# Patient Record
Sex: FEMALE | Race: WHITE | Employment: OTHER | URBAN - METROPOLITAN AREA
[De-identification: names, ages, dates, MRNs, and addresses within clinical notes are randomized per-mention and may not be internally consistent; named-entity substitution may affect disease eponyms.]

---

## 2017-01-12 PROBLEM — Z01.810 PRE-OPERATIVE CARDIOVASCULAR EXAMINATION: Status: ACTIVE | Noted: 2017-01-12

## 2017-01-12 PROBLEM — R06.02 SHORTNESS OF BREATH: Status: ACTIVE | Noted: 2017-01-12

## 2017-01-27 ENCOUNTER — HOSPITAL ENCOUNTER (OUTPATIENT)
Dept: MAMMOGRAPHY | Age: 77
Discharge: HOME | End: 2017-01-27
Attending: INTERNAL MEDICINE
Payer: MEDICARE

## 2017-01-27 DIAGNOSIS — Z12.31 VISIT FOR SCREENING MAMMOGRAM: ICD-10-CM

## 2017-01-27 PROCEDURE — 77067 SCR MAMMO BI INCL CAD: CPT

## 2017-04-04 ENCOUNTER — HOSPITAL ENCOUNTER (OUTPATIENT)
Dept: PHYSICAL THERAPY | Age: 77
Discharge: HOME | End: 2017-04-04
Payer: MEDICARE

## 2017-04-04 PROCEDURE — G8978 MOBILITY CURRENT STATUS: HCPCS

## 2017-04-04 PROCEDURE — 97162 PT EVAL MOD COMPLEX 30 MIN: CPT

## 2017-04-04 PROCEDURE — 97110 THERAPEUTIC EXERCISES: CPT

## 2017-04-04 PROCEDURE — G8979 MOBILITY GOAL STATUS: HCPCS

## 2017-04-04 NOTE — PROGRESS NOTES
Ambulatory/Rehab Services H2 Model Falls Risk Assessment    Risk Factor Pts. ·   Confusion/Disorientation/Impulsivity  []    4 ·   Symptomatic Depression  []   2 ·   Altered Elimination  []   1 ·   Dizziness/Vertigo  []   1 ·   Gender (Male)  []   1 ·   Any administered antiepileptics (anticonvulsants):  []   2 ·   Any administered benzodiazepines:  []   1 ·   Visual Impairment (specify):  []   1 ·   Portable Oxygen Use  []   1 ·   Orthostatic ? BP  []   1 ·   History of Recent Falls (within 3 mos.)  []   5     Ability to Rise from Chair (choose one) Pts. ·   Ability to rise in a single movement  [x]   0 ·   Pushes up, successful in one attempt  []   1 ·   Multiple attempts, but successful  []   3 ·   Unable to rise without assistance  []   4   Total: (5 or greater = High Risk) 0     Falls Prevention Plan:   []                Physical Limitations to Exercise (specify):   []                Mobility Assistance Device (type):   []                Exercise/Equipment Adaptation (specify):    ©2010 [facility] [address] [place] [address] Patent #[id number].  Federal Law prohibits the replication, distribution or use without written permission from [facility]

## 2017-04-04 NOTE — PROGRESS NOTES
Keiko Eugene  : [id number] [place] @ [address] [place] [address].  [phone]   [phone]        OUTPATIENT PHYSICAL THERAPY:Initial Assessment 2017      ICD-10: Treatment Diagnosis: Effusion, right hip (M25.451)Strain of muscle, fascia and tendon of the posterior muscle group at thigh level, left thigh, sequela (N22.963Y)  Precautions/Allergies:   Adhesive tape and Codeine  Fall Risk Score: 0 (? 5 = High Risk)  MD Orders: Eval and treat MEDICAL/REFERRING DIAGNOSIS:  Right hip pain [M25.551]   DATE OF ONSET: 2017  REFERRING PHYSICIAN: Paige Rob MD  RETURN PHYSICIAN APPOINTMENT: 5/15/2017     INITIAL ASSESSMENT:  Ms. Sobeida Shell presents with right hamstring muscle strain and spinal scoliosis. Nerve tension in the right leg was ruled out with negative slump and passive straight leg raise. She also demonstrates poor core and gluteal strength. She has secondary impairment of poor balance, increasing her fall risk. She will benefit from aquatic based therapy to improve strength and muscle flexibility. Recommend transition to land based therapy when she demonstrates improved functional strength and balance. She was educated on HEP to perform between sessions. PROBLEM LIST (Impacting functional limitations):  1. Decreased Strength  2. Decreased Transfer Abilities  3. Decreased Ambulation Ability/Technique  4. Decreased Balance  5. Increased Pain  6. Decreased Flexibility/Joint Mobility INTERVENTIONS PLANNED:  1. Balance Exercise  2. Electrical Stimulation  3. Gait Training  4. Heat  5. Home Exercise Program (HEP)  6. Manual Therapy  7. Neuromuscular Re-education/Strengthening  8. Range of Motion (ROM)  9. Therapeutic Activites  10. Therapeutic Exercise/Strengthening  11. Aquatics   TREATMENT PLAN:  Effective Dates: 2017 TO 2017.   Frequency/Duration: 2 times a week for 12 weeks  GOALS: (Goals have been discussed and agreed upon with patient.)  Short-Term Functional Goals: Time Frame: 2 weeks  1. Patient will be independent with HEP without pain. 2. Patient will report ability to turn while sleeping without pain. 3. Patient will reports ability to walk 1/2 mile without limp or increased pain. Discharge Goals: Time Frame: 12 weeks  1. Patient will have LEFS improved to 50/80 allowing improve community participation. 2. Patient will have mmt of bilateral LE improve to 5-/5 allowing her to ascend/descend flight of stairs without increased pain. 3. Patient will have BBS improved to > 44 decreasing her fall risk. Rehabilitation Potential For Stated Goals: Good  Regarding [facility] [employer] therapy, I certify that the treatment plan above will be carried out by a therapist or under their direction. Thank you for this referral,  Veronika Harrell PT       Referring Physician Signature: Jimmy Camarillo MD              Date                      HISTORY:   History of Present Injury/Illness (Reason for Referral):  63 y/o F with c/o LBP R hip pain lasting the last 2 months. She had chiropractor treatment that helped. Her pain is 7/10 low back and hip constant and is better than it was 2 months ago. . She had L THIERNO 1 year ago without PT. She reports poor balance but no falls. She has scoliosis that has gotten worse lately. Her pain limits her ability to walk > 1/2 mile and turn over in bed. Past Medical History/Comorbidities:   Ms. 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 She also has no past medical history of Adverse effect of anesthesia; Aneurysm ([address] Arrhythmia; Asthma; Autoimmune disease ([address] Cancer ([address] Chronic kidney disease; Chronic obstructive pulmonary disease ([address] Chronic pain; Coagulation disorder ([address] Diabetes ([address] Difficult intubation; Endocarditis; GERD (gastroesophageal reflux disease); Heart failure ([address] Ill-defined condition; Liver disease; Malignant hyperthermia due to anesthesia; Morbid obesity ([address] Pseudocholinesterase deficiency; Rheumatic fever; Seizures ([address] Sleep apnea; Stroke [facility]); or Thromboembolus ([address] Ms. Ann Marie Cook  has a past surgical history that includes appendectomy (1987); hysterectomy (1988); hip replacement (Left, 2007); bunionectomy (Bilateral, 2009); hammer toe repair (2010); lumbar laminectomy (2009); orthopaedic (2010); hip replacement (Left, 2009); knee replacement (Right, 2007); shoulder arthroscopy (2013); carpal tunnel release (Right, 10/31/13); and breast biopsy (Left, 1980). Social History/Living Environment:     no stairs, lives with spouse  Prior Level of Function/Work/Activity:  Independent  Dominant Side:         RIGHT  Current Medications:    Current Outpatient Prescriptions:     LORazepam (ATIVAN) 0.5 mg tablet, Take 0.5 mg by mouth., Disp: , Rfl:     estradiol (ESTRACE) 1 mg tablet, Take 1 mg by mouth., Disp: , Rfl:     simvastatin (ZOCOR) 10 mg tablet, Take 10 mg by mouth nightly., Disp: , Rfl:     PARoxetine (PAXIL) 20 mg tablet, Take 20 mg by mouth daily. Take / use AM day of surgery  per anesthesia protocols. , Disp: , Rfl:     cetirizine (ALLER-TALON) 10 mg tablet, Take 10 mg by mouth daily. Take / use AM day of surgery  per anesthesia protocols. , Disp: , Rfl:     fluticasone (FLONASE) 50 mcg/actuation nasal spray, 2 Sprays by Both Nostrils route daily.  Take morning of surgery per anesthesia guidelines. , Disp: , Rfl:    Date Last Reviewed:  4/4/2017   # of Personal Factors/Comorbidities that affect the Plan of Care: 3+: HIGH COMPLEXITY   EXAMINATION:   Observation/Orthostatic Postural Assessment:          Scoliosis concavity T/S to right with severe right sided shoulder shift. Palpation:          WNL  ROM:     Hip and Lumbar AROM WNL without pain  Right hamstring severe muscle flexibility resitrction                  Strength:     Date:  4/4/2017 Date:   Date:     LE MMT Left Right Left Right Left Right   Hip Flex (L1/L2) 3+ 3+       Hip Abd (glut med) 3 3       Hip Ext 3+ 3+       Quad    ( L3/4) 4+ 4+       Hamstring         Anterior Tib (L4/L5)         Gastroc (S1/2)         EHL (L5)                             Special Tests: n/a  Neurological Screen:        Sensation: light touch intact B LE  Functional Mobility:         Gait/Ambulation:  Right trendelenberg, flat foot contact B  Balance:          BBS 37        TUG 25 sec        5x STS 23 sec        SLB: unable B   Body Structures Involved:  1. Joints  2. Muscles Body Functions Affected:  1. Neuromusculoskeletal  2. Movement Related Activities and Participation Affected:  1. Mobility  2. Community, Social and [place]   # of elements that affect the Plan of Care: 3: MODERATE COMPLEXITY   CLINICAL PRESENTATION:   Presentation: Evolving clinical presentation with changing clinical characteristics: MODERATE COMPLEXITY   CLINICAL DECISION MAKING:   Outcome Measure: Tool Used: Lower Extremity Functional Scale (LEFS)  Score:  Initial: 39/80 Most Recent: X/80 (Date: -- )   Interpretation of Score: 20 questions each scored on a 5 point scale with 0 representing \"extreme difficulty or unable to perform\" and 4 representing \"no difficulty\". The lower the score, the greater the functional disability. 80/80 represents no disability. Minimal detectable change is 9 points.   Score 80 79-63 62-48 47-32 31-16 15-1 0 Modifier CH CI CJ CK CL CM CN     ? Mobility - Walking and Moving Around:     - CURRENT STATUS: CK - 40%-59% impaired, limited or restricted    - GOAL STATUS: CI - 1%-19% impaired, limited or restricted    - D/C STATUS:  ---------------To be determined---------------    Medical Necessity:   · Patient is expected to demonstrate progress in strength, range of motion, balance, coordination and functional technique to decrease assistance required with functional mobility. .  Reason for Services/Other Comments:  · Patient continues to require skilled intervention due to progressing toward goals. .   Use of outcome tool(s) and clinical judgement create a POC that gives a: Questionable prediction of patient's progress: MODERATE COMPLEXITY   TREATMENT:   (In addition to Assessment/Re-Assessment sessions the following treatments were rendered)  MODALITIES: (see grid for  minutes): *  Electrical Stimulation Therapy (IFC) was provided with intensity adjusted throughout treatment to patient tolerance. to reduce pain. *  Cold Pack Therapy in order to provide analgesia and reduce inflammation and edema. *  Hot Pack Therapy in order to relieve muscle spasm. Therapeutic Exercise: (see flow sheet below for minutes) ( ):  Exercises per grid below to improve mobility, strength, balance, coordination and dynamic movement of buttock - right, hip - right and thigh - right to improve functional bending and gait, stairs, transfers. Required moderate visual, verbal, manual and tactile cues to promote proper body alignment, promote proper body posture, promote proper body mechanics and promote proper body breathing techniques. Progressed resistance, range, repetitions and complexity of movement as indicated. Aquatic Therapy (see flow sheet below for minutes):  Aquatic treatment performed per flow grid for Decreased muscle strength, Decreased static/dynamic balance and reactive control, Decreased activity endurance, Ease of movement and Low impact and reduced weight bearing activity. Cues provided for body mechanics. Assistance by therapist provided for safety. Patient has difficulty with body mechanics. Date: 4/4/2017       Modalities:                                Manual Therapy:                                Aquatic Exercise:        Gait F/B/S/M        Heel/Toe walk        4-way        PF/DF        Hamstring Curls        Hip Flexion with knee ext. (rockette)        Squats          SLR march        Lunges        Hip circles        Hip horizontal abduction/adduction        Core:          Core:        Deep well bike        Deep well jumping gilda        Deep well scissors        Deep well:  traction                Hamstring Stretch        Step Lunge        Piriformis stretch        PF Stretch        Balance: Single leg Stance        Balance: Tandem                          Therapeutic Exercise: 15 mins       Active HS stretch 30x       Fig 4 stretch 3x30\"       Knee press single leg 10\"x5       PPT 10\"x5       SL clams 3x10               F=forward  B=Backward  S=sidesteps  M=marches    H=hold    Manual: (see flow sheet above for minutes) to reduce pain  Modalities: (see flow sheet above for minutes) to reduce pain    HEP: Handout provided 4/4/2017  Treatment/Session Assessment:  She tolerated rx without c/o of pain. She had moderate difficulty with body mechanics. · Pain/ Symptoms: Initial:   7/10 Post Session:  5/10 ·   Compliance with Program/Exercises: Will assess as treatment progresses. · Recommendations/Intent for next treatment session: \"Next visit will focus on advancements to more challenging activities\".   Total Treatment Duration:  PT Patient Time In/Time Out  Time In: 1340  Time Out: [address], PT

## 2017-04-05 NOTE — PROGRESS NOTES
Jossie Esquivel  : 1940 [id number] @ [place] [address].  [phone]   [phone]        OUTPATIENT PHYSICAL THERAPY:Initial Assessment 2017      ICD-10: Treatment Diagnosis: Effusion, right hip (M25.451)Strain of muscle, fascia and tendon of the posterior muscle group at thigh level, left thigh, sequela (N40.859G)  Precautions/Allergies:   Adhesive tape and Codeine  Fall Risk Score: 0 (? 5 = High Risk)  MD Orders: Eval and treat MEDICAL/REFERRING DIAGNOSIS:  Right hip pain [M25.551]   DATE OF ONSET: 2017  REFERRING PHYSICIAN: Mateo Almonte MD  RETURN PHYSICIAN APPOINTMENT: 5/15/2017     INITIAL ASSESSMENT:  Ms. Robbie Lee presents with right hamstring muscle strain and spinal scoliosis. Nerve tension in the right leg was ruled out with negative slump and passive straight leg raise. She also demonstrates poor core and gluteal strength. She has secondary impairment of poor balance, increasing her fall risk. She will benefit from aquatic based therapy to improve strength and muscle flexibility. Recommend transition to land based therapy when she demonstrates improved functional strength and balance. She was educated on HEP to perform between sessions. PROBLEM LIST (Impacting functional limitations):  1. Decreased Strength  2. Decreased Transfer Abilities  3. Decreased Ambulation Ability/Technique  4. Decreased Balance  5. Increased Pain  6. Decreased Flexibility/Joint Mobility INTERVENTIONS PLANNED:  1. Balance Exercise  2. Electrical Stimulation  3. Gait Training  4. Heat  5. Home Exercise Program (HEP)  6. Manual Therapy  7. Neuromuscular Re-education/Strengthening  8. Range of Motion (ROM)  9. Therapeutic Activites  10. Therapeutic Exercise/Strengthening  11. Aquatics   TREATMENT PLAN:  Effective Dates: 2017 TO 2017.   Frequency/Duration: 2 times a week for 12 weeks  GOALS: (Goals have been discussed and agreed upon with patient.)  Short-Term Functional Goals: Time Frame: 2 weeks  1. Patient will be independent with HEP without pain. 2. Patient will report ability to turn while sleeping without pain. 3. Patient will reports ability to walk 1/2 mile without limp or increased pain. Discharge Goals: Time Frame: 12 weeks  1. Patient will have LEFS improved to 50/80 allowing improve community participation. 2. Patient will have mmt of bilateral LE improve to 5-/5 allowing her to ascend/descend flight of stairs without increased pain. 3. Patient will have BBS improved to > 44 decreasing her fall risk. Rehabilitation Potential For Stated Goals: Good                HISTORY:   History of Present Injury/Illness (Reason for Referral):  63 y/o F with c/o LBP R hip pain lasting the last 2 months. She had chiropractor treatment that helped. Her pain is 7/10 low back and hip constant and is better than it was 2 months ago. . She had L THIERNO 1 year ago without PT. She reports poor balance but no falls. She has scoliosis that has gotten worse lately. Her pain limits her ability to walk > 1/2 mile and turn over in bed. Past Medical History/Comorbidities:   Ms. Nano Culver  has a past medical history of Arthritis; Malagon's esophagus; Chest discomfort; Coronary atherosclerosis of native coronary vessel; Degeneration of lumbar or lumbosacral intervertebral disc; Depression; Gastric ulcer, unspecified as acute or chronic, without mention of hemorrhage or perforation (1989); Hyperlipidemia; Hypothyroidism; Infectious disease; Nausea & vomiting; Palpitations; Scoliosis (and kyphoscoliosis), idiopathic; Spinal stenosis, lumbar (12/7/2009); Status post right hip replacement (2/26/2016); Thoracic or lumbosacral neuritis or radiculitis, unspecified; Tricuspid regurgitation; and Tricuspid valve disorder. She also has no past medical history of Adverse effect of anesthesia; Aneurysm ([address] Arrhythmia; Asthma;  Autoimmune disease ([address] [address] Chronic kidney disease; Chronic obstructive pulmonary disease ([facility] [address] Chronic pain; Coagulation disorder ([facility] [address] Diabetes ([facility] [address] Difficult intubation; Endocarditis; GERD (gastroesophageal reflux disease); Heart failure ([facility] [address] Ill-defined condition; Liver disease; Malignant hyperthermia due to anesthesia; Morbid obesity ([facility] [address] Pseudocholinesterase deficiency; Rheumatic fever; Seizures ([facility] [address] Sleep apnea; Stroke [facility]); or Thromboembolus ([facility] [address] Ms. Shalonda Olivas  has a past surgical history that includes appendectomy (1987); hysterectomy (1988); hip replacement (Left, 2007); bunionectomy (Bilateral, 2009); hammer toe repair (2010); lumbar laminectomy (2009); orthopaedic (2010); hip replacement (Left, 2009); knee replacement (Right, 2007); shoulder arthroscopy (2013); carpal tunnel release (Right, 10/31/13); and breast biopsy (Left, 1980). Social History/Living Environment:     no stairs, lives with spouse  Prior Level of Function/Work/Activity:  Independent  Dominant Side:         RIGHT  Current Medications:    Current Outpatient Prescriptions:     LORazepam (ATIVAN) 0.5 mg tablet, Take 0.5 mg by mouth., Disp: , Rfl:     estradiol (ESTRACE) 1 mg tablet, Take 1 mg by mouth., Disp: , Rfl:     simvastatin (ZOCOR) 10 mg tablet, Take 10 mg by mouth nightly., Disp: , Rfl:     PARoxetine (PAXIL) 20 mg tablet, Take 20 mg by mouth daily. Take / use AM day of surgery  per anesthesia protocols. , Disp: , Rfl:     cetirizine (ALLER-TALON) 10 mg tablet, Take 10 mg by mouth daily. Take / use AM day of surgery  per anesthesia protocols. , Disp: , Rfl:     fluticasone (FLONASE) 50 mcg/actuation nasal spray, 2 Sprays by Both Nostrils route daily. Take morning of surgery per anesthesia guidelines. , Disp: , Rfl:    Date Last Reviewed:  4/6/2017   EXAMINATION:   Observation/Orthostatic Postural Assessment:          Scoliosis concavity T/S to right with severe right sided shoulder shift.    Palpation:          WNL  ROM:     Hip and Lumbar AROM WNL without pain  Right hamstring severe muscle flexibility resitrction                  Strength:     Date:  4/4/2017 Date:   Date:     LE MMT Left Right Left Right Left Right   Hip Flex (L1/L2) 3+ 3+       Hip Abd (glut med) 3 3       Hip Ext 3+ 3+       Quad    ( L3/4) 4+ 4+       Hamstring         Anterior Tib (L4/L5)         Gastroc (S1/2)         EHL (L5)                             Special Tests: n/a  Neurological Screen:        Sensation: light touch intact B LE  Functional Mobility:         Gait/Ambulation:  Right trendelenberg, flat foot contact B  Balance:          BBS 37        TUG 25 sec        5x STS 23 sec        SLB: unable B   Body Structures Involved:  1. Joints  2. Muscles Body Functions Affected:  1. Neuromusculoskeletal  2. Movement Related Activities and Participation Affected:  1. Mobility  2. Community, Social and Civic Life   CLINICAL PRESENTATION:   CLINICAL DECISION MAKING:   Outcome Measure: Tool Used: Lower Extremity Functional Scale (LEFS)  Score:  Initial: 39/80 Most Recent: X/80 (Date: -- )   Interpretation of Score: 20 questions each scored on a 5 point scale with 0 representing \"extreme difficulty or unable to perform\" and 4 representing \"no difficulty\". The lower the score, the greater the functional disability. 80/80 represents no disability. Minimal detectable change is 9 points. Score 80 79-63 62-48 47-32 31-16 15-1 0   Modifier CH CI CJ CK CL CM CN     ? Mobility - Walking and Moving Around:     - CURRENT STATUS: CK - 40%-59% impaired, limited or restricted    - GOAL STATUS: CI - 1%-19% impaired, limited or restricted    - D/C STATUS:  ---------------To be determined---------------    Medical Necessity:   · Patient is expected to demonstrate progress in strength, range of motion, balance, coordination and functional technique to decrease assistance required with functional mobility.  .  Reason for Services/Other Comments:  · Patient continues to require skilled intervention due to progressing toward goals. .   TREATMENT:   (In addition to Assessment/Re-Assessment sessions the following treatments were rendered)  MODALITIES: (see grid for  minutes): *  Electrical Stimulation Therapy (IFC) was provided with intensity adjusted throughout treatment to patient tolerance. to reduce pain. *  Cold Pack Therapy in order to provide analgesia and reduce inflammation and edema. *  Hot Pack Therapy in order to relieve muscle spasm. Therapeutic Exercise: (see flow sheet below for minutes) ( ):  Exercises per grid below to improve mobility, strength, balance, coordination and dynamic movement of buttock - right, hip - right and thigh - right to improve functional bending and gait, stairs, transfers. Required moderate visual, verbal, manual and tactile cues to promote proper body alignment, promote proper body posture, promote proper body mechanics and promote proper body breathing techniques. Progressed resistance, range, repetitions and complexity of movement as indicated. Aquatic Therapy (see flow sheet below for minutes): Aquatic treatment performed per flow grid for Decreased muscle strength, Decreased static/dynamic balance and reactive control, Decreased activity endurance, Ease of movement and Low impact and reduced weight bearing activity. Cues provided for body mechanics. Assistance by therapist provided for safety. Patient has difficulty with body mechanics. Date: 4/4/2017  (visit 1) 4/6/2017  (visit 2)      Modalities:                                Manual Therapy:                                Aquatic Exercise:  35 mins 1.5#      Gait F/B/S/M  3 laps      Heel/Toe walk  30x       Squats  30x      4-way        PF/DF        Hamstring Curls        Hip Flexion with knee ext.   (rockette)        Squats          SLR march        Lunges        Hip circles        Hip horizontal abduction/adduction        Core:    Paddles open 30x PDs      Core:        Deep well bike  5 min      Deep well jumping gilda        Deep well scissors        Deep well:  traction                Hamstring Stretch        Step Lunge        Piriformis stretch        PF Stretch        Balance: Single leg Stance        Balance: Tandem                          Therapeutic Exercise: 15 mins       Active HS stretch 30x       Fig 4 stretch 3x30\"       Knee press single leg 10\"x5       PPT 10\"x5       SL clams 3x10               F=forward  B=Backward  S=sidesteps  M=marches    H=hold    Manual: (see flow sheet above for minutes) to reduce pain  Modalities: (see flow sheet above for minutes) to reduce pain    HEP: Handout provided 4/4/2017   Treatment/Session Assessment:  She reported some soreness after first session that went away after a day. She was able to perform HEP 3 times yesterday without pain. She tolerated exercise in pool without pain. · Pain/ Symptoms: Initial:   6/10 Post Session:  5/10 ·   Compliance with Program/Exercises: Will assess as treatment progresses. · Recommendations/Intent for next treatment session: \"Next visit will focus on advancements to more challenging activities\".   Total Treatment Duration:  PT Patient Time In/Time Out  Time In: 4856  Time Out: [address] [facility],

## 2017-04-06 ENCOUNTER — HOSPITAL ENCOUNTER (OUTPATIENT)
Dept: PHYSICAL THERAPY | Age: 77
Discharge: HOME | End: 2017-04-06
Payer: MEDICARE

## 2017-04-06 PROCEDURE — 97113 AQUATIC THERAPY/EXERCISES: CPT

## 2017-04-21 ENCOUNTER — HOSPITAL ENCOUNTER (OUTPATIENT)
Dept: PHYSICAL THERAPY | Age: 77
Discharge: HOME | End: 2017-04-21
Payer: MEDICARE

## 2017-04-21 PROCEDURE — 97113 AQUATIC THERAPY/EXERCISES: CPT

## 2017-04-21 NOTE — PROGRESS NOTES
Chyrl Lm  : 1940 [address] @ [address] [place] [address].  [phone]   [phone]        OUTPATIENT PHYSICAL THERAPY:Daily Note 2017      ICD-10: Treatment Diagnosis: Effusion, right hip (M25.451)Strain of muscle, fascia and tendon of the posterior muscle group at thigh level, left thigh, sequela (V80.354T)  Precautions/Allergies:   Adhesive tape and Codeine  Fall Risk Score: 0 (? 5 = High Risk)  MD Orders: Eval and treat MEDICAL/REFERRING DIAGNOSIS:  Right hip pain [M25.551]   DATE OF ONSET: 2017  REFERRING PHYSICIAN: Jimmy Camarillo MD  RETURN PHYSICIAN APPOINTMENT: 5/15/2017     INITIAL ASSESSMENT:  Ms. Bong Joseph presents with right hamstring muscle strain and spinal scoliosis. Nerve tension in the right leg was ruled out with negative slump and passive straight leg raise. She also demonstrates poor core and gluteal strength. She has secondary impairment of poor balance, increasing her fall risk. She will benefit from aquatic based therapy to improve strength and muscle flexibility. Recommend transition to land based therapy when she demonstrates improved functional strength and balance. She was educated on HEP to perform between sessions. PROBLEM LIST (Impacting functional limitations):  1. Decreased Strength  2. Decreased Transfer Abilities  3. Decreased Ambulation Ability/Technique  4. Decreased Balance  5. Increased Pain  6. Decreased Flexibility/Joint Mobility INTERVENTIONS PLANNED:  1. Balance Exercise  2. Electrical Stimulation  3. Gait Training  4. Heat  5. Home Exercise Program (HEP)  6. Manual Therapy  7. Neuromuscular Re-education/Strengthening  8. Range of Motion (ROM)  9. Therapeutic Activites  10. Therapeutic Exercise/Strengthening  11. Aquatics   TREATMENT PLAN:  Effective Dates: 2017 TO 2017.   Frequency/Duration: 2 times a week for 12 weeks  GOALS: (Goals have been discussed and agreed upon with patient.)  Short-Term Functional Goals: Time Frame: 2 weeks  1. Patient will be independent with HEP without pain. 2. Patient will report ability to turn while sleeping without pain. 3. Patient will reports ability to walk 1/2 mile without limp or increased pain. Discharge Goals: Time Frame: 12 weeks  1. Patient will have LEFS improved to 50/80 allowing improve community participation. 2. Patient will have mmt of bilateral LE improve to 5-/5 allowing her to ascend/descend flight of stairs without increased pain. 3. Patient will have BBS improved to > 44 decreasing her fall risk. Rehabilitation Potential For Stated Goals: Good                HISTORY:   History of Present Injury/Illness (Reason for Referral):  65 y/o F with c/o LBP R hip pain lasting the last 2 months. She had chiropractor treatment that helped. Her pain is 7/10 low back and hip constant and is better than it was 2 months ago. . She had L THEIRNO 1 year ago without PT. She reports poor balance but no falls. She has scoliosis that has gotten worse lately. Her pain limits her ability to walk > 1/2 mile and turn over in bed. Past Medical History/Comorbidities:   Ms. Miladys Fletcher  has a past medical history of Arthritis; Malagon's esophagus; Chest discomfort; Coronary atherosclerosis of native coronary vessel; Degeneration of lumbar or lumbosacral intervertebral disc; Depression; Gastric ulcer, unspecified as acute or chronic, without mention of hemorrhage or perforation (1989); Hyperlipidemia; Hypothyroidism; Infectious disease; Nausea & vomiting; Palpitations; Scoliosis (and kyphoscoliosis), idiopathic; Spinal stenosis, lumbar (12/7/2009); Status post right hip replacement (2/26/2016); Thoracic or lumbosacral neuritis or radiculitis, unspecified; Tricuspid regurgitation; and Tricuspid valve disorder. She also has no past medical history of Adverse effect of anesthesia; Aneurysm ([address] Arrhythmia; Asthma; Autoimmune disease ([address]  Cancer ([facility] [address] Chronic kidney disease; Chronic obstructive pulmonary disease ([address] Chronic pain; Coagulation disorder ([address] Diabetes ([address] Difficult intubation; Endocarditis; GERD (gastroesophageal reflux disease); Heart failure ([address] Ill-defined condition; Liver disease; Malignant hyperthermia due to anesthesia; Morbid obesity ([facility] [address] Pseudocholinesterase deficiency; Rheumatic fever; Seizures ([facility] [address] Sleep apnea; Stroke [facility]); or Thromboembolus ([facility] [address] Ms. Natasha Dawn  has a past surgical history that includes appendectomy (1987); hysterectomy (1988); hip replacement (Left, 2007); bunionectomy (Bilateral, 2009); hammer toe repair (2010); lumbar laminectomy (2009); orthopaedic (2010); hip replacement (Left, 2009); knee replacement (Right, 2007); shoulder arthroscopy (2013); carpal tunnel release (Right, 10/31/13); and breast biopsy (Left, 1980). Social History/Living Environment:     no stairs, lives with spouse  Prior Level of Function/Work/Activity:  Independent  Dominant Side:         RIGHT  Current Medications:    Current Outpatient Prescriptions:     LORazepam (ATIVAN) 0.5 mg tablet, Take 0.5 mg by mouth., Disp: , Rfl:     estradiol (ESTRACE) 1 mg tablet, Take 1 mg by mouth., Disp: , Rfl:     simvastatin (ZOCOR) 10 mg tablet, Take 10 mg by mouth nightly., Disp: , Rfl:     PARoxetine (PAXIL) 20 mg tablet, Take 20 mg by mouth daily. Take / use AM day of surgery  per anesthesia protocols. , Disp: , Rfl:     cetirizine (ALLER-TALON) 10 mg tablet, Take 10 mg by mouth daily. Take / use AM day of surgery  per anesthesia protocols. , Disp: , Rfl:     fluticasone (FLONASE) 50 mcg/actuation nasal spray, 2 Sprays by Both Nostrils route daily. Take morning of surgery per anesthesia guidelines. , Disp: , Rfl:    Date Last Reviewed:  4/21/2017   EXAMINATION:   Observation/Orthostatic Postural Assessment:          Scoliosis concavity T/S to right with severe right sided shoulder shift.    Palpation:          WNL  ROM:     Hip and Lumbar AROM WNL without pain  Right hamstring severe muscle flexibility resitrction                  Strength:     Date:  4/4/2017 Date:   Date:     LE MMT Left Right Left Right Left Right   Hip Flex (L1/L2) 3+ 3+       Hip Abd (glut med) 3 3       Hip Ext 3+ 3+       Quad    ( L3/4) 4+ 4+       Hamstring         Anterior Tib (L4/L5)         Gastroc (S1/2)         EHL (L5)                             Special Tests: n/a  Neurological Screen:        Sensation: light touch intact B LE  Functional Mobility:         Gait/Ambulation:  Right trendelenberg, flat foot contact B  Balance:          BBS 37        TUG 25 sec        5x STS 23 sec        SLB: unable B   Body Structures Involved:  1. Joints  2. Muscles Body Functions Affected:  1. Neuromusculoskeletal  2. Movement Related Activities and Participation Affected:  1. Mobility  2. Community, Social and Civic Life   CLINICAL PRESENTATION:   CLINICAL DECISION MAKING:   Outcome Measure: Tool Used: Lower Extremity Functional Scale (LEFS)  Score:  Initial: 39/80 Most Recent: X/80 (Date: -- )   Interpretation of Score: 20 questions each scored on a 5 point scale with 0 representing \"extreme difficulty or unable to perform\" and 4 representing \"no difficulty\". The lower the score, the greater the functional disability. 80/80 represents no disability. Minimal detectable change is 9 points. Score 80 79-63 62-48 47-32 31-16 15-1 0   Modifier CH CI CJ CK CL CM CN     ? Mobility - Walking and Moving Around:     - CURRENT STATUS: CK - 40%-59% impaired, limited or restricted    - GOAL STATUS: CI - 1%-19% impaired, limited or restricted    - D/C STATUS:  ---------------To be determined---------------    Medical Necessity:   · Patient is expected to demonstrate progress in strength, range of motion, balance, coordination and functional technique to decrease assistance required with functional mobility.  .  Reason for Services/Other Comments:  · Patient continues to require skilled intervention due to progressing toward goals. .   TREATMENT:   (In addition to Assessment/Re-Assessment sessions the following treatments were rendered)  MODALITIES: (see grid for  minutes): *  Electrical Stimulation Therapy (IFC) was provided with intensity adjusted throughout treatment to patient tolerance. to reduce pain. *  Cold Pack Therapy in order to provide analgesia and reduce inflammation and edema. *  Hot Pack Therapy in order to relieve muscle spasm. Therapeutic Exercise: (see flow sheet below for minutes) ( ):  Exercises per grid below to improve mobility, strength, balance, coordination and dynamic movement of buttock - right, hip - right and thigh - right to improve functional bending and gait, stairs, transfers. Required moderate visual, verbal, manual and tactile cues to promote proper body alignment, promote proper body posture, promote proper body mechanics and promote proper body breathing techniques. Progressed resistance, range, repetitions and complexity of movement as indicated. Aquatic Therapy (see flow sheet below for minutes): Aquatic treatment performed per flow grid for Decreased muscle strength, Decreased static/dynamic balance and reactive control, Decreased activity endurance, Ease of movement and Low impact and reduced weight bearing activity. Cues provided for body mechanics. Assistance by therapist provided for safety. Patient has difficulty with body mechanics. Date: 4/4/2017  (visit 1) 4/6/2017  (visit 2) 4/21/17     Modalities:                                Manual Therapy:                                Aquatic Exercise:  35 mins 1.5# 1.5# 55 min     Gait F/B/S/M  3 laps x4L     Heel/Toe walk  30x       Squats  30x x30     4-way   x10 BLE F/B only     PF/DF        Hamstring Curls   x2L (limited flexion)     Hip Flexion with knee ext.   (rockette)                  SLR march   x4L     Lunges        Hip circles        Hip horizontal abduction/adduction        Core:    Paddles open 30x PDs      Core:        Deep well bike  5 min 4 min     Deep well jumping gilda   2 min     Deep well scissors   2 min     Deep well:  traction   5 min             Hamstring Stretch        Step Lunge        Piriformis stretch        PF Stretch        Balance: Single leg Stance        Balance: Tandem                          Therapeutic Exercise: 15 mins       Active HS stretch 30x       Fig 4 stretch 3x30\"       Knee press single leg 10\"x5       PPT 10\"x5       SL clams 3x10               F=forward  B=Backward  S=sidesteps  M=marches    H=hold    Manual: (see flow sheet above for minutes)   Modalities: (see flow sheet above for minutes)     HEP: Handout provided 4/4/2017   Treatment/Session Assessment: Pt returns after not showing last two visits stating she did not know she had the appts. Continued aquatics with 1.5# and tolerated well. Had cramping in LLE with HS curl which subsided by limiting flexion motion. Plan to continue POC and progress as tolerated     · Pain/ Symptoms: Initial:   0/10 Pt reports no pain but states she has been nauseated since waking this morning, denies SOB or upset stomach. Post Session:  5/10 ·   Compliance with Program/Exercises: Will assess as treatment progresses. · Recommendations/Intent for next treatment session: \"Next visit will focus on advancements to more challenging activities\".   Total Treatment Duration:   PT Patient Time In/Time Out  Time In: 1100  Time Out: Brendan Stanford PTA

## 2017-04-26 ENCOUNTER — HOSPITAL ENCOUNTER (OUTPATIENT)
Dept: PHYSICAL THERAPY | Age: 77
Discharge: HOME | End: 2017-04-26
Payer: MEDICARE

## 2017-04-26 PROCEDURE — 97113 AQUATIC THERAPY/EXERCISES: CPT

## 2017-04-26 NOTE — PROGRESS NOTES
Samantha Patino  : 1940 [address] [facility],[address] @ [address]  [address] [place] [address].  [phone]   [phone]        OUTPATIENT PHYSICAL THERAPY:Daily Note 2017      ICD-10: Treatment Diagnosis: Effusion, right hip (M25.451)Strain of muscle, fascia and tendon of the posterior muscle group at thigh level, left thigh, sequela (M07.323L)  Precautions/Allergies:   Adhesive tape and Codeine  Fall Risk Score: 0 (? 5 = High Risk)  MD Orders: Eval and treat MEDICAL/REFERRING DIAGNOSIS:  Right hip pain [M25.551]   DATE OF ONSET: 2017  REFERRING PHYSICIAN: Aretha Osorio MD  RETURN PHYSICIAN APPOINTMENT: 5/15/2017     INITIAL ASSESSMENT:  Ms. Graciela Nguyen presents with right hamstring muscle strain and spinal scoliosis. Nerve tension in the right leg was ruled out with negative slump and passive straight leg raise. She also demonstrates poor core and gluteal strength. She has secondary impairment of poor balance, increasing her fall risk. She will benefit from aquatic based therapy to improve strength and muscle flexibility. Recommend transition to land based therapy when she demonstrates improved functional strength and balance. She was educated on HEP to perform between sessions. PROBLEM LIST (Impacting functional limitations):  1. Decreased Strength  2. Decreased Transfer Abilities  3. Decreased Ambulation Ability/Technique  4. Decreased Balance  5. Increased Pain  6. Decreased Flexibility/Joint Mobility INTERVENTIONS PLANNED:  1. Balance Exercise  2. Electrical Stimulation  3. Gait Training  4. Heat  5. Home Exercise Program (HEP)  6. Manual Therapy  7. Neuromuscular Re-education/Strengthening  8. Range of Motion (ROM)  9. Therapeutic Activites  10. Therapeutic Exercise/Strengthening  11. Aquatics   TREATMENT PLAN:  Effective Dates: 2017 TO 2017.   Frequency/Duration: 2 times a week for 12 weeks  GOALS: (Goals have been discussed and agreed upon with patient.)  Short-Term Functional Goals: Time Frame: 2 weeks  1. Patient will be independent with HEP without pain. 2. Patient will report ability to turn while sleeping without pain. 3. Patient will reports ability to walk 1/2 mile without limp or increased pain. Discharge Goals: Time Frame: 12 weeks  1. Patient will have LEFS improved to 50/80 allowing improve community participation. 2. Patient will have mmt of bilateral LE improve to 5-/5 allowing her to ascend/descend flight of stairs without increased pain. 3. Patient will have BBS improved to > 44 decreasing her fall risk. Rehabilitation Potential For Stated Goals: Good                HISTORY:   History of Present Injury/Illness (Reason for Referral):  65 y/o F with c/o LBP R hip pain lasting the last 2 months. She had chiropractor treatment that helped. Her pain is 7/10 low back and hip constant and is better than it was 2 months ago. . She had L THIERNO 1 year ago without PT. She reports poor balance but no falls. She has scoliosis that has gotten worse lately. Her pain limits her ability to walk > 1/2 mile and turn over in bed. Past Medical History/Comorbidities:   Ms. Ousmane Roa  has a past medical history of Arthritis; Malagon's esophagus; Chest discomfort; Coronary atherosclerosis of native coronary vessel; Degeneration of lumbar or lumbosacral intervertebral disc; Depression; Gastric ulcer, unspecified as acute or chronic, without mention of hemorrhage or perforation (1989); Hyperlipidemia; Hypothyroidism; Infectious disease; Nausea & vomiting; Palpitations; Scoliosis (and kyphoscoliosis), idiopathic; Spinal stenosis, lumbar (12/7/2009); Status post right hip replacement (2/26/2016); Thoracic or lumbosacral neuritis or radiculitis, unspecified; Tricuspid regurgitation; and Tricuspid valve disorder. She also has no past medical history of Adverse effect of anesthesia; Aneurysm ([address] Arrhythmia; Asthma; Autoimmune disease ([address]  Cancer ([facility] [address] Chronic kidney disease; Chronic obstructive pulmonary disease ([address] Chronic pain; Coagulation disorder ([facility] [address] Diabetes ([address] Difficult intubation; Endocarditis; GERD (gastroesophageal reflux disease); Heart failure ([address] Ill-defined condition; Liver disease; Malignant hyperthermia due to anesthesia; Morbid obesity ([facility] [address] Pseudocholinesterase deficiency; Rheumatic fever; Seizures ([facility] [address] Sleep apnea; Stroke [facility]); or Thromboembolus ([facility] [address] Ms. Graciela Nguyen  has a past surgical history that includes appendectomy (1987); hysterectomy (1988); hip replacement (Left, 2007); bunionectomy (Bilateral, 2009); hammer toe repair (2010); lumbar laminectomy (2009); orthopaedic (2010); hip replacement (Left, 2009); knee replacement (Right, 2007); shoulder arthroscopy (2013); carpal tunnel release (Right, 10/31/13); and breast biopsy (Left, 1980). Social History/Living Environment:     no stairs, lives with spouse  Prior Level of Function/Work/Activity:  Independent  Dominant Side:         RIGHT  Current Medications:    Current Outpatient Prescriptions:     LORazepam (ATIVAN) 0.5 mg tablet, Take 0.5 mg by mouth., Disp: , Rfl:     estradiol (ESTRACE) 1 mg tablet, Take 1 mg by mouth., Disp: , Rfl:     simvastatin (ZOCOR) 10 mg tablet, Take 10 mg by mouth nightly., Disp: , Rfl:     PARoxetine (PAXIL) 20 mg tablet, Take 20 mg by mouth daily. Take / use AM day of surgery  per anesthesia protocols. , Disp: , Rfl:     cetirizine (ALLER-TALON) 10 mg tablet, Take 10 mg by mouth daily. Take / use AM day of surgery  per anesthesia protocols. , Disp: , Rfl:     fluticasone (FLONASE) 50 mcg/actuation nasal spray, 2 Sprays by Both Nostrils route daily. Take morning of surgery per anesthesia guidelines. , Disp: , Rfl:    Date Last Reviewed:  4/26/2017   EXAMINATION:   Observation/Orthostatic Postural Assessment:          Scoliosis concavity T/S to right with severe right sided shoulder shift.    Palpation:          WNL  ROM:     Hip and Lumbar AROM WNL without pain  Right hamstring severe muscle flexibility resitrction                  Strength:     Date:  4/4/2017 Date:   Date:     LE MMT Left Right Left Right Left Right   Hip Flex (L1/L2) 3+ 3+       Hip Abd (glut med) 3 3       Hip Ext 3+ 3+       Quad    ( L3/4) 4+ 4+       Hamstring         Anterior Tib (L4/L5)         Gastroc (S1/2)         EHL (L5)                             Special Tests: n/a  Neurological Screen:        Sensation: light touch intact B LE  Functional Mobility:         Gait/Ambulation:  Right trendelenberg, flat foot contact B  Balance:          BBS 37        TUG 25 sec        5x STS 23 sec        SLB: unable B   Body Structures Involved:  1. Joints  2. Muscles Body Functions Affected:  1. Neuromusculoskeletal  2. Movement Related Activities and Participation Affected:  1. Mobility  2. Community, Social and Civic Life   CLINICAL PRESENTATION:   CLINICAL DECISION MAKING:   Outcome Measure: Tool Used: Lower Extremity Functional Scale (LEFS)  Score:  Initial: 39/80 Most Recent: X/80 (Date: -- )   Interpretation of Score: 20 questions each scored on a 5 point scale with 0 representing \"extreme difficulty or unable to perform\" and 4 representing \"no difficulty\". The lower the score, the greater the functional disability. 80/80 represents no disability. Minimal detectable change is 9 points. Score 80 79-63 62-48 47-32 31-16 15-1 0   Modifier CH CI CJ CK CL CM CN     ? Mobility - Walking and Moving Around:     - CURRENT STATUS: CK - 40%-59% impaired, limited or restricted    - GOAL STATUS: CI - 1%-19% impaired, limited or restricted    - D/C STATUS:  ---------------To be determined---------------    Medical Necessity:   · Patient is expected to demonstrate progress in strength, range of motion, balance, coordination and functional technique to decrease assistance required with functional mobility.  .  Reason for Services/Other Comments:  · Patient continues to require skilled intervention due to progressing toward goals. .   TREATMENT:   (In addition to Assessment/Re-Assessment sessions the following treatments were rendered)  MODALITIES: (see grid for  minutes): *  Electrical Stimulation Therapy (IFC) was provided with intensity adjusted throughout treatment to patient tolerance. to reduce pain. *  Cold Pack Therapy in order to provide analgesia and reduce inflammation and edema. *  Hot Pack Therapy in order to relieve muscle spasm. Therapeutic Exercise: (see flow sheet below for minutes) ( ):  Exercises per grid below to improve mobility, strength, balance, coordination and dynamic movement of buttock - right, hip - right and thigh - right to improve functional bending and gait, stairs, transfers. Required moderate visual, verbal, manual and tactile cues to promote proper body alignment, promote proper body posture, promote proper body mechanics and promote proper body breathing techniques. Progressed resistance, range, repetitions and complexity of movement as indicated. Aquatic Therapy (see flow sheet below for minutes): Aquatic treatment performed per flow grid for Decreased muscle strength, Decreased static/dynamic balance and reactive control, Decreased activity endurance, Ease of movement and Low impact and reduced weight bearing activity. Cues provided for body mechanics. Assistance by therapist provided for safety. Patient has difficulty with body mechanics. Date: 4/4/2017  (visit 1) 4/6/2017  (visit 2) 4/21/17 04/26/17    Modalities:                                Manual Therapy:                                Aquatic Exercise:  35 mins 1.5# 1.5# 55 min  60  Mins  2.5#    Gait F/B/S/M  3 laps x4L x4L ea    Heel/Toe walk  30x       Squats  30x x30 x30 at front rail    4-way   x10 BLE F/B only x20B     PF/DF    x30    Hamstring Curls   x2L (limited flexion)     Hip Flexion with knee ext.   (rockette)    x15 reps              SLR march   x4L x2L    Lunges        Hip circles        Hip horizontal abduction/adduction        Core:paddles UE \"rows\"    Paddles open 30x PDs  x20    Core: paddles UE horz abd/add    x20    Core: dumbbells abd/add    x20    Core: dumbbells flex/extn        Deep well bike  5 min 4 min 5 mins    Deep well jumping gilda   2 min 1 min    Deep well scissors   2 min 1 min    Deep well:  traction   5 min 5 mins            Hamstring Stretch        Step Lunge        Piriformis stretch        PF Stretch        Balance: Single leg Stance        Balance: Tandem                          Therapeutic Exercise: 15 mins       Active HS stretch 30x       Fig 4 stretch 3x30\"       Knee press single leg 10\"x5       PPT 10\"x5       SL clams 3x10               F=forward  B=Backward  S=sidesteps  M=marches    H=hold    Manual: (see flow sheet above for minutes)   Modalities: (see flow sheet above for minutes)     HEP: Handout provided 4/4/2017     Treatment/Session Assessment: Pt says \"I have felt the best that I have in a long time\", after previous aquatic sessions. With today's treatment pt continued aquatics as per flow sheet above, to decrease back pain and improve functional strength, mobility and balance. Verbal and visual cuing for proper execution and body mechanics. Aquatics completed without exacerbation of pain. Plan to continue POC and progress as tolerated     · Pain/ Symptoms: Initial:   1-2/10   Pt says overall feels good, report no significant back pain at time of treatment today. Yoni Bending Post Session: 1-25/10 ·   Compliance with Program/Exercises: Will assess as treatment progresses. · Recommendations/Intent for next treatment session: \"Next visit will focus on advancements to more challenging activities\".   Total Treatment Duration:   PT Patient Time In/Time Out  Time In: 1100  Time Out: [address], PTA

## 2017-04-28 ENCOUNTER — HOSPITAL ENCOUNTER (OUTPATIENT)
Dept: PHYSICAL THERAPY | Age: 77
Discharge: HOME | End: 2017-04-28
Payer: MEDICARE

## 2017-05-01 ENCOUNTER — HOSPITAL ENCOUNTER (OUTPATIENT)
Dept: PHYSICAL THERAPY | Age: 77
Discharge: HOME | End: 2017-05-01
Payer: MEDICARE

## 2017-05-01 PROCEDURE — 97113 AQUATIC THERAPY/EXERCISES: CPT

## 2017-05-01 NOTE — PROGRESS NOTES
Frank Baires  : 1940 [address] [facility] [address] @ [address]  [address] [place] [address]  [phone]   [phone]        OUTPATIENT PHYSICAL THERAPY:Daily Note 2017      ICD-10: Treatment Diagnosis: Effusion, right hip (M25.451)Strain of muscle, fascia and tendon of the posterior muscle group at thigh level, left thigh, sequela (J22.922M)  Precautions/Allergies:   Adhesive tape and Codeine  Fall Risk Score: 0 (? 5 = High Risk)  MD Orders: Eval and treat MEDICAL/REFERRING DIAGNOSIS:  Right hip pain [M25.551]   DATE OF ONSET: 2017  REFERRING PHYSICIAN: Boni Olvera MD  RETURN PHYSICIAN APPOINTMENT: 5/15/2017     INITIAL ASSESSMENT:  Ms. Hannah Clifton presents with right hamstring muscle strain and spinal scoliosis. Nerve tension in the right leg was ruled out with negative slump and passive straight leg raise. She also demonstrates poor core and gluteal strength. She has secondary impairment of poor balance, increasing her fall risk. She will benefit from aquatic based therapy to improve strength and muscle flexibility. Recommend transition to land based therapy when she demonstrates improved functional strength and balance. She was educated on HEP to perform between sessions. PROBLEM LIST (Impacting functional limitations):  1. Decreased Strength  2. Decreased Transfer Abilities  3. Decreased Ambulation Ability/Technique  4. Decreased Balance  5. Increased Pain  6. Decreased Flexibility/Joint Mobility INTERVENTIONS PLANNED:  1. Balance Exercise  2. Electrical Stimulation  3. Gait Training  4. Heat  5. Home Exercise Program (HEP)  6. Manual Therapy  7. Neuromuscular Re-education/Strengthening  8. Range of Motion (ROM)  9. Therapeutic Activites  10. Therapeutic Exercise/Strengthening  11. Aquatics   TREATMENT PLAN:  Effective Dates: 2017 TO 2017.   Frequency/Duration: 2 times a week for 12 weeks  GOALS: (Goals have been discussed and agreed upon with patient.)  Short-Term Functional Goals: Time Frame: 2 weeks  1. Patient will be independent with HEP without pain. 2. Patient will report ability to turn while sleeping without pain. 3. Patient will reports ability to walk 1/2 mile without limp or increased pain. Discharge Goals: Time Frame: 12 weeks  1. Patient will have LEFS improved to 50/80 allowing improve community participation. 2. Patient will have mmt of bilateral LE improve to 5-/5 allowing her to ascend/descend flight of stairs without increased pain. 3. Patient will have BBS improved to > 44 decreasing her fall risk. Rehabilitation Potential For Stated Goals: Good                HISTORY:   History of Present Injury/Illness (Reason for Referral):  63 y/o F with c/o LBP R hip pain lasting the last 2 months. She had chiropractor treatment that helped. Her pain is 7/10 low back and hip constant and is better than it was 2 months ago. . She had L THIERNO 1 year ago without PT. She reports poor balance but no falls. She has scoliosis that has gotten worse lately. Her pain limits her ability to walk > 1/2 mile and turn over in bed. Past Medical History/Comorbidities:   Ms. Cruz Adjutant  has a past medical history of Arthritis; Malagon's esophagus; Chest discomfort; Coronary atherosclerosis of native coronary vessel; Degeneration of lumbar or lumbosacral intervertebral disc; Depression; Gastric ulcer, unspecified as acute or chronic, without mention of hemorrhage or perforation (1989); Hyperlipidemia; Hypothyroidism; Infectious disease; Nausea & vomiting; Palpitations; Scoliosis (and kyphoscoliosis), idiopathic; Spinal stenosis, lumbar (12/7/2009); Status post right hip replacement (2/26/2016); Thoracic or lumbosacral neuritis or radiculitis, unspecified; Tricuspid regurgitation; and Tricuspid valve disorder. She also has no past medical history of Adverse effect of anesthesia; Aneurysm ([address] Arrhythmia; Asthma; Autoimmune disease ([address]  Cancer ([facility] [address] Chronic kidney disease; Chronic obstructive pulmonary disease ([address] Chronic pain; Coagulation disorder ([address] Diabetes ([address] Difficult intubation; Endocarditis; GERD (gastroesophageal reflux disease); Heart failure ([address] Ill-defined condition; Liver disease; Malignant hyperthermia due to anesthesia; Morbid obesity ([facility] [address] Pseudocholinesterase deficiency; Rheumatic fever; Seizures ([facility] [address] Sleep apnea; Stroke [facility]); or Thromboembolus ([facility] [address] Ms. Marcie Lyons  has a past surgical history that includes appendectomy (1987); hysterectomy (1988); hip replacement (Left, 2007); bunionectomy (Bilateral, 2009); hammer toe repair (2010); lumbar laminectomy (2009); orthopaedic (2010); hip replacement (Left, 2009); knee replacement (Right, 2007); shoulder arthroscopy (2013); carpal tunnel release (Right, 10/31/13); and breast biopsy (Left, 1980). Social History/Living Environment:     no stairs, lives with spouse  Prior Level of Function/Work/Activity:  Independent  Dominant Side:         RIGHT  Current Medications:    Current Outpatient Prescriptions:     LORazepam (ATIVAN) 0.5 mg tablet, Take 0.5 mg by mouth., Disp: , Rfl:     estradiol (ESTRACE) 1 mg tablet, Take 1 mg by mouth., Disp: , Rfl:     simvastatin (ZOCOR) 10 mg tablet, Take 10 mg by mouth nightly., Disp: , Rfl:     PARoxetine (PAXIL) 20 mg tablet, Take 20 mg by mouth daily. Take / use AM day of surgery  per anesthesia protocols. , Disp: , Rfl:     cetirizine (ALLER-TALON) 10 mg tablet, Take 10 mg by mouth daily. Take / use AM day of surgery  per anesthesia protocols. , Disp: , Rfl:     fluticasone (FLONASE) 50 mcg/actuation nasal spray, 2 Sprays by Both Nostrils route daily. Take morning of surgery per anesthesia guidelines. , Disp: , Rfl:    Date Last Reviewed:  5/1/2017   EXAMINATION:   Observation/Orthostatic Postural Assessment:          Scoliosis concavity T/S to right with severe right sided shoulder shift.    Palpation:          WNL  ROM:     Hip and Lumbar AROM WNL without pain  Right hamstring severe muscle flexibility restriction                  Strength:     Date:  4/4/2017 Date:   Date:     LE MMT Left Right Left Right Left Right   Hip Flex (L1/L2) 3+ 3+       Hip Abd (glut med) 3 3       Hip Ext 3+ 3+       Quad    ( L3/4) 4+ 4+       Hamstring         Anterior Tib (L4/L5)         Gastroc (S1/2)         EHL (L5)                             Special Tests: n/a  Neurological Screen:        Sensation: light touch intact B LE  Functional Mobility:         Gait/Ambulation:  Right trendelenberg, flat foot contact B  Balance:          BBS 37        TUG 25 sec        5x STS 23 sec        SLB: unable B   Body Structures Involved:  1. Joints  2. Muscles Body Functions Affected:  1. Neuromusculoskeletal  2. Movement Related Activities and Participation Affected:  1. Mobility  2. Community, Social and Civic Life   CLINICAL PRESENTATION:   CLINICAL DECISION MAKING:   Outcome Measure: Tool Used: Lower Extremity Functional Scale (LEFS)  Score:  Initial: 39/80 Most Recent: X/80 (Date: -- )   Interpretation of Score: 20 questions each scored on a 5 point scale with 0 representing \"extreme difficulty or unable to perform\" and 4 representing \"no difficulty\". The lower the score, the greater the functional disability. 80/80 represents no disability. Minimal detectable change is 9 points. Score 80 79-63 62-48 47-32 31-16 15-1 0   Modifier CH CI CJ CK CL CM CN     ? Mobility - Walking and Moving Around:     - CURRENT STATUS: CK - 40%-59% impaired, limited or restricted    - GOAL STATUS: CI - 1%-19% impaired, limited or restricted    - D/C STATUS:  ---------------To be determined---------------    Medical Necessity:   · Patient is expected to demonstrate progress in strength, range of motion, balance, coordination and functional technique to decrease assistance required with functional mobility.  .  Reason for Services/Other Comments:  · Patient continues to require skilled intervention due to progressing toward goals. .   TREATMENT:   (In addition to Assessment/Re-Assessment sessions the following treatments were rendered)  MODALITIES: (see grid for  minutes): *  Electrical Stimulation Therapy (IFC) was provided with intensity adjusted throughout treatment to patient tolerance. to reduce pain. *  Cold Pack Therapy in order to provide analgesia and reduce inflammation and edema. *  Hot Pack Therapy in order to relieve muscle spasm. Therapeutic Exercise: (see flow sheet below for minutes) ( ):  Exercises per grid below to improve mobility, strength, balance, coordination and dynamic movement of buttock - right, hip - right and thigh - right to improve functional bending and gait, stairs, transfers. Required moderate visual, verbal, manual and tactile cues to promote proper body alignment, promote proper body posture, promote proper body mechanics and promote proper body breathing techniques. Progressed resistance, range, repetitions and complexity of movement as indicated. Aquatic Therapy (see flow sheet below for minutes): Aquatic treatment performed per flow grid for Decreased muscle strength, Decreased static/dynamic balance and reactive control, Decreased activity endurance, Ease of movement and Low impact and reduced weight bearing activity. Cues provided for body mechanics. Assistance by therapist provided for safety. Patient has difficulty with body mechanics.      Date: 4/4/2017  (visit 1) 4/6/2017  (visit 2) 4/21/17 04/26/17 05/01/17   Modalities:                                Manual Therapy:                                Aquatic Exercise:  35 mins 1.5# 1.5# 55 min  60  Mins  2.5# 2.5# 60 min   Gait F/B/S/M  3 laps x4L x4L ea X4L each   Heel/Toe walk  30x       Squats  30x x30 x30 at front rail x30   4-way   x10 BLE F/B only x20B  X20 BLE   PF/DF    x30 X20 with handrail for support   Hamstring Curls   x2L (limited flexion)  x2L Hip Flexion with knee ext. (rockette)    x15 reps X15 BLE with hip ext             SLR march   x4L x2L X4L    Lunges        Hip circles        Hip horizontal abduction/adduction        Core:paddles UE \"rows\"    Paddles open 30x PDs  x20 X20 closed paddles   Core: paddles UE horz abd/add    x20 X20 closed paddles   Core: dumbbells abd/add    x20 X20 closed paddles   Core: dumbbells flex/extn        Deep well bike  5 min 4 min 5 mins 3 min   Deep well jumping gilda   2 min 1 min 2 min   Deep well scissors   2 min 1 min 2 min   Deep well:  traction   5 min 5 mins 5 min           Hamstring Stretch        Step Lunge        Piriformis stretch        PF Stretch        Balance: Single leg Stance        Balance: Tandem     With closed paddles alternating BLE x10                     Therapeutic Exercise: 15 mins       Active HS stretch 30x       Fig 4 stretch 3x30\"       Knee press single leg 10\"x5       PPT 10\"x5       SL clams 3x10               F=forward  B=Backward  S=sidesteps  M=marches    H=hold    Manual: (see flow sheet above for minutes)   Modalities: (see flow sheet above for minutes)     HEP: Handout provided 4/4/2017     Treatment/Session Assessment: Pt continued aquatics as per flow sheet above, to decrease back pain and improve functional strength, mobility and balance. Focused on core stabilization with introduction of tandem stance with resistance. Aquatics completed with improved pain. Plan to continue POC and progress as tolerated     · Pain/ Symptoms: Initial:   1-2/10   Pt reports overall feeling better. Gelaurena Christina Post Session: 1/10 ·   Compliance with Program/Exercises: Will assess as treatment progresses. · Recommendations/Intent for next treatment session: \"Next visit will focus on advancements to more challenging activities\".   Total Treatment Duration:   PT Patient Time In/Time Out  Time In: 1130  Time Out: [address], PT

## 2017-05-08 ENCOUNTER — HOSPITAL ENCOUNTER (OUTPATIENT)
Dept: PHYSICAL THERAPY | Age: 77
Discharge: HOME | End: 2017-05-08
Payer: MEDICARE

## 2017-05-08 PROCEDURE — 97113 AQUATIC THERAPY/EXERCISES: CPT

## 2017-05-08 NOTE — PROGRESS NOTES
Karina Scott  : 1940 [id number] [facility],[address] @ [address]  [address] [place] [address].  [phone]   [phone]        OUTPATIENT PHYSICAL THERAPY:Daily Note 2017      ICD-10: Treatment Diagnosis: Effusion, right hip (M25.451)Strain of muscle, fascia and tendon of the posterior muscle group at thigh level, left thigh, sequela (E34.511C)  Precautions/Allergies:   Adhesive tape and Codeine  Fall Risk Score: 0 (? 5 = High Risk)  MD Orders: Eval and treat MEDICAL/REFERRING DIAGNOSIS:  Right hip pain [M25.551]   DATE OF ONSET: 2017  REFERRING PHYSICIAN: Samantha Matos MD  RETURN PHYSICIAN APPOINTMENT: 5/15/2017     INITIAL ASSESSMENT:  Ms. Miladys Fletcher presents with right hamstring muscle strain and spinal scoliosis. Nerve tension in the right leg was ruled out with negative slump and passive straight leg raise. She also demonstrates poor core and gluteal strength. She has secondary impairment of poor balance, increasing her fall risk. She will benefit from aquatic based therapy to improve strength and muscle flexibility. Recommend transition to land based therapy when she demonstrates improved functional strength and balance. She was educated on HEP to perform between sessions. PROBLEM LIST (Impacting functional limitations):  1. Decreased Strength  2. Decreased Transfer Abilities  3. Decreased Ambulation Ability/Technique  4. Decreased Balance  5. Increased Pain  6. Decreased Flexibility/Joint Mobility INTERVENTIONS PLANNED:  1. Balance Exercise  2. Electrical Stimulation  3. Gait Training  4. Heat  5. Home Exercise Program (HEP)  6. Manual Therapy  7. Neuromuscular Re-education/Strengthening  8. Range of Motion (ROM)  9. Therapeutic Activites  10. Therapeutic Exercise/Strengthening  11. Aquatics   TREATMENT PLAN:  Effective Dates: 2017 TO 2017.   Frequency/Duration: 2 times a week for 12 weeks  GOALS: (Goals have been discussed and agreed upon with patient.)  Short-Term Functional Goals: Time Frame: 2 weeks  1. Patient will be independent with HEP without pain. 2. Patient will report ability to turn while sleeping without pain. - MET (5/8/17)  3. Patient will reports ability to walk 1/2 mile without limp or increased pain. Discharge Goals: Time Frame: 12 weeks  1. Patient will have LEFS improved to 50/80 allowing improve community participation. 2. Patient will have mmt of bilateral LE improve to 5-/5 allowing her to ascend/descend flight of stairs without increased pain. 3. Patient will have BBS improved to > 44 decreasing her fall risk. Rehabilitation Potential For Stated Goals: Good                HISTORY:   History of Present Injury/Illness (Reason for Referral):  63 y/o F with c/o LBP R hip pain lasting the last 2 months. She had chiropractor treatment that helped. Her pain is 7/10 low back and hip constant and is better than it was 2 months ago. . She had L THIERNO 1 year ago without PT. She reports poor balance but no falls. She has scoliosis that has gotten worse lately. Her pain limits her ability to walk > 1/2 mile and turn over in bed. Past Medical History/Comorbidities:   Ms. Kalina Joseph  has a past medical history of Arthritis; Malagon's esophagus; Chest discomfort; Coronary atherosclerosis of native coronary vessel; Degeneration of lumbar or lumbosacral intervertebral disc; Depression; Gastric ulcer, unspecified as acute or chronic, without mention of hemorrhage or perforation (1989); Hyperlipidemia; Hypothyroidism; Infectious disease; Nausea & vomiting; Palpitations; Scoliosis (and kyphoscoliosis), idiopathic; Spinal stenosis, lumbar (12/7/2009); Status post right hip replacement (2/26/2016); Thoracic or lumbosacral neuritis or radiculitis, unspecified; Tricuspid regurgitation; and Tricuspid valve disorder. She also has no past medical history of Adverse effect of anesthesia; Aneurysm ([address] Arrhythmia; Asthma;  Autoimmune disease ([facility] [address] Cancer ([facility] [address] Chronic kidney disease; Chronic obstructive pulmonary disease ([address] Chronic pain; Coagulation disorder ([address] Diabetes ([facility] [address] Difficult intubation; Endocarditis; GERD (gastroesophageal reflux disease); Heart failure ([facility] [address] Ill-defined condition; Liver disease; Malignant hyperthermia due to anesthesia; Morbid obesity ([facility] [address] Pseudocholinesterase deficiency; Rheumatic fever; Seizures ([facility] [address] Sleep apnea; Stroke [facility]); or Thromboembolus ([facility] [address] Ms. Umang Centeno  has a past surgical history that includes appendectomy (1987); hysterectomy (1988); hip replacement (Left, 2007); bunionectomy (Bilateral, 2009); hammer toe repair (2010); lumbar laminectomy (2009); orthopaedic (2010); hip replacement (Left, 2009); knee replacement (Right, 2007); shoulder arthroscopy (2013); carpal tunnel release (Right, 10/31/13); and breast biopsy (Left, 1980). Social History/Living Environment:     no stairs, lives with spouse  Prior Level of Function/Work/Activity:  Independent  Dominant Side:         RIGHT  Current Medications:    Current Outpatient Prescriptions:     LORazepam (ATIVAN) 0.5 mg tablet, Take 0.5 mg by mouth., Disp: , Rfl:     estradiol (ESTRACE) 1 mg tablet, Take 1 mg by mouth., Disp: , Rfl:     simvastatin (ZOCOR) 10 mg tablet, Take 10 mg by mouth nightly., Disp: , Rfl:     PARoxetine (PAXIL) 20 mg tablet, Take 20 mg by mouth daily. Take / use AM day of surgery  per anesthesia protocols. , Disp: , Rfl:     cetirizine (ALLER-TALON) 10 mg tablet, Take 10 mg by mouth daily. Take / use AM day of surgery  per anesthesia protocols. , Disp: , Rfl:     fluticasone (FLONASE) 50 mcg/actuation nasal spray, 2 Sprays by Both Nostrils route daily. Take morning of surgery per anesthesia guidelines. , Disp: , Rfl:    Date Last Reviewed:  5/8/2017   EXAMINATION:   Observation/Orthostatic Postural Assessment:          Scoliosis concavity T/S to right with severe right sided shoulder shift.    Palpation:          WNL  ROM: Hip and Lumbar AROM WNL without pain  Right hamstring severe muscle flexibility restriction                  Strength:     Date:  4/4/2017 Date:   Date:     LE MMT Left Right Left Right Left Right   Hip Flex (L1/L2) 3+ 3+       Hip Abd (glut med) 3 3       Hip Ext 3+ 3+       Quad    ( L3/4) 4+ 4+       Hamstring         Anterior Tib (L4/L5)         Gastroc (S1/2)         EHL (L5)                             Special Tests: n/a  Neurological Screen:        Sensation: light touch intact B LE  Functional Mobility:         Gait/Ambulation:  Right trendelenberg, flat foot contact B  Balance:          BBS 37        TUG 25 sec        5x STS 23 sec        SLB: unable B   Body Structures Involved:  1. Joints  2. Muscles Body Functions Affected:  1. Neuromusculoskeletal  2. Movement Related Activities and Participation Affected:  1. Mobility  2. Community, Social and Civic Life   CLINICAL PRESENTATION:   CLINICAL DECISION MAKING:   Outcome Measure: Tool Used: Lower Extremity Functional Scale (LEFS)  Score:  Initial: 39/80 Most Recent: X/80 (Date: -- )   Interpretation of Score: 20 questions each scored on a 5 point scale with 0 representing \"extreme difficulty or unable to perform\" and 4 representing \"no difficulty\". The lower the score, the greater the functional disability. 80/80 represents no disability. Minimal detectable change is 9 points. Score 80 79-63 62-48 47-32 31-16 15-1 0   Modifier CH CI CJ CK CL CM CN     ? Mobility - Walking and Moving Around:     - CURRENT STATUS: CK - 40%-59% impaired, limited or restricted    - GOAL STATUS: CI - 1%-19% impaired, limited or restricted    - D/C STATUS:  ---------------To be determined---------------    Medical Necessity:   · Patient is expected to demonstrate progress in strength, range of motion, balance, coordination and functional technique to decrease assistance required with functional mobility.  .  Reason for Services/Other Comments:  · Patient continues to require skilled intervention due to progressing toward goals. .   TREATMENT:   (In addition to Assessment/Re-Assessment sessions the following treatments were rendered)  MODALITIES: (see grid for  minutes): *  Electrical Stimulation Therapy (IFC) was provided with intensity adjusted throughout treatment to patient tolerance. to reduce pain. *  Cold Pack Therapy in order to provide analgesia and reduce inflammation and edema. *  Hot Pack Therapy in order to relieve muscle spasm. Therapeutic Exercise: (see flow sheet below for minutes) ( ):  Exercises per grid below to improve mobility, strength, balance, coordination and dynamic movement of buttock - right, hip - right and thigh - right to improve functional bending and gait, stairs, transfers. Required moderate visual, verbal, manual and tactile cues to promote proper body alignment, promote proper body posture, promote proper body mechanics and promote proper body breathing techniques. Progressed resistance, range, repetitions and complexity of movement as indicated. Aquatic Therapy (see flow sheet below for minutes): Aquatic treatment performed per flow grid for Decreased muscle strength, Decreased static/dynamic balance and reactive control, Decreased activity endurance, Ease of movement and Low impact and reduced weight bearing activity. Cues provided for body mechanics. Assistance by therapist provided for safety. Patient has difficulty with body mechanics.      Date: 4/4/2017  (visit 1) 4/6/2017  (visit 2) 4/21/17 04/26/17 05/01/17 5/8/17    Modalities:                                        Manual Therapy:                                        Aquatic Exercise:  35 mins 1.5# 1.5# 55 min  60  Mins  2.5# 2.5# 60 min  60 mins  2.5#    Gait F/B/S/M  3 laps x4L x4L ea X4L each x4L ea    Heel/Toe walk  30x         Squats  30x x30 x30 at front rail x30 x30    4-way   x10 BLE F/B only x20B  X20 BLE x20 B    PF/DF    x30 X20 with handrail for support X30    Hamstring Curls   x2L (limited flexion)  x2L X2L    Hip Flexion with knee ext. (rockette)    x15 reps X15 BLE with hip ext x20 B                SLR march   x4L x2L X4L  x2L    Lunges          Hip circles      Fig 4    Hip horizontal abduction/adduction          Core:paddles UE \"rows\"    Paddles open 30x PDs  x20 X20 closed paddles x30    Core: paddles UE horz abd/add    x20 X20 closed paddles x20    Core: dumbbells abd/add    x20 X20 closed paddles X30    Core: dumbbells flex/extn      X20    Deep well bike  5 min 4 min 5 mins 3 min 5 mins    Deep well jumping gilda   2 min 1 min 2 min 1 min    Deep well scissors   2 min 1 min 2 min 1 min    Deep well:  traction   5 min 5 mins 5 min 5 min              Hamstring Stretch          Step Lunge          Piriformis stretch          PF Stretch          Balance: Single leg Stance          Balance: Tandem     With closed paddles alternating BLE x10                           Therapeutic Exercise: 15 mins         Active HS stretch 30x         Fig 4 stretch 3x30\"         Knee press single leg 10\"x5         PPT 10\"x5         SL clams 3x10                   F=forward  B=Backward  S=sidesteps  M=marches    H=hold    Manual: (see flow sheet above for minutes)   Modalities: (see flow sheet above for minutes)     HEP: Handout provided 4/4/2017     Treatment/Session Assessment: Pt continued aquatics as per flow sheet above, to decrease back pain and improve functional strength, mobility and balance. Continue to perform core stabilization aquatics with cuing for proper body mechanics. Pt demonstrates moderate difficulty with resisted core stabilization exercises. She demonstrates decreased balance while walking in water, needing to use hand rail to maintain course. Aquatics completed without exacerbation of back.   Plan to continue POC and progress as tolerated     · Pain/ Symptoms: Initial:   1-2/10   Pt is pleased with progress at this time, says the aquatic has been \"wonderful\". Pt says notices it getting easier to rise for sitting in a low chair, and in/out of car. Pt says no longer hurts to depress brake pedal while driving. .    Benji Dudley Session: 1/10 ·   Compliance with Program/Exercises: Will assess as treatment progresses. · Recommendations/Intent for next treatment session: \"Next visit will focus on advancements to more challenging activities\".   ·   Total Treatment Duration:   PT Patient Time In/Time Out  Time In: 1100  Time Out: Fany 90, PTA

## 2017-05-10 ENCOUNTER — HOSPITAL ENCOUNTER (OUTPATIENT)
Dept: PHYSICAL THERAPY | Age: 77
Discharge: HOME | End: 2017-05-10
Payer: MEDICARE

## 2017-05-10 PROCEDURE — 97113 AQUATIC THERAPY/EXERCISES: CPT

## 2017-05-10 NOTE — PROGRESS NOTES
Rayshawn Bolden  : 1940 [address] @ [address] [place] [address].  [phone]   [phone]        OUTPATIENT PHYSICAL THERAPY:Daily Note 5/10/2017      ICD-10: Treatment Diagnosis: Effusion, right hip (M25.451)Strain of muscle, fascia and tendon of the posterior muscle group at thigh level, left thigh, sequela (W78.684T)  Precautions/Allergies:   Adhesive tape and Codeine  Fall Risk Score: 0 (? 5 = High Risk)  MD Orders: Eval and treat MEDICAL/REFERRING DIAGNOSIS:  Right hip pain [M25.551]   DATE OF ONSET: 2017  REFERRING PHYSICIAN: Gwen Espinal MD  RETURN PHYSICIAN APPOINTMENT: 5/15/2017     INITIAL ASSESSMENT:  Ms. Tanika Stafford presents with right hamstring muscle strain and spinal scoliosis. Nerve tension in the right leg was ruled out with negative slump and passive straight leg raise. She also demonstrates poor core and gluteal strength. She has secondary impairment of poor balance, increasing her fall risk. She will benefit from aquatic based therapy to improve strength and muscle flexibility. Recommend transition to land based therapy when she demonstrates improved functional strength and balance. She was educated on HEP to perform between sessions. PROBLEM LIST (Impacting functional limitations):  1. Decreased Strength  2. Decreased Transfer Abilities  3. Decreased Ambulation Ability/Technique  4. Decreased Balance  5. Increased Pain  6. Decreased Flexibility/Joint Mobility INTERVENTIONS PLANNED:  1. Balance Exercise  2. Electrical Stimulation  3. Gait Training  4. Heat  5. Home Exercise Program (HEP)  6. Manual Therapy  7. Neuromuscular Re-education/Strengthening  8. Range of Motion (ROM)  9. Therapeutic Activites  10. Therapeutic Exercise/Strengthening  11. Aquatics   TREATMENT PLAN:  Effective Dates: 2017 TO 2017.   Frequency/Duration: 2 times a week for 12 weeks  GOALS: (Goals have been discussed and agreed upon with patient.)  Short-Term Functional Goals: Time Frame: 2 weeks  1. Patient will be independent with HEP without pain. 2. Patient will report ability to turn while sleeping without pain. - MET (5/8/17)  3. Patient will reports ability to walk 1/2 mile without limp or increased pain. Discharge Goals: Time Frame: 12 weeks  1. Patient will have LEFS improved to 50/80 allowing improve community participation. 2. Patient will have mmt of bilateral LE improve to 5-/5 allowing her to ascend/descend flight of stairs without increased pain. 3. Patient will have BBS improved to > 44 decreasing her fall risk. Rehabilitation Potential For Stated Goals: Good                HISTORY:   History of Present Injury/Illness (Reason for Referral):  65 y/o F with c/o LBP R hip pain lasting the last 2 months. She had chiropractor treatment that helped. Her pain is 7/10 low back and hip constant and is better than it was 2 months ago. . She had L THIERNO 1 year ago without PT. She reports poor balance but no falls. She has scoliosis that has gotten worse lately. Her pain limits her ability to walk > 1/2 mile and turn over in bed. Past Medical History/Comorbidities:   Ms. Robbie Lee  has a past medical history of Arthritis; Malagon's esophagus; Chest discomfort; Coronary atherosclerosis of native coronary vessel; Degeneration of lumbar or lumbosacral intervertebral disc; Depression; Gastric ulcer, unspecified as acute or chronic, without mention of hemorrhage or perforation (1989); Hyperlipidemia; Hypothyroidism; Infectious disease; Nausea & vomiting; Palpitations; Scoliosis (and kyphoscoliosis), idiopathic; Spinal stenosis, lumbar (12/7/2009); Status post right hip replacement (2/26/2016); Thoracic or lumbosacral neuritis or radiculitis, unspecified; Tricuspid regurgitation; and Tricuspid valve disorder. She also has no past medical history of Adverse effect of anesthesia; Aneurysm ([address] Arrhythmia; Asthma;  Autoimmune disease ([facility] [address] Cancer ([facility] [address] Chronic kidney disease; Chronic obstructive pulmonary disease ([address] Chronic pain; Coagulation disorder ([address] Diabetes ([address] Difficult intubation; Endocarditis; GERD (gastroesophageal reflux disease); Heart failure ([address] Ill-defined condition; Liver disease; Malignant hyperthermia due to anesthesia; Morbid obesity ([facility] [address] Pseudocholinesterase deficiency; Rheumatic fever; Seizures ([facility] [address] Sleep apnea; Stroke [facility]); or Thromboembolus ([facility] [address] Ms. Tamiko Jenkins  has a past surgical history that includes appendectomy (1987); hysterectomy (1988); hip replacement (Left, 2007); bunionectomy (Bilateral, 2009); hammer toe repair (2010); lumbar laminectomy (2009); orthopaedic (2010); hip replacement (Left, 2009); knee replacement (Right, 2007); shoulder arthroscopy (2013); carpal tunnel release (Right, 10/31/13); and breast biopsy (Left, 1980). Social History/Living Environment:     no stairs, lives with spouse  Prior Level of Function/Work/Activity:  Independent  Dominant Side:         RIGHT  Current Medications:    Current Outpatient Prescriptions:     LORazepam (ATIVAN) 0.5 mg tablet, Take 0.5 mg by mouth., Disp: , Rfl:     estradiol (ESTRACE) 1 mg tablet, Take 1 mg by mouth., Disp: , Rfl:     simvastatin (ZOCOR) 10 mg tablet, Take 10 mg by mouth nightly., Disp: , Rfl:     PARoxetine (PAXIL) 20 mg tablet, Take 20 mg by mouth daily. Take / use AM day of surgery  per anesthesia protocols. , Disp: , Rfl:     cetirizine (ALLER-TALON) 10 mg tablet, Take 10 mg by mouth daily. Take / use AM day of surgery  per anesthesia protocols. , Disp: , Rfl:     fluticasone (FLONASE) 50 mcg/actuation nasal spray, 2 Sprays by Both Nostrils route daily. Take morning of surgery per anesthesia guidelines. , Disp: , Rfl:    Date Last Reviewed:  5/10/2017   EXAMINATION:   Observation/Orthostatic Postural Assessment:          Scoliosis concavity T/S to right with severe right sided shoulder shift.    Palpation:          WNL  ROM: Hip and Lumbar AROM WNL without pain  Right hamstring severe muscle flexibility restriction                  Strength:     Date:  4/4/2017 Date:   Date:     LE MMT Left Right Left Right Left Right   Hip Flex (L1/L2) 3+ 3+       Hip Abd (glut med) 3 3       Hip Ext 3+ 3+       Quad    ( L3/4) 4+ 4+       Hamstring         Anterior Tib (L4/L5)         Gastroc (S1/2)         EHL (L5)                             Special Tests: n/a  Neurological Screen:        Sensation: light touch intact B LE  Functional Mobility:         Gait/Ambulation:  Right trendelenberg, flat foot contact B  Balance:          BBS 37        TUG 25 sec        5x STS 23 sec        SLB: unable B   Body Structures Involved:  1. Joints  2. Muscles Body Functions Affected:  1. Neuromusculoskeletal  2. Movement Related Activities and Participation Affected:  1. Mobility  2. Community, Social and Civic Life   CLINICAL PRESENTATION:   CLINICAL DECISION MAKING:   Outcome Measure: Tool Used: Lower Extremity Functional Scale (LEFS)  Score:  Initial: 39/80 Most Recent: X/80 (Date: -- )   Interpretation of Score: 20 questions each scored on a 5 point scale with 0 representing \"extreme difficulty or unable to perform\" and 4 representing \"no difficulty\". The lower the score, the greater the functional disability. 80/80 represents no disability. Minimal detectable change is 9 points. Score 80 79-63 62-48 47-32 31-16 15-1 0   Modifier CH CI CJ CK CL CM CN     ? Mobility - Walking and Moving Around:     - CURRENT STATUS: CK - 40%-59% impaired, limited or restricted    - GOAL STATUS: CI - 1%-19% impaired, limited or restricted    - D/C STATUS:  ---------------To be determined---------------    Medical Necessity:   · Patient is expected to demonstrate progress in strength, range of motion, balance, coordination and functional technique to decrease assistance required with functional mobility.  .  Reason for Services/Other Comments:  · Patient continues to require skilled intervention due to progressing toward goals. .   TREATMENT:   (In addition to Assessment/Re-Assessment sessions the following treatments were rendered)  MODALITIES: (see grid for  minutes): *  Electrical Stimulation Therapy (IFC) was provided with intensity adjusted throughout treatment to patient tolerance. to reduce pain. *  Cold Pack Therapy in order to provide analgesia and reduce inflammation and edema. *  Hot Pack Therapy in order to relieve muscle spasm. Therapeutic Exercise: (see flow sheet below for minutes) ( ):  Exercises per grid below to improve mobility, strength, balance, coordination and dynamic movement of buttock - right, hip - right and thigh - right to improve functional bending and gait, stairs, transfers. Required moderate visual, verbal, manual and tactile cues to promote proper body alignment, promote proper body posture, promote proper body mechanics and promote proper body breathing techniques. Progressed resistance, range, repetitions and complexity of movement as indicated. Aquatic Therapy (see flow sheet below for minutes): Aquatic treatment performed per flow grid for Decreased muscle strength, Decreased static/dynamic balance and reactive control, Decreased activity endurance, Ease of movement and Low impact and reduced weight bearing activity. Cues provided for body mechanics. Assistance by therapist provided for safety. Patient has difficulty with body mechanics.      Date: 4/4/2017  (visit 1) 4/6/2017  (visit 2) 4/21/17 04/26/17 05/01/17 5/8/17 5/10/17   Modalities:                                        Manual Therapy:                                        Aquatic Exercise:  35 mins 1.5# 1.5# 55 min  60  Mins  2.5# 2.5# 60 min  60 mins  2.5# 2.5# 60 min   Gait F/B/S/M  3 laps x4L x4L ea X4L each x4L ea x4L   Heel/Toe walk  30x         Squats  30x x30 x30 at front rail x30 x30    4-way   x10 BLE F/B only x20B  X20 BLE x20 B PF/DF    x30 X20 with handrail for support X30    Hamstring Curls   x2L (limited flexion)  x2L X2L x2L   Hip Flexion with knee ext. (rockette)    x15 reps X15 BLE with hip ext x20 B                SLR march   x4L x2L X4L  x2L x4L   Lunges       x15 BLE   Hip circles      Fig 4    Hip horizontal abduction/adduction          Core:paddles UE \"rows\"    Paddles open 30x PDs  x20 X20 closed paddles x30 x15 helmets   Core: paddles UE horz abd/add    x20 X20 closed paddles x20 x15 helmets   Core: dumbbells abd/add    x20 X20 closed paddles X30    Core: dumbbells flex/extn      X20 x15 helmets   Deep well bike  5 min 4 min 5 mins 3 min 5 mins 4 min   Deep well jumping gilda   2 min 1 min 2 min 1 min 2 min   Deep well scissors   2 min 1 min 2 min 1 min 2 min    Deep well:  traction   5 min 5 mins 5 min 5 min 5 min             Hamstring Stretch          Step Lunge          Piriformis stretch          PF Stretch          Balance: Single leg Stance          Balance: Tandem     With closed paddles alternating BLE x10                           Therapeutic Exercise: 15 mins         Active HS stretch 30x         Fig 4 stretch 3x30\"         Knee press single leg 10\"x5         PPT 10\"x5         SL clams 3x10                   F=forward  B=Backward  S=sidesteps  M=marches    H=hold    Manual: (see flow sheet above for minutes)   Modalities: (see flow sheet above for minutes)     HEP: Handout provided 4/4/2017     Treatment/Session Assessment: Continued aquatics with 2.5#. Demonstrates decreased balance/stability requiring railing and also decreased endurance. Pt asked for massage therapist recommendation for her muscle tightness/tension in neck region, therapist provided name/number for massage therapist Luis Santana requests we recommend to. Plan to work in [employer] 4 more visits and then d/c to [employer] as pt plans to join pool at [facility] for primary form of exercise.      · Pain/ Symptoms: Initial:   0/10 Pt reports muscle soreness after last session but denies any pain today. .    Cesar Maddox Session: 0/10 ·   Compliance with Program/Exercises: Will assess as treatment progresses. · Recommendations/Intent for next treatment session: \"Next visit will focus on advancements to more challenging activities\".   ·   Total Treatment Duration:   PT Patient Time In/Time Out  Time In: 1055  Time Out: Brendan Stanford PTA

## 2017-05-15 ENCOUNTER — HOSPITAL ENCOUNTER (OUTPATIENT)
Dept: PHYSICAL THERAPY | Age: 77
Discharge: HOME | End: 2017-05-15
Payer: MEDICARE

## 2017-05-15 PROCEDURE — G8979 MOBILITY GOAL STATUS: HCPCS

## 2017-05-15 PROCEDURE — 97113 AQUATIC THERAPY/EXERCISES: CPT

## 2017-05-15 PROCEDURE — G8978 MOBILITY CURRENT STATUS: HCPCS

## 2017-05-15 NOTE — PROGRESS NOTES
Laisha Del Rio  : 1940 [address] [facility] @ [address]  [address] [place] [address], [address] [place] [address]  [phone]   Critical access [phone]        OUTPATIENT PHYSICAL THERAPY:Daily Note and Progress Report 5/15/2017      ICD-10: Treatment Diagnosis: Effusion, right hip (M25.451)Strain of muscle, fascia and tendon of the posterior muscle group at thigh level, left thigh, sequela (U52.704C)  Precautions/Allergies:   Adhesive tape and Codeine  Fall Risk Score: 0 (? 5 = High Risk)  MD Orders: Eval and treat MEDICAL/REFERRING DIAGNOSIS:  Right hip pain [M25.551]   DATE OF ONSET: 2017  REFERRING PHYSICIAN: Hill Kyle MD  RETURN PHYSICIAN APPOINTMENT: 5/15/2017     INITIAL ASSESSMENT:  Ms. Shalonda Olivas presents with right hamstring muscle strain and spinal scoliosis. Nerve tension in the right leg was ruled out with negative slump and passive straight leg raise. She also demonstrates poor core and gluteal strength. She has secondary impairment of poor balance, increasing her fall risk. She will benefit from aquatic based therapy to improve strength and muscle flexibility. Recommend transition to land based therapy when she demonstrates improved functional strength and balance. She was educated on HEP to perform between sessions. PROBLEM LIST (Impacting functional limitations):  1. Decreased Strength  2. Decreased Transfer Abilities  3. Decreased Ambulation Ability/Technique  4. Decreased Balance  5. Increased Pain  6. Decreased Flexibility/Joint Mobility INTERVENTIONS PLANNED:  1. Balance Exercise  2. Electrical Stimulation  3. Gait Training  4. Heat  5. Home Exercise Program (HEP)  6. Manual Therapy  7. Neuromuscular Re-education/Strengthening  8. Range of Motion (ROM)  9. Therapeutic Activites  10. Therapeutic Exercise/Strengthening  11. Aquatics   TREATMENT PLAN:  Effective Dates: 2017 TO 2017.   Frequency/Duration: 2 times a week for 12 weeks  GOALS: (Goals have been discussed and agreed upon with patient.)  Short-Term Functional Goals: Time Frame: 2 weeks  1. Patient will be independent with HEP without pain. (MET)  2. Patient will report ability to turn while sleeping without pain. - MET (5/8/17)  3. Patient will reports ability to walk 1/2 mile without limp or increased pain. (MET)  Discharge Goals: Time Frame: 12 weeks  1. Patient will have LEFS improved to 50/80 allowing improve community participation. (MET)  2. Patient will have mmt of bilateral LE improve to 5-/5 allowing her to ascend/descend flight of stairs without increased pain. (MET)  3. Patient will have BBS improved to > 44 decreasing her fall risk. (IN PROGRESS)  Rehabilitation Potential For Stated Goals: Good                HISTORY:   History of Present Injury/Illness (Reason for Referral):  63 y/o F with c/o LBP R hip pain lasting the last 2 months. She had chiropractor treatment that helped. Her pain is 7/10 low back and hip constant and is better than it was 2 months ago. . She had L THIERNO 1 year ago without PT. She reports poor balance but no falls. She has scoliosis that has gotten worse lately. Her pain limits her ability to walk > 1/2 mile and turn over in bed. Past Medical History/Comorbidities:   Ms. Adilene Norton  has a past medical history of Arthritis; Malagon's esophagus; Chest discomfort; Coronary atherosclerosis of native coronary vessel; Degeneration of lumbar or lumbosacral intervertebral disc; Depression; Gastric ulcer, unspecified as acute or chronic, without mention of hemorrhage or perforation (1989); Hyperlipidemia; Hypothyroidism; Infectious disease; Nausea & vomiting; Palpitations; Scoliosis (and kyphoscoliosis), idiopathic; Spinal stenosis, lumbar (12/7/2009); Status post right hip replacement (2/26/2016); Thoracic or lumbosacral neuritis or radiculitis, unspecified; Tricuspid regurgitation; and Tricuspid valve disorder.  She also has no past medical history of Adverse effect of anesthesia; Aneurysm ([address] Arrhythmia; Asthma; Autoimmune disease ([address] Cancer ([facility] [address] Chronic kidney disease; Chronic obstructive pulmonary disease ([address] Chronic pain; Coagulation disorder ([address] Diabetes ([address] Difficult intubation; Endocarditis; GERD (gastroesophageal reflux disease); Heart failure ([address] Ill-defined condition; Liver disease; Malignant hyperthermia due to anesthesia; Morbid obesity ([address] Pseudocholinesterase deficiency; Rheumatic fever; Seizures ([address] Sleep apnea; Stroke [facility]); or Thromboembolus ([facility] [address] Ms. Robbie Lee  has a past surgical history that includes appendectomy (1987); hysterectomy (1988); hip replacement (Left, 2007); bunionectomy (Bilateral, 2009); hammer toe repair (2010); lumbar laminectomy (2009); orthopaedic (2010); hip replacement (Left, 2009); knee replacement (Right, 2007); shoulder arthroscopy (2013); carpal tunnel release (Right, 10/31/13); and breast biopsy (Left, 1980). Social History/Living Environment:     no stairs, lives with spouse  Prior Level of Function/Work/Activity:  Independent  Dominant Side:         RIGHT  Current Medications:    Current Outpatient Prescriptions:     LORazepam (ATIVAN) 0.5 mg tablet, Take 0.5 mg by mouth., Disp: , Rfl:     estradiol (ESTRACE) 1 mg tablet, Take 1 mg by mouth., Disp: , Rfl:     simvastatin (ZOCOR) 10 mg tablet, Take 10 mg by mouth nightly., Disp: , Rfl:     PARoxetine (PAXIL) 20 mg tablet, Take 20 mg by mouth daily. Take / use AM day of surgery  per anesthesia protocols. , Disp: , Rfl:     cetirizine (ALLER-TALON) 10 mg tablet, Take 10 mg by mouth daily. Take / use AM day of surgery  per anesthesia protocols. , Disp: , Rfl:     fluticasone (FLONASE) 50 mcg/actuation nasal spray, 2 Sprays by Both Nostrils route daily. Take morning of surgery per anesthesia guidelines. , Disp: , Rfl:    Date Last Reviewed:  5/15/2017   EXAMINATION:   Observation/Orthostatic Postural Assessment:          Scoliosis concavity T/S to right with severe right sided shoulder shift. Palpation:          WNL  ROM:     Hip and Lumbar AROM WNL without pain  Right hamstring severe muscle flexibility restriction                  Strength:     Date:  4/4/2017 Date:  5/15/17 Date:     LE MMT Left Right Left Right Left Right   Hip Flex (L1/L2) 3+ 3+ 5-/5 5-/5     Hip Abd (glut med) 3 3       Hip Ext 3+ 3+       Quad    ( L3/4) 4+ 4+ 5/5 5/5     Hamstring   5/5 5/5     Anterior Tib (L4/L5)         Gastroc (S1/2)         EHL (L5)                             Special Tests: n/a  Neurological Screen:        Sensation: light touch intact B LE  Functional Mobility:         Gait/Ambulation:  Right trendelenberg, flat foot contact B  Balance:          BBS 37        TUG 25 sec        5x STS 23 sec        SLB: unable B   Body Structures Involved:  1. Joints  2. Muscles Body Functions Affected:  1. Neuromusculoskeletal  2. Movement Related Activities and Participation Affected:  1. Mobility  2. Community, Social and Civic Life   CLINICAL PRESENTATION:   CLINICAL DECISION MAKING:   Outcome Measure: Tool Used: Lower Extremity Functional Scale (LEFS)  Score:  Initial: 39/80 Most Recent: 59/80 (Date: 5/15/17 -- )   Interpretation of Score: 20 questions each scored on a 5 point scale with 0 representing \"extreme difficulty or unable to perform\" and 4 representing \"no difficulty\". The lower the score, the greater the functional disability. 80/80 represents no disability. Minimal detectable change is 9 points. Score 80 79-63 62-48 47-32 31-16 15-1 0   Modifier CH CI CJ CK CL CM CN     ?  Mobility - Walking and Moving Around:     - CURRENT STATUS: CK - 40%-59% impaired, limited or restricted    - GOAL STATUS: CJ - 20%-39% impaired, limited or restricted    - D/C STATUS:  ---------------To be determined---------------    Medical Necessity:   · Patient is expected to demonstrate progress in strength, range of motion, balance, coordination and functional technique to decrease assistance required with functional mobility. .  Reason for Services/Other Comments:  · Patient continues to require skilled intervention due to progressing toward goals. .   TREATMENT:   (In addition to Assessment/Re-Assessment sessions the following treatments were rendered)  MODALITIES: (see grid for  minutes): *  Electrical Stimulation Therapy (IFC) was provided with intensity adjusted throughout treatment to patient tolerance. to reduce pain. *  Cold Pack Therapy in order to provide analgesia and reduce inflammation and edema. *  Hot Pack Therapy in order to relieve muscle spasm. Therapeutic Exercise: (see flow sheet below for minutes) ( ):  Exercises per grid below to improve mobility, strength, balance, coordination and dynamic movement of buttock - right, hip - right and thigh - right to improve functional bending and gait, stairs, transfers. Required moderate visual, verbal, manual and tactile cues to promote proper body alignment, promote proper body posture, promote proper body mechanics and promote proper body breathing techniques. Progressed resistance, range, repetitions and complexity of movement as indicated. Aquatic Therapy (see flow sheet below for minutes): Aquatic treatment performed per flow grid for Decreased muscle strength, Decreased static/dynamic balance and reactive control, Decreased activity endurance, Ease of movement and Low impact and reduced weight bearing activity. Cues provided for body mechanics. Assistance by therapist provided for safety. Patient has difficulty with body mechanics.      Date: 4/4/2017  (visit 1) 4/6/2017  (visit 2) 4/21/17 04/26/17 05/01/17 5/8/17 5/10/17 5/15/17   Modalities:                                            Manual Therapy:                                            Aquatic Exercise:  35 mins 1.5# 1.5# 55 min  60  Mins  2.5# 2.5# 60 min  60 mins  2.5# 2.5# 60 min 2.5# 55 min   Gait F/B/S/M  3 laps x4L x4L ea X4L each x4L ea x4L x4L   Heel/Toe walk  30x          Squats  30x x30 x30 at front rail x30 x30     4-way   x10 BLE F/B only x20B  X20 BLE x20 B     PF/DF    x30 X20 with handrail for support X30     Hamstring Curls   x2L (limited flexion)  x2L X2L x2L x2L   Hip Flexion with knee ext. (rockette)    x15 reps X15 BLE with hip ext x20 B                  SLR march   x4L x2L X4L  x2L x4L x4L   Lunges       x15 BLE x15 BLE   Hip circles      Fig 4     Hip horizontal abduction/adduction           Core:paddles UE \"rows\"    Paddles open 30x PDs  x20 X20 closed paddles x30 x15 helmets x15 helmets   Core: paddles UE horz abd/add    x20 X20 closed paddles x20 x15 helmets x15 helmets   Core: dumbbells abd/add    x20 X20 closed paddles X30     Core: dumbbells flex/extn      X20 x15 helmets x15 helmets   Deep well bike  5 min 4 min 5 mins 3 min 5 mins 4 min 4 min   Deep well jumping gilda   2 min 1 min 2 min 1 min 2 min 2 min   Deep well scissors   2 min 1 min 2 min 1 min 2 min  2 min   Deep well:  traction   5 min 5 mins 5 min 5 min 5 min 5 min              Hamstring Stretch           Step Lunge           Piriformis stretch           PF Stretch           Balance: Single leg Stance           Balance: Tandem     With closed paddles alternating BLE x10                              Therapeutic Exercise: 15 mins          Active HS stretch 30x          Fig 4 stretch 3x30\"          Knee press single leg 10\"x5          PPT 10\"x5          SL clams 3x10                     F=forward  B=Backward  S=sidesteps  M=marches    H=hold    Manual: (see flow sheet above for minutes)   Modalities: (see flow sheet above for minutes)     HEP: Handout provided 4/4/2017     Treatment/Session Assessment: Continued aquatics and tolerated well. Assessments taken for progress note today with pt making good progress towards goals. Strength significantly improved. Pt still has difficulty with longer distance walking/standing and decreased balance.  Plan to add more core strength/stability as well as higher level balance next session. Progress note written with STG met and planning to progress with LTG for increased I with mobility in community setting. · Pain/ Symptoms: Initial:   0/10 Pt reports pain this morning upon waking that eased out after she moved around. States she was on her feet a lot yesterday with a busy day. .    Nelson Blake Session: 0/10 ·   Compliance with Program/Exercises: Will assess as treatment progresses. · Recommendations/Intent for next treatment session: \"Next visit will focus on advancements to more challenging activities\".   Plan to continue in aquatics  ·   Total Treatment Duration:   PT Patient Time In/Time Out  Time In: 1100  Time Out: Brendan Stanford PTA

## 2017-05-19 ENCOUNTER — HOSPITAL ENCOUNTER (OUTPATIENT)
Dept: PHYSICAL THERAPY | Age: 77
Discharge: HOME | End: 2017-05-19
Payer: MEDICARE

## 2017-05-25 ENCOUNTER — HOSPITAL ENCOUNTER (OUTPATIENT)
Dept: PHYSICAL THERAPY | Age: 77
Discharge: HOME | End: 2017-05-25
Payer: MEDICARE

## 2017-05-25 PROCEDURE — 97113 AQUATIC THERAPY/EXERCISES: CPT | Performed by: PHYSICAL THERAPIST

## 2017-05-25 NOTE — PROGRESS NOTES
Gabby Morales  : 1940 [address] @ [address]  [address] [place] [address].  [phone]   [phone]        OUTPATIENT PHYSICAL THERAPY:Daily Note 2017      ICD-10: Treatment Diagnosis: Effusion, right hip (M25.451)Strain of muscle, fascia and tendon of the posterior muscle group at thigh level, left thigh, sequela (F66.365B)  Precautions/Allergies:   Adhesive tape and Codeine  Fall Risk Score: 0 (? 5 = High Risk)  MD Orders: Eval and treat MEDICAL/REFERRING DIAGNOSIS:  Right hip pain [M25.551]   DATE OF ONSET: 2017  REFERRING PHYSICIAN: Laverne Daly MD  RETURN PHYSICIAN APPOINTMENT: 5/15/2017     INITIAL ASSESSMENT:  Ms. Claire Ro presents with right hamstring muscle strain and spinal scoliosis. Nerve tension in the right leg was ruled out with negative slump and passive straight leg raise. She also demonstrates poor core and gluteal strength. She has secondary impairment of poor balance, increasing her fall risk. She will benefit from aquatic based therapy to improve strength and muscle flexibility. Recommend transition to land based therapy when she demonstrates improved functional strength and balance. She was educated on HEP to perform between sessions. PROBLEM LIST (Impacting functional limitations):  1. Decreased Strength  2. Decreased Transfer Abilities  3. Decreased Ambulation Ability/Technique  4. Decreased Balance  5. Increased Pain  6. Decreased Flexibility/Joint Mobility INTERVENTIONS PLANNED:  1. Balance Exercise  2. Electrical Stimulation  3. Gait Training  4. Heat  5. Home Exercise Program (HEP)  6. Manual Therapy  7. Neuromuscular Re-education/Strengthening  8. Range of Motion (ROM)  9. Therapeutic Activites  10. Therapeutic Exercise/Strengthening  11. Aquatics   TREATMENT PLAN:  Effective Dates: 2017 TO 2017.   Frequency/Duration: 2 times a week for 12 weeks  GOALS: (Goals have been discussed and agreed upon with patient.)  Short-Term Functional Goals: Time Frame: 2 weeks  1. Patient will be independent with HEP without pain. (MET)  2. Patient will report ability to turn while sleeping without pain. - MET (5/8/17)  3. Patient will reports ability to walk 1/2 mile without limp or increased pain. (MET)  Discharge Goals: Time Frame: 12 weeks  1. Patient will have LEFS improved to 50/80 allowing improve community participation. (MET)  2. Patient will have mmt of bilateral LE improve to 5-/5 allowing her to ascend/descend flight of stairs without increased pain. (MET)  3. Patient will have BBS improved to > 44 decreasing her fall risk. (IN PROGRESS)  Rehabilitation Potential For Stated Goals: Good                HISTORY:   History of Present Injury/Illness (Reason for Referral):  65 y/o F with c/o LBP R hip pain lasting the last 2 months. She had chiropractor treatment that helped. Her pain is 7/10 low back and hip constant and is better than it was 2 months ago. . She had L THIERNO 1 year ago without PT. She reports poor balance but no falls. She has scoliosis that has gotten worse lately. Her pain limits her ability to walk > 1/2 mile and turn over in bed. Past Medical History/Comorbidities:   Ms. Tamiko Jenkins  has a past medical history of Arthritis; Malagon's esophagus; Chest discomfort; Coronary atherosclerosis of native coronary vessel; Degeneration of lumbar or lumbosacral intervertebral disc; Depression; Gastric ulcer, unspecified as acute or chronic, without mention of hemorrhage or perforation (1989); Hyperlipidemia; Hypothyroidism; Infectious disease; Nausea & vomiting; Palpitations; Scoliosis (and kyphoscoliosis), idiopathic; Spinal stenosis, lumbar (12/7/2009); Status post right hip replacement (2/26/2016); Thoracic or lumbosacral neuritis or radiculitis, unspecified; Tricuspid regurgitation; and Tricuspid valve disorder. She also has no past medical history of Adverse effect of anesthesia;  Aneurysm ([facility] [address] Arrhythmia; Asthma; Autoimmune disease ([facility] [address] Cancer ([address] Chronic kidney disease; Chronic obstructive pulmonary disease ([address] Chronic pain; Coagulation disorder ([address] Diabetes ([address] Difficult intubation; Endocarditis; GERD (gastroesophageal reflux disease); Heart failure ([address] Ill-defined condition; Liver disease; Malignant hyperthermia due to anesthesia; Morbid obesity ([address] Pseudocholinesterase deficiency; Rheumatic fever; Seizures ([address] Sleep apnea; Stroke [facility]); or Thromboembolus ([facility] [address] Ms. Abdon Gonzáles  has a past surgical history that includes appendectomy (1987); hysterectomy (1988); hip replacement (Left, 2007); bunionectomy (Bilateral, 2009); hammer toe repair (2010); lumbar laminectomy (2009); orthopaedic (2010); hip replacement (Left, 2009); knee replacement (Right, 2007); shoulder arthroscopy (2013); carpal tunnel release (Right, 10/31/13); and breast biopsy (Left, 1980). Social History/Living Environment:     no stairs, lives with spouse  Prior Level of Function/Work/Activity:  Independent  Dominant Side:         RIGHT  Current Medications:    Current Outpatient Prescriptions:     LORazepam (ATIVAN) 0.5 mg tablet, Take 0.5 mg by mouth., Disp: , Rfl:     estradiol (ESTRACE) 1 mg tablet, Take 1 mg by mouth., Disp: , Rfl:     simvastatin (ZOCOR) 10 mg tablet, Take 10 mg by mouth nightly., Disp: , Rfl:     PARoxetine (PAXIL) 20 mg tablet, Take 20 mg by mouth daily. Take / use AM day of surgery  per anesthesia protocols. , Disp: , Rfl:     cetirizine (ALLER-TALON) 10 mg tablet, Take 10 mg by mouth daily. Take / use AM day of surgery  per anesthesia protocols. , Disp: , Rfl:     fluticasone (FLONASE) 50 mcg/actuation nasal spray, 2 Sprays by Both Nostrils route daily. Take morning of surgery per anesthesia guidelines. , Disp: , Rfl:    Date Last Reviewed:  5/25/2017   EXAMINATION:   Observation/Orthostatic Postural Assessment:          Scoliosis concavity T/S to right with severe right sided shoulder shift.   Palpation:          WNL  ROM:     Hip and Lumbar AROM WNL without pain  Right hamstring severe muscle flexibility restriction                  Strength:     Date:  4/4/2017 Date:  5/15/17 Date:     LE MMT Left Right Left Right Left Right   Hip Flex (L1/L2) 3+ 3+ 5-/5 5-/5     Hip Abd (glut med) 3 3       Hip Ext 3+ 3+       Quad    ( L3/4) 4+ 4+ 5/5 5/5     Hamstring   5/5 5/5     Anterior Tib (L4/L5)         Gastroc (S1/2)         EHL (L5)                             Special Tests: n/a  Neurological Screen:        Sensation: light touch intact B LE  Functional Mobility:         Gait/Ambulation:  Right trendelenberg, flat foot contact B  Balance:          BBS 37        TUG 25 sec        5x STS 23 sec        SLB: unable B   Body Structures Involved:  1. Joints  2. Muscles Body Functions Affected:  1. Neuromusculoskeletal  2. Movement Related Activities and Participation Affected:  1. Mobility  2. Community, Social and Civic Life   CLINICAL PRESENTATION:   CLINICAL DECISION MAKING:   Outcome Measure: Tool Used: Lower Extremity Functional Scale (LEFS)  Score:  Initial: 39/80 Most Recent: 59/80 (Date: 5/15/17 -- )   Interpretation of Score: 20 questions each scored on a 5 point scale with 0 representing \"extreme difficulty or unable to perform\" and 4 representing \"no difficulty\". The lower the score, the greater the functional disability. 80/80 represents no disability. Minimal detectable change is 9 points. Score 80 79-63 62-48 47-32 31-16 15-1 0   Modifier CH CI CJ CK CL CM CN     ?  Mobility - Walking and Moving Around:     - CURRENT STATUS: CK - 40%-59% impaired, limited or restricted    - GOAL STATUS: CJ - 20%-39% impaired, limited or restricted    - D/C STATUS:  ---------------To be determined---------------    Medical Necessity:   · Patient is expected to demonstrate progress in strength, range of motion, balance, coordination and functional technique to decrease assistance required with functional mobility. .  Reason for Services/Other Comments:  · Patient continues to require skilled intervention due to progressing toward goals. .   TREATMENT:   (In addition to Assessment/Re-Assessment sessions the following treatments were rendered)  MODALITIES: (see grid for  minutes): *  Electrical Stimulation Therapy (IFC) was provided with intensity adjusted throughout treatment to patient tolerance. to reduce pain. *  Cold Pack Therapy in order to provide analgesia and reduce inflammation and edema. *  Hot Pack Therapy in order to relieve muscle spasm. Therapeutic Exercise: (see flow sheet below for minutes) ( ):  Exercises per grid below to improve mobility, strength, balance, coordination and dynamic movement of buttock - right, hip - right and thigh - right to improve functional bending and gait, stairs, transfers. Required moderate visual, verbal, manual and tactile cues to promote proper body alignment, promote proper body posture, promote proper body mechanics and promote proper body breathing techniques. Progressed resistance, range, repetitions and complexity of movement as indicated. Aquatic Therapy (see flow sheet below for minutes): Aquatic treatment performed per flow grid for Decreased muscle strength, Decreased static/dynamic balance and reactive control, Decreased activity endurance, Ease of movement and Low impact and reduced weight bearing activity. Cues provided for body mechanics. Assistance by therapist provided for safety. Patient has difficulty with body mechanics.      Date: 4/4/2017  (visit 1) 4/6/2017  (visit 2) 4/21/17 04/26/17 05/01/17 5/8/17 5/10/17 5/15/17 5/25/17   Modalities:                                                Manual Therapy:                                                Aquatic Exercise:  35 mins 1.5# 1.5# 55 min  60  Mins  2.5# 2.5# 60 min  60 mins  2.5# 2.5# 60 min 2.5# 55 min 3.75#/55 minutes   Gait F/B/S/M  3 laps x4L x4L ea X4L each x4L ea x4L x4L x4L   Heel/Toe walk  30x           Squats  30x x30 x30 at front rail x30 x30      4-way   x10 BLE F/B only x20B  X20 BLE x20 B      PF/DF    x30 X20 with handrail for support X30      Hamstring Curls   x2L (limited flexion)  x2L X2L x2L x2L x2L   Hip Flexion with knee ext.   (rockette)    x15 reps X15 BLE with hip ext x20 B                    SLR march   x4L x2L X4L  x2L x4L x4L x4L   Lunges       x15 BLE x15 BLE x20 BLE   Hip circles      Fig 4      Hip horizontal abduction/adduction            Core:paddles UE \"rows\"    Paddles open 30x PDs  x20 X20 closed paddles x30 x15 helmets x15 helmets Dumbbells x15   Core: paddles UE horz abd/add    x20 X20 closed paddles x20 x15 helmets x15 helmets    Core: dumbbells abd/add    x20 X20 closed paddles X30   Dumb bells 2x10   Core: dumbbells flex/extn      X20 x15 helmets x15 helmets Helmets 2x15   Deep well bike  5 min 4 min 5 mins 3 min 5 mins 4 min 4 min 4 min   Deep well jumping gilda   2 min 1 min 2 min 1 min 2 min 2 min 2 min   Deep well scissors   2 min 1 min 2 min 1 min 2 min  2 min 2 min   Deep well:  traction   5 min 5 mins 5 min 5 min 5 min 5 min 5 min   Squat chop             Hamstring Stretch            Step Lunge            Piriformis stretch            PF Stretch            Balance: Single leg Stance         2 H 20   Balance: Tandem     With closed paddles alternating BLE x10    Tandem walk  x2 L   Squat chop         UE flex/ext with dumb bells while in squat  2x10                 Therapeutic Exercise: 15 mins           Active HS stretch 30x           Fig 4 stretch 3x30\"           Knee press single leg 10\"x5           PPT 10\"x5           SL clams 3x10                       F=forward  B=Backward  S=sidesteps  M=marches    H=hold    Manual: (see flow sheet above for minutes)   Modalities: (see flow sheet above for minutes)     HEP: Handout provided 4/4/2017     Treatment/Session Assessment: Progressed aquatics with increased resistance and increased challenge with balance. Instructed to use handrail less with gait activities. No c/o during activities but note decreased dynamic balance. Needs continued balance training. · Pain/ Symptoms: Initial:   0/10 Pt reports some mild pain yesterday with the weather change, but no c/o today. \"I do need to work on my balance. \"    . Post Session: 0/10 ·   Compliance with Program/Exercises: compliant  · Recommendations/Intent for next treatment session: \"Next visit will focus on advancements to more challenging activities\".   Plan to continue in aquatics    Total Treatment Duration:   PT Patient Time In/Time Out  Time In: 0930  Time Out: 1025     Magnolia Kramer, PT

## 2017-05-31 ENCOUNTER — HOSPITAL ENCOUNTER (OUTPATIENT)
Dept: PHYSICAL THERAPY | Age: 77
Discharge: HOME | End: 2017-05-31
Payer: MEDICARE

## 2017-05-31 PROCEDURE — 97113 AQUATIC THERAPY/EXERCISES: CPT

## 2017-05-31 NOTE — PROGRESS NOTES
Laisha Del Rio  : 1940 [id number] [facility],[address] @ [address]  [address] [place] [address]  [phone]   [phone]        OUTPATIENT PHYSICAL THERAPY:Daily Note 2017      ICD-10: Treatment Diagnosis: Effusion, right hip (M25.451)Strain of muscle, fascia and tendon of the posterior muscle group at thigh level, left thigh, sequela (Z19.828B)  Precautions/Allergies:   Adhesive tape and Codeine  Fall Risk Score: 0 (? 5 = High Risk)  MD Orders: Eval and treat MEDICAL/REFERRING DIAGNOSIS:  Right hip pain [M25.551]   DATE OF ONSET: 2017  REFERRING PHYSICIAN: Hill Kyle MD  RETURN PHYSICIAN APPOINTMENT: 5/15/2017     INITIAL ASSESSMENT:  Ms. Shalonda Olivas presents with right hamstring muscle strain and spinal scoliosis. Nerve tension in the right leg was ruled out with negative slump and passive straight leg raise. She also demonstrates poor core and gluteal strength. She has secondary impairment of poor balance, increasing her fall risk. She will benefit from aquatic based therapy to improve strength and muscle flexibility. Recommend transition to land based therapy when she demonstrates improved functional strength and balance. She was educated on HEP to perform between sessions. PROBLEM LIST (Impacting functional limitations):  1. Decreased Strength  2. Decreased Transfer Abilities  3. Decreased Ambulation Ability/Technique  4. Decreased Balance  5. Increased Pain  6. Decreased Flexibility/Joint Mobility INTERVENTIONS PLANNED:  1. Balance Exercise  2. Electrical Stimulation  3. Gait Training  4. Heat  5. Home Exercise Program (HEP)  6. Manual Therapy  7. Neuromuscular Re-education/Strengthening  8. Range of Motion (ROM)  9. Therapeutic Activites  10. Therapeutic Exercise/Strengthening  11. Aquatics   TREATMENT PLAN:  Effective Dates: 2017 TO 2017.   Frequency/Duration: 2 times a week for 12 weeks  GOALS: (Goals have been discussed and agreed upon with patient.)  Short-Term Functional Goals: Time Frame: 2 weeks  1. Patient will be independent with HEP without pain. (MET)  2. Patient will report ability to turn while sleeping without pain. - MET (5/8/17)  3. Patient will reports ability to walk 1/2 mile without limp or increased pain. (MET)  Discharge Goals: Time Frame: 12 weeks  1. Patient will have LEFS improved to 50/80 allowing improve community participation. (MET)  2. Patient will have mmt of bilateral LE improve to 5-/5 allowing her to ascend/descend flight of stairs without increased pain. (MET)  3. Patient will have BBS improved to > 44 decreasing her fall risk. (IN PROGRESS)  Rehabilitation Potential For Stated Goals: Good                HISTORY:   History of Present Injury/Illness (Reason for Referral):  65 y/o F with c/o LBP R hip pain lasting the last 2 months. She had chiropractor treatment that helped. Her pain is 7/10 low back and hip constant and is better than it was 2 months ago. . She had L THIERNO 1 year ago without PT. She reports poor balance but no falls. She has scoliosis that has gotten worse lately. Her pain limits her ability to walk > 1/2 mile and turn over in bed. Past Medical History/Comorbidities:   Ms. Ysabel Mckeon  has a past medical history of Arthritis; Malagon's esophagus; Chest discomfort; Coronary atherosclerosis of native coronary vessel; Degeneration of lumbar or lumbosacral intervertebral disc; Depression; Gastric ulcer, unspecified as acute or chronic, without mention of hemorrhage or perforation (1989); Hyperlipidemia; Hypothyroidism; Infectious disease; Nausea & vomiting; Palpitations; Scoliosis (and kyphoscoliosis), idiopathic; Spinal stenosis, lumbar (12/7/2009); Status post right hip replacement (2/26/2016); Thoracic or lumbosacral neuritis or radiculitis, unspecified; Tricuspid regurgitation; and Tricuspid valve disorder. She also has no past medical history of Adverse effect of anesthesia;  Aneurysm ([address] Arrhythmia; Asthma; Autoimmune disease ([facility] [address] Cancer ([address] Chronic kidney disease; Chronic obstructive pulmonary disease ([address] Chronic pain; Coagulation disorder ([address] Diabetes ([address] Difficult intubation; Endocarditis; GERD (gastroesophageal reflux disease); Heart failure ([address] Ill-defined condition; Liver disease; Malignant hyperthermia due to anesthesia; Morbid obesity ([address] Pseudocholinesterase deficiency; Rheumatic fever; Seizures ([address] Sleep apnea; Stroke [facility]); or Thromboembolus ([facility] [address] Ms. Ysabel Mckeon  has a past surgical history that includes appendectomy (1987); hysterectomy (1988); hip replacement (Left, 2007); bunionectomy (Bilateral, 2009); hammer toe repair (2010); lumbar laminectomy (2009); orthopaedic (2010); hip replacement (Left, 2009); knee replacement (Right, 2007); shoulder arthroscopy (2013); carpal tunnel release (Right, 10/31/13); and breast biopsy (Left, 1980). Social History/Living Environment:     no stairs, lives with spouse  Prior Level of Function/Work/Activity:  Independent  Dominant Side:         RIGHT  Current Medications:    Current Outpatient Prescriptions:     LORazepam (ATIVAN) 0.5 mg tablet, Take 0.5 mg by mouth., Disp: , Rfl:     estradiol (ESTRACE) 1 mg tablet, Take 1 mg by mouth., Disp: , Rfl:     simvastatin (ZOCOR) 10 mg tablet, Take 10 mg by mouth nightly., Disp: , Rfl:     PARoxetine (PAXIL) 20 mg tablet, Take 20 mg by mouth daily. Take / use AM day of surgery  per anesthesia protocols. , Disp: , Rfl:     cetirizine (ALLER-TALON) 10 mg tablet, Take 10 mg by mouth daily. Take / use AM day of surgery  per anesthesia protocols. , Disp: , Rfl:     fluticasone (FLONASE) 50 mcg/actuation nasal spray, 2 Sprays by Both Nostrils route daily. Take morning of surgery per anesthesia guidelines. , Disp: , Rfl:    Date Last Reviewed:  5/31/2017   EXAMINATION:   Observation/Orthostatic Postural Assessment:          Scoliosis concavity T/S to right with severe right sided shoulder shift.   Palpation:          WNL  ROM:     Hip and Lumbar AROM WNL without pain  Right hamstring severe muscle flexibility restriction            Strength:     Date:  4/4/2017 Date:  5/15/17 Date:     LE MMT Left Right Left Right Left Right   Hip Flex (L1/L2) 3+ 3+ 5-/5 5-/5     Hip Abd (glut med) 3 3       Hip Ext 3+ 3+       Quad    ( L3/4) 4+ 4+ 5/5 5/5     Hamstring   5/5 5/5     Anterior Tib (L4/L5)         Gastroc (S1/2)         EHL (L5)                             Special Tests: n/a  Neurological Screen:        Sensation: light touch intact B LE  Functional Mobility:         Gait/Ambulation:  Right trendelenberg, flat foot contact B  Balance:          BBS 37        TUG 25 sec        5x STS 23 sec        SLB: unable B   Body Structures Involved:  1. Joints  2. Muscles Body Functions Affected:  1. Neuromusculoskeletal  2. Movement Related Activities and Participation Affected:  1. Mobility  2. Community, Social and Civic Life   CLINICAL PRESENTATION:   CLINICAL DECISION MAKING:   Outcome Measure: Tool Used: Lower Extremity Functional Scale (LEFS)  Score:  Initial: 39/80 Most Recent: 59/80 (Date: 5/15/17 -- )   Interpretation of Score: 20 questions each scored on a 5 point scale with 0 representing \"extreme difficulty or unable to perform\" and 4 representing \"no difficulty\". The lower the score, the greater the functional disability. 80/80 represents no disability. Minimal detectable change is 9 points. Score 80 79-63 62-48 47-32 31-16 15-1 0   Modifier CH CI CJ CK CL CM CN     ?  Mobility - Walking and Moving Around:     - CURRENT STATUS: CK - 40%-59% impaired, limited or restricted    - GOAL STATUS: CJ - 20%-39% impaired, limited or restricted    - D/C STATUS:  ---------------To be determined---------------    Medical Necessity:   · Patient is expected to demonstrate progress in strength, range of motion, balance, coordination and functional technique to decrease assistance required with functional mobility. .  Reason for Services/Other Comments:  · Patient continues to require skilled intervention due to progressing toward goals. .   TREATMENT:   (In addition to Assessment/Re-Assessment sessions the following treatments were rendered)  MODALITIES: (see grid for  minutes): *  Electrical Stimulation Therapy (IFC) was provided with intensity adjusted throughout treatment to patient tolerance. to reduce pain. *  Cold Pack Therapy in order to provide analgesia and reduce inflammation and edema. *  Hot Pack Therapy in order to relieve muscle spasm. Therapeutic Exercise: (see flow sheet below for minutes) ( ):  Exercises per grid below to improve mobility, strength, balance, coordination and dynamic movement of buttock - right, hip - right and thigh - right to improve functional bending and gait, stairs, transfers. Required moderate visual, verbal, manual and tactile cues to promote proper body alignment, promote proper body posture, promote proper body mechanics and promote proper body breathing techniques. Progressed resistance, range, repetitions and complexity of movement as indicated. Aquatic Therapy (see flow sheet below for minutes): Aquatic treatment performed per flow grid for Decreased muscle strength, Decreased static/dynamic balance and reactive control, Decreased activity endurance, Ease of movement and Low impact and reduced weight bearing activity. Cues provided for body mechanics. Assistance by therapist provided for safety. Patient has difficulty with body mechanics.      Date: 05/01/17 5/8/17 5/10/17 5/15/17 5/25/17 5/31/17    Modalities:                                        Manual Therapy:                                        Aquatic Exercise: 2.5# 60 min  60 mins  2.5# 2.5# 60 min 2.5# 55 min 3.75#/55 minutes  60 mins  3.75#    Gait F/B/S/M X4L each x4L ea x4L x4L x4L x4L ea    Heel/Toe walk          Squats x30 x30    x20 at front rail    4-way X20 BLE x20 B    x20 B    PF/DF X20 with handrail for support X30    x30    Hamstring Curls x2L X2L x2L x2L x2L x2L    Hip Flexion with knee ext. (rockette) X15 BLE with hip ext x20 B                    SLR march X4L  x2L x4L x4L x4L x2L    Lunges   x15 BLE x15 BLE x20 BLE     Hip circles  Fig 4        Hip horizontal abduction/adduction          Core:paddles UE \"rows\"   X20 closed paddles x30 x15 helmets x15 helmets Dumbbells x15 x20    Core: paddles UE horz abd/add X20 closed paddles x20 x15 helmets x15 helmets  x20    Core: dumbbells abd/add X20 closed paddles X30   Dumb bells 2x10 x20    Core: dumbbells flex/extn  X20 x15 helmets x15 helmets Helmets 2x15 x20    Deep well bike 3 min 5 mins 4 min 4 min 4 min 5 min    Deep well jumping gilda 2 min 1 min 2 min 2 min 2 min 2 min    Deep well scissors 2 min 1 min 2 min  2 min 2 min 2 min    Deep well:  traction 5 min 5 min 5 min 5 min 5 min 10 min    Squat chop           Hamstring Stretch          Step Lunge          Piriformis stretch          PF Stretch          Balance: Single leg Stance     2 H 20     Balance: Tandem With closed paddles alternating BLE x10    Tandem walk  x2 L x2L ea forw/back  hold    Squat chop     UE flex/ext with dumb bells while in squat  2x10                 Therapeutic Exercise: Active HS stretch          Fig 4 stretch          Knee press single leg          PPT          SL clams                    F=forward  B=Backward  S=sidesteps  M=marches    H=hold    Manual: (see flow sheet above for minutes)   Modalities: (see flow sheet above for minutes)     HEP: Handout provided 4/4/2017     Treatment/Session Assessment: Progressed aquatics with increased resistance to increase challenge with balance and stability. Verbal and visual cuing to assure proper body mechanics, including abdominal bracing. Pt utilized HHA at rail with gait activities. No c/o during activities but notes decreased dynamic balance. Needs continued balance training. · Pain/ Symptoms: Initial:   1-2/10   Pt reports mild B hip soreness. Pt primary complaint of hip and LBP with static standing and walking. Pt also notes R >L shoulder pain, pt says \"it needs to be replaced\". Pt continues to demonstrate altered gait mechanics due to spinal scoliosis and unlevel hips. .  Post Session: 0/10 ·     · Compliance with Program/Exercises: compliant  · Recommendations/Intent for next treatment session: \"Next visit will focus on advancements to more challenging activities\". Plan to continue in , progressing activities as tolerated by pt.     Total Treatment Duration: pt called to say she was running late (appt at 9:30)  PT Patient Time In/Time Out  Time In: 8485  Time Out: 410 [facility] [place]

## 2017-06-06 ENCOUNTER — HOSPITAL ENCOUNTER (OUTPATIENT)
Dept: PHYSICAL THERAPY | Age: 77
Discharge: HOME | End: 2017-06-06
Payer: MEDICARE

## 2017-06-06 PROCEDURE — 97113 AQUATIC THERAPY/EXERCISES: CPT

## 2017-06-06 NOTE — PROGRESS NOTES
Griffin Cuellar  : 1940 [id number] [facility],[address] @ [address]  [facility] [address]  [phone]   [phone]        OUTPATIENT PHYSICAL THERAPY:Daily Note 2017      ICD-10: Treatment Diagnosis: Effusion, right hip (M25.451)Strain of muscle, fascia and tendon of the posterior muscle group at thigh level, left thigh, sequela (F51.952I)  Precautions/Allergies:   Adhesive tape and Codeine  Fall Risk Score: 0 (? 5 = High Risk)  MD Orders: Eval and treat MEDICAL/REFERRING DIAGNOSIS:  Right hip pain [M25.551]   DATE OF ONSET: 2017  REFERRING PHYSICIAN: Jonn Huffman MD  RETURN PHYSICIAN APPOINTMENT: 5/15/2017     INITIAL ASSESSMENT:  Ms. Papo Jackson presents with right hamstring muscle strain and spinal scoliosis. Nerve tension in the right leg was ruled out with negative slump and passive straight leg raise. She also demonstrates poor core and gluteal strength. She has secondary impairment of poor balance, increasing her fall risk. She will benefit from aquatic based therapy to improve strength and muscle flexibility. Recommend transition to land based therapy when she demonstrates improved functional strength and balance. She was educated on HEP to perform between sessions. PROBLEM LIST (Impacting functional limitations):  1. Decreased Strength  2. Decreased Transfer Abilities  3. Decreased Ambulation Ability/Technique  4. Decreased Balance  5. Increased Pain  6. Decreased Flexibility/Joint Mobility INTERVENTIONS PLANNED:  1. Balance Exercise  2. Electrical Stimulation  3. Gait Training  4. Heat  5. Home Exercise Program (HEP)  6. Manual Therapy  7. Neuromuscular Re-education/Strengthening  8. Range of Motion (ROM)  9. Therapeutic Activites  10. Therapeutic Exercise/Strengthening  11. Aquatics   TREATMENT PLAN:  Effective Dates: 2017 TO 2017.   Frequency/Duration: 2 times a week for 12 weeks  GOALS: (Goals have been discussed and agreed upon with patient.)  Short-Term Functional Goals: Time Frame: 2 weeks  1. Patient will be independent with HEP without pain. (MET)  2. Patient will report ability to turn while sleeping without pain. - MET (5/8/17)  3. Patient will reports ability to walk 1/2 mile without limp or increased pain. (MET)  Discharge Goals: Time Frame: 12 weeks  1. Patient will have LEFS improved to 50/80 allowing improve community participation. (MET)  2. Patient will have mmt of bilateral LE improve to 5-/5 allowing her to ascend/descend flight of stairs without increased pain. (MET)  3. Patient will have BBS improved to > 44 decreasing her fall risk. (IN PROGRESS)  Rehabilitation Potential For Stated Goals: Good                HISTORY:   History of Present Injury/Illness (Reason for Referral):  63 y/o F with c/o LBP R hip pain lasting the last 2 months. She had chiropractor treatment that helped. Her pain is 7/10 low back and hip constant and is better than it was 2 months ago. . She had L THIERNO 1 year ago without PT. She reports poor balance but no falls. She has scoliosis that has gotten worse lately. Her pain limits her ability to walk > 1/2 mile and turn over in bed. Past Medical History/Comorbidities:   Ms. Abbey Olivas  has a past medical history of Arthritis; Malagon's esophagus; Chest discomfort; Coronary atherosclerosis of native coronary vessel; Degeneration of lumbar or lumbosacral intervertebral disc; Depression; Gastric ulcer, unspecified as acute or chronic, without mention of hemorrhage or perforation (1989); Hyperlipidemia; Hypothyroidism; Infectious disease; Nausea & vomiting; Palpitations; Scoliosis (and kyphoscoliosis), idiopathic; Spinal stenosis, lumbar (12/7/2009); Status post right hip replacement (2/26/2016); Thoracic or lumbosacral neuritis or radiculitis, unspecified; Tricuspid regurgitation; and Tricuspid valve disorder. She also has no past medical history of Adverse effect of anesthesia;  Aneurysm ([address] Arrhythmia; Asthma; Autoimmune disease ([facility] [address] Cancer ([address] Chronic kidney disease; Chronic obstructive pulmonary disease ([address] Chronic pain; Coagulation disorder ([address] Diabetes ([address] Difficult intubation; Endocarditis; GERD (gastroesophageal reflux disease); Heart failure ([address] Ill-defined condition; Liver disease; Malignant hyperthermia due to anesthesia; Morbid obesity ([address] Pseudocholinesterase deficiency; Rheumatic fever; Seizures ([address] Sleep apnea; Stroke [facility]); or Thromboembolus ([facility] [address] Ms. Tamiko Jenkins  has a past surgical history that includes appendectomy (1987); hysterectomy (1988); hip replacement (Left, 2007); bunionectomy (Bilateral, 2009); hammer toe repair (2010); lumbar laminectomy (2009); orthopaedic (2010); hip replacement (Left, 2009); knee replacement (Right, 2007); shoulder arthroscopy (2013); carpal tunnel release (Right, 10/31/13); and breast biopsy (Left, 1980). Social History/Living Environment:     no stairs, lives with spouse  Prior Level of Function/Work/Activity:  Independent  Dominant Side:         RIGHT  Current Medications:    Current Outpatient Prescriptions:     LORazepam (ATIVAN) 0.5 mg tablet, Take 0.5 mg by mouth., Disp: , Rfl:     estradiol (ESTRACE) 1 mg tablet, Take 1 mg by mouth., Disp: , Rfl:     simvastatin (ZOCOR) 10 mg tablet, Take 10 mg by mouth nightly., Disp: , Rfl:     PARoxetine (PAXIL) 20 mg tablet, Take 20 mg by mouth daily. Take / use AM day of surgery  per anesthesia protocols. , Disp: , Rfl:     cetirizine (ALLER-TALON) 10 mg tablet, Take 10 mg by mouth daily. Take / use AM day of surgery  per anesthesia protocols. , Disp: , Rfl:     fluticasone (FLONASE) 50 mcg/actuation nasal spray, 2 Sprays by Both Nostrils route daily. Take morning of surgery per anesthesia guidelines. , Disp: , Rfl:    Date Last Reviewed:  6/6/2017   EXAMINATION:   Observation/Orthostatic Postural Assessment:          Scoliosis concavity T/S to right with severe right sided shoulder shift.   Palpation:          WNL  ROM:     Hip and Lumbar AROM WNL without pain  Right hamstring severe muscle flexibility restriction            Strength:     Date:  4/4/2017 Date:  5/15/17 Date:     LE MMT Left Right Left Right Left Right   Hip Flex (L1/L2) 3+ 3+ 5-/5 5-/5     Hip Abd (glut med) 3 3       Hip Ext 3+ 3+       Quad    ( L3/4) 4+ 4+ 5/5 5/5     Hamstring   5/5 5/5     Anterior Tib (L4/L5)         Gastroc (S1/2)         EHL (L5)                             Special Tests: n/a  Neurological Screen:        Sensation: light touch intact B LE  Functional Mobility:         Gait/Ambulation:  Right trendelenberg, flat foot contact B  Balance:          BBS 37        TUG 25 sec        5x STS 23 sec        SLB: unable B   Body Structures Involved:  1. Joints  2. Muscles Body Functions Affected:  1. Neuromusculoskeletal  2. Movement Related Activities and Participation Affected:  1. Mobility  2. Community, Social and Civic Life   CLINICAL PRESENTATION:   CLINICAL DECISION MAKING:   Outcome Measure: Tool Used: Lower Extremity Functional Scale (LEFS)  Score:  Initial: 39/80 Most Recent: 59/80 (Date: 5/15/17 -- )   Interpretation of Score: 20 questions each scored on a 5 point scale with 0 representing \"extreme difficulty or unable to perform\" and 4 representing \"no difficulty\". The lower the score, the greater the functional disability. 80/80 represents no disability. Minimal detectable change is 9 points. Score 80 79-63 62-48 47-32 31-16 15-1 0   Modifier CH CI CJ CK CL CM CN     ?  Mobility - Walking and Moving Around:     - CURRENT STATUS: CK - 40%-59% impaired, limited or restricted    - GOAL STATUS: CJ - 20%-39% impaired, limited or restricted    - D/C STATUS:  ---------------To be determined---------------    Medical Necessity:   · Patient is expected to demonstrate progress in strength, range of motion, balance, coordination and functional technique to decrease assistance required with functional mobility. .  Reason for Services/Other Comments:  · Patient continues to require skilled intervention due to progressing toward goals. .   TREATMENT:   (In addition to Assessment/Re-Assessment sessions the following treatments were rendered)  MODALITIES: (see grid for  minutes): *  Electrical Stimulation Therapy (IFC) was provided with intensity adjusted throughout treatment to patient tolerance. to reduce pain. *  Cold Pack Therapy in order to provide analgesia and reduce inflammation and edema. *  Hot Pack Therapy in order to relieve muscle spasm. Therapeutic Exercise: (see flow sheet below for minutes) ( ):  Exercises per grid below to improve mobility, strength, balance, coordination and dynamic movement of buttock - right, hip - right and thigh - right to improve functional bending and gait, stairs, transfers. Required moderate visual, verbal, manual and tactile cues to promote proper body alignment, promote proper body posture, promote proper body mechanics and promote proper body breathing techniques. Progressed resistance, range, repetitions and complexity of movement as indicated. Aquatic Therapy (see flow sheet below for minutes): Aquatic treatment performed per flow grid for Decreased muscle strength, Decreased static/dynamic balance and reactive control, Decreased activity endurance, Ease of movement and Low impact and reduced weight bearing activity. Cues provided for body mechanics. Assistance by therapist provided for safety. Patient has difficulty with body mechanics.      Date: 05/01/17 5/8/17 5/10/17 5/15/17 5/25/17 5/31/17 6/6/17   Modalities:                                        Manual Therapy:                                        Aquatic Exercise: 2.5# 60 min  60 mins  2.5# 2.5# 60 min 2.5# 55 min 3.75#/55 minutes  60 mins  3.75#  60 mins  3.75#   Gait F/B/S/M X4L each x4L ea x4L x4L x4L x4L ea x4Lea   Heel/Toe walk          Squats x30 x30    x20 at front rail x20 at front rail   4-way X20 BLE x20 B    x20 B x20B   PF/DF X20 with handrail for support X30    x30 x30   Hamstring Curls x2L X2L x2L x2L x2L x2L x4L   Hip Flexion with knee ext. (rockette) X15 BLE with hip ext x20 B                    SLR march X4L  x2L x4L x4L x4L x2L x4L   Lunges   x15 BLE x15 BLE x20 BLE     Hip circles  Fig 4        Hip horizontal abduction/adduction          Core:paddles UE \"rows\"   X20 closed paddles x30 x15 helmets x15 helmets Dumbbells x15 x20 x20   Core: paddles UE horz abd/add X20 closed paddles x20 x15 helmets x15 helmets  x20 x20   Core: dumbbells abd/add X20 closed paddles X30   Dumb bells 2x10 x20 x20   Core: dumbbells flex/extn  X20 x15 helmets x15 helmets Helmets 2x15 x20 x20   Deep well bike 3 min 5 mins 4 min 4 min 4 min 5 min 5 min   Deep well jumping gilda 2 min 1 min 2 min 2 min 2 min 2 min 2 min   Deep well scissors 2 min 1 min 2 min  2 min 2 min 2 min 2 min   Deep well:  traction 5 min 5 min 5 min 5 min 5 min 10 min 10 min   Squat chop           Hamstring Stretch          Step Lunge          Piriformis stretch          PF Stretch          Balance: Single leg Stance     2 H 20     Balance: Tandem With closed paddles alternating BLE x10    Tandem walk  x2 L x2L ea forw/back  hold    Squat chop     UE flex/ext with dumb bells while in squat  2x10                 Therapeutic Exercise: Active HS stretch          Fig 4 stretch          Knee press single leg          PPT          SL clams                    F=forward  B=Backward  S=sidesteps  M=marches    H=hold    Manual: (see flow sheet above for minutes)   Modalities: (see flow sheet above for minutes)     HEP: Handout provided 4/4/2017     Treatment/Session Assessment:  Patient arrives at clinic today complaining of ill-feeling with upset stomach, and nausea. Though complaining, pt says feels good enough to continue with therapy/aquatics.     Pt completed aquatics per flow sheet without exacerbation of hip or back pain. Continue to challenge pt with core stability and strength, and use of good body mechanics in an effort to reduce back pain. Minimal verbal and visual cuing to assure proper execution, and abdominal bracing. Pt utilized HHA at rail with gait activities. No c/o of increased pain during activities but notes decreased dynamic balance. Pt would benefit from more challenging balance training. · Pain/ Symptoms: Initial:   1-2/10   Pt complains of mild B hip soreness. Pt primary complaint of hip and LBP with static standing and walking, she has moderate difficulty walk community distances. Pt continues to demonstrate altered gait mechanics due to spinal scoliosis and unlevel hips. Overall pt feels aquatics, with off-loading spine while performing exercise has been beneficial, \"I am able to do the exercise without a lot of pain\". .  Post Session: 0/10 ·     · Compliance with Program/Exercises: compliant  · Recommendations/Intent for next treatment session: \"Next visit will focus on advancements to more challenging activities\". Plan to continue in , progressing activities as tolerated by pt.     Total Treatment Duration:   PT Patient Time In/Time Out  Time In: 0845  Time Out: [address], PTA

## 2017-06-13 ENCOUNTER — HOSPITAL ENCOUNTER (OUTPATIENT)
Dept: PHYSICAL THERAPY | Age: 77
Discharge: HOME | End: 2017-06-13
Payer: MEDICARE

## 2017-06-16 ENCOUNTER — HOSPITAL ENCOUNTER (OUTPATIENT)
Dept: PHYSICAL THERAPY | Age: 77
Discharge: HOME | End: 2017-06-16
Payer: MEDICARE

## 2017-06-16 PROCEDURE — 97113 AQUATIC THERAPY/EXERCISES: CPT

## 2017-06-16 NOTE — PROGRESS NOTES
Rene Jones  : 1940 [address] [facility],[address]  [address] [place] [address].  [phone]   [phone]        OUTPATIENT PHYSICAL THERAPY:Daily Note 2017      ICD-10: Treatment Diagnosis: Effusion, right hip (M25.451)Strain of muscle, fascia and tendon of the posterior muscle group at thigh level, left thigh, sequela (E05.952A)  Precautions/Allergies:   Adhesive tape and Codeine  Fall Risk Score: 0 (? 5 = High Risk)  MD Orders: Eval and treat MEDICAL/REFERRING DIAGNOSIS:  Right hip pain [M25.551]   DATE OF ONSET: 2017  REFERRING PHYSICIAN: Paramjit Falk MD  RETURN PHYSICIAN APPOINTMENT: 5/15/2017     INITIAL ASSESSMENT:  Ms. Ousmane Roa presents with right hamstring muscle strain and spinal scoliosis. Nerve tension in the right leg was ruled out with negative slump and passive straight leg raise. She also demonstrates poor core and gluteal strength. She has secondary impairment of poor balance, increasing her fall risk. She will benefit from aquatic based therapy to improve strength and muscle flexibility. Recommend transition to land based therapy when she demonstrates improved functional strength and balance. She was educated on HEP to perform between sessions. PROBLEM LIST (Impacting functional limitations):  1. Decreased Strength  2. Decreased Transfer Abilities  3. Decreased Ambulation Ability/Technique  4. Decreased Balance  5. Increased Pain  6. Decreased Flexibility/Joint Mobility INTERVENTIONS PLANNED:  1. Balance Exercise  2. Electrical Stimulation  3. Gait Training  4. Heat  5. Home Exercise Program (HEP)  6. Manual Therapy  7. Neuromuscular Re-education/Strengthening  8. Range of Motion (ROM)  9. Therapeutic Activites  10. Therapeutic Exercise/Strengthening  11. Aquatics   TREATMENT PLAN:  Effective Dates: 2017 TO 2017.   Frequency/Duration: 2 times a week for 12 weeks  GOALS: (Goals have been discussed and agreed upon with patient.)  Short-Term Functional Goals: Time Frame: 2 weeks  1. Patient will be independent with HEP without pain. (MET)  2. Patient will report ability to turn while sleeping without pain. - MET (5/8/17)  3. Patient will reports ability to walk 1/2 mile without limp or increased pain. (MET)  Discharge Goals: Time Frame: 12 weeks  1. Patient will have LEFS improved to 50/80 allowing improve community participation. (MET)  2. Patient will have mmt of bilateral LE improve to 5-/5 allowing her to ascend/descend flight of stairs without increased pain. (MET)  3. Patient will have BBS improved to > 44 decreasing her fall risk. (IN PROGRESS)  Rehabilitation Potential For Stated Goals: Good                HISTORY:   History of Present Injury/Illness (Reason for Referral):  65 y/o F with c/o LBP R hip pain lasting the last 2 months. She had chiropractor treatment that helped. Her pain is 7/10 low back and hip constant and is better than it was 2 months ago. . She had L THIERNO 1 year ago without PT. She reports poor balance but no falls. She has scoliosis that has gotten worse lately. Her pain limits her ability to walk > 1/2 mile and turn over in bed. Past Medical History/Comorbidities:   Ms. Abbey Olivas  has a past medical history of Arthritis; Malagon's esophagus; Chest discomfort; Coronary atherosclerosis of native coronary vessel; Degeneration of lumbar or lumbosacral intervertebral disc; Depression; Gastric ulcer, unspecified as acute or chronic, without mention of hemorrhage or perforation (1989); Hyperlipidemia; Hypothyroidism; Infectious disease; Nausea & vomiting; Palpitations; Scoliosis (and kyphoscoliosis), idiopathic; Spinal stenosis, lumbar (12/7/2009); Status post right hip replacement (2/26/2016); Thoracic or lumbosacral neuritis or radiculitis, unspecified; Tricuspid regurgitation; and Tricuspid valve disorder. She also has no past medical history of Adverse effect of anesthesia;  Aneurysm ([address] Arrhythmia; Asthma; Autoimmune disease ([facility] [address] Cancer ([address] Chronic kidney disease; Chronic obstructive pulmonary disease ([address] Chronic pain; Coagulation disorder ([address] Diabetes ([address] Difficult intubation; Endocarditis; GERD (gastroesophageal reflux disease); Heart failure ([address] Ill-defined condition; Liver disease; Malignant hyperthermia due to anesthesia; Morbid obesity ([facility] [place] [address] Pseudocholinesterase deficiency; Rheumatic fever; Seizures ([facility] [address] Sleep apnea; Stroke [facility]); or Thromboembolus ([facility] [address] Ms. Frantz Larsen  has a past surgical history that includes appendectomy (1987); hysterectomy (1988); hip replacement (Left, 2007); bunionectomy (Bilateral, 2009); hammer toe repair (2010); lumbar laminectomy (2009); orthopaedic (2010); hip replacement (Left, 2009); knee replacement (Right, 2007); shoulder arthroscopy (2013); carpal tunnel release (Right, 10/31/13); and breast biopsy (Left, 1980). Social History/Living Environment:     no stairs, lives with spouse  Prior Level of Function/Work/Activity:  Independent  Dominant Side:         RIGHT  Current Medications:    Current Outpatient Prescriptions:     LORazepam (ATIVAN) 0.5 mg tablet, Take 0.5 mg by mouth., Disp: , Rfl:     estradiol (ESTRACE) 1 mg tablet, Take 1 mg by mouth., Disp: , Rfl:     simvastatin (ZOCOR) 10 mg tablet, Take 10 mg by mouth nightly., Disp: , Rfl:     PARoxetine (PAXIL) 20 mg tablet, Take 20 mg by mouth daily. Take / use AM day of surgery  per anesthesia protocols. , Disp: , Rfl:     cetirizine (ALLER-TALON) 10 mg tablet, Take 10 mg by mouth daily. Take / use AM day of surgery  per anesthesia protocols. , Disp: , Rfl:     fluticasone (FLONASE) 50 mcg/actuation nasal spray, 2 Sprays by Both Nostrils route daily. Take morning of surgery per anesthesia guidelines. , Disp: , Rfl:    Date Last Reviewed:  6/16/2017   EXAMINATION:   Observation/Orthostatic Postural Assessment:          Scoliosis concavity T/S to right with severe right sided shoulder shift.   Palpation:          WNL  ROM:     Hip and Lumbar AROM WNL without pain  Right hamstring severe muscle flexibility restriction                  Strength:     Date:  4/4/2017 Date:  5/15/17 Date:     LE MMT Left Right Left Right Left Right   Hip Flex (L1/L2) 3+ 3+ 5-/5 5-/5     Hip Abd (glut med) 3 3       Hip Ext 3+ 3+       Quad    ( L3/4) 4+ 4+ 5/5 5/5     Hamstring   5/5 5/5     Anterior Tib (L4/L5)         Gastroc (S1/2)         EHL (L5)                             Special Tests: n/a  Neurological Screen:        Sensation: light touch intact B LE  Functional Mobility:         Gait/Ambulation:  Right trendelenberg, flat foot contact B  Balance:          BBS 37        TUG 25 sec        5x STS 23 sec        SLB: unable B   Body Structures Involved:  1. Joints  2. Muscles Body Functions Affected:  1. Neuromusculoskeletal  2. Movement Related Activities and Participation Affected:  1. Mobility  2. Community, Social and Civic Life   CLINICAL PRESENTATION:   CLINICAL DECISION MAKING:   Outcome Measure: Tool Used: Lower Extremity Functional Scale (LEFS)  Score:  Initial: 39/80 Most Recent: 59/80 (Date: 5/15/17 -- )   Interpretation of Score: 20 questions each scored on a 5 point scale with 0 representing \"extreme difficulty or unable to perform\" and 4 representing \"no difficulty\". The lower the score, the greater the functional disability. 80/80 represents no disability. Minimal detectable change is 9 points. Score 80 79-63 62-48 47-32 31-16 15-1 0   Modifier CH CI CJ CK CL CM CN     ?  Mobility - Walking and Moving Around:     - CURRENT STATUS: CK - 40%-59% impaired, limited or restricted    - GOAL STATUS: CJ - 20%-39% impaired, limited or restricted    - D/C STATUS:  ---------------To be determined---------------    Medical Necessity:   · Patient is expected to demonstrate progress in strength, range of motion, balance, coordination and functional technique to decrease assistance required with functional mobility. .  Reason for Services/Other Comments:  · Patient continues to require skilled intervention due to progressing toward goals. .   TREATMENT:   (In addition to Assessment/Re-Assessment sessions the following treatments were rendered)  MODALITIES: (see grid for  minutes): *  Electrical Stimulation Therapy (IFC) was provided with intensity adjusted throughout treatment to patient tolerance. to reduce pain. *  Cold Pack Therapy in order to provide analgesia and reduce inflammation and edema. *  Hot Pack Therapy in order to relieve muscle spasm. Therapeutic Exercise: (see flow sheet below for minutes) ( ):  Exercises per grid below to improve mobility, strength, balance, coordination and dynamic movement of buttock - right, hip - right and thigh - right to improve functional bending and gait, stairs, transfers. Required moderate visual, verbal, manual and tactile cues to promote proper body alignment, promote proper body posture, promote proper body mechanics and promote proper body breathing techniques. Progressed resistance, range, repetitions and complexity of movement as indicated. Aquatic Therapy (see flow sheet below for minutes): Aquatic treatment performed per flow grid for Decreased muscle strength, Decreased static/dynamic balance and reactive control, Decreased activity endurance, Ease of movement and Low impact and reduced weight bearing activity. Cues provided for body mechanics. Assistance by therapist provided for safety. Patient has difficulty with body mechanics.      Date: 4/4/2017  (visit 1) 4/6/2017  (visit 2) 4/21/17 04/26/17 05/01/17 5/8/17 5/10/17 5/15/17 6/16/17    Modalities:                                                    Manual Therapy:                                                    Aquatic Exercise:  35 mins 1.5# 1.5# 55 min  60  Mins  2.5# 2.5# 60 min  60 mins  2.5# 2.5# 60 min 2.5# 55 min 2.5 # 40 min    Gait F/B/S/M  3 laps x4L x4L ea X4L each x4L ea x4L x4L X2L    Heel/Toe walk  30x            Squats  30x x30 x30 at front rail x30 x30       4-way   x10 BLE F/B only x20B  X20 BLE x20 B   X20    PF/DF    x30 X20 with handrail for support X30       Hamstring Curls   x2L (limited flexion)  x2L X2L x2L x2L X20    Hip Flexion with knee ext. (rockette)    x15 reps X15 BLE with hip ext x20 B                      SLR march   x4L x2L X4L  x2L x4L x4L X2L    Lunges       x15 BLE x15 BLE     Hip circles      Fig 4       Hip horizontal abduction/adduction             Core:paddles UE \"rows\"    Paddles open 30x PDs  x20 X20 closed paddles x30 x15 helmets x15 helmets X15  helmets    Core: paddles UE horz abd/add    x20 X20 closed paddles x20 x15 helmets x15 helmets X15  helmets    Core: dumbbells abd/add    x20 X20 closed paddles X30       Core: dumbbells flex/extn      X20 x15 helmets x15 helmets Too painful on R shoulder    Deep well bike  5 min 4 min 5 mins 3 min 5 mins 4 min 4 min 3 min    Deep well jumping gilda   2 min 1 min 2 min 1 min 2 min 2 min 2 min    Deep well scissors   2 min 1 min 2 min 1 min 2 min  2 min 2 min    Deep well:  traction   5 min 5 mins 5 min 5 min 5 min 5 min 5 min                 Hamstring Stretch             Step Lunge             Piriformis stretch             PF Stretch             Balance: Single leg Stance             Balance: Tandem     With closed paddles alternating BLE x10                                    Therapeutic Exercise: 15 mins            Active HS stretch 30x            Fig 4 stretch 3x30\"            Knee press single leg 10\"x5            PPT 10\"x5            SL clams 3x10                         F=forward  B=Backward  S=sidesteps  M=marches    H=hold    Manual: (see flow sheet above for minutes)   Modalities: (see flow sheet above for minutes)     HEP: Handout provided 4/4/2017     Treatment/Session Assessment: Pt arrived to PT 20 mins late for her appointment.  Patient performed above list of aquatic exercises demonstrating decreased xiang in lap exercises. Pt needed verbal and visual cues for correct technique and body mechanics. Pt reports she has R shoulder pain but was able to perform core exercises listed above with hydro bells without increased pain. Pt tolerated PT session well without exacerbation of R hip pain. Plan to continue aquatics next visit. · Pain/ Symptoms:  Initial:   0/10 Pt reports no pain today just weakness. Vernida Chard Post Session: 0/10 ·   Compliance with Program/Exercises: Will assess as treatment progresses. · Recommendations/Intent for next treatment session: \"Next visit will focus on advancements to more challenging activities\".   Plan to continue in aquatics  ·   Total Treatment Duration:   PT Patient Time In/Time Out  Time In: 1035  Time Out: [address] [place]

## 2017-06-20 ENCOUNTER — HOSPITAL ENCOUNTER (OUTPATIENT)
Dept: PHYSICAL THERAPY | Age: 77
Discharge: HOME | End: 2017-06-20
Payer: MEDICARE

## 2017-06-20 PROCEDURE — 97113 AQUATIC THERAPY/EXERCISES: CPT

## 2017-06-20 NOTE — PROGRESS NOTES
Rene Jones  : 1940 [address] [facility],[address] @ [address]  [address] [place] [address].  [phone]   [phone]        OUTPATIENT PHYSICAL THERAPY:Daily Note 2017      ICD-10: Treatment Diagnosis: Effusion, right hip (M25.451)Strain of muscle, fascia and tendon of the posterior muscle group at thigh level, left thigh, sequela (P35.990Y)  Precautions/Allergies:   Adhesive tape and Codeine  Fall Risk Score: 0 (? 5 = High Risk)  MD Orders: Eval and treat MEDICAL/REFERRING DIAGNOSIS:  Right hip pain [M25.551]   DATE OF ONSET: 2017  REFERRING PHYSICIAN: Paramjit Falk MD  RETURN PHYSICIAN APPOINTMENT: 5/15/2017     INITIAL ASSESSMENT:  Ms. Ousmane Roa presents with right hamstring muscle strain and spinal scoliosis. Nerve tension in the right leg was ruled out with negative slump and passive straight leg raise. She also demonstrates poor core and gluteal strength. She has secondary impairment of poor balance, increasing her fall risk. She will benefit from aquatic based therapy to improve strength and muscle flexibility. Recommend transition to land based therapy when she demonstrates improved functional strength and balance. She was educated on HEP to perform between sessions. PROBLEM LIST (Impacting functional limitations):  1. Decreased Strength  2. Decreased Transfer Abilities  3. Decreased Ambulation Ability/Technique  4. Decreased Balance  5. Increased Pain  6. Decreased Flexibility/Joint Mobility INTERVENTIONS PLANNED:  1. Balance Exercise  2. Electrical Stimulation  3. Gait Training  4. Heat  5. Home Exercise Program (HEP)  6. Manual Therapy  7. Neuromuscular Re-education/Strengthening  8. Range of Motion (ROM)  9. Therapeutic Activites  10. Therapeutic Exercise/Strengthening  11. Aquatics   TREATMENT PLAN:  Effective Dates: 2017 TO 2017.   Frequency/Duration: 2 times a week for 12 weeks  GOALS: (Goals have been discussed and agreed upon with patient.)  Short-Term Functional Goals: Time Frame: 2 weeks  1. Patient will be independent with HEP without pain. (MET)  2. Patient will report ability to turn while sleeping without pain. - MET (5/8/17)  3. Patient will reports ability to walk 1/2 mile without limp or increased pain. (MET)  Discharge Goals: Time Frame: 12 weeks  1. Patient will have LEFS improved to 50/80 allowing improve community participation. (MET)  2. Patient will have mmt of bilateral LE improve to 5-/5 allowing her to ascend/descend flight of stairs without increased pain. (MET)  3. Patient will have BBS improved to > 44 decreasing her fall risk. (IN PROGRESS)  Rehabilitation Potential For Stated Goals: Good                HISTORY:   History of Present Injury/Illness (Reason for Referral):  65 y/o F with c/o LBP R hip pain lasting the last 2 months. She had chiropractor treatment that helped. Her pain is 7/10 low back and hip constant and is better than it was 2 months ago. . She had L THIERNO 1 year ago without PT. She reports poor balance but no falls. She has scoliosis that has gotten worse lately. Her pain limits her ability to walk > 1/2 mile and turn over in bed. Past Medical History/Comorbidities:   Ms. Ousmane Roa  has a past medical history of Arthritis; Malagon's esophagus; Chest discomfort; Coronary atherosclerosis of native coronary vessel; Degeneration of lumbar or lumbosacral intervertebral disc; Depression; Gastric ulcer, unspecified as acute or chronic, without mention of hemorrhage or perforation (1989); Hyperlipidemia; Hypothyroidism; Infectious disease; Nausea & vomiting; Palpitations; Scoliosis (and kyphoscoliosis), idiopathic; Spinal stenosis, lumbar (12/7/2009); Status post right hip replacement (2/26/2016); Thoracic or lumbosacral neuritis or radiculitis, unspecified; Tricuspid regurgitation; and Tricuspid valve disorder. She also has no past medical history of Adverse effect of anesthesia;  Aneurysm ([address] Arrhythmia; Asthma; Autoimmune disease ([facility] [address] Cancer ([address] Chronic kidney disease; Chronic obstructive pulmonary disease ([address] Chronic pain; Coagulation disorder ([address] Diabetes ([address] Difficult intubation; Endocarditis; GERD (gastroesophageal reflux disease); Heart failure ([address] Ill-defined condition; Liver disease; Malignant hyperthermia due to anesthesia; Morbid obesity ([address] Pseudocholinesterase deficiency; Rheumatic fever; Seizures ([address] Sleep apnea; Stroke [facility]); or Thromboembolus ([facility] [address] Ms. Cassia Moody  has a past surgical history that includes appendectomy (1987); hysterectomy (1988); hip replacement (Left, 2007); bunionectomy (Bilateral, 2009); hammer toe repair (2010); lumbar laminectomy (2009); orthopaedic (2010); hip replacement (Left, 2009); knee replacement (Right, 2007); shoulder arthroscopy (2013); carpal tunnel release (Right, 10/31/13); and breast biopsy (Left, 1980). Social History/Living Environment:     no stairs, lives with spouse  Prior Level of Function/Work/Activity:  Independent  Dominant Side:         RIGHT  Current Medications:    Current Outpatient Prescriptions:     LORazepam (ATIVAN) 0.5 mg tablet, Take 0.5 mg by mouth., Disp: , Rfl:     estradiol (ESTRACE) 1 mg tablet, Take 1 mg by mouth., Disp: , Rfl:     simvastatin (ZOCOR) 10 mg tablet, Take 10 mg by mouth nightly., Disp: , Rfl:     PARoxetine (PAXIL) 20 mg tablet, Take 20 mg by mouth daily. Take / use AM day of surgery  per anesthesia protocols. , Disp: , Rfl:     cetirizine (ALLER-TALON) 10 mg tablet, Take 10 mg by mouth daily. Take / use AM day of surgery  per anesthesia protocols. , Disp: , Rfl:     fluticasone (FLONASE) 50 mcg/actuation nasal spray, 2 Sprays by Both Nostrils route daily. Take morning of surgery per anesthesia guidelines. , Disp: , Rfl:    Date Last Reviewed:  6/20/2017   EXAMINATION:   Observation/Orthostatic Postural Assessment:          Scoliosis concavity T/S to right with severe right sided shoulder shift.   Palpation:          WNL  ROM:     Hip and Lumbar AROM WNL without pain  Right hamstring severe muscle flexibility restriction                  Strength:     Date:  4/4/2017 Date:  5/15/17 Date:     LE MMT Left Right Left Right Left Right   Hip Flex (L1/L2) 3+ 3+ 5-/5 5-/5     Hip Abd (glut med) 3 3       Hip Ext 3+ 3+       Quad    ( L3/4) 4+ 4+ 5/5 5/5     Hamstring   5/5 5/5     Anterior Tib (L4/L5)         Gastroc (S1/2)         EHL (L5)                             Special Tests: n/a  Neurological Screen:        Sensation: light touch intact B LE  Functional Mobility:         Gait/Ambulation:  Right trendelenberg, flat foot contact B  Balance:          BBS 37        TUG 25 sec        5x STS 23 sec        SLB: unable B   Body Structures Involved:  1. Joints  2. Muscles Body Functions Affected:  1. Neuromusculoskeletal  2. Movement Related Activities and Participation Affected:  1. Mobility  2. Community, Social and Civic Life   CLINICAL PRESENTATION:   CLINICAL DECISION MAKING:   Outcome Measure: Tool Used: Lower Extremity Functional Scale (LEFS)  Score:  Initial: 39/80 Most Recent: 59/80 (Date: 5/15/17 -- )   Interpretation of Score: 20 questions each scored on a 5 point scale with 0 representing \"extreme difficulty or unable to perform\" and 4 representing \"no difficulty\". The lower the score, the greater the functional disability. 80/80 represents no disability. Minimal detectable change is 9 points. Score 80 79-63 62-48 47-32 31-16 15-1 0   Modifier CH CI CJ CK CL CM CN     ?  Mobility - Walking and Moving Around:     - CURRENT STATUS: CK - 40%-59% impaired, limited or restricted    - GOAL STATUS: CJ - 20%-39% impaired, limited or restricted    - D/C STATUS:  ---------------To be determined---------------    Medical Necessity:   · Patient is expected to demonstrate progress in strength, range of motion, balance, coordination and functional technique to decrease assistance required with functional mobility. .  Reason for Services/Other Comments:  · Patient continues to require skilled intervention due to progressing toward goals. .   TREATMENT:   (In addition to Assessment/Re-Assessment sessions the following treatments were rendered)  MODALITIES: (see grid for  minutes): *  Electrical Stimulation Therapy (IFC) was provided with intensity adjusted throughout treatment to patient tolerance. to reduce pain. *  Cold Pack Therapy in order to provide analgesia and reduce inflammation and edema. *  Hot Pack Therapy in order to relieve muscle spasm. Therapeutic Exercise: (see flow sheet below for minutes) ( ):  Exercises per grid below to improve mobility, strength, balance, coordination and dynamic movement of buttock - right, hip - right and thigh - right to improve functional bending and gait, stairs, transfers. Required moderate visual, verbal, manual and tactile cues to promote proper body alignment, promote proper body posture, promote proper body mechanics and promote proper body breathing techniques. Progressed resistance, range, repetitions and complexity of movement as indicated. Aquatic Therapy (see flow sheet below for minutes): Aquatic treatment performed per flow grid for Decreased muscle strength, Decreased static/dynamic balance and reactive control, Decreased activity endurance, Ease of movement and Low impact and reduced weight bearing activity. Cues provided for body mechanics. Assistance by therapist provided for safety. Patient has difficulty with body mechanics.      Date: 04/26/17 05/01/17 5/8/17 5/10/17 5/15/17 6/16/17 6/20/17   Modalities:                                        Manual Therapy:                                        Aquatic Exercise:  60  Mins  2.5# 2.5# 60 min  60 mins  2.5# 2.5# 60 min 2.5# 55 min 2.5 # 40 min 3.75# 60 min   Gait F/B/S/M x4L ea X4L each x4L ea x4L x4L X2L X4L   Heel/Toe walk       X2L   Squats x30 at front rail x30 x30    X20   4-way x20B  X20 BLE x20 B   X20 X20 B   PF/DF x30 X20 with handrail for support X30    X15 RLE  X15 LLE   Hamstring Curls  x2L X2L x2L x2L X20    Hip Flexion with knee ext. (rockette) x15 reps X15 BLE with hip ext x20 B    X20 B               SLR march x2L X4L  x2L x4L x4L X2L X4L   Lunges    x15 BLE x15 BLE     Hip circles   Fig 4       Hip horizontal abduction/adduction          Core:paddles UE \"rows\"   x20 X20 closed paddles x30 x15 helmets x15 helmets X15  helmets    Core: paddles UE horz abd/add x20 X20 closed paddles x20 x15 helmets x15 helmets X15  helmets    Core: dumbbells abd/add x20 X20 closed paddles X30       Core: dumbbells flex/extn   X20 x15 helmets x15 helmets Too painful on R shoulder    Deep well bike 5 mins 3 min 5 mins 4 min 4 min 3 min 5 min   Deep well jumping gilda 1 min 2 min 1 min 2 min 2 min 2 min 2 min   Deep well scissors 1 min 2 min 1 min 2 min  2 min 2 min 2 min   Deep well:  traction 5 mins 5 min 5 min 5 min 5 min 5 min 8 min             Hamstring Stretch          Step Lunge          Piriformis stretch          PF Stretch          Balance: Single leg Stance          Balance: Tandem  With closed paddles alternating BLE x10     2H30 B                         Therapeutic Exercise: Active HS stretch          Fig 4 stretch          Knee press single leg          PPT          SL clams                    F=forward  B=Backward  S=sidesteps  M=marches    H=hold    Manual: (see flow sheet above for minutes)   Modalities: (see flow sheet above for minutes)     HEP: Handout provided 4/4/2017     Treatment/Session Assessment: Patient performed above list of aquatic exercises to increase BLE and core strength and flexibility. Increased ankle weight to 3.75#. Pt demonstrated decreased xiang in lap exercises due to pain in R hip and decreased balance during RLE single support utilizing hand rail to maintain balance.  Pt needed minimal verbal and visual cues for correct technique and body mechanics. Pt reports she has severe R shoulder pain and is going to make an appointment with her MD for surgery in the future, d/c exercises using hydro bells. Added balance training exercises (see flow sheet) to challenge patient stability. Pt tolerated PT session well without exacerbation of R hip pain. Plan to continue aquatics next visit. Pt plans on having surgery in July. Plan to d/c patient from PT on 6/30/17. · Pain/ Symptoms:  6/10 B hip Post Session: 3/10  Pt reports B hip feels \"better\" ·   Compliance with Program/Exercises: Will assess as treatment progresses. · Recommendations/Intent for next treatment session: \"Next visit will focus on advancements to more challenging activities\".   Plan to continue in aquatics    Total Treatment Duration:   PT Patient Time In/Time Out  Time In: 1500  Time Out: Walker [address]or [id number]

## 2017-06-23 ENCOUNTER — HOSPITAL ENCOUNTER (OUTPATIENT)
Dept: PHYSICAL THERAPY | Age: 77
Discharge: HOME | End: 2017-06-23
Payer: MEDICARE

## 2017-06-23 PROCEDURE — 97113 AQUATIC THERAPY/EXERCISES: CPT

## 2017-06-23 NOTE — PROGRESS NOTES
Gilberto Dillard  : 1940 [address] @ [address] [address].  [phone]   [phone]        OUTPATIENT PHYSICAL THERAPY:Daily Note 2017      ICD-10: Treatment Diagnosis: Effusion, right hip (M25.451)Strain of muscle, fascia and tendon of the posterior muscle group at thigh level, left thigh, sequela (L70.710U)  Precautions/Allergies:   Adhesive tape and Codeine  Fall Risk Score: 0 (? 5 = High Risk)  MD Orders: Eval and treat MEDICAL/REFERRING DIAGNOSIS:  Right hip pain [M25.551]   DATE OF ONSET: 2017  REFERRING PHYSICIAN: Tenzin Chan MD  RETURN PHYSICIAN APPOINTMENT: 5/15/2017     INITIAL ASSESSMENT:  Ms. Tamiko Jenkins presents with right hamstring muscle strain and spinal scoliosis. Nerve tension in the right leg was ruled out with negative slump and passive straight leg raise. She also demonstrates poor core and gluteal strength. She has secondary impairment of poor balance, increasing her fall risk. She will benefit from aquatic based therapy to improve strength and muscle flexibility. Recommend transition to land based therapy when she demonstrates improved functional strength and balance. She was educated on HEP to perform between sessions. PROBLEM LIST (Impacting functional limitations):  1. Decreased Strength  2. Decreased Transfer Abilities  3. Decreased Ambulation Ability/Technique  4. Decreased Balance  5. Increased Pain  6. Decreased Flexibility/Joint Mobility INTERVENTIONS PLANNED:  1. Balance Exercise  2. Electrical Stimulation  3. Gait Training  4. Heat  5. Home Exercise Program (HEP)  6. Manual Therapy  7. Neuromuscular Re-education/Strengthening  8. Range of Motion (ROM)  9. Therapeutic Activites  10. Therapeutic Exercise/Strengthening  11. Aquatics   TREATMENT PLAN:  Effective Dates: 2017 TO 2017.   Frequency/Duration: 2 times a week for 12 weeks  GOALS: (Goals have been discussed and agreed upon with patient.)  Short-Term Functional Goals: Time Frame: 2 weeks  1. Patient will be independent with HEP without pain. (MET)  2. Patient will report ability to turn while sleeping without pain. - MET (5/8/17)  3. Patient will reports ability to walk 1/2 mile without limp or increased pain. (MET)  Discharge Goals: Time Frame: 12 weeks  1. Patient will have LEFS improved to 50/80 allowing improve community participation. (MET)  2. Patient will have mmt of bilateral LE improve to 5-/5 allowing her to ascend/descend flight of stairs without increased pain. (MET)  3. Patient will have BBS improved to > 44 decreasing her fall risk. (IN PROGRESS)  Rehabilitation Potential For Stated Goals: Good                HISTORY:   History of Present Injury/Illness (Reason for Referral):  63 y/o F with c/o LBP R hip pain lasting the last 2 months. She had chiropractor treatment that helped. Her pain is 7/10 low back and hip constant and is better than it was 2 months ago. . She had L THIERNO 1 year ago without PT. She reports poor balance but no falls. She has scoliosis that has gotten worse lately. Her pain limits her ability to walk > 1/2 mile and turn over in bed. Past Medical History/Comorbidities:   Ms. Ousmane Roa  has a past medical history of Arthritis; Malagon's esophagus; Chest discomfort; Coronary atherosclerosis of native coronary vessel; Degeneration of lumbar or lumbosacral intervertebral disc; Depression; Gastric ulcer, unspecified as acute or chronic, without mention of hemorrhage or perforation (1989); Hyperlipidemia; Hypothyroidism; Infectious disease; Nausea & vomiting; Palpitations; Scoliosis (and kyphoscoliosis), idiopathic; Spinal stenosis, lumbar (12/7/2009); Status post right hip replacement (2/26/2016); Thoracic or lumbosacral neuritis or radiculitis, unspecified; Tricuspid regurgitation; and Tricuspid valve disorder. She also has no past medical history of Adverse effect of anesthesia;  Aneurysm ([address] Arrhythmia; Asthma; Autoimmune disease ([facility] [address] Cancer ([address] Chronic kidney disease; Chronic obstructive pulmonary disease ([address] Chronic pain; Coagulation disorder ([address] Diabetes ([address] Difficult intubation; Endocarditis; GERD (gastroesophageal reflux disease); Heart failure ([address] Ill-defined condition; Liver disease; Malignant hyperthermia due to anesthesia; Morbid obesity ([address] Pseudocholinesterase deficiency; Rheumatic fever; Seizures ([address] Sleep apnea; Stroke [facility]); or Thromboembolus ([facility] [address] Ms. Rodriguez Steen  has a past surgical history that includes appendectomy (1987); hysterectomy (1988); hip replacement (Left, 2007); bunionectomy (Bilateral, 2009); hammer toe repair (2010); lumbar laminectomy (2009); orthopaedic (2010); hip replacement (Left, 2009); knee replacement (Right, 2007); shoulder arthroscopy (2013); carpal tunnel release (Right, 10/31/13); and breast biopsy (Left, 1980). Social History/Living Environment:     no stairs, lives with spouse  Prior Level of Function/Work/Activity:  Independent  Dominant Side:         RIGHT  Current Medications:    Current Outpatient Prescriptions:     LORazepam (ATIVAN) 0.5 mg tablet, Take 0.5 mg by mouth., Disp: , Rfl:     estradiol (ESTRACE) 1 mg tablet, Take 1 mg by mouth., Disp: , Rfl:     simvastatin (ZOCOR) 10 mg tablet, Take 10 mg by mouth nightly., Disp: , Rfl:     PARoxetine (PAXIL) 20 mg tablet, Take 20 mg by mouth daily. Take / use AM day of surgery  per anesthesia protocols. , Disp: , Rfl:     cetirizine (ALLER-TALON) 10 mg tablet, Take 10 mg by mouth daily. Take / use AM day of surgery  per anesthesia protocols. , Disp: , Rfl:     fluticasone (FLONASE) 50 mcg/actuation nasal spray, 2 Sprays by Both Nostrils route daily. Take morning of surgery per anesthesia guidelines. , Disp: , Rfl:    Date Last Reviewed:  6/23/2017   EXAMINATION:   Observation/Orthostatic Postural Assessment:          Scoliosis concavity T/S to right with severe right sided shoulder shift.   Palpation:          WNL  ROM:     Hip and Lumbar AROM WNL without pain  Right hamstring severe muscle flexibility restriction                  Strength:     Date:  4/4/2017 Date:  5/15/17 Date:     LE MMT Left Right Left Right Left Right   Hip Flex (L1/L2) 3+ 3+ 5-/5 5-/5     Hip Abd (glut med) 3 3       Hip Ext 3+ 3+       Quad    ( L3/4) 4+ 4+ 5/5 5/5     Hamstring   5/5 5/5     Anterior Tib (L4/L5)         Gastroc (S1/2)         EHL (L5)                             Special Tests: n/a  Neurological Screen:        Sensation: light touch intact B LE  Functional Mobility:         Gait/Ambulation:  Right trendelenberg, flat foot contact B  Balance:          BBS 37        TUG 25 sec        5x STS 23 sec        SLB: unable B   Body Structures Involved:  1. Joints  2. Muscles Body Functions Affected:  1. Neuromusculoskeletal  2. Movement Related Activities and Participation Affected:  1. Mobility  2. Community, Social and Civic Life   CLINICAL PRESENTATION:   CLINICAL DECISION MAKING:   Outcome Measure: Tool Used: Lower Extremity Functional Scale (LEFS)  Score:  Initial: 39/80 Most Recent: 59/80 (Date: 5/15/17 -- )   Interpretation of Score: 20 questions each scored on a 5 point scale with 0 representing \"extreme difficulty or unable to perform\" and 4 representing \"no difficulty\". The lower the score, the greater the functional disability. 80/80 represents no disability. Minimal detectable change is 9 points. Score 80 79-63 62-48 47-32 31-16 15-1 0   Modifier CH CI CJ CK CL CM CN     ?  Mobility - Walking and Moving Around:     - CURRENT STATUS: CK - 40%-59% impaired, limited or restricted    - GOAL STATUS: CJ - 20%-39% impaired, limited or restricted    - D/C STATUS:  ---------------To be determined---------------    Medical Necessity:   · Patient is expected to demonstrate progress in strength, range of motion, balance, coordination and functional technique to decrease assistance required with functional mobility. .  Reason for Services/Other Comments:  · Patient continues to require skilled intervention due to progressing toward goals. .   TREATMENT:   (In addition to Assessment/Re-Assessment sessions the following treatments were rendered)  MODALITIES: (see grid for  minutes): *  Electrical Stimulation Therapy (IFC) was provided with intensity adjusted throughout treatment to patient tolerance. to reduce pain. *  Cold Pack Therapy in order to provide analgesia and reduce inflammation and edema. *  Hot Pack Therapy in order to relieve muscle spasm. Therapeutic Exercise: (see flow sheet below for minutes) ( ):  Exercises per grid below to improve mobility, strength, balance, coordination and dynamic movement of buttock - right, hip - right and thigh - right to improve functional bending and gait, stairs, transfers. Required moderate visual, verbal, manual and tactile cues to promote proper body alignment, promote proper body posture, promote proper body mechanics and promote proper body breathing techniques. Progressed resistance, range, repetitions and complexity of movement as indicated. Aquatic Therapy (see flow sheet below for minutes): Aquatic treatment performed per flow grid for Decreased muscle strength, Decreased static/dynamic balance and reactive control, Decreased activity endurance, Ease of movement and Low impact and reduced weight bearing activity. Cues provided for body mechanics. Assistance by therapist provided for safety. Patient has difficulty with body mechanics.      Date: 04/26/17 05/01/17 5/8/17 5/10/17 5/15/17 6/16/17 6/20/17 6/2317   Modalities:                                            Manual Therapy:                                            Aquatic Exercise:  60  Mins  2.5# 2.5# 60 min  60 mins  2.5# 2.5# 60 min 2.5# 55 min 2.5 # 40 min 3.75# 60 min 3.75# 55 min   Gait F/B/S/M x4L ea X4L each x4L ea x4L x4L X2L X4L X4L   Heel/Toe walk X2L X4L   Squats x30 at front rail x30 x30    X20 X20   4-way x20B  X20 BLE x20 B   X20 X20 B X20 B   PF/DF x30 X20 with handrail for support X30    X15 RLE  X15 LLE X20  X15 LLE  X15 RLE   Hamstring Curls  x2L X2L x2L x2L X20     Hip Flexion with knee ext. (rockette) x15 reps X15 BLE with hip ext x20 B    X20 B                 SLR march x2L X4L  x2L x4L x4L X2L X4L X4L   Lunges    x15 BLE x15 BLE      Hip circles   Fig 4        Hip horizontal abduction/adduction           Core:paddles UE \"rows\"   x20 X20 closed paddles x30 x15 helmets x15 helmets X15  helmets     Core: paddles UE horz abd/add x20 X20 closed paddles x20 x15 helmets x15 helmets X15  helmets     Core: dumbbells abd/add x20 X20 closed paddles X30        Core: dumbbells flex/extn   X20 x15 helmets x15 helmets Too painful on R shoulder     Deep well bike 5 mins 3 min 5 mins 4 min 4 min 3 min 5 min 4 min   Deep well jumping gilda 1 min 2 min 1 min 2 min 2 min 2 min 2 min 1 min   Deep well scissors 1 min 2 min 1 min 2 min  2 min 2 min 2 min 1 min   Deep well:  traction 5 mins 5 min 5 min 5 min 5 min 5 min 8 min 8 min              Hamstring Stretch        2H30   Step Lunge           Piriformis stretch           PF Stretch           Balance: Single leg Stance        H30 B 4ft   Balance: Tandem  With closed paddles alternating BLE x10     2H30 B                            Therapeutic Exercise: Active HS stretch           Fig 4 stretch           Knee press single leg           PPT           SL clams                      F=forward  B=Backward  S=sidesteps  M=marches    H=hold    Manual: (see flow sheet above for minutes)   Modalities: (see flow sheet above for minutes)     HEP: Handout provided 4/4/2017     Treatment/Session Assessment: Patient performed above list of aquatic exercises to increase BLE and core strength and flexibility. Pt performed lap exercises in approximately 80% bouyancy with minimal UE assist at handrail during SLR, pt demonstrated slow xiang and decreased single support due to decreased balance/propriception. Pt demonstrates good recall for aquatic exercises needing minimal verbal and visual cues for correct technique and body mechanics. Pt tolerated PT session well without exacerbation of R hip pain. Plan to continue aquatics next visit and give patient HEP for aquatic setting so she can continue to manage pain and increase LE and core strength/flexibility following d/c. Pt plans on having surgery in July on R shoulder. Plan to d/c patient from PT on 6/30/17. · Pain/ Symptoms:  4/10 B hip    Pt reports pain is better today. Post Session: 3/10  Pt reports B hip feels \"better\" ·   Compliance with Program/Exercises: Will assess as treatment progresses. · Recommendations/Intent for next treatment session: \"Next visit will focus on advancements to more challenging activities\". Plan to continue in aquatics next visit, giving patient aquatic HEP to maintain LE/core strength following d/c on 6/30/17.     Total Treatment Duration:   PT Patient Time In/Time Out  Time In: 1020  Time Out: [address] [facility]

## 2017-06-27 ENCOUNTER — HOSPITAL ENCOUNTER (OUTPATIENT)
Dept: PHYSICAL THERAPY | Age: 77
Discharge: HOME | End: 2017-06-27
Payer: MEDICARE

## 2017-06-27 PROCEDURE — 97113 AQUATIC THERAPY/EXERCISES: CPT

## 2017-06-27 NOTE — PROGRESS NOTES
Jacey Tan  : 1940 [address] @ [address], [address] [place] [address]  [phone]   [phone]        OUTPATIENT PHYSICAL THERAPY:Daily Note 2017      ICD-10: Treatment Diagnosis: Effusion, right hip (M25.451)Strain of muscle, fascia and tendon of the posterior muscle group at thigh level, left thigh, sequela (A52.548L)  Precautions/Allergies:   Adhesive tape and Codeine  Fall Risk Score: 0 (? 5 = High Risk)  MD Orders: Eval and treat MEDICAL/REFERRING DIAGNOSIS:  Right hip pain [M25.551]   DATE OF ONSET: 2017  REFERRING PHYSICIAN: Robyn Weiss MD  RETURN PHYSICIAN APPOINTMENT: 5/15/2017     INITIAL ASSESSMENT:  Ms. Komal Oneal presents with right hamstring muscle strain and spinal scoliosis. Nerve tension in the right leg was ruled out with negative slump and passive straight leg raise. She also demonstrates poor core and gluteal strength. She has secondary impairment of poor balance, increasing her fall risk. She will benefit from aquatic based therapy to improve strength and muscle flexibility. Recommend transition to land based therapy when she demonstrates improved functional strength and balance. She was educated on HEP to perform between sessions. PROBLEM LIST (Impacting functional limitations):  1. Decreased Strength  2. Decreased Transfer Abilities  3. Decreased Ambulation Ability/Technique  4. Decreased Balance  5. Increased Pain  6. Decreased Flexibility/Joint Mobility INTERVENTIONS PLANNED:  1. Balance Exercise  2. Electrical Stimulation  3. Gait Training  4. Heat  5. Home Exercise Program (HEP)  6. Manual Therapy  7. Neuromuscular Re-education/Strengthening  8. Range of Motion (ROM)  9. Therapeutic Activites  10. Therapeutic Exercise/Strengthening  11. Aquatics   TREATMENT PLAN:  Effective Dates: 2017 TO 2017.   Frequency/Duration: 2 times a week for 12 weeks  GOALS: (Goals have been discussed and agreed upon with patient.)  Short-Term Functional Goals: Time Frame: 2 weeks  1. Patient will be independent with HEP without pain. (MET)  2. Patient will report ability to turn while sleeping without pain. - MET (5/8/17)  3. Patient will reports ability to walk 1/2 mile without limp or increased pain. (MET)  Discharge Goals: Time Frame: 12 weeks  1. Patient will have LEFS improved to 50/80 allowing improve community participation. (MET)  2. Patient will have mmt of bilateral LE improve to 5-/5 allowing her to ascend/descend flight of stairs without increased pain. (MET)  3. Patient will have BBS improved to > 44 decreasing her fall risk. (IN PROGRESS)  Rehabilitation Potential For Stated Goals: Good                HISTORY:   History of Present Injury/Illness (Reason for Referral):  65 y/o F with c/o LBP R hip pain lasting the last 2 months. She had chiropractor treatment that helped. Her pain is 7/10 low back and hip constant and is better than it was 2 months ago. . She had L THIERNO 1 year ago without PT. She reports poor balance but no falls. She has scoliosis that has gotten worse lately. Her pain limits her ability to walk > 1/2 mile and turn over in bed. Past Medical History/Comorbidities:   Ms. Edel Olivera  has a past medical history of Arthritis; Malgaon's esophagus; Chest discomfort; Coronary atherosclerosis of native coronary vessel; Degeneration of lumbar or lumbosacral intervertebral disc; Depression; Gastric ulcer, unspecified as acute or chronic, without mention of hemorrhage or perforation (1989); Hyperlipidemia; Hypothyroidism; Infectious disease; Nausea & vomiting; Palpitations; Scoliosis (and kyphoscoliosis), idiopathic; Spinal stenosis, lumbar (12/7/2009); Status post right hip replacement (2/26/2016); Thoracic or lumbosacral neuritis or radiculitis, unspecified; Tricuspid regurgitation; and Tricuspid valve disorder. She also has no past medical history of Adverse effect of anesthesia;  Aneurysm ([address] Arrhythmia; Asthma; Autoimmune disease ([facility] [address] Cancer ([address] Chronic kidney disease; Chronic obstructive pulmonary disease ([address] Chronic pain; Coagulation disorder ([address] Diabetes ([address] Difficult intubation; Endocarditis; GERD (gastroesophageal reflux disease); Heart failure ([address] Ill-defined condition; Liver disease; Malignant hyperthermia due to anesthesia; Morbid obesity ([address] Pseudocholinesterase deficiency; Rheumatic fever; Seizures ([address] Sleep apnea; Stroke [facility]); or Thromboembolus ([facility] [address] Ms. Vielka Todd  has a past surgical history that includes appendectomy (1987); hysterectomy (1988); hip replacement (Left, 2007); bunionectomy (Bilateral, 2009); hammer toe repair (2010); lumbar laminectomy (2009); orthopaedic (2010); hip replacement (Left, 2009); knee replacement (Right, 2007); shoulder arthroscopy (2013); carpal tunnel release (Right, 10/31/13); and breast biopsy (Left, 1980). Social History/Living Environment:     no stairs, lives with spouse  Prior Level of Function/Work/Activity:  Independent  Dominant Side:         RIGHT  Current Medications:    Current Outpatient Prescriptions:     LORazepam (ATIVAN) 0.5 mg tablet, Take 0.5 mg by mouth., Disp: , Rfl:     estradiol (ESTRACE) 1 mg tablet, Take 1 mg by mouth., Disp: , Rfl:     simvastatin (ZOCOR) 10 mg tablet, Take 10 mg by mouth nightly., Disp: , Rfl:     PARoxetine (PAXIL) 20 mg tablet, Take 20 mg by mouth daily. Take / use AM day of surgery  per anesthesia protocols. , Disp: , Rfl:     cetirizine (ALLER-TALON) 10 mg tablet, Take 10 mg by mouth daily. Take / use AM day of surgery  per anesthesia protocols. , Disp: , Rfl:     fluticasone (FLONASE) 50 mcg/actuation nasal spray, 2 Sprays by Both Nostrils route daily. Take morning of surgery per anesthesia guidelines. , Disp: , Rfl:    Date Last Reviewed:  6/27/2017   EXAMINATION:   Observation/Orthostatic Postural Assessment:          Scoliosis concavity T/S to right with severe right sided shoulder shift.   Palpation:          WNL  ROM:     Hip and Lumbar AROM WNL without pain  Right hamstring severe muscle flexibility restriction                  Strength:     Date:  4/4/2017 Date:  5/15/17 Date:     LE MMT Left Right Left Right Left Right   Hip Flex (L1/L2) 3+ 3+ 5-/5 5-/5     Hip Abd (glut med) 3 3       Hip Ext 3+ 3+       Quad    ( L3/4) 4+ 4+ 5/5 5/5     Hamstring   5/5 5/5     Anterior Tib (L4/L5)         Gastroc (S1/2)         EHL (L5)                             Special Tests: n/a  Neurological Screen:        Sensation: light touch intact B LE  Functional Mobility:         Gait/Ambulation:  Right trendelenberg, flat foot contact B  Balance:          BBS 37        TUG 25 sec        5x STS 23 sec        SLB: unable B   Body Structures Involved:  1. Joints  2. Muscles Body Functions Affected:  1. Neuromusculoskeletal  2. Movement Related Activities and Participation Affected:  1. Mobility  2. Community, Social and Civic Life   CLINICAL PRESENTATION:   CLINICAL DECISION MAKING:   Outcome Measure: Tool Used: Lower Extremity Functional Scale (LEFS)  Score:  Initial: 39/80 Most Recent: 59/80 (Date: 5/15/17 -- )   Interpretation of Score: 20 questions each scored on a 5 point scale with 0 representing \"extreme difficulty or unable to perform\" and 4 representing \"no difficulty\". The lower the score, the greater the functional disability. 80/80 represents no disability. Minimal detectable change is 9 points. Score 80 79-63 62-48 47-32 31-16 15-1 0   Modifier CH CI CJ CK CL CM CN     ?  Mobility - Walking and Moving Around:     - CURRENT STATUS: CK - 40%-59% impaired, limited or restricted    - GOAL STATUS: CJ - 20%-39% impaired, limited or restricted    - D/C STATUS:  ---------------To be determined---------------    Medical Necessity:   · Patient is expected to demonstrate progress in strength, range of motion, balance, coordination and functional technique to decrease assistance required with functional mobility. .  Reason for Services/Other Comments:  · Patient continues to require skilled intervention due to progressing toward goals. .   TREATMENT:   (In addition to Assessment/Re-Assessment sessions the following treatments were rendered)  MODALITIES: (see grid for  minutes): *  Electrical Stimulation Therapy (IFC) was provided with intensity adjusted throughout treatment to patient tolerance. to reduce pain. *  Cold Pack Therapy in order to provide analgesia and reduce inflammation and edema. *  Hot Pack Therapy in order to relieve muscle spasm. Therapeutic Exercise: (see flow sheet below for minutes) ( ):  Exercises per grid below to improve mobility, strength, balance, coordination and dynamic movement of buttock - right, hip - right and thigh - right to improve functional bending and gait, stairs, transfers. Required moderate visual, verbal, manual and tactile cues to promote proper body alignment, promote proper body posture, promote proper body mechanics and promote proper body breathing techniques. Progressed resistance, range, repetitions and complexity of movement as indicated. Aquatic Therapy (see flow sheet below for minutes): Aquatic treatment performed per flow grid for Decreased muscle strength, Decreased static/dynamic balance and reactive control, Decreased activity endurance, Ease of movement and Low impact and reduced weight bearing activity. Cues provided for body mechanics. Assistance by therapist provided for safety. Patient has difficulty with body mechanics.      Date: 04/26/17 05/01/17 5/8/17 5/10/17 5/15/17 6/16/17 6/20/17 6/2317 6/27/17   Modalities:                                                Manual Therapy:                                                Aquatic Exercise:  60  Mins  2.5# 2.5# 60 min  60 mins  2.5# 2.5# 60 min 2.5# 55 min 2.5 # 40 min 3.75# 60 min 3.75# 55 min 3.75# 60 min   Gait F/B/S/M x4L ea X4L each x4L ea x4L x4L X2L X4L X4L [place].Cheese   Heel/Toe walk       X2L X4L X2L   Squats x30 at front rail x30 x30    X20 X20 X20 B   4-way x20B  X20 BLE x20 B   X20 X20 B X20 B X20 B   PF/DF x30 X20 with handrail for support X30    X15 RLE  X15 LLE X20  X15 LLE  X15 RLE    Hamstring Curls  x2L X2L x2L x2L X20   X2L   Hip Flexion with knee ext. (rockette) x15 reps X15 BLE with hip ext x20 B    X20 B  X4L                 SLR march x2L X4L  x2L x4L x4L X2L X4L X4L X4L   Lunges    x15 BLE x15 BLE    X15 BLE   Hip circles   Fig 4      X10 cw/ccw   Hip horizontal abduction/adduction         X20 B   Core:paddles UE \"rows\"   x20 X20 closed paddles x30 x15 helmets x15 helmets X15  helmets      Core: paddles UE horz abd/add x20 X20 closed paddles x20 x15 helmets x15 helmets X15  helmets      Core: dumbbells abd/add x20 X20 closed paddles X30         Core: dumbbells flex/extn   X20 x15 helmets x15 helmets Too painful on R shoulder      Deep well bike 5 mins 3 min 5 mins 4 min 4 min 3 min 5 min 4 min 5 min   Deep well jumping gilda 1 min 2 min 1 min 2 min 2 min 2 min 2 min 1 min 2 min   Deep well scissors 1 min 2 min 1 min 2 min  2 min 2 min 2 min 1 min 2 min   Deep well:  traction 5 mins 5 min 5 min 5 min 5 min 5 min 8 min 8 min 8 min               Hamstring Stretch        2H30    Step Lunge            Piriformis stretch            PF Stretch            Balance: Single leg Stance        H30 B 4ft    Balance: Tandem  With closed paddles alternating BLE x10     2H30 B                               Therapeutic Exercise:             Active HS stretch            Fig 4 stretch            Knee press single leg            PPT            SL clams                        F=forward  B=Backward  S=sidesteps  M=marches    H=hold    Manual: (see flow sheet above for minutes)   Modalities: (see flow sheet above for minutes)     HEP: Handout provided 4/4/2017     Treatment/Session Assessment: Patient performed above list of aquatic exercises to increase BLE and core strength and flexibility. Pt was given HEP for aquatic exercises to maintain strength/flexibility following d/c next visit. Pt was given handout with verbal and visual instructions for correct technique, pt performed HEP demonstrating good understanding of exercises. Pt needed minimal verbal and visual cues for correct technique and body mechanics. Pt needed UE assist during single support to maintain balance, demonstrating decreased xiang. Pt tolerated PT session well without exacerbation of R hip pain. Plan to d/c patient from PT next session; pt planning R shoulder surgery in July. · Pain/ Symptoms:  2-3/10 R hip Post Session: 1-2/10 R hip ·   Compliance with Program/Exercises: Will assess as treatment progresses. · Recommendations/Intent for next treatment session: \"Next visit will focus on advancements to more challenging activities\". Plan to d/c patient from PT next visit.     Total Treatment Duration:   PT Patient Time In/Time Out  Time In: 0930  Time Out: [address]

## 2017-06-30 ENCOUNTER — HOSPITAL ENCOUNTER (OUTPATIENT)
Dept: PHYSICAL THERAPY | Age: 77
Discharge: HOME | End: 2017-06-30
Payer: MEDICARE

## 2017-07-21 NOTE — PROGRESS NOTES
Ankit Vora  : [id number] [facility],[address] @ [place] [address].  [phone]   [phone]        OUTPATIENT PHYSICAL THERAPY:Daily Note and Discontinuation Summary 2017      ICD-10: Treatment Diagnosis: Effusion, right hip (M25.451)Strain of muscle, fascia and tendon of the posterior muscle group at thigh level, left thigh, sequela (R65.867C)  Precautions/Allergies:   Adhesive tape and Codeine  Fall Risk Score: 0 (? 5 = High Risk)  MD Orders: Eval and treat MEDICAL/REFERRING DIAGNOSIS:  Right hip pain [M25.551]   DATE OF ONSET: 2017  REFERRING PHYSICIAN: Partha Avalos MD  RETURN PHYSICIAN APPOINTMENT: 5/15/2017     INITIAL ASSESSMENT:  Ms. Olan Landau presents with right hamstring muscle strain and spinal scoliosis. Nerve tension in the right leg was ruled out with negative slump and passive straight leg raise. She also demonstrates poor core and gluteal strength. She has secondary impairment of poor balance, increasing her fall risk. She will benefit from aquatic based therapy to improve strength and muscle flexibility. Recommend transition to land based therapy when she demonstrates improved functional strength and balance. She was educated on HEP to perform between sessions. PROBLEM LIST (Impacting functional limitations):  1. Decreased Strength  2. Decreased Transfer Abilities  3. Decreased Ambulation Ability/Technique  4. Decreased Balance  5. Increased Pain  6. Decreased Flexibility/Joint Mobility INTERVENTIONS PLANNED:  1. Balance Exercise  2. Electrical Stimulation  3. Gait Training  4. Heat  5. Home Exercise Program (HEP)  6. Manual Therapy  7. Neuromuscular Re-education/Strengthening  8. Range of Motion (ROM)  9. Therapeutic Activites  10. Therapeutic Exercise/Strengthening  11. Aquatics   TREATMENT PLAN:  Effective Dates: 2017 TO 2017.   Frequency/Duration: 2 times a week for 12 weeks  GOALS: (Goals have been discussed and agreed upon with patient.)  Short-Term Functional Goals: Time Frame: 2 weeks  1. Patient will be independent with HEP without pain. (MET)  2. Patient will report ability to turn while sleeping without pain. - MET (5/8/17)  3. Patient will reports ability to walk 1/2 mile without limp or increased pain. (MET)  Discharge Goals: Time Frame: 12 weeks  1. Patient will have LEFS improved to 50/80 allowing improve community participation. (MET)  2. Patient will have mmt of bilateral LE improve to 5-/5 allowing her to ascend/descend flight of stairs without increased pain. (MET)  3. Patient will have BBS improved to > 44 decreasing her fall risk. (IN PROGRESS)  Rehabilitation Potential For Stated Goals: Good                HISTORY:   History of Present Injury/Illness (Reason for Referral):  65 y/o F with c/o LBP R hip pain lasting the last 2 months. She had chiropractor treatment that helped. Her pain is 7/10 low back and hip constant and is better than it was 2 months ago. . She had L THIERNO 1 year ago without PT. She reports poor balance but no falls. She has scoliosis that has gotten worse lately. Her pain limits her ability to walk > 1/2 mile and turn over in bed. Past Medical History/Comorbidities:   Ms. Ozzy Baker  has a past medical history of Arthritis; Malagon's esophagus; Chest discomfort; Coronary atherosclerosis of native coronary vessel; Degeneration of lumbar or lumbosacral intervertebral disc; Depression; Gastric ulcer, unspecified as acute or chronic, without mention of hemorrhage or perforation (1989); Hyperlipidemia; Hypothyroidism; Infectious disease; Nausea & vomiting; Palpitations; Scoliosis (and kyphoscoliosis), idiopathic; Spinal stenosis, lumbar (12/7/2009); Status post right hip replacement (2/26/2016); Thoracic or lumbosacral neuritis or radiculitis, unspecified; Tricuspid regurgitation; and Tricuspid valve disorder.  She also has no past medical history of Adverse effect of anesthesia; Aneurysm ([address] Arrhythmia; Asthma; Autoimmune disease ([address] Cancer ([address] Chronic kidney disease; Chronic obstructive pulmonary disease ([address] Chronic pain; Coagulation disorder ([address] Diabetes ([address] Difficult intubation; Endocarditis; GERD (gastroesophageal reflux disease); Heart failure ([address] Ill-defined condition; Liver disease; Malignant hyperthermia due to anesthesia; Morbid obesity ([address] Pseudocholinesterase deficiency; Rheumatic fever; Seizures ([address] Sleep apnea; Stroke [facility]); or Thromboembolus ([address] Ms. Reggie Lovell  has a past surgical history that includes appendectomy (1987); hysterectomy (1988); hip replacement (Left, 2007); bunionectomy (Bilateral, 2009); hammer toe repair (2010); lumbar laminectomy (2009); orthopaedic (2010); hip replacement (Left, 2009); knee replacement (Right, 2007); shoulder arthroscopy (2013); carpal tunnel release (Right, 10/31/13); and breast biopsy (Left, 1980). Social History/Living Environment:     no stairs, lives with spouse  Prior Level of Function/Work/Activity:  Independent  Dominant Side:         RIGHT  Current Medications:    Current Outpatient Prescriptions:     LORazepam (ATIVAN) 0.5 mg tablet, Take 0.5 mg by mouth., Disp: , Rfl:     estradiol (ESTRACE) 1 mg tablet, Take 1 mg by mouth., Disp: , Rfl:     simvastatin (ZOCOR) 10 mg tablet, Take 10 mg by mouth nightly., Disp: , Rfl:     PARoxetine (PAXIL) 20 mg tablet, Take 20 mg by mouth daily. Take / use AM day of surgery  per anesthesia protocols. , Disp: , Rfl:     cetirizine (ALLER-TALON) 10 mg tablet, Take 10 mg by mouth daily. Take / use AM day of surgery  per anesthesia protocols. , Disp: , Rfl:     fluticasone (FLONASE) 50 mcg/actuation nasal spray, 2 Sprays by Both Nostrils route daily. Take morning of surgery per anesthesia guidelines. , Disp: , Rfl:    Date Last Reviewed:  6/27/2017   EXAMINATION:   Observation/Orthostatic Postural Assessment:          Scoliosis concavity T/S to right with severe right sided shoulder shift. Palpation:          WNL  ROM:     Hip and Lumbar AROM WNL without pain  Right hamstring severe muscle flexibility restriction                  Strength:     Date:  4/4/2017 Date:  5/15/17 Date:     LE MMT Left Right Left Right Left Right   Hip Flex (L1/L2) 3+ 3+ 5-/5 5-/5     Hip Abd (glut med) 3 3       Hip Ext 3+ 3+       Quad    ( L3/4) 4+ 4+ 5/5 5/5     Hamstring   5/5 5/5     Anterior Tib (L4/L5)         Gastroc (S1/2)         EHL (L5)                             Special Tests: n/a  Neurological Screen:        Sensation: light touch intact B LE  Functional Mobility:         Gait/Ambulation:  Right trendelenberg, flat foot contact B  Balance:          BBS 37        TUG 25 sec        5x STS 23 sec        SLB: unable B   Body Structures Involved:  1. Joints  2. Muscles Body Functions Affected:  1. Neuromusculoskeletal  2. Movement Related Activities and Participation Affected:  1. Mobility  2. Community, Social and Civic Life   CLINICAL PRESENTATION:   CLINICAL DECISION MAKING:   Outcome Measure: Tool Used: Lower Extremity Functional Scale (LEFS)  Score:  Initial: 39/80 Most Recent: 59/80 (Date: 5/15/17 -- )   Interpretation of Score: 20 questions each scored on a 5 point scale with 0 representing \"extreme difficulty or unable to perform\" and 4 representing \"no difficulty\". The lower the score, the greater the functional disability. 80/80 represents no disability. Minimal detectable change is 9 points. Score 80 79-63 62-48 47-32 31-16 15-1 0   Modifier CH CI CJ CK CL CM CN     ?  Mobility - Walking and Moving Around:     - CURRENT STATUS: CK - 40%-59% impaired, limited or restricted    - GOAL STATUS: CJ - 20%-39% impaired, limited or restricted    - D/C STATUS:  ---------------To be determined---------------    Medical Necessity:   · Patient is expected to demonstrate progress in strength, range of motion, balance, coordination and functional technique to decrease assistance required with functional mobility. .  Reason for Services/Other Comments:  · Patient continues to require skilled intervention due to progressing toward goals. .   TREATMENT:   (In addition to Assessment/Re-Assessment sessions the following treatments were rendered)  MODALITIES: (see grid for  minutes): *  Electrical Stimulation Therapy (IFC) was provided with intensity adjusted throughout treatment to patient tolerance. to reduce pain. *  Cold Pack Therapy in order to provide analgesia and reduce inflammation and edema. *  Hot Pack Therapy in order to relieve muscle spasm. Therapeutic Exercise: (see flow sheet below for minutes) ( ):  Exercises per grid below to improve mobility, strength, balance, coordination and dynamic movement of buttock - right, hip - right and thigh - right to improve functional bending and gait, stairs, transfers. Required moderate visual, verbal, manual and tactile cues to promote proper body alignment, promote proper body posture, promote proper body mechanics and promote proper body breathing techniques. Progressed resistance, range, repetitions and complexity of movement as indicated. Aquatic Therapy (see flow sheet below for minutes): Aquatic treatment performed per flow grid for Decreased muscle strength, Decreased static/dynamic balance and reactive control, Decreased activity endurance, Ease of movement and Low impact and reduced weight bearing activity. Cues provided for body mechanics. Assistance by therapist provided for safety. Patient has difficulty with body mechanics.      Date: 04/26/17 05/01/17 5/8/17 5/10/17 5/15/17 6/16/17 6/20/17 6/2317 6/27/17   Modalities:                                                Manual Therapy:                                                Aquatic Exercise:  60  Mins  2.5# 2.5# 60 min  60 mins  2.5# 2.5# 60 min 2.5# 55 min 2.5 # 40 min 3.75# 60 min 3.75# 55 min 3.75# 60 min   Gait F/B/S/M x4L ea X4L each x4L ea x4L x4L X2L X4L X4L X4L   Heel/Toe walk       X2L X4L X2L   Squats x30 at front rail x30 x30    X20 X20 X20 B   4-way x20B  X20 BLE x20 B   X20 X20 B X20 B X20 B   PF/DF x30 X20 with handrail for support X30    X15 RLE  X15 LLE X20  X15 LLE  X15 RLE    Hamstring Curls  x2L X2L x2L x2L X20   X2L   Hip Flexion with knee ext. (rockette) x15 reps X15 BLE with hip ext x20 B    X20 B  X4L                 SLR march x2L X4L  x2L x4L x4L X2L X4L X4L X4L   Lunges    x15 BLE x15 BLE    X15 BLE   Hip circles   Fig 4      X10 cw/ccw   Hip horizontal abduction/adduction         X20 B   Core:paddles UE \"rows\"   x20 X20 closed paddles x30 x15 helmets x15 helmets X15  helmets      Core: paddles UE horz abd/add x20 X20 closed paddles x20 x15 helmets x15 helmets X15  helmets      Core: dumbbells abd/add x20 X20 closed paddles X30         Core: dumbbells flex/extn   X20 x15 helmets x15 helmets Too painful on R shoulder      Deep well bike 5 mins 3 min 5 mins 4 min 4 min 3 min 5 min 4 min 5 min   Deep well jumping gilda 1 min 2 min 1 min 2 min 2 min 2 min 2 min 1 min 2 min   Deep well scissors 1 min 2 min 1 min 2 min  2 min 2 min 2 min 1 min 2 min   Deep well:  traction 5 mins 5 min 5 min 5 min 5 min 5 min 8 min 8 min 8 min               Hamstring Stretch        2H30    Step Lunge            Piriformis stretch            PF Stretch            Balance: Single leg Stance        H30 B 4ft    Balance: Tandem  With closed paddles alternating BLE x10     2H30 B                               Therapeutic Exercise:             Active HS stretch            Fig 4 stretch            Knee press single leg            PPT            SL clams                        F=forward  B=Backward  S=sidesteps  M=marches    H=hold    Manual: (see flow sheet above for minutes)   Modalities: (see flow sheet above for minutes)     HEP: Handout provided 4/4/2017     Discontinuation Summary:  Plan to discontinue physical therapy service due to pt did not return to last couple of visits. No further skilled therapy services at present time.           Marquez Wong, PT

## 2017-11-07 ENCOUNTER — HOSPITAL ENCOUNTER (OUTPATIENT)
Dept: CT IMAGING | Age: 77
Discharge: HOME | End: 2017-11-07
Attending: INTERNAL MEDICINE
Payer: SELF-PAY

## 2017-11-07 DIAGNOSIS — I25.10 ATHEROSCLEROSIS OF NATIVE CORONARY ARTERY OF NATIVE HEART WITHOUT ANGINA PECTORIS: ICD-10-CM

## 2017-11-07 PROCEDURE — 75571 CT HRT W/O DYE W/CA TEST: CPT

## 2017-11-07 NOTE — PROGRESS NOTES
Please call her, Ca score of 81. Low score. Good news. Call PRN for angina. See as planned in follow up. Call for more angina, CP.   Thanks

## 2018-03-21 PROBLEM — I77.9 BILATERAL CAROTID ARTERY DISEASE (HCC): Status: ACTIVE | Noted: 2018-03-21

## 2018-04-07 ENCOUNTER — HOSPITAL ENCOUNTER (OUTPATIENT)
Dept: MAMMOGRAPHY | Age: 78
Discharge: HOME | End: 2018-04-07
Attending: INTERNAL MEDICINE
Payer: MEDICARE

## 2018-04-07 DIAGNOSIS — Z12.31 VISIT FOR SCREENING MAMMOGRAM: ICD-10-CM

## 2018-04-07 PROCEDURE — 77067 SCR MAMMO BI INCL CAD: CPT

## 2018-09-17 ENCOUNTER — HOSPITAL ENCOUNTER (OUTPATIENT)
Dept: PHYSICAL THERAPY | Age: 78
Discharge: HOME | End: 2018-09-17
Payer: MEDICARE

## 2018-09-17 PROCEDURE — 97110 THERAPEUTIC EXERCISES: CPT

## 2018-09-17 PROCEDURE — G8978 MOBILITY CURRENT STATUS: HCPCS

## 2018-09-17 PROCEDURE — 97014 ELECTRIC STIMULATION THERAPY: CPT

## 2018-09-17 PROCEDURE — G8979 MOBILITY GOAL STATUS: HCPCS

## 2018-09-17 PROCEDURE — 97162 PT EVAL MOD COMPLEX 30 MIN: CPT

## 2018-09-17 NOTE — PROGRESS NOTES
Ambulatory/Rehab Services H2 Model Falls Risk Assessment    Risk Factor Pts. ·   Confusion/Disorientation/Impulsivity  []    4 ·   Symptomatic Depression  []   2 ·   Altered Elimination  []   1 ·   Dizziness/Vertigo  []   1 ·   Gender (Male)  []   1 ·   Any administered antiepileptics (anticonvulsants):  []   2 ·   Any administered benzodiazepines:  []   1 ·   Visual Impairment (specify):  []   1 ·   Portable Oxygen Use  []   1 ·   Orthostatic ? BP  []   1 ·   History of Recent Falls (within 3 mos.)  []   5     Ability to Rise from Chair (choose one) Pts. ·   Ability to rise in a single movement  []   0 ·   Pushes up, successful in one attempt  [x]   1 ·   Multiple attempts, but successful  []   3 ·   Unable to rise without assistance  []   4   Total: (5 or greater = High Risk) 1     Falls Prevention Plan:   []                Physical Limitations to Exercise (specify):   []                Mobility Assistance Device (type):   []                Exercise/Equipment Adaptation (specify):    ©2010 [facility] [place] [address] [place] [address] Patent #[id number].  Federal Law prohibits the replication, distribution or use without written permission from [facility] [employer]

## 2018-09-17 NOTE — THERAPY EVALUATION
Olga Cornejo  : 1940 [id number] [facility],[address]  [facility] [address]  [phone]   [phone]        OUTPATIENT PHYSICAL THERAPY:Initial Assessment 2018        ICD-10: Treatment Diagnosis:   Lumbago with sciatica, left side (M54.42)  Difficulty in walking, not elsewhere classified (R26.2)  Precautions/Allergies:   Adhesive tape and Codeine   Fall Risk Score: 1 (? 5 = High Risk)  MD Orders: Evaluate and Treat  MEDICAL/REFERRING DIAGNOSIS:  Sciatica, right side [M54.31]  Other intervertebral disc degeneration, lumbar region [M51.36]   DATE OF ONSET: Chronic- progressively worsening  REFERRING PHYSICIAN: Mónica Goode., *  RETURN PHYSICIAN APPOINTMENT: TBD     INITIAL ASSESSMENT:  Ms. Demetris Raymundo (pt) is a 76year old white female seen for physical therapy per MD orders with complaint of low back/ SI pain radiating into left hip. Pt evaluated for outpatient physical therapy today with the following deficits: pain in left low back/ SI region, decreased strength with noted pain with resistance, asymmetry in BLE AROM, decreased tolerance for functional mobility/ activities, difficulty walking with antalgic gait pattern. Pt would benefit from physical therapy to address the above deficits in order to return to increased independence with functional mobility in home/ community with decreased pain  Pt would benefit from initially working in aquatic setting to off load spine while addressing goals. As patient progresses, plan to transition to gravity challenging, land based exercises/ activities. Plan of care and goals reviewed with patient who verbalizes agreement. PROBLEM LIST (Impacting functional limitations):  1. Decreased Strength  2. Decreased ADL/Functional Activities  3. Decreased Ambulation Ability/Technique  4. Decreased Balance  5. Increased Pain  6. Decreased Activity Tolerance  7.  Decreased Flexibility/Joint Mobility  8. Decreased [place] with Home Exercise Program INTERVENTIONS PLANNED:  1. Balance Exercise  2. Gait Training  3. Home Exercise Program (HEP)  4. Manual Therapy  5. Range of Motion (ROM)  6. Therapeutic Activites  7. Therapeutic Exercise/Strengthening  8. aquatics   9. modalities    TREATMENT PLAN:  Effective Dates: 9/17/2018 TO 12/16/2018 (90 days). Frequency/Duration: 2-3 times a week for 90 Days    GOALS: (Goals have been discussed and agreed upon with patient.)  Short-Term Functional Goals: Time Frame: 2 weeks  Pt will be independent and compliant in home exercise program for independent management of symptoms (aquatics). Pt will tolerate 35 to 40 minutes of aquatic therapy with pain of 3/10 or better following intervention. Pt will report increased Oswestry Low Back Pain Questionnaire score by 2-3 points. Discharge Goals: Time Frame: 8 weeks  Pt will be independent and compliant in home exercise program for independent management of symptoms (land). Pt will report improved Oswestry Low Back Pain Questionnaire score of 15/50 or better for improved functional mobility with home/ community. Pt will report pain of 5/10 or better generally over 24 hour period of time    Rehabilitation Potential For Stated Goals: Good  Regarding Valentina Burkrap's therapy, I certify that the treatment plan above will be carried out by a therapist or under their direction. Thank you for this referral,  Taylor Colin, PT       Referring Physician Signature: Julia Sabillon., *              Date                      HISTORY:   History of Present Injury/Illness (Reason for Referral):  Pt is 75 y/o WF seen for PT per MD orders following recent office visit with c/o overall pain related to arthritis mainly in L side of low back/ buttock region. Pt reports hx of pervasive OA throughout body which can impact functional tasks including driving and standing activities.   Pt reports she has had aquatic in past and feels like this setting is helpful for pain management. Pt with recent hx of R TSA with pending R TSA/ TKA. Pt reports her goal is to have less pain and be able to move better. Past Medical History/Comorbidities:   Ms. Jhonatan Valadez  has a past medical history of Arthritis; Malagon's esophagus; Chest discomfort; Coronary atherosclerosis of native coronary vessel; Degeneration of lumbar or lumbosacral intervertebral disc; Depression; Gastric ulcer, unspecified as acute or chronic, without mention of hemorrhage or perforation (1989); Hyperlipidemia; Hypothyroidism; Infectious disease; Nausea & vomiting; Palpitations; Scoliosis (and kyphoscoliosis), idiopathic; Spinal stenosis, lumbar (12/7/2009); Status post right hip replacement (2/26/2016); Thoracic or lumbosacral neuritis or radiculitis, unspecified; Tricuspid regurgitation; and Tricuspid valve disorder. She also has no past medical history of Adverse effect of anesthesia; Aneurysm ([address] Arrhythmia; Asthma; Autoimmune disease ([address] Cancer ([address] Chronic kidney disease; Chronic obstructive pulmonary disease ([address] Chronic pain; Coagulation disorder ([address] Diabetes ([address] Difficult intubation; Endocarditis; GERD (gastroesophageal reflux disease); Heart failure ([address] Ill-defined condition; Liver disease; Malignant hyperthermia due to anesthesia; Morbid obesity ([address] Pseudocholinesterase deficiency; Rheumatic fever; Seizures ([address] Sleep apnea; Stroke [facility]); or Thromboembolus ([address] Ms. Jhonatan Valadez  has a past surgical history that includes hx appendectomy (1987); hx hysterectomy (1988); hx hip replacement (Left, 2007); hx bunionectomy (Bilateral, 2009); hx hammer toe repair (2010); hx lumbar laminectomy (2009); hx orthopaedic (2010); hx hip replacement (Left, 2009); hx knee replacement (Right, 2007); hx shoulder arthroscopy (2013); hx carpal tunnel release (Right, 10/31/13); and hx breast biopsy (Left, 1980).   Social History/Living Environment:     Pt lives with ex  in one story home with 4 steps with B handrail to enter. Prior Level of Function/Work/Activity:  Retired- over past year has progressively more sedentary due to pain  Other Clinical Tests:          X-rays indicated pervasive arthritis per pt report  Current Medications:    Current Outpatient Prescriptions:     b complex vitamins (B COMPLEX 1) tablet, Take 1 Tab by mouth daily. , Disp: , Rfl:     multivitamin (ONE A DAY) tablet, Take 1 Tab by mouth daily. , Disp: , Rfl:     estradiol (ESTRACE) 1 mg tablet, Take 1 mg by mouth., Disp: , Rfl:     PARoxetine (PAXIL) 20 mg tablet, Take 20 mg by mouth daily. Take / use AM day of surgery  per anesthesia protocols. , Disp: , Rfl:     cetirizine (ALLER-TALON) 10 mg tablet, Take 10 mg by mouth daily. Take / use AM day of surgery  per anesthesia protocols. , Disp: , Rfl:     fluticasone (FLONASE) 50 mcg/actuation nasal spray, 2 Sprays by Both Nostrils route daily. Take morning of surgery per anesthesia guidelines. , Disp: , Rfl:    Date Last Reviewed:  9/17/2018   # of Personal Factors/Comorbidities that affect the Plan of Care: 1-2: MODERATE COMPLEXITY   EXAMINATION:   Observation/Orthostatic Postural Assessment:         In standing L shoulder/ Hip higher than R side  Global arthritis as seen with nodules   R scoliotic curve  Palpation:          Tenderness over L SI region  ROM:  BUE WNL for patient- with recent hx of R TSA 12/17                       Date:  09/17/18 Date:   Date:     Trunk ROM 50 % limited                          LE ROM Left Right       Hip Flexion [facility]       Hip Abduction [facility]       Straight Leg Raise (SLR) 80 ° ** 90 °                Knee Flexion 140 ° 125 °       Knee Extension 0 ° 0 °                Ankle Dorsiflexion [facility] WF         Strength:     Date:  09/17/18 Date:   Date:     LE MMT Left Right Left Right Left Right   Hip Flex (L1/L2) 3+/5 ** 3+/5       Hip Abd (glut med) 3+/5 ** 3+/5       Hip Ext 3+/5 3+/5       Quad    ( L3/4) 3+/5 ** 3+/5       Hamstring 3+/5 ** 3+/5       Anterior Tib (L4/L5) 3+/5 3+/5       Gastroc (S1/2) 3+/5 3+/5       EHL (L5)                           ** indicates with pain  Special Tests:   SLR (-)  Neurological Screen:        Sensation: Intact for light touch  Functional Mobility:         Gait/Ambulation:  Pt amb with minimal trunk rotation, arm swing  Slow and guarded gait pattern        Transfers:  Sit to stand to sit with use of BUE for support/ push off        Bed Mobility:  Independent        Stairs:  Step to gait pattern  Balance:          Single Leg Stance:  Unable to assume SLS to assess   Body Structures Involved:  1. Nerves  2. Bones  3. Joints  4. Muscles  5. Ligaments Body Functions Affected:  1. Sensory/Pain  2. Neuromusculoskeletal  3. Movement Related Activities and Participation Affected:  1. General Tasks and Demands  2. Mobility  3. Self Care  4. Domestic Life  5. Community, Social and [place]   # of elements that affect the Plan of Care: 3: MODERATE COMPLEXITY   CLINICAL PRESENTATION:   Presentation: Evolving clinical presentation with changing clinical characteristics: MODERATE COMPLEXITY   CLINICAL DECISION MAKING:   Outcome Measure: Tool Used: Modified Oswestry Low Back Pain Questionnaire  Score:  Initial: 24/50  Most Recent: X/50 (Date: -- )   Interpretation of Score: Each section is scored on a 0-5 scale, 5 representing the greatest disability. The scores of each section are added together for a total score of 50. Score 0 1-10 11-20 21-30 31-40 41-49 50   Modifier CH CI CJ CK CL CM CN     ?  Mobility - Walking and Moving Around:     - CURRENT STATUS: CK - 40%-59% impaired, limited or restricted    - GOAL STATUS: CJ - 20%-39% impaired, limited or restricted    - D/C STATUS:  ---------------To be determined---------------    Medical Necessity:   · Patient is expected to demonstrate progress in strength, range of motion and balance to increase independence with functional mobility with decreased pain. Reason for Services/Other Comments:  · Patient continues to require modification of therapeutic interventions to increase complexity of exercises. Use of outcome tool(s) and clinical judgement create a POC that gives a: Questionable prediction of patient's progress: MODERATE COMPLEXITY   TREATMENT:   (In addition to Assessment/Re-Assessment sessions the following treatments were rendered)    Therapeutic Exercise: (see flow sheet below for minutes):  Exercises per grid below to improve mobility, strength and balance. Required minimal visual cues to promote proper body alignment and promote proper body mechanics. Progressed resistance, range and repetitions as indicated. Aquatic Therapy (see flow sheet below for minutes): Aquatic treatment performed per flow grid for Decreased muscle strength, Decreased endurance, Decreased static/dynamic balance and reactive control, Decreased range of motion and Ease of movement. Cues provided for technique. Assistance by therapist provided for progression of exercises/ activities. Patient has difficulty with low back pain. Date: 09/17/18       Modalities: 15 min       IFC/ MHP To L low back/ SI                       Manual Therapy:                                Aquatic Exercise:        Gait F/B/S/M        Heel/Toe walk        4-way        PF/DF        Hamstring Curls        Hip Flexion with knee ext.   (rockette)        Squats          SLR march        Lunges        Hip circles        Hip horizontal abduction/adduction        Core:          Core:        Deep well bike        Deep well jumping gilda        Deep well scissors        Deep well:  traction                Hamstring Stretch        Step Lunge        Piriformis stretch        PF Stretch        Balance: Single leg Stance        Balance: Tandem                          Therapeutic Exercise: 15 min       Bridging x10       SLR X10 BLE       LTR X10 R/L       LAQ X10 BLE Seated Marching X10 BLE               F=forward  B=Backward  S=sidesteps  M=marches    H=hold    Manual: (see flow sheet above for minutes)   Modalities: (see flow sheet above for minutes) Following assessment pt in Risidelying with IFC/ MHP to L low back/ SI region. Intensity monitored throughout intervention with skin intact and WFL following modality. HEP: Pt provided with written/ pictorial HEP which pt demonstrates appropriately in session with minimal cues for technique/ posture. Treatment/Session Assessment:  Initial assessment completed with plan to initiate aquatics at next visit to address pain, core stability/ strength and ROM for increased I with functional mobility in home/ community. · Pain/ Symptoms: Initial:   7/10 Post Session:  3/10 post modality ·   Compliance with Program/Exercises: Will assess as treatment progresses. · Recommendations/Intent for next treatment session: \"Next visit will focus on advancements to more challenging activities\". Plan to initiate aquatics at next visit.     Total Treatment Duration:  PT Patient Time In/Time Out  Time In: 1030  Time Out: [address] [facility], PT

## 2018-09-19 ENCOUNTER — HOSPITAL ENCOUNTER (OUTPATIENT)
Dept: PHYSICAL THERAPY | Age: 78
Discharge: HOME | End: 2018-09-19
Payer: MEDICARE

## 2018-09-19 PROCEDURE — 97113 AQUATIC THERAPY/EXERCISES: CPT

## 2018-09-19 PROCEDURE — 97014 ELECTRIC STIMULATION THERAPY: CPT

## 2018-09-19 NOTE — PROGRESS NOTES
Madhav Field  : [address]  [address] [address].  [phone]   [phone]        OUTPATIENT PHYSICAL THERAPY:Daily Note 2018        ICD-10: Treatment Diagnosis:   Lumbago with sciatica, left side (M54.42)  Difficulty in walking, not elsewhere classified (R26.2)  Precautions/Allergies:   Adhesive tape and Codeine   Fall Risk Score: 1 (? 5 = High Risk)  MD Orders: Evaluate and Treat  MEDICAL/REFERRING DIAGNOSIS:  Sciatica, right side [M54.31]  Other intervertebral disc degeneration, lumbar region [M51.36]   DATE OF ONSET: Chronic- progressively worsening  REFERRING PHYSICIAN: Elisa Magallon, *  RETURN PHYSICIAN APPOINTMENT: TBD     INITIAL ASSESSMENT:  Ms. Debra Goodwin (pt) is a 76year old white female seen for physical therapy per MD orders with complaint of low back/ SI pain radiating into left hip. Pt evaluated for outpatient physical therapy today with the following deficits: pain in left low back/ SI region, decreased strength with noted pain with resistance, asymmetry in BLE AROM, decreased tolerance for functional mobility/ activities, difficulty walking with antalgic gait pattern. Pt would benefit from physical therapy to address the above deficits in order to return to increased independence with functional mobility in home/ community with decreased pain  Pt would benefit from initially working in aquatic setting to off load spine while addressing goals. As patient progresses, plan to transition to gravity challenging, land based exercises/ activities. Plan of care and goals reviewed with patient who verbalizes agreement. PROBLEM LIST (Impacting functional limitations):  1. Decreased Strength  2. Decreased ADL/Functional Activities  3. Decreased Ambulation Ability/Technique  4. Decreased Balance  5. Increased Pain  6. Decreased Activity Tolerance  7. Decreased Flexibility/Joint Mobility  8.  Decreased [place] with Home Exercise Program INTERVENTIONS PLANNED:  1. Balance Exercise  2. Gait Training  3. Home Exercise Program (HEP)  4. Manual Therapy  5. Range of Motion (ROM)  6. Therapeutic Activites  7. Therapeutic Exercise/Strengthening  8. aquatics   9. modalities    TREATMENT PLAN:  Effective Dates: 9/17/2018 TO 12/16/2018 (90 days). Frequency/Duration: 2-3 times a week for 90 Days    GOALS: (Goals have been discussed and agreed upon with patient.)  Short-Term Functional Goals: Time Frame: 2 weeks  Pt will be independent and compliant in home exercise program for independent management of symptoms (aquatics). Pt will tolerate 35 to 40 minutes of aquatic therapy with pain of 3/10 or better following intervention. Pt will report increased Oswestry Low Back Pain Questionnaire score by 2-3 points. Discharge Goals: Time Frame: 8 weeks  Pt will be independent and compliant in home exercise program for independent management of symptoms (land). Pt will report improved Oswestry Low Back Pain Questionnaire score of 15/50 or better for improved functional mobility with home/ community. Pt will report pain of 5/10 or better generally over 24 hour period of time    Rehabilitation Potential For Stated Goals: Good  Regarding Hemlockazucena Santos Varsha's therapy, I certify that the treatment plan above will be carried out by a therapist or under their direction. Thank you for this referral,  Laura Dias PTA                   HISTORY:   History of Present Injury/Illness (Reason for Referral):  Pt is 75 y/o WF seen for PT per MD orders following recent office visit with c/o overall pain related to arthritis mainly in L side of low back/ buttock region. Pt reports hx of pervasive OA throughout body which can impact functional tasks including driving and standing activities. Pt reports she has had aquatic in past and feels like this setting is helpful for pain management.   Pt with recent hx of R TSA with pending R TSA/ TKA. Pt reports her goal is to have less pain and be able to move better. Past Medical History/Comorbidities:   Ms. Paresh Dowd  has a past medical history of Arthritis; Malagon's esophagus; Chest discomfort; Coronary atherosclerosis of native coronary vessel; Degeneration of lumbar or lumbosacral intervertebral disc; Depression; Gastric ulcer, unspecified as acute or chronic, without mention of hemorrhage or perforation (1989); Hyperlipidemia; Hypothyroidism; Infectious disease; Nausea & vomiting; Palpitations; Scoliosis (and kyphoscoliosis), idiopathic; Spinal stenosis, lumbar (12/7/2009); Status post right hip replacement (2/26/2016); Thoracic or lumbosacral neuritis or radiculitis, unspecified; Tricuspid regurgitation; and Tricuspid valve disorder. She also has no past medical history of Adverse effect of anesthesia; Aneurysm ([address] Arrhythmia; Asthma; Autoimmune disease ([address] Cancer ([address] Chronic kidney disease; Chronic obstructive pulmonary disease ([address] Chronic pain; Coagulation disorder ([address] Diabetes ([address] Difficult intubation; Endocarditis; GERD (gastroesophageal reflux disease); Heart failure ([address] Ill-defined condition; Liver disease; Malignant hyperthermia due to anesthesia; Morbid obesity ([address] Pseudocholinesterase deficiency; Rheumatic fever; Seizures ([address] Sleep apnea; Stroke [facility]); or Thromboembolus ([address] Ms. Paresh Dowd  has a past surgical history that includes hx appendectomy (1987); hx hysterectomy (1988); hx hip replacement (Left, 2007); hx bunionectomy (Bilateral, 2009); hx hammer toe repair (2010); hx lumbar laminectomy (2009); hx orthopaedic (2010); hx hip replacement (Left, 2009); hx knee replacement (Right, 2007); hx shoulder arthroscopy (2013); hx carpal tunnel release (Right, 10/31/13); and hx breast biopsy (Left, 1980). Social History/Living Environment:     Pt lives with ex  in one story home with 4 steps with B handrail to enter.   Prior Level of Function/Work/Activity:  Retired- over past year has progressively more sedentary due to pain  Other Clinical Tests:          X-rays indicated pervasive arthritis per pt report  Current Medications:    Current Outpatient Prescriptions:     b complex vitamins (B COMPLEX 1) tablet, Take 1 Tab by mouth daily. , Disp: , Rfl:     multivitamin (ONE A DAY) tablet, Take 1 Tab by mouth daily. , Disp: , Rfl:     estradiol (ESTRACE) 1 mg tablet, Take 1 mg by mouth., Disp: , Rfl:     PARoxetine (PAXIL) 20 mg tablet, Take 20 mg by mouth daily. Take / use AM day of surgery  per anesthesia protocols. , Disp: , Rfl:     cetirizine (ALLER-TALON) 10 mg tablet, Take 10 mg by mouth daily. Take / use AM day of surgery  per anesthesia protocols. , Disp: , Rfl:     fluticasone (FLONASE) 50 mcg/actuation nasal spray, 2 Sprays by Both Nostrils route daily. Take morning of surgery per anesthesia guidelines. , Disp: , Rfl:    Date Last Reviewed:  9/19/2018       EXAMINATION:   Observation/Orthostatic Postural Assessment:         In standing L shoulder/ Hip higher than R side  Global arthritis as seen with nodules   R scoliotic curve  Palpation:          Tenderness over L SI region  ROM:  BUE WNL for patient- with recent hx of R TSA 12/17                       Date:  09/17/18 Date:   Date:     Trunk ROM 50 % limited                          LE ROM Left Right       Hip Flexion [facility]       Hip Abduction [facility]       Straight Leg Raise (SLR) 80 ° ** 90 °                Knee Flexion 140 ° 125 °       Knee Extension 0 ° 0 °                Ankle Dorsiflexion [facility] WFL         Strength:     Date:  09/17/18 Date:   Date:     LE MMT Left Right Left Right Left Right   Hip Flex (L1/L2) 3+/5 ** 3+/5       Hip Abd (glut med) 3+/5 ** 3+/5       Hip Ext 3+/5 3+/5       Quad    ( L3/4) 3+/5 ** 3+/5       Hamstring 3+/5 ** 3+/5       Anterior Tib (L4/L5) 3+/5 3+/5       Gastroc (S1/2) 3+/5 3+/5       EHL (L5)                           ** indicates with pain  Special Tests:   SLR (-)  Neurological Screen:        Sensation: Intact for light touch  Functional Mobility:         Gait/Ambulation:  Pt amb with minimal trunk rotation, arm swing  Slow and guarded gait pattern        Transfers:  Sit to stand to sit with use of BUE for support/ push off        Bed Mobility:  Independent        Stairs:  Step to gait pattern  Balance:          Single Leg Stance:  Unable to assume SLS to assess   Body Structures Involved:  1. Nerves  2. Bones  3. Joints  4. Muscles  5. Ligaments Body Functions Affected:  1. Sensory/Pain  2. Neuromusculoskeletal  3. Movement Related Activities and Participation Affected:  1. General Tasks and Demands  2. Mobility  3. Self Care  4. Domestic Life  5. Community, Social and Civic Life       CLINICAL PRESENTATION:       CLINICAL DECISION MAKING:   Outcome Measure: Tool Used: Modified Oswestry Low Back Pain Questionnaire  Score:  Initial: 24/50  Most Recent: X/50 (Date: -- )   Interpretation of Score: Each section is scored on a 0-5 scale, 5 representing the greatest disability. The scores of each section are added together for a total score of 50. Score 0 1-10 11-20 21-30 31-40 41-49 50   Modifier CH CI CJ CK CL CM CN     ? Mobility - Walking and Moving Around:     - CURRENT STATUS: CK - 40%-59% impaired, limited or restricted    - GOAL STATUS: CJ - 20%-39% impaired, limited or restricted    - D/C STATUS:  ---------------To be determined---------------    Medical Necessity:   · Patient is expected to demonstrate progress in strength, range of motion and balance to increase independence with functional mobility with decreased pain. Reason for Services/Other Comments:  · Patient continues to require modification of therapeutic interventions to increase complexity of exercises.        TREATMENT:   (In addition to Assessment/Re-Assessment sessions the following treatments were rendered)    Therapeutic Exercise: (see flow sheet below for minutes):  Exercises per grid below to improve mobility, strength and balance. Required minimal visual cues to promote proper body alignment and promote proper body mechanics. Progressed resistance, range and repetitions as indicated. Aquatic Therapy (see flow sheet below for minutes): Aquatic treatment performed per flow grid for Decreased muscle strength, Decreased endurance, Decreased static/dynamic balance and reactive control, Decreased range of motion and Ease of movement. Cues provided for technique. Assistance by therapist provided for progression of exercises/ activities. Patient has difficulty with low back pain. Date: 09/17/18 9/19/18      Modalities: 15 min 15 min      IFC/ MHP To L low back/ SI To L low back/SI                       Manual Therapy:                                Aquatic Exercise:  45 min      Gait F/B/S/M  x4L      Heel/Toe walk  x20      4-way  x20      PF/DF        Hamstring Curls  x2L      Hip Flexion with knee ext. (rockette)        Squats    x20      SLR march  x2L      Lunges        Hip circles  x20 B      Hip horizontal abduction/adduction        Core:          Core:        Deep well bike  2 min      Deep well jumping gilda  2min      Deep well scissors  2min      Deep well:  traction  2min              Hamstring Stretch  2H30B      Step Lunge        Piriformis stretch  2H30L      PF Stretch        Balance: Single leg Stance        Balance: Tandem                          Therapeutic Exercise: 15 min       Bridging x10       SLR X10 BLE       LTR X10 R/L       LAQ X10 BLE       Seated Marching X10 BLE               F=forward  B=Backward  S=sidesteps  M=marches    H=hold    Manual: (see flow sheet above for minutes)   Modalities: (see flow sheet above for minutes) Following assessment pt in R[place] with IFC/ MHP to L low back/ SI region. Intensity monitored throughout intervention with skin intact and WFL following modality.       HEP: Pt provided with written/ pictorial HEP which pt demonstrates appropriately in session with minimal cues for technique/ posture. Treatment/Session Assessment: Pt performed aquatic therapy in chest deep water to increase hamstring and LB ROM, strength and reduce pain. Pt did very well in water with mod cuing for technique. Assess at next session for carryover. · Pain/ Symptoms: Initial:   7/10  \"I really like that heat last time, it made me want to live again. \" Post Session:  3/10 post modality ·   Compliance with Program/Exercises: Will assess as treatment progresses. · Recommendations/Intent for next treatment session: \"Next visit will focus on advancements to more challenging activities\". Plan to cont aquatics at next visit.     Total Treatment Duration:  PT Patient Time In/Time Out  Time In: 1100  Time Out: [address], PTA

## 2018-09-21 ENCOUNTER — HOSPITAL ENCOUNTER (OUTPATIENT)
Dept: PHYSICAL THERAPY | Age: 78
Discharge: HOME | End: 2018-09-21
Payer: MEDICARE

## 2018-09-21 PROCEDURE — 97113 AQUATIC THERAPY/EXERCISES: CPT

## 2018-09-21 PROCEDURE — 97014 ELECTRIC STIMULATION THERAPY: CPT

## 2018-09-21 NOTE — PROGRESS NOTES
Melvyn Curling  : 1940 [id number] [facility],[address]  [facility], [address] [place] [address]  [phone]   [phone]        OUTPATIENT PHYSICAL THERAPY:Daily Note 2018        ICD-10: Treatment Diagnosis:   Lumbago with sciatica, left side (M54.42)  Difficulty in walking, not elsewhere classified (R26.2)  Precautions/Allergies:   Adhesive tape and Codeine   Fall Risk Score: 1 (? 5 = High Risk)  MD Orders: Evaluate and Treat  MEDICAL/REFERRING DIAGNOSIS:  Sciatica, right side [M54.31]  Other intervertebral disc degeneration, lumbar region [M51.36]   DATE OF ONSET: Chronic- progressively worsening  REFERRING PHYSICIAN: Ender Davis., *  RETURN PHYSICIAN APPOINTMENT: TBD     INITIAL ASSESSMENT:  Ms. Brittny Sullivan (pt) is a 76year old white female seen for physical therapy per MD orders with complaint of low back/ SI pain radiating into left hip. Pt evaluated for outpatient physical therapy today with the following deficits: pain in left low back/ SI region, decreased strength with noted pain with resistance, asymmetry in BLE AROM, decreased tolerance for functional mobility/ activities, difficulty walking with antalgic gait pattern. Pt would benefit from physical therapy to address the above deficits in order to return to increased independence with functional mobility in home/ community with decreased pain  Pt would benefit from initially working in aquatic setting to off load spine while addressing goals. As patient progresses, plan to transition to gravity challenging, land based exercises/ activities. Plan of care and goals reviewed with patient who verbalizes agreement. PROBLEM LIST (Impacting functional limitations):  1. Decreased Strength  2. Decreased ADL/Functional Activities  3. Decreased Ambulation Ability/Technique  4. Decreased Balance  5. Increased Pain  6. Decreased Activity Tolerance  7. Decreased Flexibility/Joint Mobility  8.  Decreased [place] with Home Exercise Program INTERVENTIONS PLANNED:  1. Balance Exercise  2. Gait Training  3. Home Exercise Program (HEP)  4. Manual Therapy  5. Range of Motion (ROM)  6. Therapeutic Activites  7. Therapeutic Exercise/Strengthening  8. aquatics   9. modalities    TREATMENT PLAN:  Effective Dates: 9/17/2018 TO 12/16/2018 (90 days). Frequency/Duration: 2-3 times a week for 90 Days    GOALS: (Goals have been discussed and agreed upon with patient.)  Short-Term Functional Goals: Time Frame: 2 weeks  Pt will be independent and compliant in home exercise program for independent management of symptoms (aquatics). Pt will tolerate 35 to 40 minutes of aquatic therapy with pain of 3/10 or better following intervention. Pt will report increased Oswestry Low Back Pain Questionnaire score by 2-3 points. Discharge Goals: Time Frame: 8 weeks  Pt will be independent and compliant in home exercise program for independent management of symptoms (land). Pt will report improved Oswestry Low Back Pain Questionnaire score of 15/50 or better for improved functional mobility with home/ community. Pt will report pain of 5/10 or better generally over 24 hour period of time    Rehabilitation Potential For Stated Goals: Good  Regarding Richmond Maddox's therapy, I certify that the treatment plan above will be carried out by a therapist or under their direction. Thank you for this referral,  Jose Luis Cedeño PTA                   HISTORY:   History of Present Injury/Illness (Reason for Referral):  Pt is 75 y/o WF seen for PT per MD orders following recent office visit with c/o overall pain related to arthritis mainly in L side of low back/ buttock region. Pt reports hx of pervasive OA throughout body which can impact functional tasks including driving and standing activities. Pt reports she has had aquatic in past and feels like this setting is helpful for pain management.   Pt with recent hx of R TSA with pending R TSA/ TKA. Pt reports her goal is to have less pain and be able to move better. Past Medical History/Comorbidities:   Ms. Priya Mcneal  has a past medical history of Arthritis; Malagon's esophagus; Chest discomfort; Coronary atherosclerosis of native coronary vessel; Degeneration of lumbar or lumbosacral intervertebral disc; Depression; Gastric ulcer, unspecified as acute or chronic, without mention of hemorrhage or perforation (1989); Hyperlipidemia; Hypothyroidism; Infectious disease; Nausea & vomiting; Palpitations; Scoliosis (and kyphoscoliosis), idiopathic; Spinal stenosis, lumbar (12/7/2009); Status post right hip replacement (2/26/2016); Thoracic or lumbosacral neuritis or radiculitis, unspecified; Tricuspid regurgitation; and Tricuspid valve disorder. She also has no past medical history of Adverse effect of anesthesia; Aneurysm ([address] Arrhythmia; Asthma; Autoimmune disease ([address] Cancer ([address] Chronic kidney disease; Chronic obstructive pulmonary disease ([address] Chronic pain; Coagulation disorder ([address] Diabetes ([address] Difficult intubation; Endocarditis; GERD (gastroesophageal reflux disease); Heart failure ([address] Ill-defined condition; Liver disease; Malignant hyperthermia due to anesthesia; Morbid obesity ([address] Pseudocholinesterase deficiency; Rheumatic fever; Seizures ([address] Sleep apnea; Stroke [facility]); or Thromboembolus ([address] Ms. Priya Mcneal  has a past surgical history that includes hx appendectomy (1987); hx hysterectomy (1988); hx hip replacement (Left, 2007); hx bunionectomy (Bilateral, 2009); hx hammer toe repair (2010); hx lumbar laminectomy (2009); hx orthopaedic (2010); hx hip replacement (Left, 2009); hx knee replacement (Right, 2007); hx shoulder arthroscopy (2013); hx carpal tunnel release (Right, 10/31/13); and hx breast biopsy (Left, 1980). Social History/Living Environment:     Pt lives with ex  in one story home with 4 steps with B handrail to enter.   Prior Level of Function/Work/Activity:  Retired- over past year has progressively more sedentary due to pain  Other Clinical Tests:          X-rays indicated pervasive arthritis per pt report  Current Medications:    Current Outpatient Prescriptions:     b complex vitamins (B COMPLEX 1) tablet, Take 1 Tab by mouth daily. , Disp: , Rfl:     multivitamin (ONE A DAY) tablet, Take 1 Tab by mouth daily. , Disp: , Rfl:     estradiol (ESTRACE) 1 mg tablet, Take 1 mg by mouth., Disp: , Rfl:     PARoxetine (PAXIL) 20 mg tablet, Take 20 mg by mouth daily. Take / use AM day of surgery  per anesthesia protocols. , Disp: , Rfl:     cetirizine (ALLER-TALON) 10 mg tablet, Take 10 mg by mouth daily. Take / use AM day of surgery  per anesthesia protocols. , Disp: , Rfl:     fluticasone (FLONASE) 50 mcg/actuation nasal spray, 2 Sprays by Both Nostrils route daily. Take morning of surgery per anesthesia guidelines. , Disp: , Rfl:    Date Last Reviewed:  9/21/2018       EXAMINATION:   Observation/Orthostatic Postural Assessment:         In standing L shoulder/ Hip higher than R side  Global arthritis as seen with nodules   R scoliotic curve  Palpation:          Tenderness over L SI region  ROM:  BUE WNL for patient- with recent hx of R TSA 12/17                       Date:  09/17/18 Date:   Date:     Trunk ROM 50 % limited                          LE ROM Left Right       Hip Flexion [facility]       Hip Abduction [facility]       Straight Leg Raise (SLR) 80 ° ** 90 °                Knee Flexion 140 ° 125 °       Knee Extension 0 ° 0 °                Ankle Dorsiflexion [facility] WFL         Strength:     Date:  09/17/18 Date:   Date:     LE MMT Left Right Left Right Left Right   Hip Flex (L1/L2) 3+/5 ** 3+/5       Hip Abd (glut med) 3+/5 ** 3+/5       Hip Ext 3+/5 3+/5       Quad    ( L3/4) 3+/5 ** 3+/5       Hamstring 3+/5 ** 3+/5       Anterior Tib (L4/L5) 3+/5 3+/5       Gastroc (S1/2) 3+/5 3+/5       EHL (L5)                           ** indicates with pain  Special Tests:   SLR (-)  Neurological Screen:        Sensation: Intact for light touch  Functional Mobility:         Gait/Ambulation:  Pt amb with minimal trunk rotation, arm swing  Slow and guarded gait pattern        Transfers:  Sit to stand to sit with use of BUE for support/ push off        Bed Mobility:  Independent        Stairs:  Step to gait pattern  Balance:          Single Leg Stance:  Unable to assume SLS to assess   Body Structures Involved:  1. Nerves  2. Bones  3. Joints  4. Muscles  5. Ligaments Body Functions Affected:  1. Sensory/Pain  2. Neuromusculoskeletal  3. Movement Related Activities and Participation Affected:  1. General Tasks and Demands  2. Mobility  3. Self Care  4. Domestic Life  5. Community, Social and Civic Life       CLINICAL PRESENTATION:       CLINICAL DECISION MAKING:   Outcome Measure: Tool Used: Modified Oswestry Low Back Pain Questionnaire  Score:  Initial: 24/50  Most Recent: X/50 (Date: -- )   Interpretation of Score: Each section is scored on a 0-5 scale, 5 representing the greatest disability. The scores of each section are added together for a total score of 50. Score 0 1-10 11-20 21-30 31-40 41-49 50   Modifier CH CI CJ CK CL CM CN     ? Mobility - Walking and Moving Around:     - CURRENT STATUS: CK - 40%-59% impaired, limited or restricted    - GOAL STATUS: CJ - 20%-39% impaired, limited or restricted    - D/C STATUS:  ---------------To be determined---------------    Medical Necessity:   · Patient is expected to demonstrate progress in strength, range of motion and balance to increase independence with functional mobility with decreased pain. Reason for Services/Other Comments:  · Patient continues to require modification of therapeutic interventions to increase complexity of exercises.        TREATMENT:   (In addition to Assessment/Re-Assessment sessions the following treatments were rendered)    Therapeutic Exercise: (see flow sheet below for minutes):  Exercises per grid below to improve mobility, strength and balance. Required minimal visual cues to promote proper body alignment and promote proper body mechanics. Progressed resistance, range and repetitions as indicated. Aquatic Therapy (see flow sheet below for minutes): Aquatic treatment performed per flow grid for Decreased muscle strength, Decreased endurance, Decreased static/dynamic balance and reactive control, Decreased range of motion and Ease of movement. Cues provided for technique. Assistance by therapist provided for progression of exercises/ activities. Patient has difficulty with low back pain. Date: 09/17/18 9/19/18 9/21/18     Modalities: 15 min 15 min 15 min     IFC/ MHP To L low back/ SI To L low back/SI  To L Low back/SI                     Manual Therapy:                                Aquatic Exercise:  45 min 40 min  1.5#     Gait F/B/S/M  x4L x4 L     Heel/Toe walk  x20 x20     4-way  x20 x10 ea     PF/DF        Hamstring Curls  x2L x20 B     Hip Flexion with knee ext.   (rockette)   Add Next session     Squats    x20 x20     SLR march  x2L x2L     Lunges        Hip circles  x20 B x20B     Hip horizontal abduction/adduction        Core: trunk rotations     With tray x 20     Core:        Deep well bike  2 min 2 min     Deep well jumping gilda  2min 2 min     Deep well scissors  2min 2min     Deep well:  traction  2min 5min             Hamstring Stretch  2H30B 2 H 30B     Step Lunge        Piriformis stretch  2H30L 2H 30 B     PF Stretch        Balance: Single leg Stance        Balance: Tandem                          Therapeutic Exercise: 15 min       Bridging x10       SLR X10 BLE       LTR X10 R/L       LAQ X10 BLE       Seated Marching X10 BLE               F=forward  B=Backward  S=sidesteps  M=marches    H=hold    Manual: (see flow sheet above for minutes)   Modalities: (see flow sheet above for minutes) Following assessment pt in Risidely[place] with IFC/ MHP to L low back/ SI region. Intensity monitored throughout intervention with skin intact and WFL following modality. HEP: Pt provided with written/ pictorial HEP which pt demonstrates appropriately in session with minimal cues for technique/ posture. Treatment/Session Assessment: Pt performed aquatic therapy in chest deep water to increase hamstring and LB ROM, strength and reduce pain. Pt did very well in water with mod cuing for technique. Assess at next session for carryover. · Pain/ Symptoms: Initial:   0/10  \"I can really tell a difference. \" Post Session:  0/10 post modality ·   Compliance with Program/Exercises: Will assess as treatment progresses. · Recommendations/Intent for next treatment session: \"Next visit will focus on advancements to more challenging activities\". Plan to cont aquatics at next visit.     Total Treatment Duration:  PT Patient Time In/Time Out  Time In: 1055  Time Out: 6583 [facility]

## 2018-09-24 ENCOUNTER — HOSPITAL ENCOUNTER (OUTPATIENT)
Dept: PHYSICAL THERAPY | Age: 78
Discharge: HOME | End: 2018-09-24
Payer: MEDICARE

## 2018-09-24 NOTE — PROGRESS NOTES
Pt cancelled physical therapy today due to arrival at wrong time with re schedule for later this week. Will plan to resume PT at next scheduled visit to address goals/ POC.

## 2018-09-25 ENCOUNTER — HOSPITAL ENCOUNTER (OUTPATIENT)
Dept: PHYSICAL THERAPY | Age: 78
Discharge: HOME | End: 2018-09-25
Payer: MEDICARE

## 2018-09-25 PROCEDURE — 97113 AQUATIC THERAPY/EXERCISES: CPT

## 2018-09-25 PROCEDURE — 97014 ELECTRIC STIMULATION THERAPY: CPT

## 2018-09-25 NOTE — PROGRESS NOTES
Ramonita Alejandra  : 1940 [id number] [facility]  [address] [place] [address]  [phone]   [phone]        OUTPATIENT PHYSICAL THERAPY:Daily Note 2018        ICD-10: Treatment Diagnosis:   Lumbago with sciatica, left side (M54.42)  Difficulty in walking, not elsewhere classified (R26.2)  Precautions/Allergies:   Adhesive tape and Codeine   Fall Risk Score: 1 (? 5 = High Risk)  MD Orders: Evaluate and Treat  MEDICAL/REFERRING DIAGNOSIS:  Sciatica, right side [M54.31]  Other intervertebral disc degeneration, lumbar region [M51.36]   DATE OF ONSET: Chronic- progressively worsening  REFERRING PHYSICIAN: Nilson Thomas, *  RETURN PHYSICIAN APPOINTMENT: TBD     INITIAL ASSESSMENT:  Ms. Breann Muñiz (pt) is a 76year old white female seen for physical therapy per MD orders with complaint of low back/ SI pain radiating into left hip. Pt evaluated for outpatient physical therapy today with the following deficits: pain in left low back/ SI region, decreased strength with noted pain with resistance, asymmetry in BLE AROM, decreased tolerance for functional mobility/ activities, difficulty walking with antalgic gait pattern. Pt would benefit from physical therapy to address the above deficits in order to return to increased independence with functional mobility in home/ community with decreased pain  Pt would benefit from initially working in aquatic setting to off load spine while addressing goals. As patient progresses, plan to transition to gravity challenging, land based exercises/ activities. Plan of care and goals reviewed with patient who verbalizes agreement. PROBLEM LIST (Impacting functional limitations):  1. Decreased Strength  2. Decreased ADL/Functional Activities  3. Decreased Ambulation Ability/Technique  4. Decreased Balance  5. Increased Pain  6. Decreased Activity Tolerance  7. Decreased Flexibility/Joint Mobility  8.  Decreased [place] with Home Exercise Program INTERVENTIONS PLANNED:  1. Balance Exercise  2. Gait Training  3. Home Exercise Program (HEP)  4. Manual Therapy  5. Range of Motion (ROM)  6. Therapeutic Activites  7. Therapeutic Exercise/Strengthening  8. aquatics   9. modalities    TREATMENT PLAN:  Effective Dates: 9/17/2018 TO 12/16/2018 (90 days). Frequency/Duration: 2-3 times a week for 90 Days    GOALS: (Goals have been discussed and agreed upon with patient.)  Short-Term Functional Goals: Time Frame: 2 weeks  Pt will be independent and compliant in home exercise program for independent management of symptoms (aquatics). Pt will tolerate 35 to 40 minutes of aquatic therapy with pain of 3/10 or better following intervention. Pt will report increased Oswestry Low Back Pain Questionnaire score by 2-3 points. Discharge Goals: Time Frame: 8 weeks  Pt will be independent and compliant in home exercise program for independent management of symptoms (land). Pt will report improved Oswestry Low Back Pain Questionnaire score of 15/50 or better for improved functional mobility with home/ community. Pt will report pain of 5/10 or better generally over 24 hour period of time    Rehabilitation Potential For Stated Goals: Good  Regarding Anabel Kristopherkaylen Maddox's therapy, I certify that the treatment plan above will be carried out by a therapist or under their direction. Thank you for this referral,  Ying Zayas PTA                   HISTORY:   History of Present Injury/Illness (Reason for Referral):  Pt is 77 y/o WF seen for PT per MD orders following recent office visit with c/o overall pain related to arthritis mainly in L side of low back/ buttock region. Pt reports hx of pervasive OA throughout body which can impact functional tasks including driving and standing activities. Pt reports she has had aquatic in past and feels like this setting is helpful for pain management.   Pt with recent hx of R TSA with pending R TSA/ TKA. Pt reports her goal is to have less pain and be able to move better. Past Medical History/Comorbidities:   Ms. Maite Pinzon  has a past medical history of Arthritis; Malagon's esophagus; Chest discomfort; Coronary atherosclerosis of native coronary vessel; Degeneration of lumbar or lumbosacral intervertebral disc; Depression; Gastric ulcer, unspecified as acute or chronic, without mention of hemorrhage or perforation (1989); Hyperlipidemia; Hypothyroidism; Infectious disease; Nausea & vomiting; Palpitations; Scoliosis (and kyphoscoliosis), idiopathic; Spinal stenosis, lumbar (12/7/2009); Status post right hip replacement (2/26/2016); Thoracic or lumbosacral neuritis or radiculitis, unspecified; Tricuspid regurgitation; and Tricuspid valve disorder. She also has no past medical history of Adverse effect of anesthesia; Aneurysm ([address] Arrhythmia; Asthma; Autoimmune disease ([address] Cancer ([address] Chronic kidney disease; Chronic obstructive pulmonary disease ([address] Chronic pain; Coagulation disorder ([address] Diabetes ([address] Difficult intubation; Endocarditis; GERD (gastroesophageal reflux disease); Heart failure ([address] Ill-defined condition; Liver disease; Malignant hyperthermia due to anesthesia; Morbid obesity ([address] Pseudocholinesterase deficiency; Rheumatic fever; Seizures ([address] Sleep apnea; Stroke [facility]); or Thromboembolus ([address] Ms. Maite Pinzon  has a past surgical history that includes hx appendectomy (1987); hx hysterectomy (1988); hx hip replacement (Left, 2007); hx bunionectomy (Bilateral, 2009); hx hammer toe repair (2010); hx lumbar laminectomy (2009); hx orthopaedic (2010); hx hip replacement (Left, 2009); hx knee replacement (Right, 2007); hx shoulder arthroscopy (2013); hx carpal tunnel release (Right, 10/31/13); and hx breast biopsy (Left, 1980). Social History/Living Environment:     Pt lives with ex  in one story home with 4 steps with B handrail to enter.   Prior Level of Function/Work/Activity:  Retired- over past year has progressively more sedentary due to pain  Other Clinical Tests:          X-rays indicated pervasive arthritis per pt report  Current Medications:    Current Outpatient Prescriptions:     b complex vitamins (B COMPLEX 1) tablet, Take 1 Tab by mouth daily. , Disp: , Rfl:     multivitamin (ONE A DAY) tablet, Take 1 Tab by mouth daily. , Disp: , Rfl:     estradiol (ESTRACE) 1 mg tablet, Take 1 mg by mouth., Disp: , Rfl:     PARoxetine (PAXIL) 20 mg tablet, Take 20 mg by mouth daily. Take / use AM day of surgery  per anesthesia protocols. , Disp: , Rfl:     cetirizine (ALLER-TALON) 10 mg tablet, Take 10 mg by mouth daily. Take / use AM day of surgery  per anesthesia protocols. , Disp: , Rfl:     fluticasone (FLONASE) 50 mcg/actuation nasal spray, 2 Sprays by Both Nostrils route daily. Take morning of surgery per anesthesia guidelines. , Disp: , Rfl:    Date Last Reviewed:  9/25/2018       EXAMINATION:   Observation/Orthostatic Postural Assessment:         In standing L shoulder/ Hip higher than R side  Global arthritis as seen with nodules   R scoliotic curve  Palpation:          Tenderness over L SI region  ROM:  BUE WNL for patient- with recent hx of R TSA 12/17                       Date:  09/17/18 Date:   Date:     Trunk ROM 50 % limited                          LE ROM Left Right       Hip Flexion [facility]       Hip Abduction [facility]       Straight Leg Raise (SLR) 80 ° ** 90 °                Knee Flexion 140 ° 125 °       Knee Extension 0 ° 0 °                Ankle Dorsiflexion [facility] WFL         Strength:     Date:  09/17/18 Date:   Date:     LE MMT Left Right Left Right Left Right   Hip Flex (L1/L2) 3+/5 ** 3+/5       Hip Abd (glut med) 3+/5 ** 3+/5       Hip Ext 3+/5 3+/5       Quad    ( L3/4) 3+/5 ** 3+/5       Hamstring 3+/5 ** 3+/5       Anterior Tib (L4/L5) 3+/5 3+/5       Gastroc (S1/2) 3+/5 3+/5       EHL (L5)                           ** indicates with pain  Special Tests:   SLR (-)  Neurological Screen:        Sensation: Intact for light touch  Functional Mobility:         Gait/Ambulation:  Pt amb with minimal trunk rotation, arm swing  Slow and guarded gait pattern        Transfers:  Sit to stand to sit with use of BUE for support/ push off        Bed Mobility:  Independent        Stairs:  Step to gait pattern  Balance:          Single Leg Stance:  Unable to assume SLS to assess   Body Structures Involved:  1. Nerves  2. Bones  3. Joints  4. Muscles  5. Ligaments Body Functions Affected:  1. Sensory/Pain  2. Neuromusculoskeletal  3. Movement Related Activities and Participation Affected:  1. General Tasks and Demands  2. Mobility  3. Self Care  4. Domestic Life  5. Community, Social and Civic Life       CLINICAL PRESENTATION:       CLINICAL DECISION MAKING:   Outcome Measure: Tool Used: Modified Oswestry Low Back Pain Questionnaire  Score:  Initial: 24/50  Most Recent: X/50 (Date: -- )   Interpretation of Score: Each section is scored on a 0-5 scale, 5 representing the greatest disability. The scores of each section are added together for a total score of 50. Score 0 1-10 11-20 21-30 31-40 41-49 50   Modifier CH CI CJ CK CL CM CN     ? Mobility - Walking and Moving Around:     - CURRENT STATUS: CK - 40%-59% impaired, limited or restricted    - GOAL STATUS: CJ - 20%-39% impaired, limited or restricted    - D/C STATUS:  ---------------To be determined---------------    Medical Necessity:   · Patient is expected to demonstrate progress in strength, range of motion and balance to increase independence with functional mobility with decreased pain. Reason for Services/Other Comments:  · Patient continues to require modification of therapeutic interventions to increase complexity of exercises.        TREATMENT:   (In addition to Assessment/Re-Assessment sessions the following treatments were rendered)    Therapeutic Exercise: (see flow sheet below for minutes):  Exercises per grid below to improve mobility, strength and balance. Required minimal visual cues to promote proper body alignment and promote proper body mechanics. Progressed resistance, range and repetitions as indicated. Aquatic Therapy (see flow sheet below for minutes): Aquatic treatment performed per flow grid for Decreased muscle strength, Decreased endurance, Decreased static/dynamic balance and reactive control, Decreased range of motion and Ease of movement. Cues provided for technique. Assistance by therapist provided for progression of exercises/ activities. Patient has difficulty with low back pain. Date: 09/17/18 9/19/18 9/21/18 9/25/18    Modalities: 15 min 15 min 15 min 15 min    IFC/ MHP To L low back/ SI To L low back/SI  To L Low back/SI To L low back/SI                    Manual Therapy:                                Aquatic Exercise:  45 min 40 min  1.5# 55 min  1.5#    Gait F/B/S/M  x4L x4 L x4L    Heel/Toe walk  x20 x20 x20    4-way  x20 x10 ea x10 ea    PF/DF        Hamstring Curls  x2L x20 B x20 B    Hip Flexion with knee ext.   (rockette)   Add Next session x2L    Squats    x20 x20 x20    SLR march  x2L x2L x2L    Lunges        Hip circles  x20 B x20B x20B    Hip horizontal abduction/adduction        Core: trunk rotations     With tray x 20 With tray x 20    Core: monster walk with shoulder ABD/ADD        Deep well bike  2 min 2 min 2 min    Deep well jumping gilda  2min 2 min 2 min    Deep well scissors  2min 2min 2 min    Deep well:  traction  2min 5min 5 min            Hamstring Stretch  2H30B 2 H 30B 2 H 30    Step Lunge        Piriformis stretch  2H30L 2H 30 B 2 H 30    PF Stretch        Balance: Single leg Stance        Balance: Tandem                          Therapeutic Exercise: 15 min       Bridging x10       SLR X10 BLE       LTR X10 R/L       LAQ X10 BLE       Seated Marching X10 BLE               F=forward  B=Backward  S=sidesteps  M=marches H=hold    Manual: (see flow sheet above for minutes)   Modalities: (see flow sheet above for minutes) Following assessment pt in Risidelying with IFC/ MHP to L low back/ SI region. Intensity monitored throughout intervention with skin intact and WFL following modality. HEP: Pt provided with written/ pictorial HEP which pt demonstrates appropriately in session with minimal cues for technique/ posture. Treatment/Session Assessment: Pt performed aquatic therapy in chest deep water to increase hamstring and LB ROM, strength and reduce pain. Pt did very well in water with mod cuing for technique. Assess at next session for carryover. · Pain/ Symptoms: Initial:   7/10  \"I am in a lot of pain today. \" Post Session:  0/10 post modality ·   Compliance with Program/Exercises: Will assess as treatment progresses. · Recommendations/Intent for next treatment session: \"Next visit will focus on advancements to more challenging activities\". Plan to cont aquatics at next visit.     Total Treatment Duration:  PT Patient Time In/Time Out  Time In: 1055  Time Out: [address], PTA

## 2018-09-26 ENCOUNTER — HOSPITAL ENCOUNTER (OUTPATIENT)
Dept: PHYSICAL THERAPY | Age: 78
Discharge: HOME | End: 2018-09-26
Payer: MEDICARE

## 2018-09-26 PROCEDURE — 97014 ELECTRIC STIMULATION THERAPY: CPT

## 2018-09-26 PROCEDURE — 97113 AQUATIC THERAPY/EXERCISES: CPT

## 2018-09-26 NOTE — PROGRESS NOTES
Ashly Schmidt  : 1940 [id number] [facility],[address]  [address] [place] [address].  [phone]   [phone]        OUTPATIENT PHYSICAL THERAPY:Daily Note 2018        ICD-10: Treatment Diagnosis:   Lumbago with sciatica, left side (M54.42)  Difficulty in walking, not elsewhere classified (R26.2)  Precautions/Allergies:   Adhesive tape and Codeine   Fall Risk Score: 1 (? 5 = High Risk)  MD Orders: Evaluate and Treat  MEDICAL/REFERRING DIAGNOSIS:  Sciatica, right side [M54.31]  Other intervertebral disc degeneration, lumbar region [M51.36]   DATE OF ONSET: Chronic- progressively worsening  REFERRING PHYSICIAN: Rajan Genao, *  RETURN PHYSICIAN APPOINTMENT: TBD     INITIAL ASSESSMENT:  Ms. Taco Sevilla (pt) is a 76year old white female seen for physical therapy per MD orders with complaint of low back/ SI pain radiating into left hip. Pt evaluated for outpatient physical therapy today with the following deficits: pain in left low back/ SI region, decreased strength with noted pain with resistance, asymmetry in BLE AROM, decreased tolerance for functional mobility/ activities, difficulty walking with antalgic gait pattern. Pt would benefit from physical therapy to address the above deficits in order to return to increased independence with functional mobility in home/ community with decreased pain  Pt would benefit from initially working in aquatic setting to off load spine while addressing goals. As patient progresses, plan to transition to gravity challenging, land based exercises/ activities. Plan of care and goals reviewed with patient who verbalizes agreement. PROBLEM LIST (Impacting functional limitations):  1. Decreased Strength  2. Decreased ADL/Functional Activities  3. Decreased Ambulation Ability/Technique  4. Decreased Balance  5. Increased Pain  6. Decreased Activity Tolerance  7. Decreased Flexibility/Joint Mobility  8.  Decreased Curtis with Home Exercise Program INTERVENTIONS PLANNED:  1. Balance Exercise  2. Gait Training  3. Home Exercise Program (HEP)  4. Manual Therapy  5. Range of Motion (ROM)  6. Therapeutic Activites  7. Therapeutic Exercise/Strengthening  8. aquatics   9. modalities    TREATMENT PLAN:  Effective Dates: 9/17/2018 TO 12/16/2018 (90 days). Frequency/Duration: 2-3 times a week for 90 Days    GOALS: (Goals have been discussed and agreed upon with patient.)  Short-Term Functional Goals: Time Frame: 2 weeks  Pt will be independent and compliant in home exercise program for independent management of symptoms (aquatics). Pt will tolerate 35 to 40 minutes of aquatic therapy with pain of 3/10 or better following intervention. Pt will report increased Oswestry Low Back Pain Questionnaire score by 2-3 points. Discharge Goals: Time Frame: 8 weeks  Pt will be independent and compliant in home exercise program for independent management of symptoms (land). Pt will report improved Oswestry Low Back Pain Questionnaire score of 15/50 or better for improved functional mobility with home/ community. Pt will report pain of 5/10 or better generally over 24 hour period of time    Rehabilitation Potential For Stated Goals: Good  Regarding Baldemar Shanemoni Varsha's therapy, I certify that the treatment plan above will be carried out by a therapist or under their direction. Thank you for this referral,  Abraham Montoya PTA                   HISTORY:   History of Present Injury/Illness (Reason for Referral):  Pt is 77 y/o WF seen for PT per MD orders following recent office visit with c/o overall pain related to arthritis mainly in L side of low back/ buttock region. Pt reports hx of pervasive OA throughout body which can impact functional tasks including driving and standing activities. Pt reports she has had aquatic in past and feels like this setting is helpful for pain management.   Pt with recent hx of R TSA with pending R TSA/ TKA. Pt reports her goal is to have less pain and be able to move better. Past Medical History/Comorbidities:   Ms. Debra Goodwin  has a past medical history of Arthritis; Malagon's esophagus; Chest discomfort; Coronary atherosclerosis of native coronary vessel; Degeneration of lumbar or lumbosacral intervertebral disc; Depression; Gastric ulcer, unspecified as acute or chronic, without mention of hemorrhage or perforation (1989); Hyperlipidemia; Hypothyroidism; Infectious disease; Nausea & vomiting; Palpitations; Scoliosis (and kyphoscoliosis), idiopathic; Spinal stenosis, lumbar (12/7/2009); Status post right hip replacement (2/26/2016); Thoracic or lumbosacral neuritis or radiculitis, unspecified; Tricuspid regurgitation; and Tricuspid valve disorder. She also has no past medical history of Adverse effect of anesthesia; Aneurysm ([address] Arrhythmia; Asthma; Autoimmune disease ([address] Cancer ([address] Chronic kidney disease; Chronic obstructive pulmonary disease ([address] Chronic pain; Coagulation disorder ([address] Diabetes ([address] Difficult intubation; Endocarditis; GERD (gastroesophageal reflux disease); Heart failure ([address] Ill-defined condition; Liver disease; Malignant hyperthermia due to anesthesia; Morbid obesity ([address] Pseudocholinesterase deficiency; Rheumatic fever; Seizures ([address] Sleep apnea; Stroke [facility]); or Thromboembolus ([address] Ms. Debra Goowdin  has a past surgical history that includes hx appendectomy (1987); hx hysterectomy (1988); hx hip replacement (Left, 2007); hx bunionectomy (Bilateral, 2009); hx hammer toe repair (2010); hx lumbar laminectomy (2009); hx orthopaedic (2010); hx hip replacement (Left, 2009); hx knee replacement (Right, 2007); hx shoulder arthroscopy (2013); hx carpal tunnel release (Right, 10/31/13); and hx breast biopsy (Left, 1980). Social History/Living Environment:     Pt lives with ex  in one story home with 4 steps with B handrail to enter.   Prior Level of Function/Work/Activity:  Retired- over past year has progressively more sedentary due to pain  Other Clinical Tests:          X-rays indicated pervasive arthritis per pt report  Current Medications:    Current Outpatient Prescriptions:     b complex vitamins (B COMPLEX 1) tablet, Take 1 Tab by mouth daily. , Disp: , Rfl:     multivitamin (ONE A DAY) tablet, Take 1 Tab by mouth daily. , Disp: , Rfl:     estradiol (ESTRACE) 1 mg tablet, Take 1 mg by mouth., Disp: , Rfl:     PARoxetine (PAXIL) 20 mg tablet, Take 20 mg by mouth daily. Take / use AM day of surgery  per anesthesia protocols. , Disp: , Rfl:     cetirizine (ALLER-TALON) 10 mg tablet, Take 10 mg by mouth daily. Take / use AM day of surgery  per anesthesia protocols. , Disp: , Rfl:     fluticasone (FLONASE) 50 mcg/actuation nasal spray, 2 Sprays by Both Nostrils route daily. Take morning of surgery per anesthesia guidelines. , Disp: , Rfl:    Date Last Reviewed:  9/26/2018       EXAMINATION:   Observation/Orthostatic Postural Assessment:         In standing L shoulder/ Hip higher than R side  Global arthritis as seen with nodules   R scoliotic curve  Palpation:          Tenderness over L SI region  ROM:  BUE WNL for patient- with recent hx of R TSA 12/17                       Date:  09/17/18 Date:   Date:     Trunk ROM 50 % limited                          LE ROM Left Right       Hip Flexion [facility]       Hip Abduction [facility]       Straight Leg Raise (SLR) 80 ° ** 90 °                Knee Flexion 140 ° 125 °       Knee Extension 0 ° 0 °                Ankle Dorsiflexion [facility] WFL         Strength:     Date:  09/17/18 Date:   Date:     LE MMT Left Right Left Right Left Right   Hip Flex (L1/L2) 3+/5 ** 3+/5       Hip Abd (glut med) 3+/5 ** 3+/5       Hip Ext 3+/5 3+/5       Quad    ( L3/4) 3+/5 ** 3+/5       Hamstring 3+/5 ** 3+/5       Anterior Tib (L4/L5) 3+/5 3+/5       Gastroc (S1/2) 3+/5 3+/5       EHL (L5)                           ** indicates with pain  Special Tests:   SLR (-)  Neurological Screen:        Sensation: Intact for light touch  Functional Mobility:         Gait/Ambulation:  Pt amb with minimal trunk rotation, arm swing  Slow and guarded gait pattern        Transfers:  Sit to stand to sit with use of BUE for support/ push off        Bed Mobility:  Independent        Stairs:  Step to gait pattern  Balance:          Single Leg Stance:  Unable to assume SLS to assess   Body Structures Involved:  1. Nerves  2. Bones  3. Joints  4. Muscles  5. Ligaments Body Functions Affected:  1. Sensory/Pain  2. Neuromusculoskeletal  3. Movement Related Activities and Participation Affected:  1. General Tasks and Demands  2. Mobility  3. Self Care  4. Domestic Life  5. Community, Social and Civic Life       CLINICAL PRESENTATION:       CLINICAL DECISION MAKING:   Outcome Measure: Tool Used: Modified Oswestry Low Back Pain Questionnaire  Score:  Initial: 24/50  Most Recent: X/50 (Date: -- )   Interpretation of Score: Each section is scored on a 0-5 scale, 5 representing the greatest disability. The scores of each section are added together for a total score of 50. Score 0 1-10 11-20 21-30 31-40 41-49 50   Modifier CH CI CJ CK CL CM CN     ? Mobility - Walking and Moving Around:     - CURRENT STATUS: CK - 40%-59% impaired, limited or restricted    - GOAL STATUS: CJ - 20%-39% impaired, limited or restricted    - D/C STATUS:  ---------------To be determined---------------    Medical Necessity:   · Patient is expected to demonstrate progress in strength, range of motion and balance to increase independence with functional mobility with decreased pain. Reason for Services/Other Comments:  · Patient continues to require modification of therapeutic interventions to increase complexity of exercises.        TREATMENT:   (In addition to Assessment/Re-Assessment sessions the following treatments were rendered)    Therapeutic Exercise: (see flow sheet below for minutes):  Exercises per grid below to improve mobility, strength and balance. Required minimal visual cues to promote proper body alignment and promote proper body mechanics. Progressed resistance, range and repetitions as indicated. Aquatic Therapy (see flow sheet below for minutes): Aquatic treatment performed per flow grid for Decreased muscle strength, Decreased endurance, Decreased static/dynamic balance and reactive control, Decreased range of motion and Ease of movement. Cues provided for technique. Assistance by therapist provided for progression of exercises/ activities. Patient has difficulty with low back pain. Date: 09/17/18 9/19/18 9/21/18 9/25/18 9/26/18   Modalities: 15 min 15 min 15 min 15 min 15 min   IFC/ MHP To L low back/ SI To L low back/SI  To L Low back/SI To L low back/SI To L Low back/SI                   Manual Therapy:                                Aquatic Exercise:  45 min 40 min  1.5# 55 min  1.5# 60 min  1.5#-waisthigh H2O   Gait F/B/S/M  x4L x4 L x4L x4L   Heel/Toe walk  x20 x20 x20    4-way  x20 x10 ea x10 ea x20ea   PF/DF        Hamstring Curls  x2L x20 B x20 B x20B   Hip Flexion with knee ext.   (rockette)   Add Next session x2L    Squats    x20 x20 x20    SLR march  x2L x2L x2L    Lunges        Hip circles  x20 B x20B x20B x20B   Hip horizontal abduction/adduction        Core: trunk rotations     With tray x 20 With tray x 20 With tray x 20   Core: monster walk with shoulder ABD/ADD        Deep well bike  2 min 2 min 2 min 5 min   Deep well jumping gilda  2min 2 min 2 min 2 min   Deep well scissors  2min 2min 2 min 2 min   Deep well:  traction  2min 5min 5 min 5 min           Hamstring Stretch  2H30B 2 H 30B 2 H 30 2 H 30   Step Lunge        Piriformis stretch  2H30L 2H 30 B 2 H 30 2 H 30   PF Stretch        Balance: Single leg Stance        Balance: Tandem                          Therapeutic Exercise: 15 min       Bridging x10       SLR X10 BLE       LTR X10 R/L LAQ X10 BLE       Seated Marching X10 BLE               F=forward  B=Backward  S=sidesteps  M=marches    H=hold    Manual: (see flow sheet above for minutes)   Modalities: (see flow sheet above for minutes) Following assessment pt in Risidelying with IFC/ MHP to L low back/ SI region. Intensity monitored throughout intervention with skin intact and WFL following modality. HEP: Pt provided with written/ pictorial HEP which pt demonstrates appropriately in session with minimal cues for technique/ posture. Treatment/Session Assessment: Pt performed aquatic therapy in chest deep water to increase hamstring and LB ROM, strength and reduce pain. Pt did very well in water with min cuing for technique. Pt progressing well in aquatic therapy. · Pain/ Symptoms: Initial:   4/10  \"I can tell I'm feeling a little better today. Post Session:  0/10 post modality ·   Compliance with Program/Exercises: Will assess as treatment progresses. · Recommendations/Intent for next treatment session: \"Next visit will focus on advancements to more challenging activities\". Plan to cont aquatics at next visit. Pt is calling MD to discuss potential surgery this year and at her medicare cap limits.     Total Treatment Duration:  PT Patient Time In/Time Out  Time In: 845  Time Out: 49 Ewa Felton PTA

## 2018-09-28 ENCOUNTER — HOSPITAL ENCOUNTER (OUTPATIENT)
Dept: PHYSICAL THERAPY | Age: 78
Discharge: HOME | End: 2018-09-28
Payer: MEDICARE

## 2018-09-28 PROCEDURE — G8979 MOBILITY GOAL STATUS: HCPCS

## 2018-09-28 PROCEDURE — G8980 MOBILITY D/C STATUS: HCPCS

## 2018-09-28 PROCEDURE — G8978 MOBILITY CURRENT STATUS: HCPCS

## 2018-09-28 PROCEDURE — 97113 AQUATIC THERAPY/EXERCISES: CPT

## 2018-09-28 NOTE — PROGRESS NOTES
Bryan Lee  : 1940 [address]  [address] [place] [address]  [phone]   [phone]        OUTPATIENT PHYSICAL THERAPY:Daily Note and Discharge 2018        ICD-10: Treatment Diagnosis:   Lumbago with sciatica, left side (M54.42)  Difficulty in walking, not elsewhere classified (R26.2)  Precautions/Allergies:   Adhesive tape and Codeine   Fall Risk Score: 1 (? 5 = High Risk)  MD Orders: Evaluate and Treat  MEDICAL/REFERRING DIAGNOSIS:  Sciatica, right side [M54.31]  Other intervertebral disc degeneration, lumbar region [M51.36]   DATE OF ONSET: Chronic- progressively worsening  REFERRING PHYSICIAN: Ewelina Dash., *  RETURN PHYSICIAN APPOINTMENT: TBD     INITIAL ASSESSMENT:  Ms. Pamela Cosme (pt) is a 76year old white female seen for physical therapy per MD orders with complaint of low back/ SI pain radiating into left hip. Pt evaluated for outpatient physical therapy today with the following deficits: pain in left low back/ SI region, decreased strength with noted pain with resistance, asymmetry in BLE AROM, decreased tolerance for functional mobility/ activities, difficulty walking with antalgic gait pattern. Pt would benefit from physical therapy to address the above deficits in order to return to increased independence with functional mobility in home/ community with decreased pain  Pt would benefit from initially working in aquatic setting to off load spine while addressing goals. As patient progresses, plan to transition to gravity challenging, land based exercises/ activities. Plan of care and goals reviewed with patient who verbalizes agreement. PROBLEM LIST (Impacting functional limitations):  1. Decreased Strength  2. Decreased ADL/Functional Activities  3. Decreased Ambulation Ability/Technique  4. Decreased Balance  5. Increased Pain  6. Decreased Activity Tolerance  7.  Decreased Flexibility/Joint Mobility  8. Decreased [place] with Home Exercise Program INTERVENTIONS PLANNED:  1. Balance Exercise  2. Gait Training  3. Home Exercise Program (HEP)  4. Manual Therapy  5. Range of Motion (ROM)  6. Therapeutic Activites  7. Therapeutic Exercise/Strengthening  8. aquatics   9. modalities    TREATMENT PLAN:  Effective Dates: 9/17/2018 TO 12/16/2018 (90 days). Frequency/Duration: 2-3 times a week for 90 Days    GOALS: (Goals have been discussed and agreed upon with patient.)  Short-Term Functional Goals: Time Frame: 2 weeks  Pt will be independent and compliant in home exercise program for independent management of symptoms (aquatics). (MET 9/28/18)  Pt will tolerate 35 to 40 minutes of aquatic therapy with pain of 3/10 or better following intervention. (MET 9/2818)  Pt will report increased Oswestry Low Back Pain Questionnaire score by 2-3 points. MET9/28/18    Discharge Goals: Time Frame: 8 weeks  Pt will be independent and compliant in home exercise program for independent management of symptoms (land). (MET-aquatics 9/28/18)  Pt will report improved Oswestry Low Back Pain Questionnaire score of 15/50 or better for improved functional mobility with home/ community. (In progress- pt request early D/C)  Pt will report pain of 5/10 or better generally over 24 hour period of time (MET 9/28/18)    Rehabilitation Potential For Stated Goals: Good  Regarding Demetrius Alvarez Varsha's therapy, I certify that the treatment plan above will be carried out by a therapist or under their direction. Thank you for this referral,  Inis Brunner, PTA                   HISTORY:   History of Present Injury/Illness (Reason for Referral):  Pt is 77 y/o WF seen for PT per MD orders following recent office visit with c/o overall pain related to arthritis mainly in L side of low back/ buttock region. Pt reports hx of pervasive OA throughout body which can impact functional tasks including driving and standing activities.   Pt reports she has had aquatic in past and feels like this setting is helpful for pain management. Pt with recent hx of R TSA with pending R TSA/ TKA. Pt reports her goal is to have less pain and be able to move better. Past Medical History/Comorbidities:   Ms. Mirela Johnson  has a past medical history of Arthritis; Malagon's esophagus; Chest discomfort; Coronary atherosclerosis of native coronary vessel; Degeneration of lumbar or lumbosacral intervertebral disc; Depression; Gastric ulcer, unspecified as acute or chronic, without mention of hemorrhage or perforation (1989); Hyperlipidemia; Hypothyroidism; Infectious disease; Nausea & vomiting; Palpitations; Scoliosis (and kyphoscoliosis), idiopathic; Spinal stenosis, lumbar (12/7/2009); Status post right hip replacement (2/26/2016); Thoracic or lumbosacral neuritis or radiculitis, unspecified; Tricuspid regurgitation; and Tricuspid valve disorder. She also has no past medical history of Adverse effect of anesthesia; Aneurysm ([address] Arrhythmia; Asthma; Autoimmune disease ([address] Cancer ([address] Chronic kidney disease; Chronic obstructive pulmonary disease ([address] Chronic pain; Coagulation disorder ([address] Diabetes ([address] Difficult intubation; Endocarditis; GERD (gastroesophageal reflux disease); Heart failure ([address] Ill-defined condition; Liver disease; Malignant hyperthermia due to anesthesia; Morbid obesity ([address] Pseudocholinesterase deficiency; Rheumatic fever; Seizures ([address] Sleep apnea; Stroke [facility]); or Thromboembolus ([address] Ms. Mirela Johnson  has a past surgical history that includes hx appendectomy (1987); hx hysterectomy (1988); hx hip replacement (Left, 2007); hx bunionectomy (Bilateral, 2009); hx hammer toe repair (2010); hx lumbar laminectomy (2009); hx orthopaedic (2010); hx hip replacement (Left, 2009); hx knee replacement (Right, 2007); hx shoulder arthroscopy (2013); hx carpal tunnel release (Right, 10/31/13); and hx breast biopsy (Left, 1980).   Social History/Living Environment:     Pt lives with ex  in one story home with 4 steps with B handrail to enter. Prior Level of Function/Work/Activity:  Retired- over past year has progressively more sedentary due to pain  Other Clinical Tests:          X-rays indicated pervasive arthritis per pt report  Current Medications:    Current Outpatient Prescriptions:     b complex vitamins (B COMPLEX 1) tablet, Take 1 Tab by mouth daily. , Disp: , Rfl:     multivitamin (ONE A DAY) tablet, Take 1 Tab by mouth daily. , Disp: , Rfl:     estradiol (ESTRACE) 1 mg tablet, Take 1 mg by mouth., Disp: , Rfl:     PARoxetine (PAXIL) 20 mg tablet, Take 20 mg by mouth daily. Take / use AM day of surgery  per anesthesia protocols. , Disp: , Rfl:     cetirizine (ALLER-TALON) 10 mg tablet, Take 10 mg by mouth daily. Take / use AM day of surgery  per anesthesia protocols. , Disp: , Rfl:     fluticasone (FLONASE) 50 mcg/actuation nasal spray, 2 Sprays by Both Nostrils route daily. Take morning of surgery per anesthesia guidelines. , Disp: , Rfl:    Date Last Reviewed:  9/28/2018       EXAMINATION:   Observation/Orthostatic Postural Assessment:         In standing L shoulder/ Hip higher than R side  Global arthritis as seen with nodules   R scoliotic curve  Palpation:          Tenderness over L SI region  ROM:  BUE WNL for patient- with recent hx of R TSA 12/17                       Date:  09/17/18 Date:  09/28/18 no change since [facility] Date:     Trunk ROM 50 % limited                          LE ROM Left Right       Hip Flexion [facility]       Hip Abduction [facility]       Straight Leg Raise (SLR) 80 ° ** 90 °                Knee Flexion 140 ° 125 °       Knee Extension 0 ° 0 °                Ankle Dorsiflexion [facility]         Strength:     Date:  09/17/18 Date: 09/28/18   Grossly 3+-4/5    Date:     LE MMT Left Right Left Right Left Right   Hip Flex (L1/L2) 3+/5 ** 3+/5       Hip Abd (glut med) 3+/5 ** 3+/5       Hip Ext 3+/5 3+/5       Quad    ( L3/4) 3+/5 ** 3+/5       Hamstring 3+/5 ** 3+/5       Anterior Tib (L4/L5) 3+/5 3+/5       Gastroc (S1/2) 3+/5 3+/5       EHL (L5)                           ** indicates with pain  Special Tests:   SLR (-)  Neurological Screen:        Sensation: Intact for light touch  Functional Mobility:         Gait/Ambulation:  Pt amb with minimal trunk rotation, arm swing  Slow and guarded gait pattern        Transfers:  Sit to stand to sit with use of BUE for support/ push off        Bed Mobility:  Independent        Stairs:  Step to gait pattern  Balance:          Single Leg Stance:  Unable to assume SLS to assess   Body Structures Involved:  1. Nerves  2. Bones  3. Joints  4. Muscles  5. Ligaments Body Functions Affected:  1. Sensory/Pain  2. Neuromusculoskeletal  3. Movement Related Activities and Participation Affected:  1. General Tasks and Demands  2. Mobility  3. Self Care  4. Domestic Life  5. Community, Social and Civic Life       CLINICAL PRESENTATION:       CLINICAL DECISION MAKING:   Outcome Measure: Tool Used: Modified Oswestry Low Back Pain Questionnaire  Score:  Initial: 24/50  Most Recent: 18/50 (Date: 09/28/18 )   Interpretation of Score: Each section is scored on a 0-5 scale, 5 representing the greatest disability. The scores of each section are added together for a total score of 50. Score 0 1-10 11-20 21-30 31-40 41-49 50   Modifier CH CI CJ CK CL CM CN     ? Mobility - Walking and Moving Around:     - CURRENT STATUS: CJ - 20%-39% impaired, limited or restricted    - GOAL STATUS: CJ - 20%-39% impaired, limited or restricted    - D/C STATUS:  CJ - 20%-39% impaired, limited or restricted    Medical Necessity:   · Patient is expected to demonstrate progress in strength, range of motion and balance to increase independence with functional mobility with decreased pain.   Reason for Services/Other Comments:  · Patient continues to require modification of therapeutic interventions to increase complexity of exercises. TREATMENT:   (In addition to Assessment/Re-Assessment sessions the following treatments were rendered)    Therapeutic Exercise: (see flow sheet below for minutes):  Exercises per grid below to improve mobility, strength and balance. Required minimal visual cues to promote proper body alignment and promote proper body mechanics. Progressed resistance, range and repetitions as indicated. Aquatic Therapy (see flow sheet below for minutes): Aquatic treatment performed per flow grid for Decreased muscle strength, Decreased endurance, Decreased static/dynamic balance and reactive control, Decreased range of motion and Ease of movement. Cues provided for technique. Assistance by therapist provided for progression of exercises/ activities. Patient has difficulty with low back pain. Date: 09/17/18 9/19/18 9/21/18 9/25/18 9/26/18 9/28/18  Visit 6     Modalities: 15 min 15 min 15 min 15 min     IFC/ MHP To L low back/ SI To L low back/SI  To L Low back/SI To L low back/SI                       Manual Therapy:                                    Aquatic Exercise:  45 min 40 min  1.5# 55 min  1.5# 60 min  2.75#-waist high H2O 70 mins  3.75#     Gait F/B/S/M  x4L x4 L x4L x4L x4L   Heel/Toe walk  x20 x20 x20     4-way  x20 x10 ea x10 ea x20ea x20 ea   PF/DF         Hamstring Curls  x2L x20 B x20 B x20B x4L   Hip Flexion with knee ext.   (rockette)   Add Next session x2L  x4L   Squats    x20 x20 x20  x20   SLR march  x2L x2L x2L  x4L   Lunges         Hip circles  x20 B x20B x20B x20B x20 B   Hip horizontal abduction/adduction         Core: trunk rotations     With tray x 20 With tray x 20 With tray x 20 With tray x 20   Core: monster walk with shoulder ABD/ADD         Deep well bike  2 min 2 min 2 min 5 min 5 min   Deep well jumping gilda  2min 2 min 2 min 2 min 2 min   Deep well scissors  2min 2min 2 min 2 min 2 min   Deep well:  traction  2min 5min 5 min 5 min 5 min            Hamstring Stretch 2H30B 2 H 30B 2 H 30 2 H 30 2H30   Step Lunge         Piriformis stretch  2H30L 2H 30 B 2 H 30 2 H 30 2H30   PF Stretch         Balance: Single leg Stance         Balance: Tandem                             Therapeutic Exercise: 15 min        Bridging x10        SLR X10 BLE        LTR X10 R/L        LAQ X10 BLE        Seated Marching X10 BLE                 F=forward  B=Backward  S=sidesteps  M=marches    H=hold    Manual: (see flow sheet above for minutes)   Modalities: (see flow sheet above for minutes) Following assessment pt in Ris[place]ely[place] with IFC/ MHP to L low back/ SI region. Intensity monitored throughout intervention with skin intact and WFL following modality. HEP: Pt provided with written/ pictorial HEP which pt demonstrates appropriately in session with minimal cues for technique/ posture. Treatment/Session Assessment: Pt performed aquatic therapy in chest deep water to increase hamstring and LB ROM, strength and reduce pain. Pt did very well in water with min cuing for technique. Pt progressing well in aquatic therapy. At end of H20 session, pt asked if MD had sent orders for shoulder, after confirmation, she requested to be d/c with aquatic HEP to focus on shoulder and stay within financial means. Pt declined heat and IFC due to another appointment. · Pain/ Symptoms: Initial:   4/10  \"I'm good today, I can tell you have helped me a lot. \" Post Session:  0/10  ·   Compliance with Program/Exercises: Will assess as treatment progresses. Recommendations/Intent for next treatment session: D/Cper patient request at this session due to referral for shoulder. Gave aquatic HEP and membership information. Pt stated understanding to call office for eval for shoulder.   Total Treatment Duration:  PT Patient Time In/Time Out  Time In: 1100  Time Out: 1210       Inis Brunner, PTA

## 2018-10-09 ENCOUNTER — HOSPITAL ENCOUNTER (OUTPATIENT)
Dept: PHYSICAL THERAPY | Age: 78
Discharge: HOME | End: 2018-10-09
Payer: MEDICARE

## 2018-10-09 PROCEDURE — 97110 THERAPEUTIC EXERCISES: CPT

## 2018-10-09 PROCEDURE — G8985 CARRY GOAL STATUS: HCPCS

## 2018-10-09 PROCEDURE — 97161 PT EVAL LOW COMPLEX 20 MIN: CPT

## 2018-10-09 PROCEDURE — G8984 CARRY CURRENT STATUS: HCPCS

## 2018-10-09 PROCEDURE — 97140 MANUAL THERAPY 1/> REGIONS: CPT

## 2018-10-09 NOTE — PROGRESS NOTES
Jerrod Osullivan  : 1940 [address]  [phone]   [phone]        OUTPATIENT PHYSICAL THERAPY:Initial Assessment 10/10/2018         ICD-10: Treatment Diagnosis: Pain in right shoulder (M25.511)  Precautions/Allergies:   Adhesive tape and Codeine   Fall Risk Score: 0 (? 5 = High Risk)  MD Orders: evaluate and treat MEDICAL/REFERRING DIAGNOSIS:  Pain in right shoulder [M25.511]   DATE OF ONSET: date of surgery: 18 s/p reverse total shoulder arthroplasty  REFERRING PHYSICIAN: Ira Carrasco MD  RETURN PHYSICIAN APPOINTMENT: 18     INITIAL ASSESSMENT:  Ms. Leda Hall presents to therapy with right shoulder pain following reverse total shoulder arthroplasty. She has decent mobility at the glenohumeral joint but lacks strength to utilize full available motion. She has significant soft tissue restrictions, specifically through the anterior pec musculature and along the incision. She also has some superficial nerve irritation causing symptoms of burning at the lateral arm. This causes difficulty with reaching activities and pain. Skilled therapy is required to return to prior level of function. PROBLEM LIST (Impacting functional limitations):  1. Decreased Strength  2. Decreased ADL/Functional Activities  3. Increased Pain  4. Decreased Activity Tolerance  5. Decreased Flexibility/Joint Mobility INTERVENTIONS PLANNED:  1. Family Education  2. Home Exercise Program (HEP)  3. Manual Therapy  4. Neuromuscular Re-education/Strengthening  5. Range of Motion (ROM)  6. Therapeutic Activites  7. Therapeutic Exercise/Strengthening  8. modalities    TREATMENT PLAN:  Effective Dates: 10/9/2018 TO 2019 (90 days). Frequency/Duration: 2 times a week for 90 Days  GOALS: (Goals have been discussed and agreed upon with patient.)  Short-Term Functional Goals: Time Frame: 2 weeks  1.  Patient will be independent with HEP.   2. Patient will report pain <4/10 with daily activity. Discharge Goals: Time Frame: 4 weeks  1. Patient will improve active shoulder flexion to 140 ° to improve capacity for overhead reaching. 2. Patient will report pain <2/10 with daily activity. 3. Patient will improve shoulder mobility to be able to reach across her body or behind her back to fasten her bra or apply deodorant. Rehabilitation Potential For Stated Goals: Excellent  Regarding Richardson Maddox's therapy, I certify that the treatment plan above will be carried out by a therapist or under their direction. Thank you for this referral,  Brennon Gilmore DPT       Referring Physician Signature: Ira Carrasco MD              Date                      HISTORY:   History of Present Injury/Illness (Reason for Referral):  Patient presents to therapy with primary complaint of right shoulder pain. She has a prior history of reverse total shoulder replacement in 12/2017 and had approximately 2 months of therapy with a good initial recovery. In June of 2018 she had a fall while moving where she landed on the right arm and has had pain with burning into the lateral arm since. This is relatively constant and she has been unable to modify her symptoms with movement or positioning. She also reports difficulty with lifting, reaching overhead, or reaching across her body. Past Medical History/Comorbidities:   Ms. Leda Hall  has a past medical history of Arthritis; Malagon's esophagus; Chest discomfort; Coronary atherosclerosis of native coronary vessel; Degeneration of lumbar or lumbosacral intervertebral disc; Depression; Gastric ulcer, unspecified as acute or chronic, without mention of hemorrhage or perforation (1989); Hyperlipidemia; Hypothyroidism; Infectious disease; Nausea & vomiting; Palpitations; Scoliosis (and kyphoscoliosis), idiopathic; Spinal stenosis, lumbar (12/7/2009); Status post right hip replacement (2/26/2016);  Thoracic or lumbosacral neuritis or radiculitis, unspecified; Tricuspid regurgitation; and Tricuspid valve disorder. She also has no past medical history of Adverse effect of anesthesia; Aneurysm ([address] Arrhythmia; Asthma; Autoimmune disease ([address] Cancer ([address] Chronic kidney disease; Chronic obstructive pulmonary disease ([address] Chronic pain; Coagulation disorder ([address] Diabetes ([facility] [facility] Difficult intubation; Endocarditis; GERD (gastroesophageal reflux disease); Heart failure ([address] Ill-defined condition; Liver disease; Malignant hyperthermia due to anesthesia; Morbid obesity ([facility] [address] Pseudocholinesterase deficiency; Rheumatic fever; Seizures ([facility] [address] Sleep apnea; Stroke [facility]); or Thromboembolus ([facility] [address] Ms. Karley Paul  has a past surgical history that includes hx appendectomy (1987); hx hysterectomy (1988); hx hip replacement (Left, 2007); hx bunionectomy (Bilateral, 2009); hx hammer toe repair (2010); hx lumbar laminectomy (2009); hx orthopaedic (2010); hx hip replacement (Left, 2009); hx knee replacement (Right, 2007); hx shoulder arthroscopy (2013); hx carpal tunnel release (Right, 10/31/13); and hx breast biopsy (Left, 1980). Social History/Living Environment:     Patient lives at home with her . Prior Level of Function/Work/Activity:  Patient is retired. Dominant Side:         RIGHT  Current Medications:    Current Outpatient Prescriptions:     b complex vitamins (B COMPLEX 1) tablet, Take 1 Tab by mouth daily. , Disp: , Rfl:     multivitamin (ONE A DAY) tablet, Take 1 Tab by mouth daily. , Disp: , Rfl:     estradiol (ESTRACE) 1 mg tablet, Take 1 mg by mouth., Disp: , Rfl:     PARoxetine (PAXIL) 20 mg tablet, Take 20 mg by mouth daily. Take / use AM day of surgery  per anesthesia protocols. , Disp: , Rfl:     cetirizine (ALLER-TALON) 10 mg tablet, Take 10 mg by mouth daily. Take / use AM day of surgery  per anesthesia protocols. , Disp: , Rfl:     fluticasone (FLONASE) 50 mcg/actuation nasal spray, 2 Sprays by Both Nostrils route daily. Take morning of surgery per anesthesia guidelines. , Disp: , Rfl:    Date Last Reviewed:  10/9/2018   # of Personal Factors/Comorbidities that affect the Plan of Care: 0: LOW COMPLEXITY   EXAMINATION:   Observation/Orthostatic Postural Assessment:          Atrophy noted throughout the shoulder complex, specifically at the anterior chest. Surgical incision is well healed with increased rigidity of skin around the incision. Palpation:          Tender at right cervicothoracic junction, posterior glenohumeral joint, along incision and insertion of pec musculature. ROM:     Cervical mobility is full with stiffness at cervicothoracic junction during right lateral flexion. Shoulder flexion: 110 ° active; 150 ° passive  Shoulder ER: 75 ° passive in scaption  Hand behind head: able to get hand to back of head with forward elbow. Shoulder IR: 70 ° passive in scaption  Hand behind back: finger to belt line      Strength:     Scaption: 4-/5 with pain on resistance  ER: 3+/5 with pain  IR: 4+/5       Special Tests:          Not applicable  Neurological Screen:        Increased sensitivity to light touch at lateral upper arm. Reflexes are normal.  strength is good. Functional Mobility:         Independent  Balance: WNL   Body Structures Involved:  1. Nerves  2. Bones  3. Joints  4. Muscles  5. Ligaments Body Functions Affected:  1. Sensory/Pain  2. Neuromusculoskeletal  3. Movement Related Activities and Participation Affected:  1. Learning and Applying Knowledge  2. General Tasks and Demands  3. Mobility  4. Self Care  5. Domestic Life  6. Interpersonal Interactions and Relationships  7. Community, Social and [place]   # of elements that affect the Plan of Care: 1-2: LOW COMPLEXITY   CLINICAL PRESENTATION:   Presentation: Stable and uncomplicated: LOW COMPLEXITY   CLINICAL DECISION MAKING:   Outcome Measure:    Tool Used: Disabilities of the Arm, Shoulder and Hand (DASH) Questionnaire - Quick Version  Score:  Initial: 37/55  Most Recent: X/55 (Date: -- )   Interpretation of Score: The DASH is designed to measure the activities of daily living in person's with upper extremity dysfunction or pain. Each section is scored on a 1-5 scale, 5 representing the greatest disability. The scores of each section are added together for a total score of 55. Score 11 12-19 20-28 29-37 38-45 46-54 55   Modifier CH CI CJ CK CL CM CN     ? Carrying, Moving, and Handling Objects:     - CURRENT STATUS: CK - 40%-59% impaired, limited or restricted    - GOAL STATUS: CI - 1%-19% impaired, limited or restricted    - D/C STATUS:  ---------------To be determined---------------      Medical Necessity:   · Patient is expected to demonstrate progress in strength, range of motion, balance and coordination to increase independence with activities that involve use of the right arm. Reason for Services/Other Comments:  · Patient has demonstrated an improvement in functional level by independent performance of HEP. Use of outcome tool(s) and clinical judgement create a POC that gives a: Clear prediction of patient's progress: LOW COMPLEXITY   TREATMENT:   (In addition to Assessment/Re-Assessment sessions the following treatments were rendered)  THERAPEUTIC EXERCISE: (see below for minutes):  Exercises per grid below to improve mobility, strength, balance and coordination. Required minimal verbal and manual cues to promote proper body alignment, promote proper body posture and promote proper body mechanics. Progressed resistance, range, repetitions and complexity of movement as indicated. MANUAL THERAPY: (see below for minutes): Joint mobilization and Soft tissue mobilization was utilized and necessary because of the patient's restricted joint motion, painful spasm, loss of articular motion and restricted motion of soft tissue. MODALITIES: (see below for minutes):       To decrease pain     Date: 10/09/18 Modalities:                                Therapeutic Exercise: 10 min       Wall slide [id number]       Band pull aparts Red tband in sitting with thumbs up 30x                                       Proprioceptive Activities:                                Manual Therapy: 15 min       Soft tissue mobilization To lateral shoulder, anterior incision               Therapeutic Activities:                                        HEP: see 10/09/18 flow sheet for specifics. [employer] Portal  Treatment/Session Assessment:  Patient is independent with performance of above described home program.    · Pain/ Symptoms: Initial:   7/10 Post Session:  No increase following today's treatment session. ·   Compliance with Program/Exercises: Will assess as treatment progresses. · Recommendations/Intent for next treatment session: \"Next visit will focus on advancements to more challenging activities\".   Total Treatment Duration:  PT Patient Time In/Time Out  Time In: 1400  Time Out: [address], [place]

## 2018-10-09 NOTE — PROGRESS NOTES
Ambulatory/Rehab Services H2 Model Falls Risk Assessment    Risk Factor Pts. ·   Confusion/Disorientation/Impulsivity  []    4 ·   Symptomatic Depression  []   2 ·   Altered Elimination  []   1 ·   Dizziness/Vertigo  []   1 ·   Gender (Male)  []   1 ·   Any administered antiepileptics (anticonvulsants):  []   2 ·   Any administered benzodiazepines:  []   1 ·   Visual Impairment (specify):  []   1 ·   Portable Oxygen Use  []   1 ·   Orthostatic ? BP  []   1 ·   History of Recent Falls (within 3 mos.)  []   5     Ability to Rise from Chair (choose one) Pts. ·   Ability to rise in a single movement  [x]   0 ·   Pushes up, successful in one attempt  []   1 ·   Multiple attempts, but successful  []   3 ·   Unable to rise without assistance  []   4   Total: (5 or greater = High Risk) 0     Falls Prevention Plan:   []                Physical Limitations to Exercise (specify):   []                Mobility Assistance Device (type):   []                Exercise/Equipment Adaptation (specify):    ©2010 [facility] [address] Patent #[id number].  Federal Law prohibits the replication, distribution or use without written permission from [facility] [employer]

## 2018-10-17 ENCOUNTER — APPOINTMENT (OUTPATIENT)
Dept: PHYSICAL THERAPY | Age: 78
End: 2018-10-17
Payer: MEDICARE

## 2018-10-19 ENCOUNTER — HOSPITAL ENCOUNTER (OUTPATIENT)
Dept: PHYSICAL THERAPY | Age: 78
Discharge: HOME | End: 2018-10-19
Payer: MEDICARE

## 2018-10-19 PROCEDURE — 97110 THERAPEUTIC EXERCISES: CPT

## 2018-10-19 PROCEDURE — 97140 MANUAL THERAPY 1/> REGIONS: CPT

## 2018-10-19 NOTE — PROGRESS NOTES
Nicolette Mckeon  : 1940 [facility]  [address] [facility] [address]  [phone]   [phone]        OUTPATIENT PHYSICAL THERAPY:Daily Note 10/19/2018         ICD-10: Treatment Diagnosis: Pain in right shoulder (M25.511)  Precautions/Allergies:   Adhesive tape and Codeine   Fall Risk Score: 0 (? 5 = High Risk)  MD Orders: evaluate and treat MEDICAL/REFERRING DIAGNOSIS:  Pain in right shoulder [M25.511]   DATE OF ONSET: date of surgery: 17 s/p reverse total shoulder arthroplasty  REFERRING PHYSICIAN: Judith Florez MD  RETURN PHYSICIAN APPOINTMENT: 18     INITIAL ASSESSMENT:  Ms. Thea Danielson presents to therapy with right shoulder pain following reverse total shoulder arthroplasty. She has decent mobility at the glenohumeral joint but lacks strength to utilize full available motion. She has significant soft tissue restrictions, specifically through the anterior pec musculature and along the incision. She also has some superficial nerve irritation causing symptoms of burning at the lateral arm. This causes difficulty with reaching activities and pain. Skilled therapy is required to return to prior level of function. PROBLEM LIST (Impacting functional limitations):  1. Decreased Strength  2. Decreased ADL/Functional Activities  3. Increased Pain  4. Decreased Activity Tolerance  5. Decreased Flexibility/Joint Mobility INTERVENTIONS PLANNED:  1. Family Education  2. Home Exercise Program (HEP)  3. Manual Therapy  4. Neuromuscular Re-education/Strengthening  5. Range of Motion (ROM)  6. Therapeutic Activites  7. Therapeutic Exercise/Strengthening  8. modalities    TREATMENT PLAN:  Effective Dates: 10/9/2018 TO 2019 (90 days). Frequency/Duration: 2 times a week for 90 Days  GOALS: (Goals have been discussed and agreed upon with patient.)  Short-Term Functional Goals: Time Frame: 2 weeks  1. Patient will be independent with HEP. 2. Patient will report pain <4/10 with daily activity. Discharge Goals: Time Frame: 4 weeks  1. Patient will improve active shoulder flexion to 140 ° to improve capacity for overhead reaching. 2. Patient will report pain <2/10 with daily activity. 3. Patient will improve shoulder mobility to be able to reach across her body or behind her back to fasten her bra or apply deodorant. Rehabilitation Potential For Stated Goals: Excellent                HISTORY:   History of Present Injury/Illness (Reason for Referral):  Patient presents to therapy with primary complaint of right shoulder pain. She has a prior history of reverse total shoulder replacement in 12/2017 and had approximately 2 months of therapy with a good initial recovery. In June of 2018 she had a fall while moving where she landed on the right arm and has had pain with burning into the lateral arm since. This is relatively constant and she has been unable to modify her symptoms with movement or positioning. She also reports difficulty with lifting, reaching overhead, or reaching across her body. Past Medical History/Comorbidities:   Ms. Ruiz Marie  has a past medical history of Arthritis, Malagon's esophagus, Chest discomfort, Coronary atherosclerosis of native coronary vessel, Degeneration of lumbar or lumbosacral intervertebral disc, Depression, Gastric ulcer, unspecified as acute or chronic, without mention of hemorrhage or perforation, Hyperlipidemia, Hypothyroidism, Infectious disease, Nausea & vomiting, Palpitations, Scoliosis (and kyphoscoliosis), idiopathic, Spinal stenosis, lumbar, Status post right hip replacement, Thoracic or lumbosacral neuritis or radiculitis, unspecified, Tricuspid regurgitation, and Tricuspid valve disorder.   Ms. Ruiz Marie  has a past surgical history that includes hx appendectomy (1987); hx hysterectomy (1988); hx hip replacement (Left, 2007); hx bunionectomy (Bilateral, 2009); hx hammer toe repair (2010); hx lumbar laminectomy (2009); hx orthopaedic (2010); hx hip replacement (Left, 2009); hx knee replacement (Right, 2007); hx shoulder arthroscopy (2013); hx carpal tunnel release (Right, 10/31/13); hx breast biopsy (Left, 1980); HIP ARTHROPLASTY TOTAL/ RIGHT/ DEPUY (Right, 2/26/2016); RIGHT HAND CARPAL TUNNEL RELEASE C-ARM/ K-WIRES/ MINI MITECK/ ACCUTRAK (Right, 10/31/2013); RIGHT CMC ARTHROPLASTY WITH TENDON TRANSFERRIGHT THUMB MP FUSION (Right, 10/31/2013); CLAVICLE RESECTION (Left, 7/26/2013); TAPEZIO METACARPAL ARTHROPLASTY (Left, 6/9/2010); and SPINE LUMBAR LAMINECTOMY (N/A, 11/5/2009). Social History/Living Environment:     Patient lives at home with her . Prior Level of Function/Work/Activity:  Patient is retired. Dominant Side:         RIGHT  Current Medications:    Current Outpatient Medications:     b complex vitamins (B COMPLEX 1) tablet, Take 1 Tab by mouth daily. , Disp: , Rfl:     multivitamin (ONE A DAY) tablet, Take 1 Tab by mouth daily. , Disp: , Rfl:     estradiol (ESTRACE) 1 mg tablet, Take 1 mg by mouth., Disp: , Rfl:     PARoxetine (PAXIL) 20 mg tablet, Take 20 mg by mouth daily. Take / use AM day of surgery  per anesthesia protocols. , Disp: , Rfl:     cetirizine (ALLER-TALON) 10 mg tablet, Take 10 mg by mouth daily. Take / use AM day of surgery  per anesthesia protocols. , Disp: , Rfl:     fluticasone (FLONASE) 50 mcg/actuation nasal spray, 2 Sprays by Both Nostrils route daily. Take morning of surgery per anesthesia guidelines. , Disp: , Rfl:    Date Last Reviewed:  10/19/2018   EXAMINATION:   Observation/Orthostatic Postural Assessment:          Atrophy noted throughout the shoulder complex, specifically at the anterior chest. Surgical incision is well healed with increased rigidity of skin around the incision. Palpation:          Tender at right cervicothoracic junction, posterior glenohumeral joint, along incision and insertion of pec musculature.   ROM:     Cervical mobility is full with stiffness at cervicothoracic junction during right lateral flexion. Shoulder flexion: 110 ° active; 150 ° passive  Shoulder ER: 75 ° passive in scaption  Hand behind head: able to get hand to back of head with forward elbow. Shoulder IR: 70 ° passive in scaption  Hand behind back: finger to belt line      Strength:     Scaption: 4-/5 with pain on resistance  ER: 3+/5 with pain  IR: 4+/5       Special Tests:          Not applicable  Neurological Screen:        Increased sensitivity to light touch at lateral upper arm. Reflexes are normal.  strength is good. Functional Mobility:         Independent  Balance: WNL   Body Structures Involved:  1. Nerves  2. Bones  3. Joints  4. Muscles  5. Ligaments Body Functions Affected:  1. Sensory/Pain  2. Neuromusculoskeletal  3. Movement Related Activities and Participation Affected:  1. Learning and Applying Knowledge  2. General Tasks and Demands  3. Mobility  4. Self Care  5. Domestic Life  6. Interpersonal Interactions and Relationships  7. Community, Social and Civic Life   CLINICAL PRESENTATION:   CLINICAL DECISION MAKING:   Outcome Measure: Tool Used: Disabilities of the Arm, Shoulder and Hand (DASH) Questionnaire - Quick Version  Score:  Initial: 37/55  Most Recent: X/55 (Date: -- )   Interpretation of Score: The DASH is designed to measure the activities of daily living in person's with upper extremity dysfunction or pain. Each section is scored on a 1-5 scale, 5 representing the greatest disability. The scores of each section are added together for a total score of 55. Score 11 12-19 20-28 29-37 38-45 46-54 55   Modifier CH CI CJ CK CL CM CN     ?  Carrying, Moving, and Handling Objects:     - CURRENT STATUS: CK - 40%-59% impaired, limited or restricted    - GOAL STATUS: CI - 1%-19% impaired, limited or restricted    - D/C STATUS:  ---------------To be determined---------------      Medical Necessity:   · Patient is expected to demonstrate progress in strength, range of motion, balance and coordination to increase independence with activities that involve use of the right arm. Reason for Services/Other Comments:  · Patient has demonstrated an improvement in functional level by independent performance of HEP. TREATMENT:   (In addition to Assessment/Re-Assessment sessions the following treatments were rendered)  THERAPEUTIC EXERCISE: (see below for minutes):  Exercises per grid below to improve mobility, strength, balance and coordination. Required minimal verbal and manual cues to promote proper body alignment, promote proper body posture and promote proper body mechanics. Progressed resistance, range, repetitions and complexity of movement as indicated. MANUAL THERAPY: (see below for minutes): Joint mobilization and Soft tissue mobilization was utilized and necessary because of the patient's restricted joint motion, painful spasm, loss of articular motion and restricted motion of soft tissue. MODALITIES: (see below for minutes): To decrease pain     Date: 10/09/18 10/19/18 (visit 2)      Modalities:                                Therapeutic Exercise: 10 min 25 min      Wall slide [id number]       Band pull aparts Red tband in sitting with thumbs up 30x       pulleys  Flexion and scaption x2 min each      Isometric band (flexion, extension, IR and ER)  5 sec hold x 10 ea direction, yellow      Supine wand ER  2x10      Supine wand flexion  2x10      Proprioceptive Activities:                                Manual Therapy: 15 min 20 min      Soft tissue mobilization To lateral shoulder, anterior incision 10 min scar massage to R shoulder      PROM to R shoulder  10 min to pt tolerance      Therapeutic Activities:                                        HEP: see 10/09/18 flow sheet for specifics. [employer] Portal  Treatment/Session Assessment:  Patient did not report increased pain but she became fatigued quickly.  Continue POC..    · Pain/ Symptoms: Initial:   7/10 R shoulder    Pt reports relief from scar massage. Post Session:  No increase following today's treatment session. ·   Compliance with Program/Exercises: Will assess as treatment progresses. · Recommendations/Intent for next treatment session: \"Next visit will focus on advancements to more challenging activities\".   Total Treatment Duration:  PT Patient Time In/Time Out  Time In: 1445  Time Out: [address] [place], [place]

## 2018-10-23 ENCOUNTER — HOSPITAL ENCOUNTER (OUTPATIENT)
Dept: PHYSICAL THERAPY | Age: 78
Discharge: HOME | End: 2018-10-23
Payer: MEDICARE

## 2018-10-25 ENCOUNTER — HOSPITAL ENCOUNTER (OUTPATIENT)
Dept: PHYSICAL THERAPY | Age: 78
Discharge: HOME | End: 2018-10-25
Payer: MEDICARE

## 2018-11-01 ENCOUNTER — HOSPITAL ENCOUNTER (OUTPATIENT)
Dept: PHYSICAL THERAPY | Age: 78
Discharge: HOME | End: 2018-11-01
Payer: MEDICARE

## 2018-11-01 NOTE — PROGRESS NOTES
Today's appointment was cancelled as she was 30 minutes late. Will continue with POC at next scheduled appointment.

## 2018-11-02 ENCOUNTER — HOSPITAL ENCOUNTER (OUTPATIENT)
Dept: PHYSICAL THERAPY | Age: 78
Discharge: HOME | End: 2018-11-02
Payer: MEDICARE

## 2018-11-02 PROCEDURE — 97110 THERAPEUTIC EXERCISES: CPT

## 2018-11-02 PROCEDURE — 97140 MANUAL THERAPY 1/> REGIONS: CPT

## 2018-11-02 NOTE — PROGRESS NOTES
Lashay Ramirez  : 1940 [id number] [facility],[address]  [address] [place] [address].  [phone]   [phone]        OUTPATIENT PHYSICAL THERAPY:Daily Note 2018         ICD-10: Treatment Diagnosis: Pain in right shoulder (M25.511)  Precautions/Allergies:   Adhesive tape and Codeine   Fall Risk Score: 0 (? 5 = High Risk)  MD Orders: evaluate and treat MEDICAL/REFERRING DIAGNOSIS:  Pain in right shoulder [M25.511]   DATE OF ONSET: date of surgery: 17 s/p reverse total shoulder arthroplasty  REFERRING PHYSICIAN: Jamee Norris MD  RETURN PHYSICIAN APPOINTMENT: 18     INITIAL ASSESSMENT:  Ms. Sandra Jc presents to therapy with right shoulder pain following reverse total shoulder arthroplasty. She has decent mobility at the glenohumeral joint but lacks strength to utilize full available motion. She has significant soft tissue restrictions, specifically through the anterior pec musculature and along the incision. She also has some superficial nerve irritation causing symptoms of burning at the lateral arm. This causes difficulty with reaching activities and pain. Skilled therapy is required to return to prior level of function. PROBLEM LIST (Impacting functional limitations):  1. Decreased Strength  2. Decreased ADL/Functional Activities  3. Increased Pain  4. Decreased Activity Tolerance  5. Decreased Flexibility/Joint Mobility INTERVENTIONS PLANNED:  1. Family Education  2. Home Exercise Program (HEP)  3. Manual Therapy  4. Neuromuscular Re-education/Strengthening  5. Range of Motion (ROM)  6. Therapeutic Activites  7. Therapeutic Exercise/Strengthening  8. modalities    TREATMENT PLAN:  Effective Dates: 10/9/2018 TO 2019 (90 days). Frequency/Duration: 2 times a week for 90 Days  GOALS: (Goals have been discussed and agreed upon with patient.)  Short-Term Functional Goals: Time Frame: 2 weeks  1. Patient will be independent with HEP. 2. Patient will report pain <4/10 with daily activity. Discharge Goals: Time Frame: 4 weeks  1. Patient will improve active shoulder flexion to 140 ° to improve capacity for overhead reaching. 2. Patient will report pain <2/10 with daily activity. 3. Patient will improve shoulder mobility to be able to reach across her body or behind her back to fasten her bra or apply deodorant. Rehabilitation Potential For Stated Goals: Excellent                HISTORY:   History of Present Injury/Illness (Reason for Referral):  Patient presents to therapy with primary complaint of right shoulder pain. She has a prior history of reverse total shoulder replacement in 12/2017 and had approximately 2 months of therapy with a good initial recovery. In June of 2018 she had a fall while moving where she landed on the right arm and has had pain with burning into the lateral arm since. This is relatively constant and she has been unable to modify her symptoms with movement or positioning. She also reports difficulty with lifting, reaching overhead, or reaching across her body. Past Medical History/Comorbidities:   Ms. Ruiz Marie  has a past medical history of Arthritis, Malagon's esophagus, Chest discomfort, Coronary atherosclerosis of native coronary vessel, Degeneration of lumbar or lumbosacral intervertebral disc, Depression, Gastric ulcer, unspecified as acute or chronic, without mention of hemorrhage or perforation, Hyperlipidemia, Hypothyroidism, Infectious disease, Nausea & vomiting, Palpitations, Scoliosis (and kyphoscoliosis), idiopathic, Spinal stenosis, lumbar, Status post right hip replacement, Thoracic or lumbosacral neuritis or radiculitis, unspecified, Tricuspid regurgitation, and Tricuspid valve disorder.   Ms. Ruiz Marie  has a past surgical history that includes hx appendectomy (1987); hx hysterectomy (1988); hx hip replacement (Left, 2007); hx bunionectomy (Bilateral, 2009); hx hammer toe repair (2010); hx lumbar laminectomy (2009); hx orthopaedic (2010); hx hip replacement (Left, 2009); hx knee replacement (Right, 2007); hx shoulder arthroscopy (2013); hx carpal tunnel release (Right, 10/31/13); hx breast biopsy (Left, 1980); HIP ARTHROPLASTY TOTAL/ RIGHT/ DEPUY (Right, 2/26/2016); RIGHT HAND CARPAL TUNNEL RELEASE C-ARM/ K-WIRES/ MINI MITECK/ ACCUTRAK (Right, 10/31/2013); RIGHT CMC ARTHROPLASTY WITH TENDON TRANSFERRIGHT THUMB MP FUSION (Right, 10/31/2013); CLAVICLE RESECTION (Left, 7/26/2013); TAPEZIO METACARPAL ARTHROPLASTY (Left, 6/9/2010); and SPINE LUMBAR LAMINECTOMY (N/A, 11/5/2009). Social History/Living Environment:     Patient lives at home with her . Prior Level of Function/Work/Activity:  Patient is retired. Dominant Side:         RIGHT  Current Medications:    Current Outpatient Medications:     b complex vitamins (B COMPLEX 1) tablet, Take 1 Tab by mouth daily. , Disp: , Rfl:     multivitamin (ONE A DAY) tablet, Take 1 Tab by mouth daily. , Disp: , Rfl:     estradiol (ESTRACE) 1 mg tablet, Take 1 mg by mouth., Disp: , Rfl:     PARoxetine (PAXIL) 20 mg tablet, Take 20 mg by mouth daily. Take / use AM day of surgery  per anesthesia protocols. , Disp: , Rfl:     cetirizine (ALLER-TALON) 10 mg tablet, Take 10 mg by mouth daily. Take / use AM day of surgery  per anesthesia protocols. , Disp: , Rfl:     fluticasone (FLONASE) 50 mcg/actuation nasal spray, 2 Sprays by Both Nostrils route daily. Take morning of surgery per anesthesia guidelines. , Disp: , Rfl:    Date Last Reviewed:  11/2/2018   EXAMINATION:   Observation/Orthostatic Postural Assessment:          Atrophy noted throughout the shoulder complex, specifically at the anterior chest. Surgical incision is well healed with increased rigidity of skin around the incision. Palpation:          Tender at right cervicothoracic junction, posterior glenohumeral joint, along incision and insertion of pec musculature.   ROM:     Cervical mobility is full with stiffness at cervicothoracic junction during right lateral flexion. Shoulder flexion: 110 ° active; 150 ° passive  Shoulder ER: 75 ° passive in scaption  Hand behind head: able to get hand to back of head with forward elbow. Shoulder IR: 70 ° passive in scaption  Hand behind back: finger to belt line      Strength:     Scaption: 4-/5 with pain on resistance  ER: 3+/5 with pain  IR: 4+/5       Special Tests:          Not applicable  Neurological Screen:        Increased sensitivity to light touch at lateral upper arm. Reflexes are normal.  strength is good. Functional Mobility:         Independent  Balance: WNL   Body Structures Involved:  1. Nerves  2. Bones  3. Joints  4. Muscles  5. Ligaments Body Functions Affected:  1. Sensory/Pain  2. Neuromusculoskeletal  3. Movement Related Activities and Participation Affected:  1. Learning and Applying Knowledge  2. General Tasks and Demands  3. Mobility  4. Self Care  5. Domestic Life  6. Interpersonal Interactions and Relationships  7. Community, Social and Civic Life   CLINICAL PRESENTATION:   CLINICAL DECISION MAKING:   Outcome Measure: Tool Used: Disabilities of the Arm, Shoulder and Hand (DASH) Questionnaire - Quick Version  Score:  Initial: 37/55  Most Recent: X/55 (Date: -- )   Interpretation of Score: The DASH is designed to measure the activities of daily living in person's with upper extremity dysfunction or pain. Each section is scored on a 1-5 scale, 5 representing the greatest disability. The scores of each section are added together for a total score of 55. Score 11 12-19 20-28 29-37 38-45 46-54 55   Modifier CH CI CJ CK CL CM CN     ?  Carrying, Moving, and Handling Objects:     - CURRENT STATUS: CK - 40%-59% impaired, limited or restricted    - GOAL STATUS: CI - 1%-19% impaired, limited or restricted    - D/C STATUS:  ---------------To be determined---------------      Medical Necessity:   · Patient is expected to demonstrate progress in strength, range of motion, balance and coordination to increase independence with activities that involve use of the right arm. Reason for Services/Other Comments:  · Patient has demonstrated an improvement in functional level by independent performance of HEP. TREATMENT:   (In addition to Assessment/Re-Assessment sessions the following treatments were rendered)  THERAPEUTIC EXERCISE: (see below for minutes):  Exercises per grid below to improve mobility, strength, balance and coordination. Required minimal verbal and manual cues to promote proper body alignment, promote proper body posture and promote proper body mechanics. Progressed resistance, range, repetitions and complexity of movement as indicated. MANUAL THERAPY: (see below for minutes): Joint mobilization and Soft tissue mobilization was utilized and necessary because of the patient's restricted joint motion, painful spasm, loss of articular motion and restricted motion of soft tissue. MODALITIES: (see below for minutes):       To decrease pain     Date: 10/09/18 10/19/18 (visit 2) 11/02/2018     Modalities:                                Therapeutic Exercise: 10 min 25 min 30 min     Wall slide [id number]       Band pull aparts Red tband in sitting with thumbs up 30x       pulleys  Flexion and scaption x2 min each      Isometric band (flexion, extension, IR and ER)  5 sec hold x 10 ea direction, yellow      Supine wand ER  2x10      UBE   5 min L5 focusing on forward punch     Punch   2# 3x15 cueing scapular protraction     Eccentric ER   2# in supine with elbow on towel roll 3x10     Sidelying scaption   1# 3x15     Sidelying horizontal abduction   1# 3x10     Supine wand flexion  2x10      Proprioceptive Activities:                                Manual Therapy: 15 min 20 min 15 min     Soft tissue mobilization To lateral shoulder, anterior incision 10 min scar massage to R shoulder repeat     PROM to R shoulder  10 min to pt tolerance repeat     Therapeutic Activities:                                        HEP: see 10/09/18 flow sheet for specifics. [employer]  Treatment/Session Assessment:  Demonstrates difficulty with control moving the arm away from the body due to weakness and poor motor control of the arm. Continue POC. · Pain/ Symptoms: Initial:   7/10 R shoulder    Patient has been out of town the past few weeks. She notes minimal compliance with performance of her home program.  Post Session:  No increase following today's treatment session. ·   Compliance with Program/Exercises: Will assess as treatment progresses. · Recommendations/Intent for next treatment session: \"Next visit will focus on advancements to more challenging activities\".   Total Treatment Duration:  PT Patient Time In/Time Out  Time In: 1445  Time Out: [address], DPT

## 2018-11-06 ENCOUNTER — HOSPITAL ENCOUNTER (OUTPATIENT)
Dept: PHYSICAL THERAPY | Age: 78
Discharge: HOME | End: 2018-11-06
Payer: MEDICARE

## 2018-12-04 NOTE — PROGRESS NOTES
Cornelio Tovarbernabeyvonne has been seen in physical therapy from 10/09/18 to 11/06/18 for 3 of 8 scheduled visits. Treatment has been discontinued at this time due to patient failing to return for additional treatment.    Thank you for this referral.

## 2018-12-06 PROBLEM — I73.9 PVD (PERIPHERAL VASCULAR DISEASE) (HCC): Status: ACTIVE | Noted: 2018-12-06

## 2019-03-13 ENCOUNTER — HOSPITAL ENCOUNTER (OUTPATIENT)
Dept: MAMMOGRAPHY | Age: 79
Discharge: HOME | End: 2019-03-13
Attending: INTERNAL MEDICINE
Payer: MEDICARE

## 2019-03-13 DIAGNOSIS — Z12.39 SCREENING BREAST EXAMINATION: ICD-10-CM

## 2019-03-13 PROCEDURE — 77067 SCR MAMMO BI INCL CAD: CPT

## 2019-07-03 PROBLEM — R00.2 PALPITATION: Status: ACTIVE | Noted: 2019-07-03

## 2019-09-16 ENCOUNTER — HOSPITAL ENCOUNTER (OUTPATIENT)
Dept: LAB | Age: 79
Discharge: HOME | End: 2019-09-16

## 2019-09-16 PROCEDURE — 88305 TISSUE EXAM BY PATHOLOGIST: CPT

## 2019-09-25 PROBLEM — Z01.810 PRE-OPERATIVE CARDIOVASCULAR EXAMINATION: Status: RESOLVED | Noted: 2017-01-12 | Resolved: 2019-09-25

## 2019-10-30 ENCOUNTER — HOSPITAL ENCOUNTER (OUTPATIENT)
Dept: PHYSICAL THERAPY | Age: 79
Discharge: HOME | End: 2019-10-30
Payer: MEDICARE

## 2019-10-30 PROCEDURE — 97110 THERAPEUTIC EXERCISES: CPT

## 2019-10-30 PROCEDURE — 97012 MECHANICAL TRACTION THERAPY: CPT

## 2019-10-30 PROCEDURE — 97161 PT EVAL LOW COMPLEX 20 MIN: CPT

## 2019-10-30 NOTE — THERAPY EVALUATION
Evonne Busby  : 1940 [address] [facility],[address]  [address] [place] [address].  [phone]   [phone]        OUTPATIENT PHYSICAL THERAPY:Initial Assessment 10/30/2019         ICD-10: Treatment Diagnosis: Cervicalgia (M54.2) and Pain in thoracic spine (M54.6)  Precautions/Allergies:   Adhesive tape and Codeine   Fall Risk Score:     Ambulatory/Rehab Services H2 Model Falls Risk Assessment    Risk Factors:       No Risk Factors Identified Ability to Rise from Chair:       (0)  Ability to rise in a single movement    Falls Prevention Plan:       No modifications necessary   Total: (5 or greater = High Risk): 0     [facility] of [employer]. All Rights Reserved. [facility] Patent #[id number]. Federal Law prohibits the replication, distribution or use without written permission from [facility] [employer]     MD Orders: evaluate and treat MEDICAL/REFERRING DIAGNOSIS:  Neck pain [M54.2]   DATE OF ONSET: chronic history  REFERRING PHYSICIAN: Jennifer Garg NP  RETURN PHYSICIAN APPOINTMENT:      INITIAL ASSESSMENT:  Ms. Kannan Sanchez presents to therapy with cervical and thoracic spine pain. She has impaired mobility of the cervical spine in all directions, with specific difficulty performing cervical rotation, and extension. She has decreased thoracic mobility with limited thoracic rotation and extension leading to altered posture and increased strain on the thoracic and cervical paraspinals. she has weakness through the postural musculature of the cervical spine and weakness of the periscapular musculature. This causes increased pain. Skilled therapy is required to return to prior level of function. PROBLEM LIST (Impacting functional limitations):  1. Decreased Strength  2. Increased Pain  3. Decreased Activity Tolerance  4. Decreased Flexibility/Joint Mobility INTERVENTIONS PLANNED:  1. Family Education  2. Home Exercise Program (HEP)  3.  Manual Therapy  4. Neuromuscular Re-education/Strengthening  5. Range of Motion (ROM)  6. Therapeutic Activites  7. Therapeutic Exercise/Strengthening  8. modalities    TREATMENT PLAN:  Effective Dates: 10/30/2019 TO 1/28/2020 (90 days). Frequency/Duration: 3 times a week for 90 Days  GOALS: (Goals have been discussed and agreed upon with patient.)  Short-Term Functional Goals: Time Frame: 2 weeks  1. Patient will be independent with HEP to reduce pain and return to prior level of function. 2. Patient will report pain <3/10 with daily activity. Discharge Goals: Time Frame: 6 weeks  1. Patient will have no headaches over a 5 day period. 2. Patient will improve cervical rotation to >50 ° to restore mobility required for tasks such as driving. 3. Patient will have no pain with daily activity. Rehabilitation Potential For Stated Goals: Excellent  Regarding Rosetta Maddox's therapy, I certify that the treatment plan above will be carried out by a therapist or under their direction. Thank you for this referral,  Radha Colvin DPT       Referring Physician Signature: Jose Ramirez NP              Date                      HISTORY:   History of Present Injury/Illness (Reason for Referral):  Patient presents to therapy with primary complaint of upper thoracic and neck pain with associated headaches. She describes her symptoms as a stiffness and soreness, and she states she feels tight through the upper back and neck. She has also been having headaches on an almost daily basis that are present through the forehead. She also has difficulty sleeping and she has been using a cervical pillow with some benefit. She denies any symptoms of numbness or tingling in the extremities. She has had an MRI for the cervical spine and notes that just laying flat was extremely painful. She takes 2 aleeve on a daily basis to help manage her symptoms and she has been doing this for years.  Of note, she also has a history of shoulder replacement approximately 2 years ago. Past Medical History/Comorbidities:   Ms. Kannan Sanchez  has a past medical history of Arthritis, Malagon's esophagus, Chest discomfort, Coronary atherosclerosis of native coronary vessel, Degeneration of lumbar or lumbosacral intervertebral disc, Depression, Gastric ulcer, unspecified as acute or chronic, without mention of hemorrhage or perforation (1989), Hyperlipidemia, Hypothyroidism, Infectious disease, Nausea & vomiting, Palpitations, Scoliosis (and kyphoscoliosis), idiopathic, Spinal stenosis, lumbar (12/7/2009), Status post right hip replacement (2/26/2016), Thoracic or lumbosacral neuritis or radiculitis, unspecified, Tricuspid regurgitation, and Tricuspid valve disorder. She also has no past medical history of Adverse effect of anesthesia, Aneurysm ([address] Arrhythmia, Asthma, Autoimmune disease ([address] Cancer ([address] Chronic kidney disease, Chronic obstructive pulmonary disease ([address] Chronic pain, Coagulation disorder ([address] Diabetes ([address] Difficult intubation, Endocarditis, GERD (gastroesophageal reflux disease), Heart failure ([address] Ill-defined condition, Liver disease, Malignant hyperthermia due to anesthesia, Morbid obesity ([address] Pseudocholinesterase deficiency, Rheumatic fever, Seizures ([address] Sleep apnea, Stroke ([address] or Thromboembolus ([address] Ms. Kannan Sanchez  has a past surgical history that includes hx appendectomy (1987); hx hysterectomy (1988); hx hip replacement (Left, 2007); hx bunionectomy (Bilateral, 2009); hx hammer toe repair (2010); hx lumbar laminectomy (2009); hx orthopaedic (2010); hx hip replacement (Left, 2009); hx knee replacement (Right, 2007); hx shoulder arthroscopy (2013); hx carpal tunnel release (Right, 10/31/13); and hx breast biopsy (Left, 1980). Social History/Living Environment:     Patient is independent and lives at home. Prior Level of Function/Work/Activity:  Patient is retired.   Dominant Side:         RIGHT  Current Medications: Current Outpatient Medications:     Lactobac no.41/Bifidobact no.7 (PROBIOTIC-10 PO), Take  by mouth., Disp: , Rfl:     omeprazole (PRILOSEC) 20 mg capsule, Take 20 mg by mouth two (2) times a day., Disp: , Rfl:     b complex vitamins (B COMPLEX 1) tablet, Take 1 Tab by mouth daily. , Disp: , Rfl:     multivitamin (ONE A DAY) tablet, Take 1 Tab by mouth daily. , Disp: , Rfl:     estradiol (ESTRACE) 1 mg tablet, Take 1 mg by mouth., Disp: , Rfl:     PARoxetine (PAXIL) 20 mg tablet, Take 20 mg by mouth daily. Take / use AM day of surgery  per anesthesia protocols. , Disp: , Rfl:     cetirizine (ALLER-TALON) 10 mg tablet, Take 10 mg by mouth daily. Take / use AM day of surgery  per anesthesia protocols. , Disp: , Rfl:     fluticasone (FLONASE) 50 mcg/actuation nasal spray, 2 Sprays by Both Nostrils route daily. Take morning of surgery per anesthesia guidelines. , Disp: , Rfl:    Date Last Reviewed:  10/30/2019   # of Personal Factors/Comorbidities that affect the Plan of Care: 0: LOW COMPLEXITY   EXAMINATION:   Observation/Orthostatic Postural Assessment:    Dowager's hump present. Forward head posture with elevated scapular position B. Palpation:    Increased tone to upper trap musculature. R scapula winging at inferior angle. Point tenderness present at T2 spinous process. ROM:    Cervical extension: 10 °   Cervical rotation: 35 ° to L, 30 ° to R with provocation of symptoms  Lateral cervical flexion: 20 ° B  Cervical flexion: WNL  Strength:   Scapular retraction: 3/5 R; 5/5 L  Myotomal strength is 5/5 throughout  Deep cervical flexion: 4/5   Cervical rotation: 4/5 B  Special Tests:    Distraction is relieving; Spurling's (-)  Neurological Screen:  Reflexes and sensation are fully intact. Functional Mobility:   WNL  Balance: WNL   Body Structures Involved:  1. Nerves  2. Bones  3. Joints  4. Muscles  5. Ligaments Body Functions Affected:  1. Sensory/Pain  2. Neuromusculoskeletal  3.  Movement Related Activities and Participation Affected:  1. General Tasks and Demands  2. Mobility  3. Self Care  4. Domestic Life  5. Interpersonal Interactions and Relationships  6. Community, Social and [place]   # of elements that affect the Plan of Care: 1-2: LOW COMPLEXITY   CLINICAL PRESENTATION:   Presentation: Stable and uncomplicated: LOW COMPLEXITY   CLINICAL DECISION MAKING:   Tool Used: Neck Disability Index (NDI)  Score:  Initial: will fill out next visit/50  Most Recent: X/50 (Date: -- )   Interpretation of Score: The Neck Disability Index is a revised form of the Oswestry Low Back Pain Index and is designed to measure the activities of daily living in person's with neck pain. Each section is scored on a 0-5 scale, 5 representing the greatest disability. The scores of each section are added together for a total score of 50. Medical Necessity:   · Patient is expected to demonstrate progress in strength, range of motion, balance and coordination  ·  to increase independence with pain free movement of the head and shoulders. · .  Reason for Services/Other Comments:  · Patient has demonstrated an improvement in functional level by independent performance of HEP.    · .   Use of outcome tool(s) and clinical judgement create a POC that gives a: Clear prediction of patient's progress: LOW COMPLEXITY     Total Treatment Duration:  PT Patient Time In/Time Out  Time In: 1015  Time Out: [address] [facility], [facility]

## 2019-10-30 NOTE — PROGRESS NOTES
Jose Cheek  : 1940  [place]  Secondary: [place] [employer] [id number] @ [address], [place] [place]  [phone]   [phone]      OUTPATIENT PHYSICAL THERAPY: Daily Treatment Note  10/30/2019   Cervicalgia (M54.2) and Pain in thoracic spine (M54.6)    Effective Dates: 10/30/2019 TO 2020 (90 days). Frequency/Duration: 3 times a week for 90 Days  GOALS: (Goals have been discussed and agreed upon with patient.)  Short-Term Functional Goals: Time Frame: 2 weeks  1. Patient will be independent with HEP to reduce pain and return to prior level of function. 2. Patient will report pain <3/10 with daily activity. Discharge Goals: Time Frame: 6 weeks  1. Patient will have no headaches over a 5 day period. 2. Patient will improve cervical rotation to >50 ° to restore mobility required for tasks such as driving. 3. Patient will have no pain with daily activity. _________________________________________________________________________  Pre-treatment Symptoms/Complaints:  See initial evaluation. Pain: Initial: 8/10 Post Session:  6/10   Medications Last Reviewed:  10/30/2019  Updated Objective Findings:  Below measures from initial evaluation unless otherwise noted. Observation/Orthostatic Postural Assessment:    Dowager's hump present. Forward head posture with elevated scapular position B. Palpation:    Increased tone to upper trap musculature. R scapula winging at inferior angle. Point tenderness present at T2 spinous process.    ROM:    Cervical extension: 10 °   Cervical rotation: 35 ° to L, 30 ° to R with provocation of symptoms  Lateral cervical flexion: 20 ° B  Cervical flexion: WNL  Strength:   Scapular retraction: 3/5 R; 5/5 L  Myotomal strength is 5/5 throughout  Deep cervical flexion: 4/5   Cervical rotation: 4/5 B  Special Tests:    Distraction is relieving; Spurling's (-)  Neurological Screen:  Reflexes and sensation are fully intact. TREATMENT:   THERAPEUTIC ACTIVITY: ( see below for minutes): Therapeutic activities per grid below to improve mobility, strength, balance and coordination. Required minimal visual, verbal, manual and tactile cues to improve independence and safety with daily activities . THERAPEUTIC EXERCISE: (see below for minutes):  Exercises per grid below to improve mobility, strength, balance and coordination. Required minimal verbal and manual cues to promote proper body alignment, promote proper body posture and promote proper body mechanics. Progressed resistance, range, repetitions and complexity of movement as indicated. MANUAL THERAPY: (see below for minutes): Joint mobilization and Soft tissue mobilization was utilized and necessary because of the patient's restricted joint motion, painful spasm, loss of articular motion and restricted motion of soft tissue. MODALITIES: (see below for minutes):      to decrease pain    SELF CARE: (see below for minutes): Procedure(s) (per grid) utilized to improve and/or restore self-care/home management as related to dressing, bathing and grooming. Required minimal verbal cueing to facilitate activities of daily living skills and compensatory activities. Date: 10/30/19       Modalities: 15 min       Cervical traction 12# pull 10 min                       Therapeutic Exercise: 15 min       Cervical retraction [id number]       Cervical rotation [id number] B       Band pull aparts Red tband 20x                               Proprioceptive Activities:                                Manual Therapy:                        Therapeutic Activities:                                  HEP: see 10/30/19 flow sheet for specifics.     [employer] Portal  Treatment/Session Summary:    · Response to Treatment:  Patient is independent with performance of above described home program.  · Communication/Consultation:  None today  · Equipment provided today:  None today  · Recommendations/Intent for next treatment session: Next visit will focus on relief of pain and improvement of cervical mobility.     Total Treatment Billable Duration:  45 min  PT Patient Time In/Time Out  Time In: 1015  Time Out: [address], DPT    Future Appointments   Date Time Provider Allyson Hopkins   11/1/2019 11:00 AM Aleyda Mons, DPT [facility]   11/4/2019 11:45 AM Aleyda Mons, DPT [facility]   11/6/2019 11:45 AM Aleyda Mons, DPT [facility]   11/8/2019 11:00 AM Aleyda Mons, DPT [facility]   11/11/2019 11:45 AM Aleyda Mons, DPT [facility]   11/13/2019 11:45 AM Aleyda Mons, DPT [facility]   11/15/2019 11:00 AM Aleyda Mons, DPT [facility]   11/18/2019 11:00 AM Aleyda Mons, DPT [facility]   11/20/2019 11:00 AM Aleyda Mons, DPT [facility]   11/22/2019 11:00 AM Aleyda Mons, DPT [facility]   2/18/2020 11:45 AM DO EVELINA Alvarez DG D

## 2019-11-01 ENCOUNTER — HOSPITAL ENCOUNTER (OUTPATIENT)
Dept: PHYSICAL THERAPY | Age: 79
Discharge: HOME | End: 2019-11-01
Payer: MEDICARE

## 2019-11-01 PROCEDURE — 97140 MANUAL THERAPY 1/> REGIONS: CPT

## 2019-11-01 PROCEDURE — 97012 MECHANICAL TRACTION THERAPY: CPT

## 2019-11-01 PROCEDURE — 97110 THERAPEUTIC EXERCISES: CPT

## 2019-11-01 NOTE — PROGRESS NOTES
Sruthi Hunger  : 1940  [place]  Secondary: [place] [employer] [id number] @ [address], [place].  [phone]   [phone]      OUTPATIENT PHYSICAL THERAPY: Daily Treatment Note  2019   Cervicalgia (M54.2) and Pain in thoracic spine (M54.6)    Effective Dates: 10/30/2019 TO 2020 (90 days). Frequency/Duration: 3 times a week for 90 Days  GOALS: (Goals have been discussed and agreed upon with patient.)  Short-Term Functional Goals: Time Frame: 2 weeks  1. Patient will be independent with HEP to reduce pain and return to prior level of function. 2. Patient will report pain <3/10 with daily activity. Discharge Goals: Time Frame: 6 weeks  1. Patient will have no headaches over a 5 day period. 2. Patient will improve cervical rotation to >50 ° to restore mobility required for tasks such as driving. 3. Patient will have no pain with daily activity. _________________________________________________________________________  Pre-treatment Symptoms/Complaints:    Pain: Initial: 8/10    \"the traction really seemed to help. \" Post Session:  6/10   Medications Last Reviewed:  2019  Updated Objective Findings:  Below measures from initial evaluation unless otherwise noted. Observation/Orthostatic Postural Assessment:    Dowager's hump present. Forward head posture with elevated scapular position B. Palpation:    Increased tone to upper trap musculature. R scapula winging at inferior angle. Point tenderness present at T2 spinous process.    ROM:    Cervical extension: 10 °   Cervical rotation: 35 ° to L, 30 ° to R with provocation of symptoms  Lateral cervical flexion: 20 ° B  Cervical flexion: WNL  Strength:   Scapular retraction: 3/5 R; 5/5 L  Myotomal strength is 5/5 throughout  Deep cervical flexion: 4/5   Cervical rotation: 4/5 B  Special Tests:    Distraction is relieving; Spurling's (-)  Neurological Screen:  Reflexes and sensation are fully intact. TREATMENT:   THERAPEUTIC ACTIVITY: ( see below for minutes): Therapeutic activities per grid below to improve mobility, strength, balance and coordination. Required minimal visual, verbal, manual and tactile cues to improve independence and safety with daily activities . THERAPEUTIC EXERCISE: (see below for minutes):  Exercises per grid below to improve mobility, strength, balance and coordination. Required minimal verbal and manual cues to promote proper body alignment, promote proper body posture and promote proper body mechanics. Progressed resistance, range, repetitions and complexity of movement as indicated. MANUAL THERAPY: (see below for minutes): Joint mobilization and Soft tissue mobilization was utilized and necessary because of the patient's restricted joint motion, painful spasm, loss of articular motion and restricted motion of soft tissue. MODALITIES: (see below for minutes):      to decrease pain    SELF CARE: (see below for minutes): Procedure(s) (per grid) utilized to improve and/or restore self-care/home management as related to dressing, bathing and grooming. Required minimal verbal cueing to facilitate activities of daily living skills and compensatory activities.      Date: 10/30/19 11/1/19 (visit 2)      Modalities: 15 min 15 min      Cervical traction 12# pull 10 min 15 min progressing 12 to 15# pull                      Therapeutic Exercise: 15 min 30 min      Cervical retraction [id number] repeat      Cervical rotation [id number] B repeat      Band pull aparts Red tband 20x       Stretching  Therapist assisted into cervical rotation      Alternating isometrics  Into cervical rotation [id number]              Proprioceptive Activities:                                Manual Therapy:  15 min        STM to cervical paraspinals, upper trap musculature; MET to L lateral flexion              Therapeutic Activities: HEP: see 10/30/19 flow sheet for specifics. [employer] Portal  Treatment/Session Summary:    · Response to Treatment:  Demonstrates improved pain free cervical rotation. Per patient request will add IFC with MHP next visit. To further assist with muscular relaxation. · Communication/Consultation:  None today  · Equipment provided today:  None today  · Recommendations/Intent for next treatment session: Next visit will focus on relief of pain and improvement of cervical mobility.     Total Treatment Billable Duration:  45 min  PT Patient Time In/Time Out  Time In: 1100  Time Out: [address], DPT    Future Appointments   Date Time Provider Allyson Hopkins   11/4/2019 11:45 AM Fred , DPT [facility]   11/6/2019 11:45 AM Fred , DPT [facility]   11/8/2019 11:00 AM Fred , DPT [facility]   11/11/2019 11:45 AM Fred , DPT [facility]   11/13/2019 11:45 AM Fred , DPT [facility]   11/15/2019 11:00 AM Fred , DPT [facility]   11/18/2019 11:00 AM Fred , DPT [facility]   11/20/2019 11:00 AM Fred , DPT [facility]   11/22/2019 11:00 AM Fred , DPT [facility]   2/18/2020 11:45 AM DO EVELINA Araujo [facility]CR[facility]

## 2019-11-04 ENCOUNTER — HOSPITAL ENCOUNTER (OUTPATIENT)
Dept: PHYSICAL THERAPY | Age: 79
Discharge: HOME | End: 2019-11-04
Payer: MEDICARE

## 2019-11-04 PROCEDURE — 97110 THERAPEUTIC EXERCISES: CPT

## 2019-11-04 PROCEDURE — 97014 ELECTRIC STIMULATION THERAPY: CPT

## 2019-11-04 PROCEDURE — 97012 MECHANICAL TRACTION THERAPY: CPT

## 2019-11-04 NOTE — PROGRESS NOTES
Sruthi Hunger  : 1940  [place]  Secondary: [place] [employer] [id number] @ [address]  [phone]   [phone]      OUTPATIENT PHYSICAL THERAPY: Daily Treatment Note  2019   Cervicalgia (M54.2) and Pain in thoracic spine (M54.6)    Effective Dates: 10/30/2019 TO 2020 (90 days). Frequency/Duration: 3 times a week for 90 Days  GOALS: (Goals have been discussed and agreed upon with patient.)  Short-Term Functional Goals: Time Frame: 2 weeks  1. Patient will be independent with HEP to reduce pain and return to prior level of function. 2. Patient will report pain <3/10 with daily activity. Discharge Goals: Time Frame: 6 weeks  1. Patient will have no headaches over a 5 day period. 2. Patient will improve cervical rotation to >50 ° to restore mobility required for tasks such as driving. 3. Patient will have no pain with daily activity. _________________________________________________________________________  Pre-treatment Symptoms/Complaints:    Pain: Initial: 6/10    \"the traction felt so good. i'm still today, but it felt really good after last time. \" Post Session:  4/10   Medications Last Reviewed:  2019  Updated Objective Findings:  Below measures from initial evaluation unless otherwise noted. Observation/Orthostatic Postural Assessment:    Dowager's hump present. Forward head posture with elevated scapular position B. Palpation:    Increased tone to upper trap musculature. R scapula winging at inferior angle. Point tenderness present at T2 spinous process.    ROM:    Cervical extension: 10 °   Cervical rotation: 35 ° to L, 30 ° to R with provocation of symptoms  Lateral cervical flexion: 20 ° B  Cervical flexion: WNL  Strength:   Scapular retraction: 3/5 R; 5/5 L  Myotomal strength is 5/5 throughout  Deep cervical flexion: 4/5   Cervical rotation: 4/5 B  Special Tests:    Distraction is relieving; Spurling's (-)  Neurological Screen:  Reflexes and sensation are fully intact. TREATMENT:   THERAPEUTIC ACTIVITY: ( see below for minutes): Therapeutic activities per grid below to improve mobility, strength, balance and coordination. Required minimal visual, verbal, manual and tactile cues to improve independence and safety with daily activities . THERAPEUTIC EXERCISE: (see below for minutes):  Exercises per grid below to improve mobility, strength, balance and coordination. Required minimal verbal and manual cues to promote proper body alignment, promote proper body posture and promote proper body mechanics. Progressed resistance, range, repetitions and complexity of movement as indicated. MANUAL THERAPY: (see below for minutes): Joint mobilization and Soft tissue mobilization was utilized and necessary because of the patient's restricted joint motion, painful spasm, loss of articular motion and restricted motion of soft tissue. MODALITIES: (see below for minutes):      to decrease pain    SELF CARE: (see below for minutes): Procedure(s) (per grid) utilized to improve and/or restore self-care/home management as related to dressing, bathing and grooming. Required minimal verbal cueing to facilitate activities of daily living skills and compensatory activities.      Date: 10/30/19 11/1/19 (visit 2) 11/4/19 (visit 3)     Modalities: 15 min 15 min 30 min     Cervical traction 12# pull 10 min 15 min progressing 12 to 15# pull repeat        IFC x15 min at beginning of session             Therapeutic Exercise: 15 min 30 min 15 min     Cervical retraction [id number] repeat      Cervical rotation [id number] B repeat Therapist assisted     Band pull aparts Red tband 20x       Stretching  Therapist assisted into cervical rotation Therapist assisted into rotation, lateral flexion; contract relax to R upper trap     Alternating isometrics  Into cervical rotation [id number] Proprioceptive Activities:                                Manual Therapy:  15 min        STM to cervical paraspinals, upper trap musculature; MET to L lateral flexion              Therapeutic Activities:                                  HEP: see 10/30/19 flow sheet for specifics. [facility] Portal  Treatment/Session Summary:    · Response to Treatment:  Continues to benefit from use of cervical traction. Has decreased tone through upper trap musculature. · Communication/Consultation:  None today  · Equipment provided today:  None today  · Recommendations/Intent for next treatment session: Next visit will focus on relief of pain and improvement of cervical mobility.     Total Treatment Billable Duration:  45 min  PT Patient Time In/Time Out  Time In: 4340  Time Out: [address], DPT    Future Appointments   Date Time Provider Allyson Hopkins   11/6/2019 11:45 AM Timmy Richey DPT [facility]   11/8/2019 11:00 AM Timmy Richey, TAYLER [facility]   11/11/2019 11:45 AM Timmy Richey DPT [facility]   11/13/2019 11:45 AM Timmy Richey DPT [facility]   11/15/2019 11:00 AM Timmy Richey, TAYLER [facility]   11/18/2019 11:00 AM Timmy Richey, DPT [facility]   11/20/2019 11:00 AM Timmy Richey, TAYLER [facility]   11/22/2019 11:00 AM Timmy Richey, TAYLER [facility]   2/18/2020 11:45 AM Susannah Cabrera, DO [facility]

## 2019-11-06 ENCOUNTER — HOSPITAL ENCOUNTER (OUTPATIENT)
Dept: PHYSICAL THERAPY | Age: 79
Discharge: HOME | End: 2019-11-06
Payer: MEDICARE

## 2019-11-06 PROCEDURE — 97110 THERAPEUTIC EXERCISES: CPT

## 2019-11-06 PROCEDURE — 97012 MECHANICAL TRACTION THERAPY: CPT

## 2019-11-06 PROCEDURE — 97014 ELECTRIC STIMULATION THERAPY: CPT

## 2019-11-06 NOTE — PROGRESS NOTES
Sallie Soni  : 1940  [place]  Secondary: [place] [employer] [id number]  [phone]   [phone]      OUTPATIENT PHYSICAL THERAPY: Daily Treatment Note  2019   Cervicalgia (M54.2) and Pain in thoracic spine (M54.6)    Effective Dates: 10/30/2019 TO 2020 (90 days). Frequency/Duration: 3 times a week for 90 Days  GOALS: (Goals have been discussed and agreed upon with patient.)  Short-Term Functional Goals: Time Frame: 2 weeks  1. Patient will be independent with HEP to reduce pain and return to prior level of function. 2. Patient will report pain <3/10 with daily activity. Discharge Goals: Time Frame: 6 weeks  1. Patient will have no headaches over a 5 day period. 2. Patient will improve cervical rotation to >50 ° to restore mobility required for tasks such as driving. 3. Patient will have no pain with daily activity. _________________________________________________________________________  Pre-treatment Symptoms/Complaints:    Pain: Initial: 6/10    \"I haven't been getting the headaches as much or as bad. I've been really stressed the past few days and I think that makes me get stiff as well. \" Post Session:  4/10   Medications Last Reviewed:  2019  Updated Objective Findings:  Below measures from initial evaluation unless otherwise noted. Observation/Orthostatic Postural Assessment:    Dowager's hump present. Forward head posture with elevated scapular position B. Palpation:    Increased tone to upper trap musculature. R scapula winging at inferior angle. Point tenderness present at T2 spinous process.    ROM:    Cervical extension: 10 °   Cervical rotation: 35 ° to L, 30 ° to R with provocation of symptoms  Lateral cervical flexion: 20 ° B  Cervical flexion: WNL  Strength:   Scapular retraction: 3/5 R; 5/5 L  Myotomal strength is 5/5 throughout  Deep cervical flexion: 4/5   Cervical rotation: 4/5 B  Special Tests:    Distraction is relieving; Spurling's (-)  Neurological Screen:  Reflexes and sensation are fully intact. TREATMENT:   THERAPEUTIC ACTIVITY: ( see below for minutes): Therapeutic activities per grid below to improve mobility, strength, balance and coordination. Required minimal visual, verbal, manual and tactile cues to improve independence and safety with daily activities . THERAPEUTIC EXERCISE: (see below for minutes):  Exercises per grid below to improve mobility, strength, balance and coordination. Required minimal verbal and manual cues to promote proper body alignment, promote proper body posture and promote proper body mechanics. Progressed resistance, range, repetitions and complexity of movement as indicated. MANUAL THERAPY: (see below for minutes): Joint mobilization and Soft tissue mobilization was utilized and necessary because of the patient's restricted joint motion, painful spasm, loss of articular motion and restricted motion of soft tissue. MODALITIES: (see below for minutes):      to decrease pain    SELF CARE: (see below for minutes): Procedure(s) (per grid) utilized to improve and/or restore self-care/home management as related to dressing, bathing and grooming. Required minimal verbal cueing to facilitate activities of daily living skills and compensatory activities.      Date: 10/30/19 11/1/19 (visit 2) 11/4/19 (visit 3) 11/6/19 (visit 4)    Modalities: 15 min 15 min 30 min 30 min    Cervical traction 12# pull 10 min 15 min progressing 12 to 15# pull repeat repeat       IFC x15 min at beginning of session repeat            Therapeutic Exercise: 15 min 30 min 15 min 15 min    Cervical retraction [id number] repeat      Cervical rotation [id number] B repeat Therapist assisted Therapist assisted     Band pull aparts Red tband 20x       Stretching  Therapist assisted into cervical rotation Therapist assisted into rotation, lateral flexion; contract relax to R upper trap Therapist assisted    Alternating isometrics  Into cervical rotation [id number]  [id number]            Proprioceptive Activities:                                Manual Therapy:  15 min        STM to cervical paraspinals, upper trap musculature; MET to L lateral flexion              Therapeutic Activities:                                  HEP: see 10/30/19 flow sheet for specifics. [employer] Portal  Treatment/Session Summary:    · Response to Treatment:  Increased pull with cervical traction with good tolerance. · Communication/Consultation:  None today  · Equipment provided today:  None today  · Recommendations/Intent for next treatment session: Next visit will focus on relief of pain and improvement of cervical mobility.     Total Treatment Billable Duration:  45 min  PT Patient Time In/Time Out  Time In: 1615  Time Out: [address], DPT    Future Appointments   Date Time Provider Allyson Hopkins   11/8/2019 11:00 AM [facility] Dose, [facility]PT [facility]   11/11/2019 11:45 AM Judithe Dose, [facility]PT [facility]   11/13/2019 11:45 AM Judithe Dose, DPT [facility]   11/15/2019 11:00 AM Judithe Dose, DPT [facility]   11/18/2019 11:00 AM Judithe Dose, DPT [facility]   11/20/2019 11:00 AM Judithe Dose, DPT [facility]   11/22/2019 11:00 AM Judithe Dose, DPT [facility]   2/18/2020 11:45 AM Damon Santos DO [facility]

## 2019-11-11 ENCOUNTER — HOSPITAL ENCOUNTER (OUTPATIENT)
Dept: PHYSICAL THERAPY | Age: 79
Discharge: HOME | End: 2019-11-11
Payer: MEDICARE

## 2019-11-11 PROCEDURE — 97110 THERAPEUTIC EXERCISES: CPT

## 2019-11-11 PROCEDURE — 97014 ELECTRIC STIMULATION THERAPY: CPT

## 2019-11-11 PROCEDURE — 97012 MECHANICAL TRACTION THERAPY: CPT

## 2019-11-11 NOTE — PROGRESS NOTES
Otisbob Ale  : 1940  [place]  Secondary: [place] [employer] [address]  [phone]   [phone]      OUTPATIENT PHYSICAL THERAPY: Daily Treatment Note  2019   Cervicalgia (M54.2) and Pain in thoracic spine (M54.6)    Effective Dates: 10/30/2019 TO 2020 (90 days). Frequency/Duration: 3 times a week for 90 Days  GOALS: (Goals have been discussed and agreed upon with patient.)  Short-Term Functional Goals: Time Frame: 2 weeks  1. Patient will be independent with HEP to reduce pain and return to prior level of function. 2. Patient will report pain <3/10 with daily activity. Discharge Goals: Time Frame: 6 weeks  1. Patient will have no headaches over a 5 day period. 2. Patient will improve cervical rotation to >50 ° to restore mobility required for tasks such as driving. 3. Patient will have no pain with daily activity. _________________________________________________________________________  Pre-treatment Symptoms/Complaints:    Pain: Initial: 6/10    \"The headaches are doing much better, but i'm still having the pain in the back of my neck and between my shoulder blades. \"    Post Session:  4/10   Medications Last Reviewed:  2019  Updated Objective Findings:  Below measures from initial evaluation unless otherwise noted. Observation/Orthostatic Postural Assessment:    Dowager's hump present. Forward head posture with elevated scapular position B. Palpation:    Increased tone to upper trap musculature. R scapula winging at inferior angle. Point tenderness present at T2 spinous process.    ROM:    Cervical extension: 10 °   Cervical rotation: 35 ° to L, 30 ° to R with provocation of symptoms  Lateral cervical flexion: 20 ° B  Cervical flexion: WNL  Strength:   Scapular retraction: 3/5 R; 5/5 L  Myotomal strength is 5/5 throughout  Deep cervical flexion: 4/5   Cervical rotation: 4/5 B  Special Tests:    Distraction is relieving; Spurling's (-)  Neurological Screen:  Reflexes and sensation are fully intact. TREATMENT:   THERAPEUTIC ACTIVITY: ( see below for minutes): Therapeutic activities per grid below to improve mobility, strength, balance and coordination. Required minimal visual, verbal, manual and tactile cues to improve independence and safety with daily activities . THERAPEUTIC EXERCISE: (see below for minutes):  Exercises per grid below to improve mobility, strength, balance and coordination. Required minimal verbal and manual cues to promote proper body alignment, promote proper body posture and promote proper body mechanics. Progressed resistance, range, repetitions and complexity of movement as indicated. MANUAL THERAPY: (see below for minutes): Joint mobilization and Soft tissue mobilization was utilized and necessary because of the patient's restricted joint motion, painful spasm, loss of articular motion and restricted motion of soft tissue. MODALITIES: (see below for minutes):      to decrease pain    SELF CARE: (see below for minutes): Procedure(s) (per grid) utilized to improve and/or restore self-care/home management as related to dressing, bathing and grooming. Required minimal verbal cueing to facilitate activities of daily living skills and compensatory activities.      Date: 10/30/19 11/1/19 (visit 2) 11/4/19 (visit 3) 11/6/19 (visit 4) 11/11/19 (visit 5)   Modalities: 15 min 15 min 30 min 30 min 30 min   Cervical traction 12# pull 10 min 15 min progressing 12 to 15# pull repeat repeat repeat      IFC x15 min at beginning of session repeat repeat           Therapeutic Exercise: 15 min 30 min 15 min 15 min 15 min   Cervical retraction [id number] repeat      Cervical rotation [id number] B repeat Therapist assisted Therapist assisted  Therapist assisted   Band pull aparts Red tband 20x       Stretching  Therapist assisted into cervical rotation Therapist assisted into rotation, lateral flexion; contract relax to R upper trap Therapist assisted Therapist assisted   Alternating isometrics  Into cervical rotation [id number]  [id number] repeat   Thoracic rotation     With hands on wall 10x B           Proprioceptive Activities:                                Manual Therapy:  15 min        STM to cervical paraspinals, upper trap musculature; MET to L lateral flexion              Therapeutic Activities:                                  HEP: see 10/30/19 flow sheet for specifics. [facility] Portal  Treatment/Session Summary:    · Response to Treatment:  Continues to have palpable tenderness through spinous processes of lower cervical spine and increased tone of cervical paraspinals. Traction remains effective at providing improved mobility at the neck and decreasing pain. · Communication/Consultation:  None today  · Equipment provided today:  None today  · Recommendations/Intent for next treatment session: Next visit will focus on relief of pain and improvement of cervical mobility.     Total Treatment Billable Duration:  45 min  PT Patient Time In/Time Out  Time In: 1698  Time Out: [address], DPT    Future Appointments   Date Time Provider Allyson Hopkins   11/13/2019 11:45 AM Lyssa Whitaker DPT [facility]   11/15/2019 11:00 AM TAYLER Omalley [facility]   11/18/2019 11:00 AM Lyssa Whitaker [facility]PT [facility]   11/20/2019 11:00 AM Lyssa Whitaker DPT [facility]   11/22/2019 11:00 AM Lyssa Whitaker DPT [facility]   2/18/2020 11:45 AM DO EVELINA Tejeda [facility]

## 2019-11-13 ENCOUNTER — HOSPITAL ENCOUNTER (OUTPATIENT)
Dept: PHYSICAL THERAPY | Age: 79
Discharge: HOME | End: 2019-11-13
Payer: MEDICARE

## 2019-11-13 PROCEDURE — 97140 MANUAL THERAPY 1/> REGIONS: CPT

## 2019-11-13 PROCEDURE — 97012 MECHANICAL TRACTION THERAPY: CPT

## 2019-11-13 NOTE — PROGRESS NOTES
Delmy Parsons  : 1940  [place]  Secondary: [place] [employer] [address] @ [address]  [phone]   [phone]      OUTPATIENT PHYSICAL THERAPY: Daily Treatment Note  2019   Cervicalgia (M54.2) and Pain in thoracic spine (M54.6)    Effective Dates: 10/30/2019 TO 2020 (90 days). Frequency/Duration: 3 times a week for 90 Days  GOALS: (Goals have been discussed and agreed upon with patient.)  Short-Term Functional Goals: Time Frame: 2 weeks  1. Patient will be independent with HEP to reduce pain and return to prior level of function. 2. Patient will report pain <3/10 with daily activity. Discharge Goals: Time Frame: 6 weeks  1. Patient will have no headaches over a 5 day period. 2. Patient will improve cervical rotation to >50 ° to restore mobility required for tasks such as driving. 3. Patient will have no pain with daily activity. _________________________________________________________________________  Pre-treatment Symptoms/Complaints:    Pain: Initial: 6/10 R shoulder and neck    \"The pain is about the same. \"   Post Session:  Slight decrease   Medications Last Reviewed:  2019  Updated Objective Findings:  Below measures from initial evaluation unless otherwise noted. Observation/Orthostatic Postural Assessment:    Dowager's hump present. Forward head posture with elevated scapular position B. Palpation:    Increased tone to upper trap musculature. R scapula winging at inferior angle. Point tenderness present at T2 spinous process.    ROM:    Cervical extension: 10 °   Cervical rotation: 35 ° to L, 30 ° to R with provocation of symptoms  Lateral cervical flexion: 20 ° B  Cervical flexion: WNL  Strength:   Scapular retraction: 3/5 R; 5/5 L  Myotomal strength is 5/5 throughout  Deep cervical flexion: 4/5   Cervical rotation: 4/5 B  Special Tests: Distraction is relieving; Spurling's (-)  Neurological Screen:  Reflexes and sensation are fully intact. TREATMENT:   THERAPEUTIC ACTIVITY: ( see below for minutes): Therapeutic activities per grid below to improve mobility, strength, balance and coordination. Required minimal visual, verbal, manual and tactile cues to improve independence and safety with daily activities . THERAPEUTIC EXERCISE: (see below for minutes):  Exercises per grid below to improve mobility, strength, balance and coordination. Required minimal verbal and manual cues to promote proper body alignment, promote proper body posture and promote proper body mechanics. Progressed resistance, range, repetitions and complexity of movement as indicated. MANUAL THERAPY: (see below for minutes): Joint mobilization and Soft tissue mobilization was utilized and necessary because of the patient's restricted joint motion, painful spasm, loss of articular motion and restricted motion of soft tissue. MODALITIES: (see below for minutes):      to decrease pain    SELF CARE: (see below for minutes): Procedure(s) (per grid) utilized to improve and/or restore self-care/home management as related to dressing, bathing and grooming. Required minimal verbal cueing to facilitate activities of daily living skills and compensatory activities.      Date: 10/30/19 11/1/19 (visit 2) 11/4/19 (visit 3) 11/6/19 (visit 4) 11/11/19 (visit 5) 11/13/19 (visit 6)   Modalities: 15 min 15 min 30 min 30 min 30 min 15 min   Cervical traction 12# pull 10 min 15 min progressing 12 to 15# pull repeat repeat repeat 15# for 15 min (12:15-12:30)      IFC x15 min at beginning of session repeat repeat             Therapeutic Exercise: 15 min 30 min 15 min 15 min 15 min    Cervical retraction [id number] repeat       Cervical rotation [id number] B repeat Therapist assisted Therapist assisted  Therapist assisted    Band pull aparts Red tband 20x        Stretching  Therapist assisted into cervical rotation Therapist assisted into rotation, lateral flexion; contract relax to R upper trap Therapist assisted Therapist assisted    Alternating isometrics  Into cervical rotation [id number]  [id number] repeat    Thoracic rotation     With hands on wall 10x B             Proprioceptive Activities:                                    Manual Therapy:  15 min    30 min     STM to cervical paraspinals, upper trap musculature; MET to L lateral flexion    Seated: STM to cervical spine and B upper trapezius 11:45-12:15)            Therapeutic Activities:                                      HEP: see 10/30/19 flow sheet for specifics. [facility] Portal  Treatment/Session Summary:    · Response to Treatment:  Pt is tight and guarded in the upper trapezius and requires verbal cues to depress the scapula. She continue to have difficulty transferring from sitting to supine. Continue POC. · Communication/Consultation:  None today  · Equipment provided today:  None today  · Recommendations/Intent for next treatment session: Next visit will focus on relief of pain and improvement of cervical mobility.     Total Treatment Billable Duration:  45 min  PT Patient Time In/Time Out  Time In: 8113  Time Out: [address], PTA    Future Appointments   Date Time Provider Allyson Hopkins   11/15/2019 11:00 AM Alexandra Raines, DPT [facility]   11/18/2019 11:00 AM Alexandra Pastbutch, DPT [facility]   11/20/2019 11:00 AM Alexandra Raines, DPT [facility]   11/22/2019 11:00 AM Alexandra Raines, DPT [facility]   2/18/2020 11:45 AM Delcia Appl, DO [facility]

## 2019-11-15 ENCOUNTER — HOSPITAL ENCOUNTER (OUTPATIENT)
Dept: PHYSICAL THERAPY | Age: 79
Discharge: HOME | End: 2019-11-15
Payer: MEDICARE

## 2019-11-15 PROCEDURE — 97012 MECHANICAL TRACTION THERAPY: CPT

## 2019-11-15 PROCEDURE — 97110 THERAPEUTIC EXERCISES: CPT

## 2019-11-15 PROCEDURE — 97014 ELECTRIC STIMULATION THERAPY: CPT

## 2019-11-15 NOTE — PROGRESS NOTES
Kusum Espinoza  : 1940  [place]  Secondary: [place] [employer] [id number] @ [address]  [phone]   [phone]      OUTPATIENT PHYSICAL THERAPY: Daily Treatment Note  11/15/2019   Cervicalgia (M54.2) and Pain in thoracic spine (M54.6)    Effective Dates: 10/30/2019 TO 2020 (90 days). Frequency/Duration: 3 times a week for 90 Days  GOALS: (Goals have been discussed and agreed upon with patient.)  Short-Term Functional Goals: Time Frame: 2 weeks  1. Patient will be independent with HEP to reduce pain and return to prior level of function. 2. Patient will report pain <3/10 with daily activity. Discharge Goals: Time Frame: 6 weeks  1. Patient will have no headaches over a 5 day period. 2. Patient will improve cervical rotation to >50 ° to restore mobility required for tasks such as driving. 3. Patient will have no pain with daily activity. _________________________________________________________________________  Pre-treatment Symptoms/Complaints:    Pain: Initial: 6/10 R shoulder and neck    \"I think the traction is helping. I still have some pain at the base of my neck, but i'm doing better. \"   Post Session:  Slight decrease   Medications Last Reviewed:  11/15/2019  Updated Objective Findings:  Below measures from initial evaluation unless otherwise noted. Observation/Orthostatic Postural Assessment:    Dowager's hump present. Forward head posture with elevated scapular position B. Palpation:    Increased tone to upper trap musculature. R scapula winging at inferior angle. Point tenderness present at T2 spinous process.    ROM:    Cervical extension: 10 °   Cervical rotation: 35 ° to L, 30 ° to R with provocation of symptoms  Lateral cervical flexion: 20 ° B  Cervical flexion: WNL  Strength:   Scapular retraction: 3/5 R; 5/5 L  Myotomal strength is 5/5 throughout  Deep cervical flexion: 4/5   Cervical rotation: 4/5 B  Special Tests:    Distraction is relieving; Spurling's (-)  Neurological Screen:  Reflexes and sensation are fully intact. TREATMENT:   THERAPEUTIC ACTIVITY: ( see below for minutes): Therapeutic activities per grid below to improve mobility, strength, balance and coordination. Required minimal visual, verbal, manual and tactile cues to improve independence and safety with daily activities . THERAPEUTIC EXERCISE: (see below for minutes):  Exercises per grid below to improve mobility, strength, balance and coordination. Required minimal verbal and manual cues to promote proper body alignment, promote proper body posture and promote proper body mechanics. Progressed resistance, range, repetitions and complexity of movement as indicated. MANUAL THERAPY: (see below for minutes): Joint mobilization and Soft tissue mobilization was utilized and necessary because of the patient's restricted joint motion, painful spasm, loss of articular motion and restricted motion of soft tissue. MODALITIES: (see below for minutes):      to decrease pain    SELF CARE: (see below for minutes): Procedure(s) (per grid) utilized to improve and/or restore self-care/home management as related to dressing, bathing and grooming. Required minimal verbal cueing to facilitate activities of daily living skills and compensatory activities.      Date: 10/30/19 11/1/19 (visit 2) 11/4/19 (visit 3) 11/6/19 (visit 4) 11/11/19 (visit 5) 11/13/19 (visit 6) 11/15/19 (visit 7)   Modalities: 15 min 15 min 30 min 30 min 30 min 15 min 30 min   Cervical traction 12# pull 10 min 15 min progressing 12 to 15# pull repeat repeat repeat 15# for 15 min (12:15-12:30) 15 min progressing 12 to 20#       IFC x15 min at beginning of session repeat repeat  IFC x 15 min at beginning of session             Therapeutic Exercise: 15 min 30 min 15 min 15 min 15 min  15 min   Cervical retraction [id number] repeat Cervical rotation [id number] B repeat Therapist assisted Therapist assisted  Therapist assisted  Therapist assisted   Band pull aparts Red tband 20x         Stretching  Therapist assisted into cervical rotation Therapist assisted into rotation, lateral flexion; contract relax to R upper trap Therapist assisted Therapist assisted  Therapist assisted   Alternating isometrics  Into cervical rotation [id number]  [id number] repeat  repeat   Thoracic rotation     With hands on wall 10x B  reviewed             Proprioceptive Activities:                                        Manual Therapy:  15 min    30 min      STM to cervical paraspinals, upper trap musculature; MET to L lateral flexion    Seated: STM to cervical spine and B upper trapezius 11:45-12:15)              Therapeutic Activities:                                          HEP: see 10/30/19 flow sheet for specifics. [facility] Portal  Treatment/Session Summary:    · Response to Treatment:  Continues to have stiffness with rotation and lateral flexion. · Communication/Consultation:  None today  · Equipment provided today:  None today  · Recommendations/Intent for next treatment session: Next visit will focus on relief of pain and improvement of cervical mobility.     Total Treatment Billable Duration:  45 min  PT Patient Time In/Time Out  Time In: 1100  Time Out: Kimberley Hdz DPT    Future Appointments   Date Time Provider Allyson Hopkins   11/18/2019 11:00 AM Teodora Olsen DPT [facility]   11/20/2019 11:00 AM Teodora Olsen DPT [facility]   11/22/2019 11:00 AM Teodora Olsen DPT [facility]   2/18/2020 11:45 AM [place], DO [facility]

## 2019-11-18 ENCOUNTER — HOSPITAL ENCOUNTER (OUTPATIENT)
Dept: PHYSICAL THERAPY | Age: 79
Discharge: HOME | End: 2019-11-18
Payer: MEDICARE

## 2019-11-20 ENCOUNTER — HOSPITAL ENCOUNTER (OUTPATIENT)
Dept: PHYSICAL THERAPY | Age: 79
Discharge: HOME | End: 2019-11-20
Payer: MEDICARE

## 2019-11-20 PROCEDURE — 97014 ELECTRIC STIMULATION THERAPY: CPT

## 2019-11-20 PROCEDURE — 97110 THERAPEUTIC EXERCISES: CPT

## 2019-11-20 PROCEDURE — 97012 MECHANICAL TRACTION THERAPY: CPT

## 2019-11-20 NOTE — PROGRESS NOTES
Ysabel Sotelo  : 1940  [place]  Secondary: [place] [employer] [id number] @ [address], [address] [place] [address]  [phone]   [phone]      OUTPATIENT PHYSICAL THERAPY: Daily Treatment Note  2019   Cervicalgia (M54.2) and Pain in thoracic spine (M54.6)    Effective Dates: 10/30/2019 TO 2020 (90 days). Frequency/Duration: 3 times a week for 90 Days  GOALS: (Goals have been discussed and agreed upon with patient.)  Short-Term Functional Goals: Time Frame: 2 weeks  1. Patient will be independent with HEP to reduce pain and return to prior level of function. 2. Patient will report pain <3/10 with daily activity. Discharge Goals: Time Frame: 6 weeks  1. Patient will have no headaches over a 5 day period. 2. Patient will improve cervical rotation to >50 ° to restore mobility required for tasks such as driving. 3. Patient will have no pain with daily activity. _________________________________________________________________________  Pre-treatment Symptoms/Complaints:    Pain: Initial: 6/10 R shoulder and neck    \"I just feel so stiff today. \"   Post Session:  Slight decrease   Medications Last Reviewed:  2019  Updated Objective Findings:  Below measures from initial evaluation unless otherwise noted. Observation/Orthostatic Postural Assessment:    Dowager's hump present. Forward head posture with elevated scapular position B. Palpation:    Increased tone to upper trap musculature. R scapula winging at inferior angle. Point tenderness present at T2 spinous process.    ROM:    Cervical extension: 10 °   Cervical rotation: 35 ° to L, 30 ° to R with provocation of symptoms  Lateral cervical flexion: 20 ° B  Cervical flexion: WNL  Strength:   Scapular retraction: 3/5 R; 5/5 L  Myotomal strength is 5/5 throughout  Deep cervical flexion: 4/5   Cervical rotation: 4/5 B  Special Tests: Distraction is relieving; Spurling's (-)  Neurological Screen:  Reflexes and sensation are fully intact. TREATMENT:   THERAPEUTIC ACTIVITY: ( see below for minutes): Therapeutic activities per grid below to improve mobility, strength, balance and coordination. Required minimal visual, verbal, manual and tactile cues to improve independence and safety with daily activities . THERAPEUTIC EXERCISE: (see below for minutes):  Exercises per grid below to improve mobility, strength, balance and coordination. Required minimal verbal and manual cues to promote proper body alignment, promote proper body posture and promote proper body mechanics. Progressed resistance, range, repetitions and complexity of movement as indicated. MANUAL THERAPY: (see below for minutes): Joint mobilization and Soft tissue mobilization was utilized and necessary because of the patient's restricted joint motion, painful spasm, loss of articular motion and restricted motion of soft tissue. MODALITIES: (see below for minutes):      to decrease pain    SELF CARE: (see below for minutes): Procedure(s) (per grid) utilized to improve and/or restore self-care/home management as related to dressing, bathing and grooming. Required minimal verbal cueing to facilitate activities of daily living skills and compensatory activities.      Date: 10/30/19 11/1/19 (visit 2) 11/4/19 (visit 3) 11/6/19 (visit 4) 11/11/19 (visit 5) 11/13/19 (visit 6) 11/15/19 (visit 7) 11/20/19 (visit 8)   Modalities: 15 min 15 min 30 min 30 min 30 min 15 min 30 min 30 min   Cervical traction 12# pull 10 min 15 min progressing 12 to 15# pull repeat repeat repeat 15# for 15 min (12:15-12:30) 15 min progressing 12 to 20#  repeat      IFC x15 min at beginning of session repeat repeat  IFC x 15 min at beginning of session repeat              Therapeutic Exercise: 15 min 30 min 15 min 15 min 15 min  15 min 15 min   Cervical retraction [id number] repeat         Cervical rotation [id number] B repeat Therapist assisted Therapist assisted  Therapist assisted  Therapist assisted Therapist assisted into rotation and lateral flexion   Band pull aparts Red tband 20x          Stretching  Therapist assisted into cervical rotation Therapist assisted into rotation, lateral flexion; contract relax to R upper trap Therapist assisted Therapist assisted  Therapist assisted To upper trap musculature   Alternating isometrics  Into cervical rotation [id number]  [id number] repeat  repeat    Thoracic rotation     With hands on wall 10x B  reviewed               Proprioceptive Activities:                                            Manual Therapy:  15 min    30 min       STM to cervical paraspinals, upper trap musculature; MET to L lateral flexion    Seated: STM to cervical spine and B upper trapezius 11:45-12:15)                Therapeutic Activities:                                              HEP: see 10/30/19 flow sheet for specifics. [employer] Portal  Treatment/Session Summary:    · Response to Treatment:  Patient requires further postural strengthening activities to decrease tension at the upper trap and cervical musculature. · Communication/Consultation:  None today  · Equipment provided today:  None today  · Recommendations/Intent for next treatment session: Next visit will focus on relief of pain and improvement of cervical mobility.     Total Treatment Billable Duration:  45 min  PT Patient Time In/Time Out  Time In: 1110  Time Out: [employer], DPT    Future Appointments   Date Time Provider Allyson Hopkins   11/22/2019 11:00 AM Fred Downing DPT [facility]   2/18/2020 11:45 AM DO EVELINA AraujoD

## 2019-11-22 ENCOUNTER — HOSPITAL ENCOUNTER (OUTPATIENT)
Dept: PHYSICAL THERAPY | Age: 79
Discharge: HOME | End: 2019-11-22
Payer: MEDICARE

## 2019-11-22 NOTE — PROGRESS NOTES
Patient called to cancel today's appointment after her scheduled time. She forgot she had an appointment today.

## 2019-12-03 ENCOUNTER — HOSPITAL ENCOUNTER (OUTPATIENT)
Dept: PHYSICAL THERAPY | Age: 79
Discharge: HOME | End: 2019-12-03
Payer: MEDICARE

## 2019-12-03 PROCEDURE — 97012 MECHANICAL TRACTION THERAPY: CPT

## 2019-12-03 PROCEDURE — 97014 ELECTRIC STIMULATION THERAPY: CPT

## 2019-12-03 PROCEDURE — 97140 MANUAL THERAPY 1/> REGIONS: CPT

## 2019-12-03 NOTE — PROGRESS NOTES
Chivo Melendez  : 1940  [place]  Secondary: [place] [employer] [id number] [address] @ [address] Desiree Jama.  [phone]   [phone]      OUTPATIENT PHYSICAL THERAPY: Daily Treatment Note  12/3/2019   Cervicalgia (M54.2) and Pain in thoracic spine (M54.6)    Effective Dates: 10/30/2019 TO 2020 (90 days). Frequency/Duration: 3 times a week for 90 Days  GOALS: (Goals have been discussed and agreed upon with patient.)  Short-Term Functional Goals: Time Frame: 2 weeks  1. Patient will be independent with HEP to reduce pain and return to prior level of function. 2. Patient will report pain <3/10 with daily activity. Discharge Goals: Time Frame: 6 weeks  1. Patient will have no headaches over a 5 day period. 2. Patient will improve cervical rotation to >50 ° to restore mobility required for tasks such as driving. 3. Patient will have no pain with daily activity. _________________________________________________________________________  Pre-treatment Symptoms/Complaints:    Pain: Initial: 1-2/10 R shoulder and neck    \"I think it's getting better overall. \"   Post Session: no increase   Medications Last Reviewed:  12/3/2019  Updated Objective Findings:  Below measures from initial evaluation unless otherwise noted. Observation/Orthostatic Postural Assessment:    Dowager's hump present. Forward head posture with elevated scapular position B. Palpation:    Increased tone to upper trap musculature. R scapula winging at inferior angle. Point tenderness present at T2 spinous process.    ROM:    Cervical extension: 10 °   Cervical rotation: 35 ° to L, 30 ° to R with provocation of symptoms  Lateral cervical flexion: 20 ° B  Cervical flexion: WNL  Strength:   Scapular retraction: 3/5 R; 5/5 L  Myotomal strength is 5/5 throughout  Deep cervical flexion: 4/5   Cervical rotation: 4/5 B  Special Tests: Distraction is relieving; Spurling's (-)  Neurological Screen:  Reflexes and sensation are fully intact. TREATMENT:   THERAPEUTIC ACTIVITY: ( see below for minutes): Therapeutic activities per grid below to improve mobility, strength, balance and coordination. Required minimal visual, verbal, manual and tactile cues to improve independence and safety with daily activities . THERAPEUTIC EXERCISE: (see below for minutes):  Exercises per grid below to improve mobility, strength, balance and coordination. Required minimal verbal and manual cues to promote proper body alignment, promote proper body posture and promote proper body mechanics. Progressed resistance, range, repetitions and complexity of movement as indicated. MANUAL THERAPY: (see below for minutes): Joint mobilization and Soft tissue mobilization was utilized and necessary because of the patient's restricted joint motion, painful spasm, loss of articular motion and restricted motion of soft tissue. MODALITIES: (see below for minutes):      to decrease pain    SELF CARE: (see below for minutes): Procedure(s) (per grid) utilized to improve and/or restore self-care/home management as related to dressing, bathing and grooming. Required minimal verbal cueing to facilitate activities of daily living skills and compensatory activities.      Date: 11/1/19 (visit 2) 11/4/19 (visit 3) 11/6/19 (visit 4) 11/11/19 (visit 5) 11/13/19 (visit 6) 11/15/19 (visit 7) 11/20/19 (visit 8) 12/03/19 (visit 9)   Modalities: 15 min 30 min 30 min 30 min 15 min 30 min 30 min 30 min   Cervical traction 15 min progressing 12 to 15# pull repeat repeat repeat 15# for 15 min (12:15-12:30) 15 min progressing 12 to 20#  repeat 15 min at 15# (4:15-4:30)     IFC x15 min at beginning of session repeat repeat  IFC x 15 min at beginning of session repeat IFC and moist hot pack to cervical spine in supine, 15 min              Therapeutic Exercise: 30 min 15 min 15 min 15 min  15 min 15 min    Cervical retraction repeat          Cervical rotation repeat Therapist assisted Therapist assisted  Therapist assisted  Therapist assisted Therapist assisted into rotation and lateral flexion    Band pull aparts           Stretching Therapist assisted into cervical rotation Therapist assisted into rotation, lateral flexion; contract relax to R upper trap Therapist assisted Therapist assisted  Therapist assisted To upper trap musculature    Alternating isometrics Into cervical rotation [id number]  [id number] repeat  repeat     Thoracic rotation    With hands on wall 10x B  reviewed                Proprioceptive Activities:                                            Manual Therapy: 15 min    30 min   25 min    STM to cervical paraspinals, upper trap musculature; MET to L lateral flexion    Seated: STM to cervical spine and B upper trapezius 11:45-12:15)   Cervical PROM into all directions, then STM to B upper trapezius (3:50-4:15)              Therapeutic Activities:                                              HEP: see 10/30/19 flow sheet for specifics. [employer] Portal  Treatment/Session Summary:    · Response to Treatment:  Patient was 5 minutes late today. Pt did not report increased pain but did have difficulty transferring from supine to sitting. Her B upper trapezius is tight and guarded. Continue POC. · Communication/Consultation:  None today  · Equipment provided today:  None today  · Recommendations/Intent for next treatment session: Next visit will focus on relief of pain and improvement of cervical mobility.     Total Treatment Billable Duration:  55 min  PT Patient Time In/Time Out  Time In: 0335  Time Out: [address] ([place] [address]), PTA    Future Appointments   Date Time Provider Allyson Hopkins   12/6/2019 11:00 AM Tono Monroy DPT [facility]   12/10/2019  6:00 PM Tono Monroy DPT [facility]   12/12/2019 11:00 AM Tono Monroy DPT [facility]   12/16/2019  2:15 PM Riaz Espinoza DO [facility]   12/17/2019 11:00 AM Yosef Quinonez DPT [facility]   12/19/2019  2:00 PM Yosef Quinonez DPT [facility]   2/18/2020 11:45 AM Riaz Espinoza DO [facility]

## 2019-12-06 ENCOUNTER — HOSPITAL ENCOUNTER (OUTPATIENT)
Dept: PHYSICAL THERAPY | Age: 79
Discharge: HOME | End: 2019-12-06
Payer: MEDICARE

## 2019-12-06 PROCEDURE — 97110 THERAPEUTIC EXERCISES: CPT

## 2019-12-06 PROCEDURE — 97140 MANUAL THERAPY 1/> REGIONS: CPT

## 2019-12-06 PROCEDURE — 97014 ELECTRIC STIMULATION THERAPY: CPT

## 2019-12-06 PROCEDURE — 97012 MECHANICAL TRACTION THERAPY: CPT

## 2019-12-06 NOTE — PROGRESS NOTES
Sruthi Ponce  : 1940  [place]  Secondary: [place] [employer] [id number] [address] @ [address]  [phone]   [phone]      OUTPATIENT PHYSICAL THERAPY: Daily Treatment Note and Progress Note  2019   Cervicalgia (M54.2) and Pain in thoracic spine (M54.6)    Effective Dates: 10/30/2019 TO 2020 (90 days). Frequency/Duration: 3 times a week for 90 Days  GOALS: (Goals have been discussed and agreed upon with patient.)  Short-Term Functional Goals: Time Frame: 2 weeks  1. Patient will be independent with HEP to reduce pain and return to prior level of function. MET  2. Patient will report pain <3/10 with daily activity. MET  Discharge Goals: Time Frame: 6 weeks  1. Patient will have no headaches over a 5 day period. MET  2. Patient will improve cervical rotation to >50 ° to restore mobility required for tasks such as driving. NOT MET  3. Patient will have no pain with daily activity. NOT MET  _________________________________________________________________________  Pre-treatment Symptoms/Complaints:    Pain: Initial: 1-2/10 R shoulder and neck    \"It's doing a lot better. I've been sick and I think that's got it hurting a little more. I still just feel tight through the neck and my shoulders. \"   Post Session: no increase   Medications Last Reviewed:  2019  Updated Objective Findings:  Below measures from initial evaluation unless otherwise noted. Observation/Orthostatic Postural Assessment:    Dowager's hump present. Forward head posture with elevated scapular position B. Palpation:    Increased tone to upper trap musculature. R scapula winging at inferior angle. Point tenderness present at T2 spinous process.    ROM:    Cervical extension: 10 °   Cervical rotation: 35 ° to L, 30 ° to R with provocation of symptoms  Lateral cervical flexion: 20 ° B  Cervical flexion: WNL  Strength:   Scapular retraction: 3/5 R; 5/5 L  Myotomal strength is 5/5 throughout  Deep cervical flexion: 4/5   Cervical rotation: 4/5 B  Special Tests:    Distraction is relieving; Spurling's (-)  Neurological Screen:  Reflexes and sensation are fully intact. 12/6/19 update:   Lateral cervical flexion: 40 ° to R; 30 ° to L  Cervical rotation: 40 ° to L; 45 ° to R  NDI: 14/50       TREATMENT:   THERAPEUTIC ACTIVITY: ( see below for minutes): Therapeutic activities per grid below to improve mobility, strength, balance and coordination. Required minimal visual, verbal, manual and tactile cues to improve independence and safety with daily activities . THERAPEUTIC EXERCISE: (see below for minutes):  Exercises per grid below to improve mobility, strength, balance and coordination. Required minimal verbal and manual cues to promote proper body alignment, promote proper body posture and promote proper body mechanics. Progressed resistance, range, repetitions and complexity of movement as indicated. MANUAL THERAPY: (see below for minutes): Joint mobilization and Soft tissue mobilization was utilized and necessary because of the patient's restricted joint motion, painful spasm, loss of articular motion and restricted motion of soft tissue. MODALITIES: (see below for minutes):      to decrease pain    SELF CARE: (see below for minutes): Procedure(s) (per grid) utilized to improve and/or restore self-care/home management as related to dressing, bathing and grooming. Required minimal verbal cueing to facilitate activities of daily living skills and compensatory activities.      Date: 11/1/19 (visit 2) 11/4/19 (visit 3) 11/6/19 (visit 4) 11/11/19 (visit 5) 11/13/19 (visit 6) 11/15/19 (visit 7) 11/20/19 (visit 8) 12/03/19 (visit 9) 12/6/19 (visit 10)   Modalities: 15 min 30 min 30 min 30 min 15 min 30 min 30 min 30 min 30 min   Cervical traction 15 min progressing 12 to 15# pull repeat repeat repeat 15# for 15 min (12:15-12:30) 15 min progressing 12 to 20#  repeat 15 min at 15# (4:15-4:30) 15 min progressing 12 to 18# (11:45 to 12:00)     IFC x15 min at beginning of session repeat repeat  IFC x 15 min at beginning of session repeat IFC and moist hot pack to cervical spine in supine, 15 min Repeat, 15 min at beginning of session               Therapeutic Exercise: 30 min 15 min 15 min 15 min  15 min 15 min  15 min   Cervical retraction repeat           Cervical rotation repeat Therapist assisted Therapist assisted  Therapist assisted  Therapist assisted Therapist assisted into rotation and lateral flexion  Seated AROM 10x B   Band pull aparts         Row: black tband 3x10; shoulder extension B: blue tband 3x10   Stretching Therapist assisted into cervical rotation Therapist assisted into rotation, lateral flexion; contract relax to R upper trap Therapist assisted Therapist assisted  Therapist assisted To upper trap musculature  Therapist assisted to L lateral cervical flexion   Alternating isometrics Into cervical rotation [id number]  [id number] repeat  repeat   Lateral cervical flexion isometrics to R only [id number] against manual resistance   Thoracic rotation    With hands on wall 10x B  reviewed                  Proprioceptive Activities:                                                Manual Therapy: 15 min    30 min   25 min 15 min    STM to cervical paraspinals, upper trap musculature; MET to L lateral flexion    Seated: STM to cervical spine and B upper trapezius 11:45-12:15)   Cervical PROM into all directions, then STM to B upper trapezius (3:50-4:15) STM to cervical paraspinals and upper trap musculature to decrease tone               Therapeutic Activities:                                                  HEP: see 10/30/19 flow sheet for specifics.     [facility] Portal  Treatment/Session Summary:    · Response to Treatment:  Symptom irritability is continuing to improve but she remains limited with cervical rotation that is painful. Continued postural strengthening and stretching for the cervical paraspinals will improve mobility and decrease pain. · Communication/Consultation:  None today  · Equipment provided today:  None today  · Recommendations/Intent for next treatment session: Next visit will focus on relief of pain and improvement of cervical mobility.     Total Treatment Billable Duration:  60 min  PT Patient Time In/Time Out  Time In: 1100  Time Out: [address], DPT    Future Appointments   Date Time Provider Allyson Hopkins   12/10/2019  6:00 PM Jenna Ruiz DPT [facility]   12/12/2019 11:00 AM Jenna Ruiz DPT [facility]   12/16/2019  2:15 PM DO EVELINA Johnson   12/17/2019 11:00 AM Jenna Ruiz DPT [facility]   12/19/2019  2:00 PM Jenna Ruiz DPT [facility]   2/18/2020 11:45 AM Adelaide Mora DO [facility]

## 2019-12-10 ENCOUNTER — HOSPITAL ENCOUNTER (OUTPATIENT)
Dept: PHYSICAL THERAPY | Age: 79
Discharge: HOME | End: 2019-12-10
Payer: MEDICARE

## 2019-12-10 PROCEDURE — 97140 MANUAL THERAPY 1/> REGIONS: CPT

## 2019-12-10 PROCEDURE — 97012 MECHANICAL TRACTION THERAPY: CPT

## 2019-12-10 NOTE — PROGRESS NOTES
Diego Ash  : 1940  [place]  Secondary: [place] [employer] [id number] @ [address]  [phone]   [phone]      OUTPATIENT PHYSICAL THERAPY: Daily Treatment Note 12/10/2019   Cervicalgia (M54.2) and Pain in thoracic spine (M54.6)    Effective Dates: 10/30/2019 TO 2020 (90 days). Frequency/Duration: 3 times a week for 90 Days  GOALS: (Goals have been discussed and agreed upon with patient.)  Short-Term Functional Goals: Time Frame: 2 weeks  1. Patient will be independent with HEP to reduce pain and return to prior level of function. MET  2. Patient will report pain <3/10 with daily activity. MET  Discharge Goals: Time Frame: 6 weeks  1. Patient will have no headaches over a 5 day period. MET  2. Patient will improve cervical rotation to >50 ° to restore mobility required for tasks such as driving. NOT MET  3. Patient will have no pain with daily activity. NOT MET  _________________________________________________________________________  Pre-treatment Symptoms/Complaints:    Pain: Initial: 1-2/10 R shoulder and neck    \"It's about the same. I went to the doctor about the shoulder and he said he could do surgery. \"   Post Session: no increase   Medications Last Reviewed:  12/10/2019  Updated Objective Findings:  Below measures from initial evaluation unless otherwise noted. Observation/Orthostatic Postural Assessment:    Dowager's hump present. Forward head posture with elevated scapular position B. Palpation:    Increased tone to upper trap musculature. R scapula winging at inferior angle. Point tenderness present at T2 spinous process.    ROM:    Cervical extension: 10 °   Cervical rotation: 35 ° to L, 30 ° to R with provocation of symptoms  Lateral cervical flexion: 20 ° B  Cervical flexion: WNL  Strength:   Scapular retraction: 3/5 R; 5/5 L  Myotomal strength is 5/5 throughout  Deep cervical flexion: 4/5   Cervical rotation: 4/5 B  Special Tests:    Distraction is relieving; Spurling's (-)  Neurological Screen:  Reflexes and sensation are fully intact. 12/6/19 update:   Lateral cervical flexion: 40 ° to R; 30 ° to L  Cervical rotation: 40 ° to L; 45 ° to R  NDI: 14/50       TREATMENT:   THERAPEUTIC ACTIVITY: ( see below for minutes): Therapeutic activities per grid below to improve mobility, strength, balance and coordination. Required minimal visual, verbal, manual and tactile cues to improve independence and safety with daily activities . THERAPEUTIC EXERCISE: (see below for minutes):  Exercises per grid below to improve mobility, strength, balance and coordination. Required minimal verbal and manual cues to promote proper body alignment, promote proper body posture and promote proper body mechanics. Progressed resistance, range, repetitions and complexity of movement as indicated. MANUAL THERAPY: (see below for minutes): Joint mobilization and Soft tissue mobilization was utilized and necessary because of the patient's restricted joint motion, painful spasm, loss of articular motion and restricted motion of soft tissue. MODALITIES: (see below for minutes):      to decrease pain    SELF CARE: (see below for minutes): Procedure(s) (per grid) utilized to improve and/or restore self-care/home management as related to dressing, bathing and grooming. Required minimal verbal cueing to facilitate activities of daily living skills and compensatory activities.      Date: 11/6/19 (visit 4) 11/11/19 (visit 5) 11/13/19 (visit 6) 11/15/19 (visit 7) 11/20/19 (visit 8) 12/03/19 (visit 9) 12/6/19 (visit 10) PN 12/10/19 (visit 11)   Modalities: 30 min 30 min 15 min 30 min 30 min 30 min 30 min 15 min   Cervical traction repeat repeat 15# for 15 min (12:15-12:30) 15 min progressing 12 to 20#  repeat 15 min at 15# (4:15-4:30) 15 min progressing 12 to 18# (11:45 to 12:00) At 15 pounds of pressure (4:15-4:30)    repeat repeat  IFC x 15 min at beginning of session repeat IFC and moist hot pack to cervical spine in supine, 15 min Repeat, 15 min at beginning of session               Therapeutic Exercise: 15 min 15 min  15 min 15 min  15 min    Cervical retraction        x15   Cervical rotation Therapist assisted  Therapist assisted  Therapist assisted Therapist assisted into rotation and lateral flexion  Seated AROM 10x B    Band pull aparts       Row: black tband 3x10; shoulder extension B: blue tband 3x10    Stretching Therapist assisted Therapist assisted  Therapist assisted To upper trap musculature  Therapist assisted to L lateral cervical flexion    Alternating isometrics [id number] repeat  repeat   Lateral cervical flexion isometrics to R only [id number] against manual resistance    Thoracic rotation  With hands on wall 10x B  reviewed                  Proprioceptive Activities:                                            Manual Therapy:   30 min   25 min 15 min 40 min      Seated: STM to cervical spine and B upper trapezius 11:45-12:15)   Cervical PROM into all directions, then STM to B upper trapezius (3:50-4:15) STM to cervical paraspinals and upper trap musculature to decrease tone Cervical PROM in supine, then STM to B upper trapezius in seated (3:35-4:15)              Therapeutic Activities:                                              HEP: see 10/30/19 flow sheet for specifics. [employer] Portal  Treatment/Session Summary:    · Response to Treatment:  Pt is tight and guarded in the B upper trapezius. She was reminded to be aware of her posture with driving, sleeping and sitting. She reported relief after traction. Continue POC. · Communication/Consultation:  None today  · Equipment provided today:  None today  · Recommendations/Intent for next treatment session: Next visit will focus on relief of pain and improvement of cervical mobility.     Total Treatment Billable Duration:  60 min  PT Patient Time In/Time Out  Time In: 0330  Time Out: [address] ([place] [address]), PTA    Future Appointments   Date Time Provider Allyson Hopkins   12/12/2019 11:00 AM Cesar Schultz DPT [facility]   12/16/2019  2:15 PM Aishwarya Quintanilla DO [facility]   12/17/2019 11:00 AM Cesar Schultz DPT [facility]   12/19/2019  2:00 PM Cesar Schultz DPT [facility]   2/18/2020 11:45 AM Aishwarya Quintanilla DO [facility]

## 2019-12-12 ENCOUNTER — HOSPITAL ENCOUNTER (OUTPATIENT)
Dept: PHYSICAL THERAPY | Age: 79
Discharge: HOME | End: 2019-12-12
Payer: MEDICARE

## 2019-12-12 PROCEDURE — 97012 MECHANICAL TRACTION THERAPY: CPT

## 2019-12-12 PROCEDURE — 97110 THERAPEUTIC EXERCISES: CPT

## 2019-12-12 PROCEDURE — 97140 MANUAL THERAPY 1/> REGIONS: CPT

## 2019-12-12 PROCEDURE — 97014 ELECTRIC STIMULATION THERAPY: CPT

## 2019-12-12 NOTE — PROGRESS NOTES
Mckenzie Zayas  : 1940  [place]  Secondary: [place] [employer] [id number] [address]  [phone]   [phone]      OUTPATIENT PHYSICAL THERAPY: Daily Treatment Note 2019   Cervicalgia (M54.2) and Pain in thoracic spine (M54.6)    Effective Dates: 10/30/2019 TO 2020 (90 days). Frequency/Duration: 3 times a week for 90 Days  GOALS: (Goals have been discussed and agreed upon with patient.)  Short-Term Functional Goals: Time Frame: 2 weeks  1. Patient will be independent with HEP to reduce pain and return to prior level of function. MET  2. Patient will report pain <3/10 with daily activity. MET  Discharge Goals: Time Frame: 6 weeks  1. Patient will have no headaches over a 5 day period. MET  2. Patient will improve cervical rotation to >50 ° to restore mobility required for tasks such as driving. NOT MET  3. Patient will have no pain with daily activity. NOT MET  _________________________________________________________________________  Pre-treatment Symptoms/Complaints:    Pain: Initial: 1-2/10 R shoulder and neck    \"I'm doing pretty good today. I think it's related to how much I have going on as well. I do feel less stiff. \"   Post Session: no increase   Medications Last Reviewed:  2019  Updated Objective Findings:  Below measures from initial evaluation unless otherwise noted. Observation/Orthostatic Postural Assessment:    Dowager's hump present. Forward head posture with elevated scapular position B. Palpation:    Increased tone to upper trap musculature. R scapula winging at inferior angle. Point tenderness present at T2 spinous process.    ROM:    Cervical extension: 10 °   Cervical rotation: 35 ° to L, 30 ° to R with provocation of symptoms  Lateral cervical flexion: 20 ° B  Cervical flexion: WNL  Strength:   Scapular retraction: 3/5 R; 5/5 L  Myotomal strength is 5/5 throughout  Deep cervical flexion: 4/5   Cervical rotation: 4/5 B  Special Tests:    Distraction is relieving; Spurling's (-)  Neurological Screen:  Reflexes and sensation are fully intact. 12/6/19 update:   Lateral cervical flexion: 40 ° to R; 30 ° to L  Cervical rotation: 40 ° to L; 45 ° to R  NDI: 14/50       TREATMENT:   THERAPEUTIC ACTIVITY: ( see below for minutes): Therapeutic activities per grid below to improve mobility, strength, balance and coordination. Required minimal visual, verbal, manual and tactile cues to improve independence and safety with daily activities . THERAPEUTIC EXERCISE: (see below for minutes):  Exercises per grid below to improve mobility, strength, balance and coordination. Required minimal verbal and manual cues to promote proper body alignment, promote proper body posture and promote proper body mechanics. Progressed resistance, range, repetitions and complexity of movement as indicated. MANUAL THERAPY: (see below for minutes): Joint mobilization and Soft tissue mobilization was utilized and necessary because of the patient's restricted joint motion, painful spasm, loss of articular motion and restricted motion of soft tissue. MODALITIES: (see below for minutes):      to decrease pain    SELF CARE: (see below for minutes): Procedure(s) (per grid) utilized to improve and/or restore self-care/home management as related to dressing, bathing and grooming. Required minimal verbal cueing to facilitate activities of daily living skills and compensatory activities.      Date: 11/6/19 (visit 4) 11/11/19 (visit 5) 11/13/19 (visit 6) 11/15/19 (visit 7) 11/20/19 (visit 8) 12/03/19 (visit 9) 12/6/19 (visit 10) PN 12/10/19 (visit 11) 12/12/19 (visit 12)   Modalities: 30 min 30 min 15 min 30 min 30 min 30 min 30 min 15 min 30 min   Cervical traction repeat repeat 15# for 15 min (12:15-12:30) 15 min progressing 12 to 20#  repeat 15 min at 15# (4:15-4:30) 15 min progressing 12 to 18# (11:45 to 12:00) At 15 pounds of pressure (4:15-4:30) 15 to 18# of pressure (1145 to 1245)    repeat repeat  IFC x 15 min at beginning of session repeat IFC and moist hot pack to cervical spine in supine, 15 min Repeat, 15 min at beginning of session  IFC with MHP at beginning of session               Therapeutic Exercise: 15 min 15 min  15 min 15 min  15 min  15 min   Cervical retraction        x15    Cervical rotation Therapist assisted  Therapist assisted  Therapist assisted Therapist assisted into rotation and lateral flexion  Seated AROM 10x B  Seated AROM 10x B   Band pull aparts       Row: black tband 3x10; shoulder extension B: blue tband 3x10  Row black tband 3x10; extension blue tband 3x10   Stretching Therapist assisted Therapist assisted  Therapist assisted To upper trap musculature  Therapist assisted to L lateral cervical flexion  repeat   Alternating isometrics [id number] repeat  repeat   Lateral cervical flexion isometrics to R only [id number] against manual resistance     Thoracic rotation  With hands on wall 10x B  reviewed                    Proprioceptive Activities:                                                Manual Therapy:   30 min   25 min 15 min 40 min 15 min      Seated: STM to cervical spine and B upper trapezius 11:45-12:15)   Cervical PROM into all directions, then STM to B upper trapezius (3:50-4:15) STM to cervical paraspinals and upper trap musculature to decrease tone Cervical PROM in supine, then STM to B upper trapezius in seated (3:35-4:15) STM to cervical paraspinals and upper trap musculature (1120 to 1135)               Therapeutic Activities:                                                  HEP: see 10/30/19 flow sheet for specifics. [facility] Portal  Treatment/Session Summary:    · Response to Treatment:  Continues to have increased tenderness through cervical paraspinals. Cervical rotation continues to improve with decreased provocation at end range. · Communication/Consultation:  None today  · Equipment provided today:  None today  · Recommendations/Intent for next treatment session: Next visit will focus on relief of pain and improvement of cervical mobility.     Total Treatment Billable Duration:  60 min  PT Patient Time In/Time Out  Time In: 1105  Time Out: 1 [facility], TAYLER    Future Appointments   Date Time Provider Allyson Hopkins   12/16/2019  2:15 PM Lois Campuzano, DO EVELINA ROSE   12/17/2019 11:00 AM Aleyda Hobbs DPT [facility]   12/19/2019  2:00 PM Aleyda Hobbs DPT [facility]   2/18/2020 11:45 AM DO EVELINA Alvarez

## 2019-12-17 ENCOUNTER — HOSPITAL ENCOUNTER (OUTPATIENT)
Dept: PHYSICAL THERAPY | Age: 79
Discharge: HOME | End: 2019-12-17
Payer: MEDICARE

## 2019-12-19 ENCOUNTER — HOSPITAL ENCOUNTER (OUTPATIENT)
Dept: PHYSICAL THERAPY | Age: 79
Discharge: HOME | End: 2019-12-19
Payer: MEDICARE

## 2019-12-19 PROCEDURE — 97110 THERAPEUTIC EXERCISES: CPT

## 2019-12-19 PROCEDURE — 97014 ELECTRIC STIMULATION THERAPY: CPT

## 2019-12-19 PROCEDURE — 97140 MANUAL THERAPY 1/> REGIONS: CPT

## 2019-12-19 PROCEDURE — 97012 MECHANICAL TRACTION THERAPY: CPT

## 2019-12-19 NOTE — PROGRESS NOTES
Sruthi Ponce   (:1940) [phone] [facility]  [address] [place] [address].  [phone]   [phone]           PHYSICIAN COMMUNICATION    REFERRING PHYSICIAN: Jose Diane NP  Return Physician Appointment: 19  MEDICAL/REFERRING DIAGNOSIS:  · Neck pain [M54.2]  ATTENDANCE: Sruhti Ponce has attended 13 visits with physical therapy. ASSESSMENT:  DATE: 2019    PROGRESS: Sruthi Ponce has progressed well with therapy. Treatment has consisted of stretching and postural strengthening exercises, manual therapy, and traction to improve cervical mobility and decrease pain. She has resolved symptoms of headaches and has decreased symptoms of stiffness at the neck. She continues to have intermittent days with increased stiffness, but she attributes this to increased activity and stress. Neck Disability Index has been improved to 14/50. She has been instructed in a home program of stretching and strengthening exercises to continue to manage her symptoms. RECOMMENDATIONS: Will discharge therapy at this time as she has maximized her benefit from treatment at this time. I expect she will continue to benefit from her home program of stretching and strengthening activities.      Thank you for this referral,  Lois Szymanski DPT

## 2019-12-19 NOTE — PROGRESS NOTES
Chivo Melendez  : 1940  [place]  Secondary: [place] [employer] [id number]  [address]  [phone]   [phone]      OUTPATIENT PHYSICAL THERAPY: Daily Treatment Note 2019   Cervicalgia (M54.2) and Pain in thoracic spine (M54.6)    Effective Dates: 10/30/2019 TO 2020 (90 days). Frequency/Duration: 3 times a week for 90 Days  GOALS: (Goals have been discussed and agreed upon with patient.)  Short-Term Functional Goals: Time Frame: 2 weeks  1. Patient will be independent with HEP to reduce pain and return to prior level of function. MET  2. Patient will report pain <3/10 with daily activity. MET  Discharge Goals: Time Frame: 6 weeks  1. Patient will have no headaches over a 5 day period. MET  2. Patient will improve cervical rotation to >50 ° to restore mobility required for tasks such as driving. MET  3. Patient will have no pain with daily activity. PROGRESSING  _________________________________________________________________________  Pre-treatment Symptoms/Complaints:    Pain: Initial: 1-2/10 R shoulder and neck    \"I'm feeling better. I still get stiffness some, but it's better overall. I don't have the headaches any more. \"   Post Session: no increase   Medications Last Reviewed:  2019  Updated Objective Findings:  Below measures from initial evaluation unless otherwise noted. Observation/Orthostatic Postural Assessment:    Dowager's hump present. Forward head posture with elevated scapular position B. Palpation:    Increased tone to upper trap musculature. R scapula winging at inferior angle. Point tenderness present at T2 spinous process.    ROM:    Cervical extension: 10 °   Cervical rotation: 35 ° to L, 30 ° to R with provocation of symptoms  Lateral cervical flexion: 20 ° B  Cervical flexion: WNL  Strength:   Scapular retraction: 3/5 R; 5/5 L  Myotomal strength is 5/5 throughout  Deep cervical flexion: 4/5   Cervical rotation: 4/5 B  Special Tests:    Distraction is relieving; Spurling's (-)  Neurological Screen:  Reflexes and sensation are fully intact. 12/6/19 update:   Lateral cervical flexion: 40 ° to R; 30 ° to L  Cervical rotation: 40 ° to L; 45 ° to R  NDI: 14/50       TREATMENT:   THERAPEUTIC ACTIVITY: ( see below for minutes): Therapeutic activities per grid below to improve mobility, strength, balance and coordination. Required minimal visual, verbal, manual and tactile cues to improve independence and safety with daily activities . THERAPEUTIC EXERCISE: (see below for minutes):  Exercises per grid below to improve mobility, strength, balance and coordination. Required minimal verbal and manual cues to promote proper body alignment, promote proper body posture and promote proper body mechanics. Progressed resistance, range, repetitions and complexity of movement as indicated. MANUAL THERAPY: (see below for minutes): Joint mobilization and Soft tissue mobilization was utilized and necessary because of the patient's restricted joint motion, painful spasm, loss of articular motion and restricted motion of soft tissue. MODALITIES: (see below for minutes):      to decrease pain    SELF CARE: (see below for minutes): Procedure(s) (per grid) utilized to improve and/or restore self-care/home management as related to dressing, bathing and grooming. Required minimal verbal cueing to facilitate activities of daily living skills and compensatory activities.      Date: 11/6/19 (visit 4) 11/11/19 (visit 5) 11/13/19 (visit 6) 11/15/19 (visit 7) 11/20/19 (visit 8) 12/03/19 (visit 9) 12/6/19 (visit 10) PN 12/10/19 (visit 11) 12/12/19 (visit 12) 12/19/19 (visit 13)   Modalities: 30 min 30 min 15 min 30 min 30 min 30 min 30 min 15 min 30 min 30 min   Cervical traction repeat repeat 15# for 15 min (12:15-12:30) 15 min progressing 12 to 20#  repeat 15 min at 15# (4:15-4:30) 15 min progressing 12 to 18# (11:45 to 12:00) At 15 pounds of pressure (4:15-4:30) 15 to 18# of pressure (1145 to 1245) Repeat, 1445 to 1500    repeat repeat  IFC x 15 min at beginning of session repeat IFC and moist hot pack to cervical spine in supine, 15 min Repeat, 15 min at beginning of session  IFC with MHP at beginning of session repeat                Therapeutic Exercise: 15 min 15 min  15 min 15 min  15 min  15 min 15 min   Cervical retraction        x15     Cervical rotation Therapist assisted  Therapist assisted  Therapist assisted Therapist assisted into rotation and lateral flexion  Seated AROM 10x B  Seated AROM 10x B repeat   Band pull aparts       Row: black tband 3x10; shoulder extension B: blue tband 3x10  Row black tband 3x10; extension blue tband 3x10 repeat   Stretching Therapist assisted Therapist assisted  Therapist assisted To upper trap musculature  Therapist assisted to L lateral cervical flexion  repeat repeat   Alternating isometrics [id number] repeat  repeat   Lateral cervical flexion isometrics to R only [id number] against manual resistance      Thoracic rotation  With hands on wall 10x B  reviewed                      Proprioceptive Activities:                                                    Manual Therapy:   30 min   25 min 15 min 40 min 15 min 15 min      Seated: STM to cervical spine and B upper trapezius 11:45-12:15)   Cervical PROM into all directions, then STM to B upper trapezius (3:50-4:15) STM to cervical paraspinals and upper trap musculature to decrease tone Cervical PROM in supine, then STM to B upper trapezius in seated (3:35-4:15) STM to cervical paraspinals and upper trap musculature (1120 to 1135) Repeat, 1415 to 1530                Therapeutic Activities:                                                      HEP: see 10/30/19 flow sheet for specifics.     for; to (do) Portal  Treatment/Session Summary:    · Response to Treatment:  Patient has maximized her benefit with therapy at this time. See MD note. · Communication/Consultation:  None today  · Equipment provided today:  None today  · Recommendations/Intent for next treatment session: D/C to HEP.      Total Treatment Billable Duration:  60 min  PT Patient Time In/Time Out  Time In: 1400  Time Out: TAYLER Cast    Future Appointments   Date Time Provider Allyson Hopkins   1/8/2020 10:15 AM GVLLE NUCLEAR STRESS [facility]   2/18/2020 11:45 AM [facility]

## 2020-03-12 ENCOUNTER — HOSPITAL ENCOUNTER (OUTPATIENT)
Dept: CT IMAGING | Age: 80
Discharge: HOME | End: 2020-03-12
Attending: ORTHOPAEDIC SURGERY
Payer: MEDICARE

## 2020-03-12 ENCOUNTER — HOSPITAL ENCOUNTER (OUTPATIENT)
Dept: GENERAL RADIOLOGY | Age: 80
Discharge: HOME | End: 2020-03-12
Attending: ORTHOPAEDIC SURGERY
Payer: MEDICARE

## 2020-03-12 ENCOUNTER — HOSPITAL ENCOUNTER (OUTPATIENT)
Dept: LAB | Age: 80
Discharge: HOME | End: 2020-03-12
Attending: ORTHOPAEDIC SURGERY
Payer: MEDICARE

## 2020-03-12 VITALS
BODY MASS INDEX: 25.99 KG/M2 | RESPIRATION RATE: 16 BRPM | OXYGEN SATURATION: 97 % | WEIGHT: 156 LBS | HEART RATE: 64 BPM | DIASTOLIC BLOOD PRESSURE: 77 MMHG | HEIGHT: 65 IN | TEMPERATURE: 98.4 F | SYSTOLIC BLOOD PRESSURE: 150 MMHG

## 2020-03-12 DIAGNOSIS — Z96.611 PRESENCE OF RIGHT ARTIFICIAL SHOULDER JOINT: ICD-10-CM

## 2020-03-12 DIAGNOSIS — T84.84XA PAIN DUE TO INTERNAL ORTHOPEDIC PROSTHETIC DEVICE, INITIAL ENCOUNTER (HCC): ICD-10-CM

## 2020-03-12 LAB
APPEARANCE FLD: NORMAL
BASOPHILS # BLD: 0.1 K/UL (ref 0–0.2)
BASOPHILS NFR BLD: 1 % (ref 0–2)
COLOR FLD: NORMAL
CRP SERPL-MCNC: 0.4 MG/DL (ref 0–0.9)
DIFFERENTIAL METHOD BLD: ABNORMAL
EOSINOPHIL # BLD: 0.1 K/UL (ref 0–0.8)
EOSINOPHIL NFR BLD: 2 % (ref 0.5–7.8)
ERYTHROCYTE [DISTWIDTH] IN BLOOD BY AUTOMATED COUNT: 14 % (ref 11.9–14.6)
ERYTHROCYTE [SEDIMENTATION RATE] IN BLOOD: 16 MM/HR (ref 0–30)
HCT VFR BLD AUTO: 38.6 % (ref 35.8–46.3)
HGB BLD-MCNC: 12.7 G/DL (ref 11.7–15.4)
IMM GRANULOCYTES # BLD AUTO: 0 K/UL (ref 0–0.5)
IMM GRANULOCYTES NFR BLD AUTO: 0 % (ref 0–5)
LYMPHOCYTES # BLD: 1.1 K/UL (ref 0.5–4.6)
LYMPHOCYTES NFR BLD: 20 % (ref 13–44)
LYMPHOCYTES NFR BRONCH MANUAL: 94 %
MCH RBC QN AUTO: 32.6 PG (ref 26.1–32.9)
MCHC RBC AUTO-ENTMCNC: 32.9 G/DL (ref 31.4–35)
MCV RBC AUTO: 99 FL (ref 79.6–97.8)
MONOCYTES # BLD: 0.4 K/UL (ref 0.1–1.3)
MONOCYTES NFR BLD: 7 % (ref 4–12)
NEUTROPHILS NFR BRONCH MANUAL: 6 %
NEUTS SEG # BLD: 3.9 K/UL (ref 1.7–8.2)
NEUTS SEG NFR BLD: 70 % (ref 43–78)
NRBC # BLD: 0 K/UL (ref 0–0.2)
NUC CELL # FLD: 1133 /CU MM
PLATELET # BLD AUTO: 244 K/UL (ref 150–450)
PMV BLD AUTO: 11.2 FL (ref 9.4–12.3)
RBC # BLD AUTO: 3.9 M/UL (ref 4.05–5.2)
RBC # FLD: NORMAL /CU MM
SPECIMEN SOURCE FLD: NORMAL
WBC # BLD AUTO: 5.6 K/UL (ref 4.3–11.1)

## 2020-03-12 PROCEDURE — 20610 DRAIN/INJ JOINT/BURSA W/O US: CPT

## 2020-03-12 PROCEDURE — 86140 C-REACTIVE PROTEIN: CPT

## 2020-03-12 PROCEDURE — 74011000250 HC RX REV CODE- 250: Performed by: ORTHOPAEDIC SURGERY

## 2020-03-12 PROCEDURE — 85025 COMPLETE CBC W/AUTO DIFF WBC: CPT

## 2020-03-12 PROCEDURE — 87075 CULTR BACTERIA EXCEPT BLOOD: CPT

## 2020-03-12 PROCEDURE — 85652 RBC SED RATE AUTOMATED: CPT

## 2020-03-12 PROCEDURE — 36415 COLL VENOUS BLD VENIPUNCTURE: CPT

## 2020-03-12 PROCEDURE — 87205 SMEAR GRAM STAIN: CPT

## 2020-03-12 PROCEDURE — 89050 BODY FLUID CELL COUNT: CPT

## 2020-03-12 PROCEDURE — 73200 CT UPPER EXTREMITY W/O DYE: CPT

## 2020-03-12 RX ORDER — CHOLECALCIFEROL (VITAMIN D3) 50 MCG
3 CAPSULE ORAL DAILY
COMMUNITY
End: 2020-09-25

## 2020-03-12 RX ORDER — LIDOCAINE HYDROCHLORIDE 20 MG/ML
0.3 INJECTION, SOLUTION INFILTRATION; PERINEURAL
Status: COMPLETED | OUTPATIENT
Start: 2020-03-12 | End: 2020-03-12

## 2020-03-12 RX ADMIN — LIDOCAINE HYDROCHLORIDE 6 MG: 20 INJECTION, SOLUTION INFILTRATION; PERINEURAL at 13:32

## 2020-03-14 LAB
BACTERIA SPEC CULT: NORMAL
GRAM STN SPEC: NORMAL
GRAM STN SPEC: NORMAL
SERVICE CMNT-IMP: NORMAL

## 2020-04-03 LAB
BACTERIA SPEC CULT: NORMAL
SERVICE CMNT-IMP: NORMAL

## 2020-05-31 ENCOUNTER — HOSPITAL ENCOUNTER (OUTPATIENT)
Dept: MAMMOGRAPHY | Age: 80
Discharge: HOME | End: 2020-05-31
Attending: INTERNAL MEDICINE
Payer: MEDICARE

## 2020-05-31 DIAGNOSIS — Z12.31 VISIT FOR SCREENING MAMMOGRAM: ICD-10-CM

## 2020-05-31 PROCEDURE — 77067 SCR MAMMO BI INCL CAD: CPT

## 2021-01-20 ENCOUNTER — HOSPITAL ENCOUNTER (OUTPATIENT)
Dept: CT IMAGING | Age: 81
Discharge: HOME | End: 2021-01-20
Attending: NURSE PRACTITIONER
Payer: MEDICARE

## 2021-01-20 DIAGNOSIS — R79.89 POSITIVE D DIMER: ICD-10-CM

## 2021-01-20 DIAGNOSIS — R06.02 SHORTNESS OF BREATH: ICD-10-CM

## 2021-01-20 PROCEDURE — 74011000258 HC RX REV CODE- 258: Performed by: NURSE PRACTITIONER

## 2021-01-20 PROCEDURE — 74011000636 HC RX REV CODE- 636: Performed by: NURSE PRACTITIONER

## 2021-01-20 PROCEDURE — 71260 CT THORAX DX C+: CPT

## 2021-01-20 RX ORDER — SODIUM CHLORIDE 0.9 % (FLUSH) 0.9 %
10 SYRINGE (ML) INJECTION
Status: COMPLETED | OUTPATIENT
Start: 2021-01-20 | End: 2021-01-20

## 2021-01-20 RX ADMIN — SODIUM CHLORIDE 100 ML: 900 INJECTION, SOLUTION INTRAVENOUS at 16:04

## 2021-01-20 RX ADMIN — IOPAMIDOL 75 ML: 755 INJECTION, SOLUTION INTRAVENOUS at 16:04

## 2021-01-20 RX ADMIN — Medication 10 ML: at 16:04

## 2021-01-22 ENCOUNTER — APPOINTMENT (OUTPATIENT)
Dept: GENERAL RADIOLOGY | Age: 81
End: 2021-01-22
Attending: NURSE PRACTITIONER
Payer: MEDICARE

## 2021-01-22 ENCOUNTER — HOSPITAL ENCOUNTER (OUTPATIENT)
Dept: CT IMAGING | Age: 81
Discharge: HOME | End: 2021-01-22
Attending: NURSE PRACTITIONER
Payer: MEDICARE

## 2021-01-22 DIAGNOSIS — R10.9 ABDOMINAL PAIN, UNSPECIFIED ABDOMINAL LOCATION: ICD-10-CM

## 2021-01-22 PROCEDURE — 74177 CT ABD & PELVIS W/CONTRAST: CPT

## 2021-01-22 PROCEDURE — 74011000258 HC RX REV CODE- 258: Performed by: NURSE PRACTITIONER

## 2021-01-22 PROCEDURE — 74011000636 HC RX REV CODE- 636: Performed by: NURSE PRACTITIONER

## 2021-01-22 RX ORDER — SODIUM CHLORIDE 0.9 % (FLUSH) 0.9 %
10 SYRINGE (ML) INJECTION
Status: COMPLETED | OUTPATIENT
Start: 2021-01-22 | End: 2021-01-22

## 2021-01-22 RX ADMIN — IOPAMIDOL 100 ML: 755 INJECTION, SOLUTION INTRAVENOUS at 15:28

## 2021-01-22 RX ADMIN — Medication 10 ML: at 15:28

## 2021-01-22 RX ADMIN — DIATRIZOATE MEGLUMINE AND DIATRIZOATE SODIUM 15 ML: 660; 100 LIQUID ORAL; RECTAL at 15:28

## 2021-01-22 RX ADMIN — SODIUM CHLORIDE 100 ML: 900 INJECTION, SOLUTION INTRAVENOUS at 15:28

## 2021-01-28 PROBLEM — Z86.16 HISTORY OF COVID-19: Status: ACTIVE | Noted: 2021-01-28

## 2021-05-10 ENCOUNTER — HOSPITAL ENCOUNTER (OUTPATIENT)
Dept: LAB | Age: 81
Discharge: HOME | End: 2021-05-10

## 2021-05-10 PROCEDURE — 88305 TISSUE EXAM BY PATHOLOGIST: CPT

## 2021-06-15 ENCOUNTER — TRANSCRIBE ORDER (OUTPATIENT)
Dept: SCHEDULING | Age: 81
End: 2021-06-15

## 2021-06-15 DIAGNOSIS — Z12.31 ENCOUNTER FOR SCREENING MAMMOGRAM FOR MALIGNANT NEOPLASM OF BREAST: Primary | ICD-10-CM

## 2021-06-17 NOTE — PROGRESS NOTES
Goal Outcome Evaluation:  Plan of Care Reviewed With: patient        Progress: improving  Outcome Summary: Transferred from CCU. Pt here with GI bleed, s/p EGD with cauterzation of ulcer, on carafate and protonix. Pt hypertensive, home meds restarted. SSI as ordered. receiving last dose of rocephin today per ID. A&O. standby assist to bathroom. will continue to monitor   Pt cancelled due to illness.

## 2021-11-30 ENCOUNTER — HOSPITAL ENCOUNTER (EMERGENCY)
Age: 81
Discharge: HOME | End: 2021-11-30
Attending: EMERGENCY MEDICINE
Payer: MEDICARE

## 2021-11-30 ENCOUNTER — APPOINTMENT (OUTPATIENT)
Dept: GENERAL RADIOLOGY | Age: 81
End: 2021-11-30
Attending: EMERGENCY MEDICINE
Payer: MEDICARE

## 2021-11-30 VITALS
OXYGEN SATURATION: 98 % | BODY MASS INDEX: 26.46 KG/M2 | DIASTOLIC BLOOD PRESSURE: 74 MMHG | HEART RATE: 66 BPM | HEIGHT: 64 IN | TEMPERATURE: 98.2 F | RESPIRATION RATE: 20 BRPM | WEIGHT: 155 LBS | SYSTOLIC BLOOD PRESSURE: 137 MMHG

## 2021-11-30 DIAGNOSIS — R07.89 ATYPICAL CHEST PAIN: Primary | ICD-10-CM

## 2021-11-30 LAB
ALBUMIN SERPL-MCNC: 3.5 G/DL (ref 3.2–4.6)
ALBUMIN/GLOB SERPL: 1 {RATIO} (ref 1.2–3.5)
ALP SERPL-CCNC: 74 U/L (ref 50–136)
ALT SERPL-CCNC: 22 U/L (ref 12–65)
ANION GAP SERPL CALC-SCNC: 4 MMOL/L (ref 7–16)
AST SERPL-CCNC: 41 U/L (ref 15–37)
ATRIAL RATE: 66 BPM
BASOPHILS # BLD: 0.1 K/UL (ref 0–0.2)
BASOPHILS NFR BLD: 1 % (ref 0–2)
BILIRUB SERPL-MCNC: 0.6 MG/DL (ref 0.2–1.1)
BUN SERPL-MCNC: 20 MG/DL (ref 8–23)
CALCIUM SERPL-MCNC: 9.4 MG/DL (ref 8.3–10.4)
CALCULATED P AXIS, ECG09: 72 DEGREES
CALCULATED R AXIS, ECG10: -40 DEGREES
CALCULATED T AXIS, ECG11: 50 DEGREES
CHLORIDE SERPL-SCNC: 107 MMOL/L (ref 98–107)
CO2 SERPL-SCNC: 27 MMOL/L (ref 21–32)
CREAT SERPL-MCNC: 0.86 MG/DL (ref 0.6–1)
DIAGNOSIS, 93000: NORMAL
DIFFERENTIAL METHOD BLD: ABNORMAL
EOSINOPHIL # BLD: 0.1 K/UL (ref 0–0.8)
EOSINOPHIL NFR BLD: 2 % (ref 0.5–7.8)
ERYTHROCYTE [DISTWIDTH] IN BLOOD BY AUTOMATED COUNT: 13.9 % (ref 11.9–14.6)
GLOBULIN SER CALC-MCNC: 3.4 G/DL (ref 2.3–3.5)
GLUCOSE SERPL-MCNC: 98 MG/DL (ref 65–100)
HCT VFR BLD AUTO: 35.6 % (ref 35.8–46.3)
HGB BLD-MCNC: 11.8 G/DL (ref 11.7–15.4)
IMM GRANULOCYTES # BLD AUTO: 0 K/UL (ref 0–0.5)
IMM GRANULOCYTES NFR BLD AUTO: 0 % (ref 0–5)
LIPASE SERPL-CCNC: 17 U/L (ref 73–393)
LYMPHOCYTES # BLD: 1.2 K/UL (ref 0.5–4.6)
LYMPHOCYTES NFR BLD: 22 % (ref 13–44)
MAGNESIUM SERPL-MCNC: 1.9 MG/DL (ref 1.8–2.4)
MCH RBC QN AUTO: 31.6 PG (ref 26.1–32.9)
MCHC RBC AUTO-ENTMCNC: 33.1 G/DL (ref 31.4–35)
MCV RBC AUTO: 95.4 FL (ref 79.6–97.8)
MONOCYTES # BLD: 0.4 K/UL (ref 0.1–1.3)
MONOCYTES NFR BLD: 8 % (ref 4–12)
NEUTS SEG # BLD: 3.9 K/UL (ref 1.7–8.2)
NEUTS SEG NFR BLD: 68 % (ref 43–78)
NRBC # BLD: 0 K/UL (ref 0–0.2)
P-R INTERVAL, ECG05: 184 MS
PLATELET # BLD AUTO: 167 K/UL (ref 150–450)
PMV BLD AUTO: 11.5 FL (ref 9.4–12.3)
POTASSIUM SERPL-SCNC: 4.7 MMOL/L (ref 3.5–5.1)
PROT SERPL-MCNC: 6.9 G/DL (ref 6.3–8.2)
Q-T INTERVAL, ECG07: 402 MS
QRS DURATION, ECG06: 80 MS
QTC CALCULATION (BEZET), ECG08: 421 MS
RBC # BLD AUTO: 3.73 M/UL (ref 4.05–5.2)
SODIUM SERPL-SCNC: 138 MMOL/L (ref 136–145)
TROPONIN-HIGH SENSITIVITY: 13.1 PG/ML (ref 0–14)
TROPONIN-HIGH SENSITIVITY: 13.5 PG/ML (ref 0–14)
VENTRICULAR RATE, ECG03: 66 BPM
WBC # BLD AUTO: 5.7 K/UL (ref 4.3–11.1)

## 2021-11-30 PROCEDURE — 93005 ELECTROCARDIOGRAM TRACING: CPT | Performed by: EMERGENCY MEDICINE

## 2021-11-30 PROCEDURE — 85025 COMPLETE CBC W/AUTO DIFF WBC: CPT

## 2021-11-30 PROCEDURE — 94762 N-INVAS EAR/PLS OXIMTRY CONT: CPT

## 2021-11-30 PROCEDURE — 83735 ASSAY OF MAGNESIUM: CPT

## 2021-11-30 PROCEDURE — 99285 EMERGENCY DEPT VISIT HI MDM: CPT

## 2021-11-30 PROCEDURE — 71046 X-RAY EXAM CHEST 2 VIEWS: CPT

## 2021-11-30 PROCEDURE — 80053 COMPREHEN METABOLIC PANEL: CPT

## 2021-11-30 PROCEDURE — 84484 ASSAY OF TROPONIN QUANT: CPT

## 2021-11-30 PROCEDURE — 83690 ASSAY OF LIPASE: CPT

## 2021-11-30 RX ORDER — SODIUM CHLORIDE 0.9 % (FLUSH) 0.9 %
5-10 SYRINGE (ML) INJECTION EVERY 8 HOURS
Status: DISCONTINUED | OUTPATIENT
Start: 2021-11-30 | End: 2021-11-30 | Stop reason: HOSPADM

## 2021-11-30 RX ORDER — SODIUM CHLORIDE 0.9 % (FLUSH) 0.9 %
5-10 SYRINGE (ML) INJECTION AS NEEDED
Status: DISCONTINUED | OUTPATIENT
Start: 2021-11-30 | End: 2021-11-30 | Stop reason: HOSPADM

## 2021-11-30 NOTE — ED NOTES
I have reviewed discharge instructions with the patient. The patient verbalized understanding. Patient left ED via Discharge Method: ambulatory to Home with (family). Opportunity for questions and clarification provided. Patient given 0 scripts. To continue your aftercare when you leave the hospital, you may receive an automated call from our care team to check in on how you are doing. This is a free service and part of our promise to provide the best care and service to meet your aftercare needs.  If you have questions, or wish to unsubscribe from this service please call [phone]. Thank you for Choosing our [facility].

## 2021-11-30 NOTE — ED PROVIDER NOTES
39-year-old lady presents with concerns about pain in her chest that started during an MRI. She was getting an MRI with and without contrast of her lumbar spine to evaluate issues with back pain. She said just as she was getting the IV contrast she began to develop a significant chest tightness. After the MRI she got into her car and tried to leave but the pain was too great for her to be able to drive adequately. She was then brought to our emergency department for further evaluation. She notes that she recently had a nuclear medicine stress test to evaluate for some chest pain related issues. The reading of that test is included below. · Baseline ECG: Normal sinus rhythm, non-specific ST-T wave abnormalities. · SPECT: Left ventricular function post-stress was normal. Calculated ejection fraction is 75% (normal EF value is equal to or greater than 50%). Left ventricular wall motion was normal at stress, no regional wall motion abnormality noted. There is no evidence of transient ischemic dilation (TID). · SPECT: Left ventricular perfusion is normal. Myocardial perfusion imaging supports a low risk stress test.    Patient says that with this she did not have any other symptoms including no nausea or vomiting. No fevers or chills. No cough or shortness of breath. She said that she was given some nitroglycerin. She notes that she now has a headache. She says that the chest pain is gone. No other associated symptoms. Elements of this note were created using speech recognition software. As such, errors of speech recognition may be present.            Past Medical History:   Diagnosis Date    Arthritis     osteo    Malagon's esophagus     Chest discomfort     Coronary atherosclerosis of native coronary vessel     Degeneration of lumbar or lumbosacral intervertebral disc     Depression     medication     Gastric ulcer, unspecified as acute or chronic, without mention of hemorrhage or perforation 1989    Hyperlipidemia     Hypothyroidism     no meds needed currently (low normal range per pt)    Infectious disease     lyme disease 2004    Nausea & vomiting     Palpitations     Scoliosis (and kyphoscoliosis), idiopathic     Spinal stenosis, lumbar 12/7/2009    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    Thoracic or lumbosacral neuritis or radiculitis, unspecified     Tricuspid regurgitation     mild per echo dated 1-11-13    Tricuspid valve disorder        Past Surgical History:   Procedure Laterality Date    HX APPENDECTOMY  1987    HX BREAST BIOPSY Left 1980    ENLARGED MILK GLAND    HX BUNIONECTOMY Bilateral 2009    with hammertoe correction    HX CARPAL TUNNEL RELEASE Right 10/31/13    HX HAMMER TOE REPAIR  2010    revision    HX HIP REPLACEMENT Left 2007    revision 3/2000    HX HIP REPLACEMENT Left 2009    revision    HX HYSTERECTOMY  1988    HX KNEE REPLACEMENT Right 2007    HX LUMBAR LAMINECTOMY  2009    L4-L5    HX ORTHOPAEDIC  2010    Right hand    HX SHOULDER ARTHROSCOPY  2013    left shoulder cyst         Family History:   Problem Relation Age of Onset    Heart Disease Mother         CONGESTIVE HEART DISEASE    Stroke Father     Hypertension Other     Stroke Other     Diabetes Other     Heart Disease Other     Asthma Other     Breast Cancer Neg Hx        Social History     Socioeconomic History    Marital status:      Spouse name: Not on file    Number of children: Not on file    Years of education: Not on file    Highest education level: Not on file   Occupational History    Not on file   Tobacco Use    Smoking status: Never Smoker    Smokeless tobacco: Never Used   Vaping Use    Vaping Use: Never used   Substance and Sexual Activity    Alcohol use: Not Currently     Comment: occ    Drug use: No    Sexual activity: Not Currently   Other Topics Concern    Not on file   Social History Narrative    Not on file     Social Determinants of Health     Financial Resource Strain:     Difficulty of Paying Living Expenses: Not on file   Food Insecurity:     Worried About Running Out of Food in the Last Year: Not on file    Kirk of Food in the Last Year: Not on file   Transportation Needs:     Lack of Transportation (Medical): Not on file    Lack of Transportation (Non-Medical): Not on file   Physical Activity:     Days of Exercise per Week: Not on file    Minutes of Exercise per Session: Not on file   Stress:     Feeling of Stress : Not on file   Social Connections:     Frequency of Communication with Friends and Family: Not on file    Frequency of Social Gatherings with Friends and Family: Not on file    Attends Mandaen Services: Not on file    Active Member of [address] [place] [address] [employer] or Organizations: Not on file    Attends Club or Organization Meetings: Not on file    Marital Status: Not on file   Intimate Partner Violence:     Fear of Current or Ex-Partner: Not on file    Emotionally Abused: Not on file    Physically Abused: Not on file    Sexually Abused: Not on file   Housing Stability:     Unable to Pay for Housing in the Last Year: Not on file    Number of [place] in the Last Year: Not on file    Unstable Housing in the Last Year: Not on file         ALLERGIES: Adhesive tape and Codeine    Review of Systems   Constitutional: Negative for chills, diaphoresis and fever. HENT: Negative for congestion, rhinorrhea and sore throat. Eyes: Negative for redness and visual disturbance. Respiratory: Positive for chest tightness. Negative for cough, shortness of breath and wheezing. Cardiovascular: Negative for chest pain and palpitations. Gastrointestinal: Negative for abdominal pain, blood in stool, diarrhea, nausea and vomiting. Endocrine: Negative for polydipsia and polyuria. Genitourinary: Negative for dysuria and hematuria. Musculoskeletal: Negative for arthralgias, myalgias and neck stiffness. Skin: Negative for rash. Allergic/Immunologic: Negative for environmental allergies and food allergies. Neurological: Positive for headaches. Negative for dizziness and weakness. Hematological: Negative for adenopathy. Does not bruise/bleed easily. Psychiatric/Behavioral: Negative for confusion and sleep disturbance. The patient is not nervous/anxious. Vitals:    11/30/21 1310   BP: 134/62   Pulse: 70   Resp: 18   Temp: 98.8 °F (37.1 °C)   SpO2: 96%   Weight: 70.3 kg (155 lb)   Height: 5' 4\" (1.626 m)            Physical Exam  Vitals and nursing note reviewed. Constitutional:       General: She is not in acute distress. Appearance: She is well-developed. She is not toxic-appearing. HENT:      Head: Normocephalic and atraumatic. Eyes:      General: No scleral icterus. Right eye: No discharge. Left eye: No discharge. Conjunctiva/sclera: Conjunctivae normal.      Pupils: Pupils are equal, round, and reactive to light. Cardiovascular:      Rate and Rhythm: Normal rate and regular rhythm. Heart sounds: Normal heart sounds. Pulmonary:      Effort: Pulmonary effort is normal. No respiratory distress. Breath sounds: Normal breath sounds. No wheezing or rales. Chest:      Chest wall: No tenderness. Abdominal:      General: Bowel sounds are normal. There is no distension. Palpations: Abdomen is soft. Tenderness: There is no guarding or rebound. Musculoskeletal:         General: No tenderness. Normal range of motion. Cervical back: Normal range of motion. No rigidity. Lymphadenopathy:      Cervical: No cervical adenopathy. Skin:     General: Skin is warm and dry. Neurological:      General: No focal deficit present. Mental Status: She is alert and oriented to person, place, and time.    Psychiatric:         Mood and Affect: Mood normal.         Behavior: Behavior normal.          MDM  Number of Diagnoses or Management Options  Diagnosis management comments: Patient's exam is unremarkable. I will do a cardiac evaluation given her symptoms. She did have a low risk stress test which is reassuring. EKG review by ER doctor:  Normal sinus rhythm  No acute ischemic ST segment changes  No QTC prolongation  Rate of: 66    ED Course as of 11/30/21 1639   Tue Nov 30, 2021   1638 Patient's repeat troponin is negative. I do not think she is having an acute coronary syndrome.   I will discharge her home. [AC]      ED Course User Index  [AC] Huyen Benavides MD       Procedures

## 2021-11-30 NOTE — ED TRIAGE NOTES
Pt brought in by EMS from MD office. Pt had an MRI today and began to have left sided chest pressure as they continued MRI with contrast her chest pressure got worse. Pt walked out to her vehicle and called the ambulance. EMS gave her 324 aspirin, 2 nitro, 18G PIV, EKG (sinus rhythm).    VS: 129/75; p 70, rr 16, 100% RA, , Temp 97.9

## 2022-01-26 ENCOUNTER — HOSPITAL ENCOUNTER (OUTPATIENT)
Dept: LAB | Age: 82
Discharge: HOME | End: 2022-01-26
Payer: MEDICARE

## 2022-01-26 LAB
BASOPHILS # BLD: 0.1 K/UL (ref 0–0.2)
BASOPHILS NFR BLD: 1 % (ref 0–2)
CRP SERPL-MCNC: <0.3 MG/DL (ref 0–0.9)
DIFFERENTIAL METHOD BLD: ABNORMAL
EOSINOPHIL # BLD: 0.1 K/UL (ref 0–0.8)
EOSINOPHIL NFR BLD: 2 % (ref 0.5–7.8)
ERYTHROCYTE [DISTWIDTH] IN BLOOD BY AUTOMATED COUNT: 14.2 % (ref 11.9–14.6)
ERYTHROCYTE [SEDIMENTATION RATE] IN BLOOD: 3 MM/HR (ref 0–30)
HCT VFR BLD AUTO: 37.9 % (ref 35.8–46.3)
HGB BLD-MCNC: 12.3 G/DL (ref 11.7–15.4)
IMM GRANULOCYTES # BLD AUTO: 0 K/UL (ref 0–0.5)
IMM GRANULOCYTES NFR BLD AUTO: 0 % (ref 0–5)
LYMPHOCYTES # BLD: 1.3 K/UL (ref 0.5–4.6)
LYMPHOCYTES NFR BLD: 21 % (ref 13–44)
MCH RBC QN AUTO: 31.8 PG (ref 26.1–32.9)
MCHC RBC AUTO-ENTMCNC: 32.5 G/DL (ref 31.4–35)
MCV RBC AUTO: 97.9 FL (ref 79.6–97.8)
MONOCYTES # BLD: 0.4 K/UL (ref 0.1–1.3)
MONOCYTES NFR BLD: 7 % (ref 4–12)
NEUTS SEG # BLD: 4.1 K/UL (ref 1.7–8.2)
NEUTS SEG NFR BLD: 69 % (ref 43–78)
NRBC # BLD: 0 K/UL (ref 0–0.2)
PLATELET # BLD AUTO: 269 K/UL (ref 150–450)
PMV BLD AUTO: 11.1 FL (ref 9.4–12.3)
RBC # BLD AUTO: 3.87 M/UL (ref 4.05–5.2)
WBC # BLD AUTO: 6 K/UL (ref 4.3–11.1)

## 2022-01-26 PROCEDURE — 36415 COLL VENOUS BLD VENIPUNCTURE: CPT

## 2022-01-26 PROCEDURE — 86140 C-REACTIVE PROTEIN: CPT

## 2022-01-26 PROCEDURE — 85652 RBC SED RATE AUTOMATED: CPT

## 2022-01-26 PROCEDURE — 85025 COMPLETE CBC W/AUTO DIFF WBC: CPT

## 2022-02-01 ENCOUNTER — HOSPITAL ENCOUNTER (OUTPATIENT)
Dept: GENERAL RADIOLOGY | Age: 82
Discharge: HOME | End: 2022-02-01
Attending: ORTHOPAEDIC SURGERY
Payer: MEDICARE

## 2022-02-01 VITALS
HEART RATE: 89 BPM | OXYGEN SATURATION: 98 % | DIASTOLIC BLOOD PRESSURE: 70 MMHG | TEMPERATURE: 97.9 F | RESPIRATION RATE: 22 BRPM | SYSTOLIC BLOOD PRESSURE: 132 MMHG

## 2022-02-01 DIAGNOSIS — T84.84XA PAIN DUE TO INTERNAL ORTHOPEDIC PROSTHETIC DEVICES, IMPLANTS AND GRAFTS, INITIAL ENCOUNTER (HCC): ICD-10-CM

## 2022-02-01 DIAGNOSIS — Z96.611 PRESENCE OF RIGHT ARTIFICIAL SHOULDER JOINT: ICD-10-CM

## 2022-02-01 PROCEDURE — 74011000250 HC RX REV CODE- 250: Performed by: ORTHOPAEDIC SURGERY

## 2022-02-01 PROCEDURE — 20610 DRAIN/INJ JOINT/BURSA W/O US: CPT

## 2022-02-01 RX ORDER — LIDOCAINE HYDROCHLORIDE 20 MG/ML
0.2 INJECTION, SOLUTION INFILTRATION; PERINEURAL
Status: COMPLETED | OUTPATIENT
Start: 2022-02-01 | End: 2022-02-01

## 2022-02-01 RX ADMIN — LIDOCAINE HYDROCHLORIDE 3 ML: 20 INJECTION, SOLUTION INFILTRATION; PERINEURAL at 11:59

## 2022-02-04 ENCOUNTER — HOSPITAL ENCOUNTER (OUTPATIENT)
Dept: CT IMAGING | Age: 82
Discharge: HOME | End: 2022-02-04
Attending: INTERNAL MEDICINE
Payer: MEDICARE

## 2022-02-04 DIAGNOSIS — R91.1 LUNG NODULE: ICD-10-CM

## 2022-02-04 LAB — CREAT BLD-MCNC: 0.88 MG/DL (ref 0.8–1.5)

## 2022-02-04 PROCEDURE — 74011000636 HC RX REV CODE- 636: Performed by: INTERNAL MEDICINE

## 2022-02-04 PROCEDURE — 71260 CT THORAX DX C+: CPT

## 2022-02-04 PROCEDURE — 74011000258 HC RX REV CODE- 258: Performed by: INTERNAL MEDICINE

## 2022-02-04 PROCEDURE — 82565 ASSAY OF CREATININE: CPT

## 2022-02-04 RX ORDER — SODIUM CHLORIDE 0.9 % (FLUSH) 0.9 %
10 SYRINGE (ML) INJECTION
Status: COMPLETED | OUTPATIENT
Start: 2022-02-04 | End: 2022-02-04

## 2022-02-04 RX ADMIN — Medication 10 ML: at 11:59

## 2022-02-04 RX ADMIN — IOPAMIDOL 80 ML: 755 INJECTION, SOLUTION INTRAVENOUS at 11:59

## 2022-02-04 RX ADMIN — SODIUM CHLORIDE 100 ML: 9 INJECTION, SOLUTION INTRAVENOUS at 11:59

## 2022-03-18 PROBLEM — I73.9 PVD (PERIPHERAL VASCULAR DISEASE) (HCC): Status: ACTIVE | Noted: 2018-12-06

## 2022-03-19 PROBLEM — R00.2 PALPITATION: Status: ACTIVE | Noted: 2019-07-03

## 2022-03-19 PROBLEM — Z86.16 HISTORY OF COVID-19: Status: ACTIVE | Noted: 2021-01-28

## 2022-03-19 PROBLEM — I77.9 BILATERAL CAROTID ARTERY DISEASE (HCC): Status: ACTIVE | Noted: 2018-03-21

## 2022-03-19 PROBLEM — R06.02 SHORTNESS OF BREATH: Status: ACTIVE | Noted: 2017-01-12

## 2022-06-20 ENCOUNTER — OFFICE VISIT (OUTPATIENT)
Dept: CARDIOLOGY CLINIC | Age: 82
End: 2022-06-20
Payer: MEDICARE

## 2022-06-20 VITALS
SYSTOLIC BLOOD PRESSURE: 110 MMHG | HEIGHT: 64 IN | DIASTOLIC BLOOD PRESSURE: 60 MMHG | WEIGHT: 153.8 LBS | HEART RATE: 68 BPM | BODY MASS INDEX: 26.26 KG/M2

## 2022-06-20 DIAGNOSIS — I65.23 BILATERAL CAROTID ARTERY STENOSIS: ICD-10-CM

## 2022-06-20 DIAGNOSIS — I10 HYPERTENSION, BENIGN: ICD-10-CM

## 2022-06-20 DIAGNOSIS — I25.10 ATHEROSCLEROSIS OF NATIVE CORONARY ARTERY OF NATIVE HEART WITHOUT ANGINA PECTORIS: ICD-10-CM

## 2022-06-20 DIAGNOSIS — I73.9 PVD (PERIPHERAL VASCULAR DISEASE) (HCC): Primary | ICD-10-CM

## 2022-06-20 PROCEDURE — 1123F ACP DISCUSS/DSCN MKR DOCD: CPT | Performed by: INTERNAL MEDICINE

## 2022-06-20 PROCEDURE — G8427 DOCREV CUR MEDS BY ELIG CLIN: HCPCS | Performed by: INTERNAL MEDICINE

## 2022-06-20 PROCEDURE — G8400 PT W/DXA NO RESULTS DOC: HCPCS | Performed by: INTERNAL MEDICINE

## 2022-06-20 PROCEDURE — G8417 CALC BMI ABV UP PARAM F/U: HCPCS | Performed by: INTERNAL MEDICINE

## 2022-06-20 PROCEDURE — 99214 OFFICE O/P EST MOD 30 MIN: CPT | Performed by: INTERNAL MEDICINE

## 2022-06-20 PROCEDURE — 1090F PRES/ABSN URINE INCON ASSESS: CPT | Performed by: INTERNAL MEDICINE

## 2022-06-20 PROCEDURE — 1036F TOBACCO NON-USER: CPT | Performed by: INTERNAL MEDICINE

## 2022-07-25 ENCOUNTER — TELEPHONE (OUTPATIENT)
Dept: ORTHOPEDIC SURGERY | Age: 82
End: 2022-07-25

## 2022-07-28 NOTE — TELEPHONE ENCOUNTER
Called and spoke to pt who has already been scheduled in late August for appt. Advised her that we need clearance from Dr. Kim Edwards in writing and pt will contact her office to get that. Pt agreed with plan.

## 2022-07-29 ENCOUNTER — TELEPHONE (OUTPATIENT)
Dept: ORTHOPEDIC SURGERY | Age: 82
End: 2022-07-29

## 2022-07-29 ENCOUNTER — HOSPITAL ENCOUNTER (EMERGENCY)
Age: 82
Discharge: HOME | End: 2022-07-29
Attending: EMERGENCY MEDICINE
Payer: MEDICARE

## 2022-07-29 ENCOUNTER — HOSPITAL ENCOUNTER (EMERGENCY)
Dept: GENERAL RADIOLOGY | Age: 82
Discharge: HOME | End: 2022-08-01
Payer: MEDICARE

## 2022-07-29 VITALS
HEIGHT: 64 IN | WEIGHT: 153 LBS | DIASTOLIC BLOOD PRESSURE: 61 MMHG | HEART RATE: 78 BPM | RESPIRATION RATE: 18 BRPM | BODY MASS INDEX: 26.12 KG/M2 | TEMPERATURE: 98 F | OXYGEN SATURATION: 95 % | SYSTOLIC BLOOD PRESSURE: 115 MMHG

## 2022-07-29 DIAGNOSIS — S42.201A CLOSED FRACTURE OF PROXIMAL END OF RIGHT HUMERUS, UNSPECIFIED FRACTURE MORPHOLOGY, INITIAL ENCOUNTER: Primary | ICD-10-CM

## 2022-07-29 PROCEDURE — 73030 X-RAY EXAM OF SHOULDER: CPT

## 2022-07-29 PROCEDURE — 99283 EMERGENCY DEPT VISIT LOW MDM: CPT

## 2022-07-29 RX ORDER — TRAMADOL HYDROCHLORIDE 50 MG/1
50 TABLET ORAL EVERY 8 HOURS PRN
Qty: 9 TABLET | Refills: 0 | Status: SHIPPED | OUTPATIENT
Start: 2022-07-29 | End: 2022-08-01

## 2022-07-29 ASSESSMENT — PAIN SCALES - GENERAL
PAINLEVEL_OUTOF10: 5
PAINLEVEL_OUTOF10: 5

## 2022-07-29 ASSESSMENT — ENCOUNTER SYMPTOMS
NAUSEA: 0
SHORTNESS OF BREATH: 0
COLOR CHANGE: 0

## 2022-07-29 ASSESSMENT — PAIN - FUNCTIONAL ASSESSMENT: PAIN_FUNCTIONAL_ASSESSMENT: 0-10

## 2022-07-29 NOTE — ED NOTES
I have reviewed discharge instructions with the patient. The patient verbalized understanding. Patient left ED via Discharge Method: wheelchair to Home with (insert name of family/friend, self, transport sister). Opportunity for questions and clarification provided. Patient given 1 scripts. To continue your aftercare when you leave the hospital, you may receive an automated call from our care team to check in on how you are doing. This is a free service and part of our promise to provide the best care and service to meet your aftercare needs.  If you have questions, or wish to unsubscribe from this service please call [phone]. Thank you for Choosing our [facility] Emergency Department.        Nikhil Gramajo RN  07/29/22 1610

## 2022-07-29 NOTE — DISCHARGE INSTRUCTIONS
Call [facility] orthopedics on Monday to get an appointment as soon as possible for follow-up. Use sling for immobilization over the weekend. Apply ice to help with pain or swelling. Use tramadol as needed for severe pain, use your meloxicam for mild pain. Return to the ER for any new or worsening symptoms.

## 2022-07-29 NOTE — ED TRIAGE NOTES
Patient presents complaining of difficulty lifting right arm due to shoulder pain since this morning. Denies new trauma/injury. Reports history of shoulder issues and waiting on shoulder replacement.

## 2022-07-29 NOTE — TELEPHONE ENCOUNTER
Called and spoke to pt who states that overnight, her shoulder became more painful with pain radiating to the elbow and swelling in her hand. Pt states she has taken tramadol and meloxicam and is icing her shoulder, but wants to know if there is anything else she can do. Pt is already scheduled for appt. I advised her that if pain becomes unbearable or she has other concerning symptoms to go to the ER and she agreed. Message sent for Dr. Alessia Coronado to review for recommendation.

## 2022-07-29 NOTE — TELEPHONE ENCOUNTER
Called and spoke to the pt who states her shoulder pain has gotten worse. She is currently waiting for her sister who will take her to [facility] ER to be evaluated. I called and spoke to [facility] and notified her and Dr. Alessia Coronado that pt is going to ER.

## 2022-07-29 NOTE — TELEPHONE ENCOUNTER
Pt called and asked to speak to Quinton Tejada about her shoulder. She had a lot of pain overnight and needs to know what she can do. Please call pt.

## 2022-07-29 NOTE — ED PROVIDER NOTES
[facility] Emergency Department Provider Note                   PCP:                SHAVON Bear NP               Age: 78 y.o. Sex: female       ICD-10-CM    1. Closed fracture of proximal end of right humerus, unspecified fracture morphology, initial encounter  S42.201A traMADol (ULTRAM) 50 MG tablet          DISPOSITION Decision To Discharge 07/29/2022 05:34:37 PM        MDM  Number of Diagnoses or Management Options  Closed fracture of proximal end of right humerus, unspecified fracture morphology, initial encounter  Diagnosis management comments: DDX: Strain, arthritis, chronic pain, cellulitis, septic arthritis, dislocation, fracture    Overall patient is a well-appearing 47-year-old female presenting today for acute on chronic right shoulder pain. She is scheduled to have shoulder revision surgery in 1 month. On exam there is no reproducible tenderness but she does have decreased range of motion in all directions due to pain. X-ray today shows a new proximal humerus fracture. Discussed with on-call orthopedics, patient will be placed in a sling over the weekend and will follow-up with [facility] orthopedics next week for follow-up. Amount and/or Complexity of Data Reviewed  Tests in the radiology section of CPT®: ordered and reviewed    Patient Progress  Patient progress: stable       Orders Placed This Encounter   Procedures    XR SHOULDER RIGHT (MIN 2 VIEWS)    Sling        Alberto Ruffin is a 78 y.o. female who presents to the Emergency Department with chief complaint of    Chief Complaint   Patient presents with    Shoulder Pain     Patient presents complaining of difficulty lifting right arm due to shoulder pain since this morning. Denies new trauma/injury. Reports history of shoulder issues and waiting on shoulder replacement. 47-year-old well-appearing female presents today for evaluation of right shoulder pain.   She states that this has been somewhat chronic for her since having a total shoulder replacement several years ago. She states that she has had progressively worsening pain and is scheduled to have a revision with Dr. Nelia Collins next month. She states that over the last week she has had an increase in her pain, but woke up this morning and the pain was significantly worse. She also reports decreased range of motion. She denies any numbness or tingling in the arm or hand, no decreased  strength. She denies having any specific injury or trauma over the past week that would explain the worsening pain today. The history is provided by the patient. No  was used. Review of Systems   Constitutional:  Negative for activity change, chills and fever. Respiratory:  Negative for shortness of breath. Cardiovascular:  Negative for chest pain. Gastrointestinal:  Negative for nausea. Musculoskeletal:  Negative for neck pain. Right shoulder pain    Skin:  Negative for color change. Neurological:  Negative for weakness and numbness.      Past Medical History:   Diagnosis Date    Arthritis     osteo    Vazquez's esophagus     Chest discomfort     Coronary atherosclerosis of native coronary vessel     Degeneration of lumbar or lumbosacral intervertebral disc     Depression     medication     Gastric ulcer, unspecified as acute or chronic, without mention of hemorrhage or perforation 1989    Hyperlipidemia     Hypothyroidism     no meds needed currently (low normal range per pt)    Infectious disease     lyme disease 2004    Nausea & vomiting     Palpitations     Scoliosis (and kyphoscoliosis), idiopathic     Spinal stenosis, lumbar 12/7/2009    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    Thoracic or lumbosacral neuritis or radiculitis, unspecified     Tricuspid regurgitation     mild per echo dated 1-11-13    Tricuspid valve disorder         Past Surgical History:   Procedure Laterality Date    formerly [facility] ENLARGED MILK GLAND    BUNIONECTOMY Bilateral 2009    with hammertoe correction    CARPAL TUNNEL RELEASE Right 10/31/13    HAMMER TOE SURGERY  2010    revision    HYSTERECTOMY (CERVIX STATUS UNKNOWN)  1988    LUMBAR LAMINECTOMY  2009    L4-L5    ORTHOPEDIC SURGERY  2010    Right hand    SHOULDER ARTHROSCOPY  2013    left shoulder cyst    TOTAL HIP ARTHROPLASTY Left 2007    revision 3/2000    TOTAL HIP ARTHROPLASTY Left 2009    revision    TOTAL KNEE ARTHROPLASTY Right 2007        Family History   Problem Relation Age of Onset    Stroke Other     Diabetes Other     Heart Disease Other     Asthma Other     Breast Cancer Neg Hx     Heart Disease Mother         CONGESTIVE HEART DISEASE    Stroke Father     Hypertension Other         Social History     Socioeconomic History    Marital status:    Tobacco Use    Smoking status: Never    Smokeless tobacco: Never   Substance and Sexual Activity    Alcohol use: Not Currently    Drug use: No         Adhesive tape and Codeine     Previous Medications    CETIRIZINE (ZYRTEC) 10 MG TABLET    Take 10 mg by mouth daily    FLUTICASONE (FLONASE) 50 MCG/ACT NASAL SPRAY    2 sprays by Nasal route daily    LOSARTAN (COZAAR) 50 MG TABLET    Take 50 mg by mouth daily    MELOXICAM (MOBIC) 15 MG TABLET    Take 15 mg by mouth daily    OMEPRAZOLE (PRILOSEC) 20 MG DELAYED RELEASE CAPSULE    Take 20 mg by mouth 2 times daily    TRAMADOL (ULTRAM) 50 MG TABLET    Take 50 mg by mouth every 6 hours as needed. Vitals signs and nursing note reviewed. Patient Vitals for the past 4 hrs:   Temp Pulse Resp BP SpO2   07/29/22 1522 98.2 °F (36.8 °C) 75 18 110/67 94 %          Physical Exam  Vitals and nursing note reviewed. Constitutional:       General: She is not in acute distress. Appearance: Normal appearance. HENT:      Head: Normocephalic and atraumatic. Mouth/Throat:      Mouth: Mucous membranes are moist.      Pharynx: Oropharynx is clear. No oropharyngeal exudate. Eyes:      Conjunctiva/sclera: Conjunctivae normal.   Cardiovascular:      Rate and Rhythm: Normal rate and regular rhythm. Pulmonary:      Effort: Pulmonary effort is normal.      Breath sounds: No wheezing, rhonchi or rales. Musculoskeletal:      Right shoulder: No deformity or bony tenderness. Decreased range of motion (severe in all directions 2/2 pain). Right hand: Normal strength. Normal capillary refill. Normal pulse. Skin:     General: Skin is warm and dry. Capillary Refill: Capillary refill takes less than 2 seconds. Neurological:      General: No focal deficit present. Mental Status: She is alert and oriented to person, place, and time. Psychiatric:         Mood and Affect: Mood normal.         Thought Content: Thought content normal.         Judgment: Judgment normal.        Procedures      Labs Reviewed - No data to display     XR SHOULDER RIGHT (MIN 2 VIEWS)   Final Result   New fracture extending to the lateral aspect of the proximal humeral   diaphysis. ED Course as of 07/29/22 1801 Fri Jul 29, 2022   1619   FINDINGS: Comparison is made to a prior fluoroscopy images dated 3/12/2020. There is a new fracture extending through the lateral aspect of the proximal  humeral diaphysis in this patient with right total shoulder arthroplasty. IMPRESSION:  New fracture extending to the lateral aspect of the proximal humeral  diaphysis. [KE]   1104 Perfect serve message sent to on call orthopedic regarding XR findings [KE]      ED Course User Index  [KE] DAYDAY Davies        Voice dictation software was used during the making of this note. This software is not perfect and grammatical and other typographical errors may be present. This note has not been completely proofread for errors.         Shreya Parker, [address]  07/29/22 1801

## 2022-08-01 ENCOUNTER — TELEPHONE (OUTPATIENT)
Dept: ORTHOPEDIC SURGERY | Age: 82
End: 2022-08-01

## 2022-08-01 DIAGNOSIS — S42.331A CLOSED DISPLACED OBLIQUE FRACTURE OF SHAFT OF RIGHT HUMERUS, INITIAL ENCOUNTER: ICD-10-CM

## 2022-08-01 DIAGNOSIS — Z48.89 ENCOUNTER FOR POSTOPERATIVE CARE: Primary | ICD-10-CM

## 2022-08-01 RX ORDER — OXYCODONE HYDROCHLORIDE 5 MG/1
5 TABLET ORAL EVERY 6 HOURS PRN
Qty: 40 TABLET | Refills: 0 | Status: SHIPPED | OUTPATIENT
Start: 2022-08-01 | End: 2022-08-11

## 2022-08-01 NOTE — TELEPHONE ENCOUNTER
Carl Werner pt  spoke  with  you  this  past  Friday  she  did  go  on  to  the  ER  she  has  a fx  arm and  left  a VM    When can  she  be  seen?

## 2022-08-09 ENCOUNTER — OFFICE VISIT (OUTPATIENT)
Dept: ORTHOPEDIC SURGERY | Age: 82
End: 2022-08-09
Payer: MEDICARE

## 2022-08-09 DIAGNOSIS — Z09 FOLLOW-UP EXAMINATION AFTER ORTHOPEDIC SURGERY: ICD-10-CM

## 2022-08-09 DIAGNOSIS — Z96.611 PRESENCE OF RIGHT ARTIFICIAL SHOULDER JOINT: ICD-10-CM

## 2022-08-09 DIAGNOSIS — S42.251A: Primary | ICD-10-CM

## 2022-08-09 DIAGNOSIS — M97.31XA PERIPROSTHETIC FRACTURE AROUND INTERNAL PROSTHETIC RIGHT SHOULDER JOINT, INITIAL ENCOUNTER: ICD-10-CM

## 2022-08-09 PROCEDURE — G8400 PT W/DXA NO RESULTS DOC: HCPCS | Performed by: ORTHOPAEDIC SURGERY

## 2022-08-09 PROCEDURE — 1123F ACP DISCUSS/DSCN MKR DOCD: CPT | Performed by: ORTHOPAEDIC SURGERY

## 2022-08-09 PROCEDURE — G8427 DOCREV CUR MEDS BY ELIG CLIN: HCPCS | Performed by: ORTHOPAEDIC SURGERY

## 2022-08-09 PROCEDURE — 99215 OFFICE O/P EST HI 40 MIN: CPT | Performed by: ORTHOPAEDIC SURGERY

## 2022-08-09 PROCEDURE — G8417 CALC BMI ABV UP PARAM F/U: HCPCS | Performed by: ORTHOPAEDIC SURGERY

## 2022-08-09 PROCEDURE — 1036F TOBACCO NON-USER: CPT | Performed by: ORTHOPAEDIC SURGERY

## 2022-08-09 PROCEDURE — 1090F PRES/ABSN URINE INCON ASSESS: CPT | Performed by: ORTHOPAEDIC SURGERY

## 2022-08-09 NOTE — PROGRESS NOTES
Progress Notes by Ran Victor MD at 03/22/22 [address]                    Author: Ran Victor MD  Service: --  Author Type: Physician       Filed: 03/22/22 1444  Encounter Date: 3/22/2022  Status: Signed          : Ran Victor MD (Physician)                            Name: Eda Closs   YOB: 1940   Gender: female   MRN: [id number]     What: Right shoulder pain  How: Insidious onset   when: July 29, 2022               HPI: Eda Closs is a 78  y.o. right-hand-dominant female seen for right shoulder problems. She presented to the ER on 7/29/2022 with new onset severe right shoulder pain. Radiographs demonstrated a periprosthetic right proximal humeral fracture. She is here for follow-up exam.  She cannot raise the arm in all      ROS/Meds/PSH/PMH/FH/SH: A ten system review of systems was performed and is negative other than what is in the HPI. Tobacco:  reports that she has never smoked. She has never used smokeless tobacco.   There were no vitals taken for this visit. Physical Examination:   She is awake alert female ambulating without difficulty   She is guarding her right arm at her side      Her right shoulder has well-healed deltopectoral incision   No erythema  Exam is limited due to pain. She does appear neurovascularly intact of the exam is significantly limited      Her left shoulder has 0 to 140 degrees of active and passive forward elevation   IR to T12   ER to 30 degrees   Global left shoulder pain   She is neurovascularly intact      Data Reviewed:       XR: AP Y axillary views right shoulder   Clinical Indication    ICD-10-CM    1. Traumatic closed displaced fracture of greater tuberosity of humerus, right, initial encounter  S42.251A       2. Periprosthetic fracture around internal prosthetic right shoulder joint, initial encounter  M97. 31XA       3.  Presence of right artificial shoulder joint  Z96.611 XR SHOULDER RIGHT (MIN 2 VIEWS)      4. Follow-up examination after orthopedic surgery  Z09 XR SHOULDER RIGHT (MIN 2 VIEWS)         Report: AP Y axillary views right shoulder demonstrate a periprosthetic right proximal humerus fracture involving greater tuberosity and extending into the humeral shaft. There is a reverse prosthesis in place. The the glenoid component is in a superior position. Marked glenoid wear    Impression: As above   Kita Melton MD                 Impression:      1. Traumatic closed displaced fracture of greater tuberosity of humerus, right, initial encounter    2. Periprosthetic fracture around internal prosthetic right shoulder joint, initial encounter    3. Presence of right artificial shoulder joint    4. Follow-up examination after orthopedic surgery             Painful reverse right total shoulder arthroplasty with glenoid bone loss and medialization   Rule out periprosthetic infection status post reverse right total shoulder arthroplasty   Rule out periprosthetic loosening   Status post reverse right total shoulder arthroplasty by Dr. Lyudmila Isaac for years   Rotator cuff tear arthropathy left shoulder   2 previous surgical procedures by Dr. Jay Smith on the left shoulder   Sternoclavicular osteoarthritis both shoulders   Source bolus multiple levels   Status post primary right total hip arthroplasty   Status post revision left total hip arthroplasty   Status post primary left total hip arthroplasty   Status post right total knee arthroplasty   History of low back surgery   Chronic low back pain   Hypercholesterolemia   Coronary disease on aspirin   Systemic osteoarthritis   Status post previous hand surgery with osteoarthritis      Plan:    I discussed the problem with the patient. I discussed nonoperative versus operative intervention including injections. Her right shoulder is giving her trouble. She now has a new periprosthetic fracture.   She wants to proceed with revision surgery I think that is reasonable. She would be a candidate for I&D removal of implant right shoulder reverse right total shoulder arthroplasty followed by a revision reverse right total shoulder arthroplasty most likely with a longstem delta xtend prosthesis in combination with latissimus dorsi and teres major tendon transfer. This would be performed utilizing general anesthesia with interscalene block and I keep her at a minimum overnight 23 hours for observation. I would hold antibiotics. we will get frozen section and cultures. she get a Beasley catheter. She would get weight adjusted vancomycin. If there was any evidence of infection she would get a two-stage revision with a remedy cement spacer and antibiotic beads along with 6 weeks of IV antibiotics through a PICC line followed by a second stage revision. I would place a PPD and consider a short-term rehab stay if necessary. I discussed the risks and benefits of the procedure with her and expected outcomes. I discussed the complexity of revision right shoulder surgery. She is a multi operated shoulder. She does require cardiac clearance before proceeding as above. Again I discussed risks and benefits in detail and we will proceed as outlined above      5.   Chronic illness with severe exacerbation      Follow up:      S42.251  M97.31x  T84.84x  Z96.611        I would use the delta xtend center screw  She currently has a [place] implant in place  12 mm stem 32 cup      Mariia Spears MD

## 2022-08-10 ENCOUNTER — TELEPHONE (OUTPATIENT)
Dept: ORTHOPEDIC SURGERY | Age: 82
End: 2022-08-10

## 2022-08-10 ENCOUNTER — TELEPHONE (OUTPATIENT)
Dept: CARDIOLOGY CLINIC | Age: 82
End: 2022-08-10

## 2022-08-10 DIAGNOSIS — M97.31XA PERIPROSTHETIC FRACTURE AROUND INTERNAL PROSTHETIC RIGHT SHOULDER JOINT, INITIAL ENCOUNTER: Primary | ICD-10-CM

## 2022-08-10 PROBLEM — T84.84XA PAIN DUE TO RIGHT SHOULDER JOINT PROSTHESIS (HCC): Status: ACTIVE | Noted: 2022-08-10

## 2022-08-10 PROBLEM — S42.251A: Status: ACTIVE | Noted: 2022-08-10

## 2022-08-10 PROBLEM — Z96.611 PRESENCE OF RIGHT ARTIFICIAL SHOULDER JOINT: Status: ACTIVE | Noted: 2022-08-10

## 2022-08-10 PROBLEM — Z96.611 PAIN DUE TO RIGHT SHOULDER JOINT PROSTHESIS (HCC): Status: ACTIVE | Noted: 2022-08-10

## 2022-08-10 NOTE — TELEPHONE ENCOUNTER
Cardiac Clearance        Physician or [address]  Contact Phone Number: [phone]  Fax Number: [phone]  Date of Surgery/Procedure: TBD  Type of Surgery or Procedure: right shoulder revision, removal of Implants. placement of new depuy implants  Type of Anesthesia: general with interscalene block  Type of Clearance Requested: cardiac clearance  Medication to Hold:?  Days to Hold: ?

## 2022-08-11 ENCOUNTER — PREP FOR PROCEDURE (OUTPATIENT)
Dept: ORTHOPEDIC SURGERY | Age: 82
End: 2022-08-11

## 2022-08-11 DIAGNOSIS — M97.31XA PERIPROSTHETIC FRACTURE AROUND INTERNAL PROSTHETIC RIGHT SHOULDER JOINT, INITIAL ENCOUNTER: Primary | ICD-10-CM

## 2022-08-11 RX ORDER — SODIUM CHLORIDE 9 MG/ML
INJECTION, SOLUTION INTRAVENOUS PRN
Status: CANCELLED | OUTPATIENT
Start: 2022-08-11

## 2022-08-11 RX ORDER — SODIUM CHLORIDE 0.9 % (FLUSH) 0.9 %
5-40 SYRINGE (ML) INJECTION PRN
Status: CANCELLED | OUTPATIENT
Start: 2022-08-11

## 2022-08-11 RX ORDER — SODIUM CHLORIDE 0.9 % (FLUSH) 0.9 %
5-40 SYRINGE (ML) INJECTION EVERY 12 HOURS SCHEDULED
Status: CANCELLED | OUTPATIENT
Start: 2022-08-11

## 2022-08-12 ENCOUNTER — HOSPITAL ENCOUNTER (OUTPATIENT)
Dept: SURGERY | Age: 82
Discharge: HOME | End: 2022-08-15
Payer: MEDICARE

## 2022-08-12 VITALS
TEMPERATURE: 97.6 F | SYSTOLIC BLOOD PRESSURE: 115 MMHG | HEART RATE: 76 BPM | WEIGHT: 151.5 LBS | HEIGHT: 62 IN | DIASTOLIC BLOOD PRESSURE: 78 MMHG | BODY MASS INDEX: 27.88 KG/M2 | OXYGEN SATURATION: 98 %

## 2022-08-12 DIAGNOSIS — M97.31XA PERIPROSTHETIC FRACTURE AROUND INTERNAL PROSTHETIC RIGHT SHOULDER JOINT, INITIAL ENCOUNTER: ICD-10-CM

## 2022-08-12 LAB
ANION GAP SERPL CALC-SCNC: 2 MMOL/L (ref 7–16)
APPEARANCE UR: CLEAR
APTT PPP: 35.3 SEC (ref 24.1–35.1)
BACTERIA SPEC CULT: NORMAL
BASOPHILS # BLD: 0 K/UL (ref 0–0.2)
BASOPHILS NFR BLD: 1 % (ref 0–2)
BILIRUB UR QL: NEGATIVE
BUN SERPL-MCNC: 17 MG/DL (ref 8–23)
CALCIUM SERPL-MCNC: 9.3 MG/DL (ref 8.3–10.4)
CHLORIDE SERPL-SCNC: 105 MMOL/L (ref 98–107)
CO2 SERPL-SCNC: 31 MMOL/L (ref 21–32)
COLOR UR: NORMAL
CREAT SERPL-MCNC: 0.99 MG/DL (ref 0.6–1)
CRP SERPL-MCNC: <0.3 MG/DL (ref 0–0.9)
DIFFERENTIAL METHOD BLD: ABNORMAL
EKG ATRIAL RATE: 67 BPM
EKG DIAGNOSIS: NORMAL
EKG P AXIS: 22 DEGREES
EKG P-R INTERVAL: 142 MS
EKG Q-T INTERVAL: 426 MS
EKG QRS DURATION: 88 MS
EKG QTC CALCULATION (BAZETT): 450 MS
EKG R AXIS: 26 DEGREES
EKG T AXIS: -6 DEGREES
EKG VENTRICULAR RATE: 67 BPM
EOSINOPHIL # BLD: 0.2 K/UL (ref 0–0.8)
EOSINOPHIL NFR BLD: 4 % (ref 0.5–7.8)
ERYTHROCYTE [DISTWIDTH] IN BLOOD BY AUTOMATED COUNT: 13.7 % (ref 11.9–14.6)
ERYTHROCYTE [SEDIMENTATION RATE] IN BLOOD: 5 MM/HR (ref 0–30)
GLUCOSE SERPL-MCNC: 92 MG/DL (ref 65–100)
GLUCOSE UR STRIP.AUTO-MCNC: NEGATIVE MG/DL
HCT VFR BLD AUTO: 37.7 % (ref 35.8–46.3)
HGB BLD-MCNC: 12.4 G/DL (ref 11.7–15.4)
HGB UR QL STRIP: NEGATIVE
IMM GRANULOCYTES # BLD AUTO: 0 K/UL (ref 0–0.5)
IMM GRANULOCYTES NFR BLD AUTO: 0 % (ref 0–5)
INR PPP: 1
KETONES UR QL STRIP.AUTO: NEGATIVE MG/DL
LEUKOCYTE ESTERASE UR QL STRIP.AUTO: NEGATIVE
LYMPHOCYTES # BLD: 1.1 K/UL (ref 0.5–4.6)
LYMPHOCYTES NFR BLD: 21 % (ref 13–44)
MAGNESIUM SERPL-MCNC: 2.4 MG/DL (ref 1.8–2.4)
MCH RBC QN AUTO: 32 PG (ref 26.1–32.9)
MCHC RBC AUTO-ENTMCNC: 32.9 G/DL (ref 31.4–35)
MCV RBC AUTO: 97.2 FL (ref 79.6–97.8)
MONOCYTES # BLD: 0.5 K/UL (ref 0.1–1.3)
MONOCYTES NFR BLD: 10 % (ref 4–12)
NEUTS SEG # BLD: 3.4 K/UL (ref 1.7–8.2)
NEUTS SEG NFR BLD: 65 % (ref 43–78)
NITRITE UR QL STRIP.AUTO: NEGATIVE
NRBC # BLD: 0 K/UL (ref 0–0.2)
PH UR STRIP: 7.5 [PH] (ref 5–9)
PLATELET # BLD AUTO: 263 K/UL (ref 150–450)
PMV BLD AUTO: 10.6 FL (ref 9.4–12.3)
POTASSIUM SERPL-SCNC: 4.2 MMOL/L (ref 3.5–5.1)
PROT UR STRIP-MCNC: NEGATIVE MG/DL
PROTHROMBIN TIME: 13.5 SEC (ref 12.6–14.5)
RBC # BLD AUTO: 3.88 M/UL (ref 4.05–5.2)
SERVICE CMNT-IMP: NORMAL
SODIUM SERPL-SCNC: 138 MMOL/L (ref 136–145)
SP GR UR REFRACTOMETRY: 1.01 (ref 1–1.02)
UROBILINOGEN UR QL STRIP.AUTO: 0.2 EU/DL (ref 0.2–1)
WBC # BLD AUTO: 5.3 K/UL (ref 4.3–11.1)

## 2022-08-12 PROCEDURE — 85730 THROMBOPLASTIN TIME PARTIAL: CPT

## 2022-08-12 PROCEDURE — 93005 ELECTROCARDIOGRAM TRACING: CPT | Performed by: ANESTHESIOLOGY

## 2022-08-12 PROCEDURE — 83735 ASSAY OF MAGNESIUM: CPT

## 2022-08-12 PROCEDURE — 87641 MR-STAPH DNA AMP PROBE: CPT

## 2022-08-12 PROCEDURE — 85652 RBC SED RATE AUTOMATED: CPT

## 2022-08-12 PROCEDURE — 86140 C-REACTIVE PROTEIN: CPT

## 2022-08-12 PROCEDURE — 81003 URINALYSIS AUTO W/O SCOPE: CPT

## 2022-08-12 PROCEDURE — 85025 COMPLETE CBC W/AUTO DIFF WBC: CPT

## 2022-08-12 PROCEDURE — 80048 BASIC METABOLIC PNL TOTAL CA: CPT

## 2022-08-12 PROCEDURE — 85610 PROTHROMBIN TIME: CPT

## 2022-08-12 RX ORDER — VITAMIN B COMPLEX
1 CAPSULE ORAL DAILY
COMMUNITY

## 2022-08-12 RX ORDER — OXYCODONE HYDROCHLORIDE 5 MG/1
5 TABLET ORAL EVERY 4 HOURS PRN
COMMUNITY

## 2022-08-12 ASSESSMENT — PAIN - FUNCTIONAL ASSESSMENT: PAIN_FUNCTIONAL_ASSESSMENT: PREVENTS OR INTERFERES SOME ACTIVE ACTIVITIES AND ADLS

## 2022-08-12 ASSESSMENT — PAIN DESCRIPTION - DESCRIPTORS: DESCRIPTORS: ACHING;SHARP

## 2022-08-12 ASSESSMENT — PAIN SCALES - GENERAL: PAINLEVEL_OUTOF10: 7

## 2022-08-12 ASSESSMENT — PAIN DESCRIPTION - ORIENTATION: ORIENTATION: RIGHT

## 2022-08-12 ASSESSMENT — PAIN DESCRIPTION - LOCATION: LOCATION: SHOULDER

## 2022-08-12 NOTE — PERIOP NOTE
PLEASE CONTINUE TAKING ALL PRESCRIPTION MEDICATIONS UP TO THE DAY OF SURGERY UNLESS OTHERWISE DIRECTED BELOW. DISCONTINUE all vitamins and supplements 7 days prior to surgery. DISCONTINUE Non-Steriodal Anti-Inflammatory (NSAIDS) such as Advil and Aleve 5 days prior to surgery. Home Medications to take  the day of surgery    Zyrtec, flonase, omeprazole if needed, oxycodone if needed            Home Medications   to Hold   Stop vitamins and supplements now   Stop meloxicam five days prior to surgery      Comments    Bring Incentive spirometer and Harleen-hex wash to the hospital on the day of surgery. On the day before surgery please take Acetaminophen 1000mg in the morning and then again before bed. You may substitute for Tylenol 650 mg. Please do not bring home medications with you on the day of surgery unless otherwise directed by your nurse. If you are instructed to bring home medications, please give them to your nurse as they will be administered by the nursing staff. If you have any questions, please call [facility] [phone]. A copy of this note was provided to the patient for reference.

## 2022-08-12 NOTE — PROGRESS NOTES
Dr. Easton Haro states \"I spoke with cardiologist, Saintclair Cain, about pt and EKG - she is good to go for surgery. EKG looks ok. \"

## 2022-08-12 NOTE — PERIOP NOTE
Patient verified name and . Order for consent found in EHR and matches case posting; patient verified. Type 3 surgery, PAT walk in assessment complete. Labs per surgeon: CBC, Mag, PT, CRP, UA, sed rate; results pending  Labs per anesthesia protocol: BMP, PTT; results pending  EKG: Done today- abnormal.    Pt's EKG done today reads new onset A Fib. Spoke with Dr. Carlito Ravi and he reviewed pt's chart and EKG's. Dr. Carlito Ravi states he will speak with Dr. Arsalan Hoyt (cardiology) and decide if pt's surgery needs to be delayed. Last Echo dated 21, last Cardiology office note dated 22 and cardiac clearance dated 8/10/22 found in EHR if needed for anesthesia reference. Will have charge nurse follow up. MRSA/MSSA swab collected; pharmacy to review and dose antibiotic as appropriate. Hospital approved surgical skin cleanser and instructions to return bottle on DOS given per hospital policy. Patient provided with handouts including Guide to Surgery, Pain Management, Hand Hygiene, Blood Transfusion Education, and [facility] Brochure. Patient answered medical/surgical history questions at their best of ability. All prior to admission medications documented in [facility]. Original medication prescription bottle not visualized during patient appointment. Patient instructed to hold all vitamins 1 week prior to surgery and NSAIDS 5 days prior to surgery. Patient teach back successful and patient demonstrates knowledge of instruction.

## 2022-08-15 ENCOUNTER — TELEPHONE (OUTPATIENT)
Dept: ORTHOPEDIC SURGERY | Age: 82
End: 2022-08-15

## 2022-08-15 ENCOUNTER — TELEPHONE (OUTPATIENT)
Dept: CARDIOLOGY CLINIC | Age: 82
End: 2022-08-15

## 2022-08-15 NOTE — TELEPHONE ENCOUNTER
Called and spoke to pt and informed her that cardiology and anesthesiology discussed her pre-op findings and all parties are good to proceed with surgery. I advised her to f/u with cardiology regarding any questions about EKG findings.

## 2022-08-15 NOTE — H&P
Subjective:     Patient is a 78 y.o. RHD FEMALE WITH RIGHT SHOULDER PAIN. SEE OFFICE NOTE.     Patient Active Problem List    Diagnosis Date Noted    Spinal stenosis, lumbar 12/07/2009    Pain due to right shoulder joint prosthesis ([address] 08/10/2022    Traumatic closed fracture of greater tuberosity of humerus with minimal displacement, right, initial encounter 08/10/2022    Periprosthetic fracture around internal prosthetic right shoulder joint 08/10/2022    Presence of right artificial shoulder joint 08/10/2022    History of COVID-19 01/28/2021    Palpitation 07/03/2019    PVD (peripheral vascular disease) ([address] 12/06/2018    Bilateral carotid artery disease ([address] 03/21/2018    Shortness of breath 01/12/2017    Status post right hip replacement 02/26/2016    Tricuspid valve disorder     Coronary atherosclerosis of native coronary vessel     Palpitations     Chest discomfort     Hypothyroidism     Vazquez's esophagus     Hypertension, benign     Hyperlipidemia      Past Medical History:   Diagnosis Date    Arthritis     osteo    Vazquez's esophagus     Managed with as needed meds    Bilateral carotid artery disease ([address]     Followed by cardiology- Dr. Danielle Conley    Chest discomfort     Coronary atherosclerosis of native coronary vessel     Degeneration of lumbar or lumbosacral intervertebral disc     Depression     medication     Gastric ulcer, unspecified as acute or chronic, without mention of hemorrhage or perforation 1989    History of blood transfusion     Hyperlipidemia     Hypothyroidism     no meds needed currently (low normal range per pt)    Infectious disease     lyme disease 2004    Nausea & vomiting     Palpitations     PVD (peripheral vascular disease) (HCC)     Scoliosis (and kyphoscoliosis), idiopathic     Spinal stenosis, lumbar 12/7/2009    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    Thoracic or lumbosacral neuritis or radiculitis, unspecified     Tricuspid regurgitation     mild per echo dated 1-11-13    Tricuspid valve disorder       Past Surgical History:   Procedure Laterality Date    APPENDECTOMY  1987    BREAST BIOPSY Left 1980    ENLARGED MILK GLAND    BUNIONECTOMY Bilateral 2009    with hammertoe correction    CARPAL TUNNEL RELEASE Right 10/31/13    ESOPHAGOGASTRODUODENOSCOPY  07/27/2018    2016, 2018    HAMMER TOE SURGERY  2010    revision    HYSTERECTOMY (CERVIX STATUS UNKNOWN)  1988    LAMINECTOMY  04/05/2022    T10 OPEN TOTAL LAMINECTOMY    LUMBAR LAMINECTOMY  2009    L4-L5    ORTHOPEDIC SURGERY  2010    Right hand    REVERSE TOTAL SHOULDER ARTHROPLASTY Right 2017    SHOULDER ARTHROSCOPY  2013    left shoulder cyst    TOTAL HIP ARTHROPLASTY Left 2007    revision 3/2000    TOTAL HIP ARTHROPLASTY Left 2009    revision    TOTAL KNEE ARTHROPLASTY Right 2007      Prior to Admission medications    Medication Sig Start Date End Date Taking? Authorizing Provider   oxyCODONE (ROXICODONE) 5 MG immediate release tablet Take 5 mg by mouth every 4 hours as needed for Pain. Historical Provider, MD   b complex vitamins capsule Take 1 capsule by mouth in the morning. Historical Provider, MD   cetirizine (ZYRTEC) 10 MG tablet Take 10 mg by mouth daily    Ar Automatic Reconciliation   fluticasone (FLONASE) 50 MCG/ACT nasal spray 2 sprays by Nasal route daily 2/24/21   Ar Automatic Reconciliation   losartan (COZAAR) 50 MG tablet Take 50 mg by mouth in the morning. 12/3/21   Ar Automatic Reconciliation   meloxicam (MOBIC) 15 MG tablet Take 15 mg by mouth daily 10/28/21   Ar Automatic Reconciliation   omeprazole (PRILOSEC) 20 MG delayed release capsule Take 20 mg by mouth 2 times daily as needed 2/24/21   Ar Automatic Reconciliation   traMADol (ULTRAM) 50 MG tablet Take 50 mg by mouth every 6 hours as needed.     Ar Automatic Reconciliation     Allergies   Allergen Reactions    Adhesive Tape Other (See Comments)     Blisters, pt denies allergy 7-3-19     Codeine Itching     Pt denies allergy 7-3-19 Social History     Tobacco Use    Smoking status: Never    Smokeless tobacco: Never   Substance Use Topics    Alcohol use: Not Currently      Family History   Problem Relation Age of Onset    Stroke Other     Diabetes Other     Heart Disease Other     Asthma Other     Breast Cancer Neg Hx     Heart Disease Mother         CONGESTIVE HEART DISEASE    Stroke Father     Hypertension Other       Review of Systems  Pertinent items are noted in HPI. Objective:     No data found. There were no vitals taken for this visit. General Appearance:    Alert, cooperative, no distress, appears stated age   Head:    Normocephalic, without obvious abnormality, atraumatic                       Back:     Symmetric, no curvature, ROM normal, no CVA tenderness   Lungs:     Clear to auscultation bilaterally, respirations unlabored   Chest Wall:    No tenderness or deformity    Heart:    Regular rate and rhythm, S1 and S2 normal, no murmur, rub   or gallop                   Extremities:   Extremities normal, atraumatic, no cyanosis or edema   Pulses:   2+ and symmetric all extremities   Skin:   Skin color, texture, turgor normal, no rashes or lesions   Lymph nodes:   Cervical, supraclavicular, and axillary nodes normal   Neurologic:   CNII-XII intact, normal strength, sensation and reflexes     throughout           Assessment:     Principal Problem:    Periprosthetic fracture around internal prosthetic right shoulder joint  Active Problems:    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    Presence of right artificial shoulder joint  Resolved Problems:    * No resolved hospital problems. *      Plan:     The various methods of treatment have been discussed with the patient and family.      PATIENT HAS EXHAUSTED NON-OPERATIVE MODALITIES     After consideration of risks, benefits and other options for treatment, the patient has consented to surgical intervention.     SEE OFFICE NOTE    Zachary Chester MD 4

## 2022-08-15 NOTE — TELEPHONE ENCOUNTER
Patient called and stated she is scheduled for surgery on Thursday. Patient went and had an EKG and said something was wrong. Patient was wondering if there was any way what is going on with EKG. No one told her about what is going on. Please call and advise.

## 2022-08-16 NOTE — PROGRESS NOTES
Total Joint Surgery Preoperative Chart Review      Patient ID:  Epi Christianson  [id number]  59 y.o.  1940  Surgeon: Dr. Lars Cárdenas  Date of Surgery: 8/18/2022  Procedure: Total Right Shoulder Arthroplasty  Primary Care Physician: SHAVON Payne [phone]  Specialty Physician(s):      Subjective:   Epi Christianson is a 78 y.o. White (non-) female who presents for preoperative evaluation for Total Right Shoulder arthroplasty. This is a preoperative chart review note based on data collected by the nurse at the surgical Pre-Assessment visit.     Past Medical History:   Diagnosis Date    Arthritis     osteo    Vazquez's esophagus     Managed with as needed meds    Bilateral carotid artery disease ([facility]     Followed by cardiology- Dr. Daquan Young    Chest discomfort     Coronary atherosclerosis of native coronary vessel     Degeneration of lumbar or lumbosacral intervertebral disc     Depression     medication     Gastric ulcer, unspecified as acute or chronic, without mention of hemorrhage or perforation 1989    History of blood transfusion     Hyperlipidemia     Hypothyroidism     no meds needed currently (low normal range per pt)    Infectious disease     lyme disease 2004    Nausea & vomiting     Palpitations     PVD (peripheral vascular disease) ([address]     Scoliosis (and kyphoscoliosis), idiopathic     Spinal stenosis, lumbar 12/7/2009    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    Thoracic or lumbosacral neuritis or radiculitis, unspecified     Tricuspid regurgitation     mild per echo dated 1-11-13    Tricuspid valve disorder       Past Surgical History:   Procedure Laterality Date    APPENDECTOMY  1987    BREAST BIOPSY Left 1980    ENLARGED MILK GLAND    BUNIONECTOMY Bilateral 2009    with hammertoe correction    CARPAL TUNNEL RELEASE Right 10/31/13    ESOPHAGOGASTRODUODENOSCOPY  07/27/2018 2016, 2018    HAMMER TOE SURGERY  2010    revision    HYSTERECTOMY (CERVIX STATUS UNKNOWN) 1988    LAMINECTOMY  04/05/2022    T10 OPEN TOTAL LAMINECTOMY    LUMBAR LAMINECTOMY  2009    L4-L5    ORTHOPEDIC SURGERY  2010    Right hand    REVERSE TOTAL SHOULDER ARTHROPLASTY Right 2017    SHOULDER ARTHROSCOPY  2013    left shoulder cyst    TOTAL HIP ARTHROPLASTY Left 2007    revision 3/2000    TOTAL HIP ARTHROPLASTY Left 2009    revision    TOTAL KNEE ARTHROPLASTY Right 2007     Family History   Problem Relation Age of Onset    Stroke Other     Diabetes Other     Heart Disease Other     Asthma Other     Breast Cancer Neg Hx     Heart Disease Mother         CONGESTIVE HEART DISEASE    Stroke Father     Hypertension Other       Social History     Tobacco Use    Smoking status: Never    Smokeless tobacco: Never   Substance Use Topics    Alcohol use: Not Currently       Prior to Admission medications    Medication Sig Start Date End Date Taking? Authorizing Provider   oxyCODONE (ROXICODONE) 5 MG immediate release tablet Take 5 mg by mouth every 4 hours as needed for Pain. Yes Historical Provider, MD   b complex vitamins capsule Take 1 capsule by mouth in the morning. Yes Historical Provider, MD   cetirizine (ZYRTEC) 10 MG tablet Take 10 mg by mouth daily    Ar Automatic Reconciliation   fluticasone (FLONASE) 50 MCG/ACT nasal spray 2 sprays by Nasal route daily 2/24/21   Ar Automatic Reconciliation   losartan (COZAAR) 50 MG tablet Take 50 mg by mouth in the morning. 12/3/21   Ar Automatic Reconciliation   meloxicam (MOBIC) 15 MG tablet Take 15 mg by mouth daily 10/28/21   Ar Automatic Reconciliation   omeprazole (PRILOSEC) 20 MG delayed release capsule Take 20 mg by mouth 2 times daily as needed 2/24/21   Ar Automatic Reconciliation   traMADol (ULTRAM) 50 MG tablet Take 50 mg by mouth every 6 hours as needed.     Ar Automatic Reconciliation     Allergies   Allergen Reactions    Adhesive Tape Other (See Comments)     Blisters, pt denies allergy 7-3-19     Codeine Itching     Pt denies allergy 7-3-19 Objective:     Physical Exam:   No data found. ECG:    No results found for this or any previous visit (from the past 4464 hour(s)). Data Review:   Labs:   Labs reviewed    Problem List:  )  Patient Active Problem List   Diagnosis    PVD (peripheral vascular disease) ([facility]    Status post right hip replacement    Shortness of breath    Palpitations    Chest discomfort    History of COVID-19    Spinal stenosis, lumbar    Bilateral carotid artery disease (HCC)    Palpitation    Hypothyroidism    Vazquez's esophagus    Tricuspid valve disorder    Coronary atherosclerosis of native coronary vessel    Hypertension, benign    Hyperlipidemia    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    Periprosthetic fracture around internal prosthetic right shoulder joint    Presence of right artificial shoulder joint       Total Joint Surgery Pre-Assessment Recommendations:           Continuous saturation monitoring UPON ARRIVAL TO FLOOR. Heated high flow lung expansion x 24 hours and prn.   IP RT consult for respiratory evaluation every shift      Signed By: SHAVON Omalley - CNP-C    August 16, 2022

## 2022-08-17 ENCOUNTER — ANESTHESIA EVENT (OUTPATIENT)
Dept: SURGERY | Age: 82
DRG: 483 | End: 2022-08-17
Payer: MEDICARE

## 2022-08-17 ENCOUNTER — HOSPITAL ENCOUNTER (OUTPATIENT)
Dept: LAB | Age: 82
Discharge: HOME | DRG: 483 | End: 2022-08-20
Payer: MEDICARE

## 2022-08-17 DIAGNOSIS — M97.31XA PERIPROSTHETIC FRACTURE AROUND INTERNAL PROSTHETIC RIGHT SHOULDER JOINT, INITIAL ENCOUNTER: ICD-10-CM

## 2022-08-17 LAB — HISTORY CHECK: NORMAL

## 2022-08-17 PROCEDURE — 86923 COMPATIBILITY TEST ELECTRIC: CPT

## 2022-08-17 PROCEDURE — 36415 COLL VENOUS BLD VENIPUNCTURE: CPT

## 2022-08-17 PROCEDURE — 86901 BLOOD TYPING SEROLOGIC RH(D): CPT

## 2022-08-17 NOTE — PROGRESS NOTES
Nephrology ([facility]) Progress  Note      Patient:  Sunday Dailey  YOB: 1962  Date of Service: 7/10/2019  MRN: [id number]   Acct: [de-identified]   Primary Care Physician: Ming Rivera DO  Advance Directive: Full Code  Admit Date: 7/8/2019       Hospital Day: 2  Referring Provider: Linnette Blackmon DO    Patient independently seen and examined, Chart, Consults, Notes, Operative notes, Labs, Cardiology, and Radiology studies reviewed as able. Chief complaint: End-stage renal disease. Subjective:  Sunday Dailey is a 59 y.o. male  whom we were consulted for end-stage renal disease secondary to diabetic nephropathy. He goes to maintenance dialysis treatment at [address] on Monday Wednesday Friday. He presented with severe encephalopathy, weakness. Patient was recently admitted, underwent extensive vascular surgery on bilateral lower extremities including endarterectomy of CFA, popliteal bypass surgery bilaterally. Patient also has hepatitis C and liver disease. Infection of vascular surgical wound has been ruled out. Currently seen on hemodialysis  Hemodialysis access: AV fistula  Hemodialysis: 3-1/2-hour  Ultrafiltration: 3000 cc  2K bath  Blood flow rate is 450 cc/min. Allergies:  Patient has no known allergies.     Medicines:  Current Facility-Administered Medications   Medication Dose Route Frequency Provider Last Rate Last Dose    lactulose (CHRONULAC) 10 GM/15ML solution 10 g  10 g Oral Daily Annabel Mccray MD        famotidine (PEPCID) tablet 20 mg  20 mg Oral BID Annabel Mccray MD   20 mg at 07/09/19 2123    cefTRIAXone (ROCEPHIN) 1 g in sodium chloride (PF) 10 mL IV syringe  1 g Intravenous Q24H Era Herron MD   1 g at 07/10/19 0543    aspirin EC tablet 81 mg  81 mg Oral Daily Era Herron MD   81 mg at 07/09/19 0933    calcium acetate (PHOSLO) capsule 667 mg  667 mg Oral TID  Era Herron MD   667 mg at 07/09/19 1807    clopidogrel (PLAVIX) Patient presented to Outpatient Lab for Preop Blood [facility] Labs. quit: 2017     Years since quittin.3    Smokeless tobacco: Never Used    Tobacco comment: [id number] smoked 45 years at [facility]   Substance and Sexual Activity    Alcohol use: No    Drug use: Not Currently     Types: Marijuana, Cocaine, Methamphetamines, IV, Opiates , Other-see comments     Comment: in the 80s, LSD    Sexual activity: Yes     Partners: Female     Comment: has a wife   Lifestyle    Physical activity:     Days per week: Not on file     Minutes per session: Not on file    Stress: Not on file   Relationships    Social connections:     Talks on phone: Not on file     Gets together: Not on file     Attends Faith service: Not on file     Active member of club or organization: Not on file     Attends meetings of clubs or organizations: Not on file     Relationship status: Not on file    Intimate partner violence:     Fear of current or ex partner: Not on file     Emotionally abused: Not on file     Physically abused: Not on file     Forced sexual activity: Not on file   Other Topics Concern    Not on file   Social History Narrative    CODE STATUS: Full Code    HEALTH CARE PROXY: Mrs. Tyrone Diehl, [phone]    Back up: his Mother, Mrs. Tan Su, [phone]    AMBULATES: independantly    DOMICILED: lives in a private house with 2 steps to get in, has no stairs inside, lives with wife and step children, has 3 pets         Review of Systems:  History obtained from chart review and the patient  General ROS: No fever or chills  Respiratory ROS: No cough, shortness of breath, wheezing  Cardiovascular ROS: No chest pain or palpitations  Gastrointestinal ROS: No abdominal pain or melena  Genito-Urinary ROS: No dysuria or hematuria  Musculoskeletal ROS: No joint pain or swelling   14 point ROS reviewed with the patient and negative except as noted above and in the HPI unless unable to obtain.     Objective:  Patient Vitals for the past 24 hrs:   BP Temp Temp src Pulse Resp SpO2 Weight   07/10/19 0742 -- -- -- -- 22 91 % --   07/10/19 0657 (!) 161/58 99.7 °F (37.6 °C) Oral 62 22 90 % --   07/10/19 0521 -- -- -- -- -- -- 168 lb 6.4 oz (76.4 kg)   07/09/19 1829 (!) 151/59 98.4 °F (36.9 °C) Temporal 63 18 91 % --       Intake/Output Summary (Last 24 hours) at 7/10/2019 2084  Last data filed at 7/10/2019 0035  Gross per 24 hour   Intake 240 ml   Output --   Net 240 ml     General: awake/alert    HEENT: Normocephalic atraumatic head  Neck: Supple with no JVD or carotid bruits. Chest:  clear to auscultation bilaterally without respiratory distress  CVS: regular rate and rhythm  Abdominal: soft, nontender, normal bowel sounds  Extremities: Lower extremity surgical wound looks clean. Skin: warm and dry without rash      Labs:  BMP:   Recent Labs     07/08/19  0545 07/10/19  0407   * 128*   K 5.1* 5.1*   CL 85* 87*   CO2 27 23   BUN 52* 44*   CREATININE 7.0* 6.0*   CALCIUM 9.0 8.9     CBC:   Recent Labs     07/08/19  0545 07/09/19  1001 07/10/19  0406   WBC 12.4* 9.7 6.3   HGB 7.6* 8.6* 8.4*   HCT 23.2* 26.9* 28.8*   MCV 90.6 90.3 99.7*   PLT 99* 136 134     LIVER PROFILE:   Recent Labs     07/08/19  0545 07/10/19  0407   AST 14 13   ALT 6 6   BILITOT 0.5 0.4   ALKPHOS 93 94     PT/INR:   Recent Labs     07/08/19 0545   PROTIME 15.9*   INR 1.34*     APTT:   Recent Labs     07/08/19 0545   APTT 32.7     BNP:  No results for input(s): BNP in the last 72 hours. Ionized Calcium:No results for input(s): IONCA in the last 72 hours. Magnesium:No results for input(s): MG in the last 72 hours. Phosphorus:No results for input(s): PHOS in the last 72 hours. HgbA1C: No results for input(s): LABA1C in the last 72 hours.   Hepatic:   Recent Labs     07/08/19  0545 07/10/19  0407   ALKPHOS 93 94   ALT 6 6   AST 14 13   PROT 7.3 6.8   BILITOT 0.5 0.4   LABALBU 3.7 2.6*     Lactic Acid:   Recent Labs     07/08/19  1227   LACTA 0.6     Troponin: No results for input(s): CKTOTAL, CKMB, TROPONINT in

## 2022-08-18 ENCOUNTER — HOSPITAL ENCOUNTER (INPATIENT)
Age: 82
LOS: 5 days | Discharge: HOME | DRG: 483 | End: 2022-08-23
Attending: ORTHOPAEDIC SURGERY | Admitting: ORTHOPAEDIC SURGERY
Payer: MEDICARE

## 2022-08-18 ENCOUNTER — APPOINTMENT (OUTPATIENT)
Dept: GENERAL RADIOLOGY | Age: 82
DRG: 483 | End: 2022-08-18
Attending: ORTHOPAEDIC SURGERY
Payer: MEDICARE

## 2022-08-18 ENCOUNTER — ANESTHESIA (OUTPATIENT)
Dept: SURGERY | Age: 82
DRG: 483 | End: 2022-08-18
Payer: MEDICARE

## 2022-08-18 DIAGNOSIS — Z96.611 PAIN DUE TO RIGHT SHOULDER JOINT PROSTHESIS (HCC): ICD-10-CM

## 2022-08-18 DIAGNOSIS — S42.251A: ICD-10-CM

## 2022-08-18 DIAGNOSIS — M97.31XA PERIPROSTHETIC FRACTURE AROUND INTERNAL PROSTHETIC RIGHT SHOULDER JOINT, INITIAL ENCOUNTER: ICD-10-CM

## 2022-08-18 DIAGNOSIS — Z96.611 PRESENCE OF RIGHT ARTIFICIAL SHOULDER JOINT: ICD-10-CM

## 2022-08-18 DIAGNOSIS — T84.84XA PAIN DUE TO RIGHT SHOULDER JOINT PROSTHESIS (HCC): ICD-10-CM

## 2022-08-18 PROBLEM — Z96.619 MECHANICAL LOOSENING OF PROSTHETIC SHOULDER JOINT (HCC): Status: ACTIVE | Noted: 2022-08-18

## 2022-08-18 PROBLEM — T84.038A MECHANICAL LOOSENING OF PROSTHETIC SHOULDER JOINT (HCC): Status: ACTIVE | Noted: 2022-08-18

## 2022-08-18 PROBLEM — D62 POSTOPERATIVE ANEMIA DUE TO ACUTE BLOOD LOSS: Status: ACTIVE | Noted: 2022-08-18

## 2022-08-18 LAB
ANION GAP SERPL CALC-SCNC: 3 MMOL/L (ref 7–16)
BASOPHILS # BLD: 0 K/UL (ref 0–0.2)
BASOPHILS NFR BLD: 0 % (ref 0–2)
BUN SERPL-MCNC: 11 MG/DL (ref 8–23)
CALCIUM SERPL-MCNC: 8.2 MG/DL (ref 8.3–10.4)
CHLORIDE SERPL-SCNC: 111 MMOL/L (ref 98–107)
CO2 SERPL-SCNC: 27 MMOL/L (ref 21–32)
CREAT SERPL-MCNC: 0.78 MG/DL (ref 0.6–1)
DIFFERENTIAL METHOD BLD: ABNORMAL
EOSINOPHIL # BLD: 0 K/UL (ref 0–0.8)
EOSINOPHIL NFR BLD: 0 % (ref 0.5–7.8)
ERYTHROCYTE [DISTWIDTH] IN BLOOD BY AUTOMATED COUNT: 13.8 % (ref 11.9–14.6)
EST. AVERAGE GLUCOSE BLD GHB EST-MCNC: 117 MG/DL
GLUCOSE BLD STRIP.AUTO-MCNC: 91 MG/DL (ref 65–100)
GLUCOSE SERPL-MCNC: 141 MG/DL (ref 65–100)
HBA1C MFR BLD: 5.7 % (ref 4.8–5.6)
HCT VFR BLD AUTO: 28.1 % (ref 35.8–46.3)
HGB BLD-MCNC: 9.3 G/DL (ref 11.7–15.4)
IMM GRANULOCYTES # BLD AUTO: 0.1 K/UL (ref 0–0.5)
IMM GRANULOCYTES NFR BLD AUTO: 1 % (ref 0–5)
LYMPHOCYTES # BLD: 0.5 K/UL (ref 0.5–4.6)
LYMPHOCYTES NFR BLD: 5 % (ref 13–44)
MAGNESIUM SERPL-MCNC: 2.1 MG/DL (ref 1.8–2.4)
MCH RBC QN AUTO: 32.4 PG (ref 26.1–32.9)
MCHC RBC AUTO-ENTMCNC: 33.1 G/DL (ref 31.4–35)
MCV RBC AUTO: 97.9 FL (ref 79.6–97.8)
MONOCYTES # BLD: 0.2 K/UL (ref 0.1–1.3)
MONOCYTES NFR BLD: 2 % (ref 4–12)
NEUTS SEG # BLD: 8.3 K/UL (ref 1.7–8.2)
NEUTS SEG NFR BLD: 92 % (ref 43–78)
NRBC # BLD: 0 K/UL (ref 0–0.2)
PLATELET # BLD AUTO: 196 K/UL (ref 150–450)
PMV BLD AUTO: 10.6 FL (ref 9.4–12.3)
POTASSIUM SERPL-SCNC: 4.1 MMOL/L (ref 3.5–5.1)
RBC # BLD AUTO: 2.87 M/UL (ref 4.05–5.2)
SERVICE CMNT-IMP: NORMAL
SODIUM SERPL-SCNC: 141 MMOL/L (ref 136–145)
WBC # BLD AUTO: 9 K/UL (ref 4.3–11.1)

## 2022-08-18 PROCEDURE — 2580000003 HC RX 258: Performed by: ORTHOPAEDIC SURGERY

## 2022-08-18 PROCEDURE — 73030 X-RAY EXAM OF SHOULDER: CPT

## 2022-08-18 PROCEDURE — 2580000003 HC RX 258: Performed by: FAMILY MEDICINE

## 2022-08-18 PROCEDURE — 88331 PATH CONSLTJ SURG 1 BLK 1SPC: CPT

## 2022-08-18 PROCEDURE — 2580000003 HC RX 258: Performed by: ANESTHESIOLOGY

## 2022-08-18 PROCEDURE — 87205 SMEAR GRAM STAIN: CPT

## 2022-08-18 PROCEDURE — 88304 TISSUE EXAM BY PATHOLOGIST: CPT

## 2022-08-18 PROCEDURE — 7100000000 HC PACU RECOVERY - FIRST 15 MIN: Performed by: ORTHOPAEDIC SURGERY

## 2022-08-18 PROCEDURE — 6360000002 HC RX W HCPCS: Performed by: ANESTHESIOLOGY

## 2022-08-18 PROCEDURE — 83735 ASSAY OF MAGNESIUM: CPT

## 2022-08-18 PROCEDURE — 82962 GLUCOSE BLOOD TEST: CPT

## 2022-08-18 PROCEDURE — 2500000003 HC RX 250 WO HCPCS: Performed by: ORTHOPAEDIC SURGERY

## 2022-08-18 PROCEDURE — 3700000001 HC ADD 15 MINUTES (ANESTHESIA): Performed by: ORTHOPAEDIC SURGERY

## 2022-08-18 PROCEDURE — 83036 HEMOGLOBIN GLYCOSYLATED A1C: CPT

## 2022-08-18 PROCEDURE — 88307 TISSUE EXAM BY PATHOLOGIST: CPT

## 2022-08-18 PROCEDURE — 3600000005 HC SURGERY LEVEL 5 BASE: Performed by: ORTHOPAEDIC SURGERY

## 2022-08-18 PROCEDURE — 6370000000 HC RX 637 (ALT 250 FOR IP): Performed by: ORTHOPAEDIC SURGERY

## 2022-08-18 PROCEDURE — 1100000000 HC RM PRIVATE

## 2022-08-18 PROCEDURE — 94761 N-INVAS EAR/PLS OXIMETRY MLT: CPT

## 2022-08-18 PROCEDURE — 85025 COMPLETE CBC W/AUTO DIFF WBC: CPT

## 2022-08-18 PROCEDURE — 3700000000 HC ANESTHESIA ATTENDED CARE: Performed by: ORTHOPAEDIC SURGERY

## 2022-08-18 PROCEDURE — 6360000002 HC RX W HCPCS: Performed by: NURSE ANESTHETIST, CERTIFIED REGISTERED

## 2022-08-18 PROCEDURE — 2709999900 HC NON-CHARGEABLE SUPPLY: Performed by: ORTHOPAEDIC SURGERY

## 2022-08-18 PROCEDURE — 2500000003 HC RX 250 WO HCPCS: Performed by: NURSE ANESTHETIST, CERTIFIED REGISTERED

## 2022-08-18 PROCEDURE — C1713 ANCHOR/SCREW BN/BN,TIS/BN: HCPCS | Performed by: ORTHOPAEDIC SURGERY

## 2022-08-18 PROCEDURE — 23474 REVIS RECONST SHOULDER JOINT: CPT | Performed by: ORTHOPAEDIC SURGERY

## 2022-08-18 PROCEDURE — 24400 OSTEOT HUMERUS W/WO INT FIXJ: CPT | Performed by: ORTHOPAEDIC SURGERY

## 2022-08-18 PROCEDURE — 0LX10ZZ TRANSFER RIGHT SHOULDER TENDON, OPEN APPROACH: ICD-10-PCS | Performed by: ORTHOPAEDIC SURGERY

## 2022-08-18 PROCEDURE — 64415 NJX AA&/STRD BRCH PLXS IMG: CPT | Performed by: ANESTHESIOLOGY

## 2022-08-18 PROCEDURE — 6370000000 HC RX 637 (ALT 250 FOR IP): Performed by: ANESTHESIOLOGY

## 2022-08-18 PROCEDURE — 80048 BASIC METABOLIC PNL TOTAL CA: CPT

## 2022-08-18 PROCEDURE — 3600000015 HC SURGERY LEVEL 5 ADDTL 15MIN: Performed by: ORTHOPAEDIC SURGERY

## 2022-08-18 PROCEDURE — 23397 MUSCLE TRANSFERS: CPT | Performed by: ORTHOPAEDIC SURGERY

## 2022-08-18 PROCEDURE — 87075 CULTR BACTERIA EXCEPT BLOOD: CPT

## 2022-08-18 PROCEDURE — 0RPJ0JZ REMOVAL OF SYNTHETIC SUBSTITUTE FROM RIGHT SHOULDER JOINT, OPEN APPROACH: ICD-10-PCS | Performed by: ORTHOPAEDIC SURGERY

## 2022-08-18 PROCEDURE — C1776 JOINT DEVICE (IMPLANTABLE): HCPCS | Performed by: ORTHOPAEDIC SURGERY

## 2022-08-18 PROCEDURE — 7100000001 HC PACU RECOVERY - ADDTL 15 MIN: Performed by: ORTHOPAEDIC SURGERY

## 2022-08-18 PROCEDURE — 2700000000 HC OXYGEN THERAPY PER DAY

## 2022-08-18 PROCEDURE — 0RRJ00Z REPLACEMENT OF RIGHT SHOULDER JOINT WITH REVERSE BALL AND SOCKET SYNTHETIC SUBSTITUTE, OPEN APPROACH: ICD-10-PCS | Performed by: ORTHOPAEDIC SURGERY

## 2022-08-18 DEVICE — STEM HUM SZ 1 DIA8MM LNG CO CHROM CEM MONOBLOC EPIPHYSIS: Type: IMPLANTABLE DEVICE | Site: SHOULDER | Status: FUNCTIONAL

## 2022-08-18 DEVICE — COMPONENT GLENOSPHERE ECCENTRIC XTEND 42MM PLUS 8MM: Type: IMPLANTABLE DEVICE | Site: SHOULDER | Status: FUNCTIONAL

## 2022-08-18 DEVICE — IMPLANTABLE DEVICE: Type: IMPLANTABLE DEVICE | Site: SHOULDER | Status: FUNCTIONAL

## 2022-08-18 DEVICE — CEMENT BNE 20ML 41GM FULL DOSE PMMA W/ TOBRA M VISC RADPQ: Type: IMPLANTABLE DEVICE | Site: SHOULDER | Status: FUNCTIONAL

## 2022-08-18 DEVICE — SCREW BNE L36MM DIA4.5MM PROX CORT TIB S STL ST LOK FULL: Type: IMPLANTABLE DEVICE | Site: SHOULDER | Status: FUNCTIONAL

## 2022-08-18 DEVICE — SCREW BNE L38MM DIA4.5MM PROX CORT TIB S STL ST LOK FULL: Type: IMPLANTABLE DEVICE | Site: SHOULDER | Status: FUNCTIONAL

## 2022-08-18 DEVICE — SPACER HUM +9MM OFFSET SHLDR POLYETH FOR DELT XTEND REV SYS: Type: IMPLANTABLE DEVICE | Site: SHOULDER | Status: FUNCTIONAL

## 2022-08-18 DEVICE — RESTRICTOR CEM DIA8MM UNIV FEM CNL UHMWPE BIOSTP G: Type: IMPLANTABLE DEVICE | Site: SHOULDER | Status: FUNCTIONAL

## 2022-08-18 RX ORDER — DOCUSATE SODIUM 100 MG/1
100 CAPSULE, LIQUID FILLED ORAL 2 TIMES DAILY
Status: DISPENSED | OUTPATIENT
Start: 2022-08-19 | End: 2022-08-23

## 2022-08-18 RX ORDER — LABETALOL HYDROCHLORIDE 5 MG/ML
INJECTION, SOLUTION INTRAVENOUS PRN
Status: DISCONTINUED | OUTPATIENT
Start: 2022-08-18 | End: 2022-08-18 | Stop reason: SDUPTHER

## 2022-08-18 RX ORDER — SODIUM CHLORIDE 0.9 % (FLUSH) 0.9 %
5-40 SYRINGE (ML) INJECTION EVERY 12 HOURS SCHEDULED
Status: DISCONTINUED | OUTPATIENT
Start: 2022-08-18 | End: 2022-08-18 | Stop reason: HOSPADM

## 2022-08-18 RX ORDER — VANCOMYCIN HYDROCHLORIDE 1 G/20ML
INJECTION, POWDER, LYOPHILIZED, FOR SOLUTION INTRAVENOUS PRN
Status: DISCONTINUED | OUTPATIENT
Start: 2022-08-18 | End: 2022-08-18 | Stop reason: SDUPTHER

## 2022-08-18 RX ORDER — NEOSTIGMINE METHYLSULFATE 1 MG/ML
INJECTION, SOLUTION INTRAVENOUS PRN
Status: DISCONTINUED | OUTPATIENT
Start: 2022-08-18 | End: 2022-08-18 | Stop reason: SDUPTHER

## 2022-08-18 RX ORDER — ACETAMINOPHEN 500 MG
1000 TABLET ORAL ONCE
Status: COMPLETED | OUTPATIENT
Start: 2022-08-18 | End: 2022-08-18

## 2022-08-18 RX ORDER — SODIUM CHLORIDE 9 MG/ML
INJECTION, SOLUTION INTRAVENOUS PRN
Status: DISCONTINUED | OUTPATIENT
Start: 2022-08-18 | End: 2022-08-18 | Stop reason: HOSPADM

## 2022-08-18 RX ORDER — ROPIVACAINE HYDROCHLORIDE 5 MG/ML
INJECTION, SOLUTION EPIDURAL; INFILTRATION; PERINEURAL
Status: COMPLETED | OUTPATIENT
Start: 2022-08-18 | End: 2022-08-18

## 2022-08-18 RX ORDER — FERROUS SULFATE 325(65) MG
325 TABLET ORAL 2 TIMES DAILY WITH MEALS
Status: DISPENSED | OUTPATIENT
Start: 2022-08-19 | End: 2022-08-23

## 2022-08-18 RX ORDER — SODIUM CHLORIDE 9 MG/ML
INJECTION, SOLUTION INTRAVENOUS PRN
Status: DISCONTINUED | OUTPATIENT
Start: 2022-08-18 | End: 2022-08-23 | Stop reason: HOSPADM

## 2022-08-18 RX ORDER — GLYCOPYRROLATE 0.2 MG/ML
INJECTION INTRAMUSCULAR; INTRAVENOUS PRN
Status: DISCONTINUED | OUTPATIENT
Start: 2022-08-18 | End: 2022-08-18 | Stop reason: SDUPTHER

## 2022-08-18 RX ORDER — SODIUM CHLORIDE, SODIUM LACTATE, POTASSIUM CHLORIDE, CALCIUM CHLORIDE 600; 310; 30; 20 MG/100ML; MG/100ML; MG/100ML; MG/100ML
INJECTION, SOLUTION INTRAVENOUS CONTINUOUS
Status: DISCONTINUED | OUTPATIENT
Start: 2022-08-18 | End: 2022-08-18 | Stop reason: HOSPADM

## 2022-08-18 RX ORDER — HYDROMORPHONE HYDROCHLORIDE 1 MG/ML
0.25 INJECTION, SOLUTION INTRAMUSCULAR; INTRAVENOUS; SUBCUTANEOUS EVERY 5 MIN PRN
Status: DISCONTINUED | OUTPATIENT
Start: 2022-08-18 | End: 2022-08-18 | Stop reason: HOSPADM

## 2022-08-18 RX ORDER — SODIUM CHLORIDE 0.9 % (FLUSH) 0.9 %
5-40 SYRINGE (ML) INJECTION PRN
Status: DISCONTINUED | OUTPATIENT
Start: 2022-08-18 | End: 2022-08-18 | Stop reason: HOSPADM

## 2022-08-18 RX ORDER — SODIUM CHLORIDE 0.9 % (FLUSH) 0.9 %
5-40 SYRINGE (ML) INJECTION PRN
Status: DISCONTINUED | OUTPATIENT
Start: 2022-08-18 | End: 2022-08-23 | Stop reason: HOSPADM

## 2022-08-18 RX ORDER — ONDANSETRON 2 MG/ML
4 INJECTION INTRAMUSCULAR; INTRAVENOUS
Status: DISCONTINUED | OUTPATIENT
Start: 2022-08-18 | End: 2022-08-18 | Stop reason: HOSPADM

## 2022-08-18 RX ORDER — FENTANYL CITRATE 50 UG/ML
100 INJECTION, SOLUTION INTRAMUSCULAR; INTRAVENOUS
Status: COMPLETED | OUTPATIENT
Start: 2022-08-18 | End: 2022-08-18

## 2022-08-18 RX ORDER — LIDOCAINE HYDROCHLORIDE 20 MG/ML
INJECTION, SOLUTION EPIDURAL; INFILTRATION; INTRACAUDAL; PERINEURAL PRN
Status: DISCONTINUED | OUTPATIENT
Start: 2022-08-18 | End: 2022-08-18 | Stop reason: SDUPTHER

## 2022-08-18 RX ORDER — PROPOFOL 10 MG/ML
INJECTION, EMULSION INTRAVENOUS PRN
Status: DISCONTINUED | OUTPATIENT
Start: 2022-08-18 | End: 2022-08-18 | Stop reason: SDUPTHER

## 2022-08-18 RX ORDER — EPHEDRINE SULFATE/0.9% NACL/PF 50 MG/5 ML
SYRINGE (ML) INTRAVENOUS PRN
Status: DISCONTINUED | OUTPATIENT
Start: 2022-08-18 | End: 2022-08-18 | Stop reason: SDUPTHER

## 2022-08-18 RX ORDER — HYDROMORPHONE HYDROCHLORIDE 2 MG/1
2 TABLET ORAL
Status: DISCONTINUED | OUTPATIENT
Start: 2022-08-18 | End: 2022-08-20

## 2022-08-18 RX ORDER — ONDANSETRON 4 MG/1
4 TABLET, ORALLY DISINTEGRATING ORAL EVERY 8 HOURS PRN
Status: DISCONTINUED | OUTPATIENT
Start: 2022-08-18 | End: 2022-08-23 | Stop reason: HOSPADM

## 2022-08-18 RX ORDER — PROMETHAZINE HYDROCHLORIDE 25 MG/1
25 TABLET ORAL EVERY 4 HOURS PRN
Status: DISCONTINUED | OUTPATIENT
Start: 2022-08-18 | End: 2022-08-23 | Stop reason: HOSPADM

## 2022-08-18 RX ORDER — SODIUM CHLORIDE, SODIUM LACTATE, POTASSIUM CHLORIDE, CALCIUM CHLORIDE 600; 310; 30; 20 MG/100ML; MG/100ML; MG/100ML; MG/100ML
INJECTION, SOLUTION INTRAVENOUS CONTINUOUS
Status: DISCONTINUED | OUTPATIENT
Start: 2022-08-18 | End: 2022-08-19

## 2022-08-18 RX ORDER — OXYCODONE HYDROCHLORIDE 5 MG/1
5 TABLET ORAL
Status: DISCONTINUED | OUTPATIENT
Start: 2022-08-18 | End: 2022-08-18 | Stop reason: HOSPADM

## 2022-08-18 RX ORDER — PANTOPRAZOLE SODIUM 40 MG/1
40 TABLET, DELAYED RELEASE ORAL DAILY
Status: DISCONTINUED | OUTPATIENT
Start: 2022-08-19 | End: 2022-08-21

## 2022-08-18 RX ORDER — ONDANSETRON 2 MG/ML
INJECTION INTRAMUSCULAR; INTRAVENOUS PRN
Status: DISCONTINUED | OUTPATIENT
Start: 2022-08-18 | End: 2022-08-18 | Stop reason: SDUPTHER

## 2022-08-18 RX ORDER — HYDROMORPHONE HYDROCHLORIDE 4 MG/1
4 TABLET ORAL
Status: DISCONTINUED | OUTPATIENT
Start: 2022-08-18 | End: 2022-08-20

## 2022-08-18 RX ORDER — SODIUM PHOSPHATE, DIBASIC AND SODIUM PHOSPHATE, MONOBASIC 7; 19 G/133ML; G/133ML
1 ENEMA RECTAL
Status: DISPENSED | OUTPATIENT
Start: 2022-08-18 | End: 2022-08-18

## 2022-08-18 RX ORDER — LOSARTAN POTASSIUM 50 MG/1
50 TABLET ORAL DAILY
Status: DISCONTINUED | OUTPATIENT
Start: 2022-08-19 | End: 2022-08-19

## 2022-08-18 RX ORDER — ACETAMINOPHEN 500 MG
1000 TABLET ORAL EVERY 6 HOURS PRN
COMMUNITY

## 2022-08-18 RX ORDER — DEXTROSE MONOHYDRATE 100 MG/ML
INJECTION, SOLUTION INTRAVENOUS CONTINUOUS PRN
Status: DISCONTINUED | OUTPATIENT
Start: 2022-08-18 | End: 2022-08-18 | Stop reason: HOSPADM

## 2022-08-18 RX ORDER — SODIUM CHLORIDE 0.9 % (FLUSH) 0.9 %
5-40 SYRINGE (ML) INJECTION EVERY 12 HOURS SCHEDULED
Status: DISCONTINUED | OUTPATIENT
Start: 2022-08-18 | End: 2022-08-23 | Stop reason: HOSPADM

## 2022-08-18 RX ORDER — ACETAMINOPHEN 500 MG
1000 TABLET ORAL EVERY 6 HOURS PRN
Status: DISCONTINUED | OUTPATIENT
Start: 2022-08-18 | End: 2022-08-23 | Stop reason: HOSPADM

## 2022-08-18 RX ORDER — ROCURONIUM BROMIDE 10 MG/ML
INJECTION, SOLUTION INTRAVENOUS PRN
Status: DISCONTINUED | OUTPATIENT
Start: 2022-08-18 | End: 2022-08-18 | Stop reason: SDUPTHER

## 2022-08-18 RX ORDER — MIDAZOLAM HYDROCHLORIDE 2 MG/2ML
2 INJECTION, SOLUTION INTRAMUSCULAR; INTRAVENOUS
Status: COMPLETED | OUTPATIENT
Start: 2022-08-18 | End: 2022-08-18

## 2022-08-18 RX ORDER — LIDOCAINE HYDROCHLORIDE 10 MG/ML
1 INJECTION, SOLUTION INFILTRATION; PERINEURAL
Status: DISCONTINUED | OUTPATIENT
Start: 2022-08-18 | End: 2022-08-18 | Stop reason: HOSPADM

## 2022-08-18 RX ORDER — HALOPERIDOL 5 MG/ML
1 INJECTION INTRAMUSCULAR
Status: DISCONTINUED | OUTPATIENT
Start: 2022-08-18 | End: 2022-08-18 | Stop reason: HOSPADM

## 2022-08-18 RX ORDER — 0.9 % SODIUM CHLORIDE 0.9 %
500 INTRAVENOUS SOLUTION INTRAVENOUS ONCE
Status: COMPLETED | OUTPATIENT
Start: 2022-08-19 | End: 2022-08-19

## 2022-08-18 RX ADMIN — PROPOFOL 200 MG: 10 INJECTION, EMULSION INTRAVENOUS at 14:39

## 2022-08-18 RX ADMIN — ACETAMINOPHEN 1000 MG: 500 TABLET, FILM COATED ORAL at 21:37

## 2022-08-18 RX ADMIN — GLYCOPYRROLATE 0.4 MG: 0.2 INJECTION, SOLUTION INTRAMUSCULAR; INTRAVENOUS at 16:44

## 2022-08-18 RX ADMIN — SODIUM CHLORIDE 500 ML: 9 INJECTION, SOLUTION INTRAVENOUS at 23:58

## 2022-08-18 RX ADMIN — GLYCOPYRROLATE 0.2 MG: 0.2 INJECTION, SOLUTION INTRAMUSCULAR; INTRAVENOUS at 14:55

## 2022-08-18 RX ADMIN — ROPIVACAINE HYDROCHLORIDE 30 ML: 5 INJECTION, SOLUTION EPIDURAL; INFILTRATION; PERINEURAL at 13:52

## 2022-08-18 RX ADMIN — MIDAZOLAM 1 MG: 1 INJECTION INTRAMUSCULAR; INTRAVENOUS at 13:56

## 2022-08-18 RX ADMIN — TUBERCULIN PURIFIED PROTEIN DERIVATIVE 5 UNITS: 5 INJECTION, SOLUTION INTRADERMAL at 21:18

## 2022-08-18 RX ADMIN — NEOSTIGMINE METHYLSULFATE 3 MG: 1 INJECTION, SOLUTION INTRAVENOUS at 16:44

## 2022-08-18 RX ADMIN — Medication 10 MG: at 16:38

## 2022-08-18 RX ADMIN — LIDOCAINE HYDROCHLORIDE 60 MG: 20 INJECTION, SOLUTION EPIDURAL; INFILTRATION; INTRACAUDAL; PERINEURAL at 14:39

## 2022-08-18 RX ADMIN — SODIUM CHLORIDE, POTASSIUM CHLORIDE, SODIUM LACTATE AND CALCIUM CHLORIDE: 600; 310; 30; 20 INJECTION, SOLUTION INTRAVENOUS at 23:33

## 2022-08-18 RX ADMIN — ROCURONIUM BROMIDE 50 MG: 50 INJECTION, SOLUTION INTRAVENOUS at 14:39

## 2022-08-18 RX ADMIN — SODIUM CHLORIDE, PRESERVATIVE FREE 10 ML: 5 INJECTION INTRAVENOUS at 21:19

## 2022-08-18 RX ADMIN — SODIUM CHLORIDE: 9 INJECTION, SOLUTION INTRAVENOUS at 18:44

## 2022-08-18 RX ADMIN — LABETALOL HYDROCHLORIDE 10 MG: 5 INJECTION INTRAVENOUS at 16:56

## 2022-08-18 RX ADMIN — Medication 10 MG: at 15:15

## 2022-08-18 RX ADMIN — ACETAMINOPHEN 1000 MG: 500 TABLET, FILM COATED ORAL at 12:34

## 2022-08-18 RX ADMIN — SODIUM CHLORIDE, SODIUM LACTATE, POTASSIUM CHLORIDE, AND CALCIUM CHLORIDE 125 ML/HR: 600; 310; 30; 20 INJECTION, SOLUTION INTRAVENOUS at 12:33

## 2022-08-18 RX ADMIN — VANCOMYCIN HYDROCHLORIDE 1000 MG: 1 INJECTION, POWDER, LYOPHILIZED, FOR SOLUTION INTRAVENOUS at 15:27

## 2022-08-18 RX ADMIN — SODIUM CHLORIDE: 9 INJECTION, SOLUTION INTRAVENOUS at 21:19

## 2022-08-18 RX ADMIN — ONDANSETRON 4 MG: 2 INJECTION INTRAMUSCULAR; INTRAVENOUS at 16:44

## 2022-08-18 RX ADMIN — SODIUM CHLORIDE, SODIUM LACTATE, POTASSIUM CHLORIDE, AND CALCIUM CHLORIDE: 600; 310; 30; 20 INJECTION, SOLUTION INTRAVENOUS at 16:40

## 2022-08-18 RX ADMIN — Medication 10 MG: at 16:29

## 2022-08-18 RX ADMIN — FENTANYL CITRATE 50 MCG: 50 INJECTION, SOLUTION INTRAMUSCULAR; INTRAVENOUS at 13:56

## 2022-08-18 RX ADMIN — Medication 10 MG: at 14:53

## 2022-08-18 ASSESSMENT — PAIN SCALES - GENERAL
PAINLEVEL_OUTOF10: 3
PAINLEVEL_OUTOF10: 0
PAINLEVEL_OUTOF10: 0
PAINLEVEL_OUTOF10: 1

## 2022-08-18 ASSESSMENT — PAIN DESCRIPTION - LOCATION: LOCATION: NECK

## 2022-08-18 ASSESSMENT — PAIN - FUNCTIONAL ASSESSMENT
PAIN_FUNCTIONAL_ASSESSMENT: ACTIVITIES ARE NOT PREVENTED
PAIN_FUNCTIONAL_ASSESSMENT: 0-10

## 2022-08-18 ASSESSMENT — PAIN DESCRIPTION - DESCRIPTORS
DESCRIPTORS: ACHING;SORE
DESCRIPTORS: ACHING

## 2022-08-18 NOTE — H&P
Update History & Physical    The patient's History and Physical of August 9, 2022 was reviewed with the patient and I examined the patient. There was no change. The surgical site was confirmed by the patient and me. Plan: The risks, benefits, expected outcome, and alternative to the recommended procedure have been discussed with the patient. Patient understands and wants to proceed with the procedure.      Electronically signed by Rosana Taylor MD on 8/18/2022 at 12:46 PM

## 2022-08-18 NOTE — PERIOP NOTE
Platelet count WNL. Gauze positioned over insertion site and A-line catheter withdrawn. Direct pressure applied above the insertion site upon removal. Catheter intact upon withdrawal. Direct pressure held for approx. 5 minutes beyond the point when hemostasis has been achieved. Distal circulation intact.        Once hemostasis ensured, transparent dressing applied to monitor A-line site

## 2022-08-18 NOTE — ANESTHESIA PROCEDURE NOTES
Arterial Line:    An arterial line was placed using surface landmarks, in the pre-op for the following indication(s): continuous blood pressure monitoring and blood sampling needed. A 20 gauge (size), 1 and 3/4 inch (length), Arrow (type) catheter was placed, Seldinger technique used, into the left radial artery, secured by Tegaderm and tape. Events:  patient tolerated procedure well with no complications. 8/18/2022 2:02 PM8/18/2022 2:05 PM  Anesthesiologist: Steven Bazan MD  Performed: Anesthesiologist   Preanesthetic Checklist  Completed: patient identified, IV checked, risks and benefits discussed, equipment checked, pre-op evaluation, timeout performed, anesthesia consent given, oxygen available and monitors applied/VS acknowledged

## 2022-08-18 NOTE — OP NOTE
[place]  OPERATIVE REPORT    Name:  Schuyler Najera  MR#:  [id number]  :  1940  ACCOUNT #:  [de-identified]  DATE OF SERVICE:  2022    PREOPERATIVE DIAGNOSIS:  Periprosthetic fracture status post reverse right total shoulder arthroplasty. POSTOPERATIVE DIAGNOSES:  Periprosthetic fracture status post reverse right total shoulder arthroplasty as well as glenoid loosening status post reverse right total shoulder arthroplasty. PROCEDURES PERFORMED:  1. Incision, debridement, removal of implant, right shoulder, [place], reverse right total shoulder arthroplasty with a proximal humeral osteotomy. 2.  Revision reverse right total shoulder arthroplasty with a long stem Delta Xtend prosthesis, biceps tenodesis, latissimus dorsi and teres major tendon transfer. SURGEON: Meron Luevano MD      ANESTHESIA:  General with an interscalene block. COMPLICATIONS:  None. IMPLANTS:  Hardware utilized is a central screw metaglene +4 mm lateralized, a 42 eccentric +8 mm lateralized glenosphere, 42+3 cup, three +9 extenders, 8.1 long stem, 8 mm Biostop G, 36 mm superior, 36 mm inferior, and 38 mm anterior nonlocking cortical screw. ESTIMATED BLOOD LOSS:  250 mL. FINDINGS:  1. Periprosthetic fracture involving greater tuberosity and humeral shaft. 2.  Glenoid loosening. CPT CODES:  [id number], [id number], 97753. ICD-10 CODES:  M97.31X, S42.251, T84.038. W38.110. INDICATIONS:  The patient is an 78-year-old female who underwent a previous reverse right total shoulder arthroplasty elsewhere. The patient developed severe right shoulder pain. Preoperative radiographic studies demonstrated what appeared to be glenoid loosening. She recently sustained a periprosthetic fracture involving her greater tuberosity and humeral shaft.   She has exhausted nonoperative modalities and following preoperative medical clearance, she was brought to the operative suite for operative intervention. PROCEDURE:  Following identification of the patient, the patient was taken to the operative suite. Following administration of general anesthesia, interscalene block for postop pain control, no antibiotics, placement of Beasley catheter, and measurement of CBC, ESR, CRP all within normal limits, the patient was very carefully positioned on the operative table in the beach-chair fashion. Right shoulder was then prepped and draped in the sterile fashion. A previous surgical incision was marked and extended proximally and distally. InteguSeal was applied. Once InteguSeal was allowed to cure, the skin was incised. Subcutaneous tissue was then dissected down to the deltopectoral interval.  This was very carefully elevated medially and laterally and the deltoid was elevated laterally off of the humeral shaft. Subdeltoid bursa was released. Axillary nerve was protected. The glenohumeral joint was then incised. Cultures were then obtained from the right shoulder labeled one, two, and three. The patient received weight-adjusted vancomycin. Frozen section was obtained which was negative. The dissection was carried circumferentially. The proximal humeral shaft was completely mobilized. There was a fracture involving the greater tuberosity and extending into the humeral shaft. This was completely mobilized for later repair. The humeral stem was noted to be well fixed. It was dislocated from the wound. The glenoid component was noted to be grossly loose. The glenoid was then meticulously exposed and the glenoid component was removed in its entirety. At this point, attention was then turned to removing the humeral shaft. The humeral stem was noted to be well fixed. With use of a bur and an osteotome, a proximal humeral osteotomy was then performed. The humeral stem was then removed without issue. The humeral stem was then reamed. The proximal end was debrided with saw.   It was noted that an 8.1 long stem gave excellent fit and fill. At this point, our attention was then turned back to the glenoid. There was noted to be severe glenoid wear particularly superiorly. The glenoid was then meticulously exposed. Starter wire was then drilled. The inferior tilt was reamed in. The standard +4 mm lateralized metaglene was then inserted in the standard fashion and secured with 36 mm superior and inferior and 38 mm anterior nonlocking cortical screw. Metaglene was stable. A 42 eccentric +8 mm lateralized glenosphere was then inserted. Metaglene and glenosphere were stable. Humerus was dislocated back from the wound. It was noted that an 8.1 long stem with three +9 extenders and 42+3 cup gave excellent stability and mobility. At this point, all trial components were then removed. Humeral canal was irrigated and dried. An 8 mm Biostop G was then placed distally. Once the cement restrictor was then inserted , the humeral canal was vigorously debrided and irrigated. The latissimus dorsi and teres major tendons were then identified on the humeral shaft. They were released to mobilize posterior for later transfer. Humeral canal was irrigated and . Antibiotic-impregnated cement was mixed and inserted in the humeral canal and an 8.1 long stem was cemented in the appropriate version. Excessive cement was removed with a curette. Once the cement was allowed to cure, the true three +9 extenders were inserted on the stem along with a 42+3 cup. The shoulder was reduced. There was excellent stability with excellent mobility. At this point, the greater tuberosity fracture fragment and the humeral shaft fragment were repaired back to the fins of prosthesis using #5 Mersilene sutures. Biceps was tenodesed using #5 Mersilene sutures. Latissimus dorsi and teres major tendons were then transferred posteriorly and secured using #2 Mersilene sutures.   At this point, the arm was put through range of motion and stable. Axillary and radial nerves were intact. At this point, the wound was then irrigated. The  deltopectoral interval fascia was approximated with #5 Mersilene sutures. Skin was closed with 0 Vicryl figure-of-eight sutures and 2-0 nylon horizontal mattress sutures. A sterile dressing was applied. A sling and Swathe was applied. The patient was then transferred to the recovery room in stale condition. Cultures obtained from the right shoulder labeled one, two, and three. The patient then got weight-adjust vancomycin. Axillary and radial nerves were protected throughout the procedure.       MD ABBY [id number]  D:  08/18/2022 17:45  T:  08/18/2022 19:44  JOB #:  [id number]  CC:  Kallie Mark MD

## 2022-08-18 NOTE — ANESTHESIA PROCEDURE NOTES
Peripheral Block    Patient location during procedure: pre-op  Reason for block: post-op pain management and at surgeon's request  Start time: 8/18/2022 1:52 PM  End time: 8/18/2022 1:56 PM  Staffing  Performed: anesthesiologist   Anesthesiologist: Adolfo Lawson MD  Preanesthetic Checklist  Completed: patient identified, IV checked, site marked, risks and benefits discussed, surgical/procedural consents, equipment checked, pre-op evaluation, timeout performed, anesthesia consent given, oxygen available and monitors applied/VS acknowledged  Peripheral Block   Patient position: sitting  Prep: ChloraPrep  Provider prep: mask and sterile gloves  Patient monitoring: cardiac monitor, continuous pulse ox, oxygen, IV access, frequent blood pressure checks and responsive to questions  Block type: Brachial plexus  Interscalene  Laterality: right  Injection technique: single-shot  Guidance: nerve stimulator and ultrasound guided    Needle   Needle type: insulated echogenic nerve stimulator needle   Needle gauge: 20 G  Needle localization: nerve stimulator and ultrasound guidance (minimal motor response at >0.4 mA)  Needle length: 10 cm  Assessment   Injection assessment: negative aspiration for heme, no paresthesia on injection, local visualized surrounding nerve on ultrasound and no intravascular symptoms  Slow fractionated injection: yes  Hemodynamics: stable  Real-time US image taken/store: yes  Outcomes: patient tolerated procedure well    Additional Notes  Risks/benefits/alternatives discussed including damage to nerve or muscle. Needle inserted and placed in close proximity to the nerve under real time ultrasound guidance. Ultrasound was used to visualize the spread of local anesthetic in increments of 2cc to 5cc in close proximity to the nerve being blocked. Patient tolerated procedure well with no immediate complications.   Image saved to chart    Decadron 4mg added to local anesthetic solution as adjunct  Medications Administered  ropivacaine (NAROPIN) injection 0.5% - Perineural   30 mL - 8/18/2022 1:52:00 PM

## 2022-08-18 NOTE — ANESTHESIA PRE PROCEDURE
Department of Anesthesiology  Preprocedure Note       Name:  Dontae Hernandez   Age:  78 y.o.  :  1940                                          MRN:  [id number]         Date:  2022      Surgeon: Jake Travis):  Genoveva Hatchet., MD    Procedure: Procedure(s):  right shoulder irrigation and debridement, right shoulder removal of manjit total shoulder arthroplasty implants, revision right reverse total shoulder arthroplasty with a delta xtend prosthesis and biceps tenodesis, possible remedy spacer. general/interscalene. 23hr observation. Medications prior to admission:   Prior to Admission medications    Medication Sig Start Date End Date Taking? Authorizing Provider   acetaminophen (TYLENOL) 500 MG tablet Take 1,000 mg by mouth every 6 hours as needed for Pain   Yes Historical Provider, MD   oxyCODONE (ROXICODONE) 5 MG immediate release tablet Take 5 mg by mouth every 4 hours as needed for Pain. Historical Provider, MD   b complex vitamins capsule Take 1 capsule by mouth in the morning. Historical Provider, MD   cetirizine (ZYRTEC) 10 MG tablet Take 10 mg by mouth daily    Ar Automatic Reconciliation   fluticasone (FLONASE) 50 MCG/ACT nasal spray 2 sprays by Nasal route daily 21   Ar Automatic Reconciliation   losartan (COZAAR) 50 MG tablet Take 50 mg by mouth in the morning. Patient not taking: Reported on 2022 12/3/21   Ar Automatic Reconciliation   meloxicam (MOBIC) 15 MG tablet Take 15 mg by mouth daily 10/28/21   Ar Automatic Reconciliation   omeprazole (PRILOSEC) 20 MG delayed release capsule Take 20 mg by mouth 2 times daily as needed 21   Ar Automatic Reconciliation   traMADol (ULTRAM) 50 MG tablet Take 50 mg by mouth every 6 hours as needed.     Ar Automatic Reconciliation       Current medications:    Current Facility-Administered Medications   Medication Dose Route Frequency Provider Last Rate Last Admin    lidocaine 1 % injection 1 mL  1 mL IntraDERmal Once PRN Maria Cohen JOSE Elise MD        acetaminophen (TYLENOL) tablet 1,000 mg  1,000 mg Oral Once Thao Simental MD        fentaNYL (SUBLIMAZE) injection 100 mcg  100 mcg IntraVENous Once PRN Thao Simental MD        lactated ringers infusion   IntraVENous Continuous Thao Simental MD        sodium chloride flush 0.9 % injection 5-40 mL  5-40 mL IntraVENous 2 times per day Thao Simental MD        sodium chloride flush 0.9 % injection 5-40 mL  5-40 mL IntraVENous PRN Thao Simental MD        0.9 % sodium chloride infusion   IntraVENous PRN Thao Simental MD        midazolam PF (VERSED) injection 2 mg  2 mg IntraVENous Once PRN Thao Simental MD        sodium chloride flush 0.9 % injection 5-40 mL  5-40 mL IntraVENous 2 times per day Rahul Correa MD        sodium chloride flush 0.9 % injection 5-40 mL  5-40 mL IntraVENous PRN Rahul Correa MD        0.9 % sodium chloride infusion   IntraVENous PRN Rahul Correa MD           Allergies:     Allergies   Allergen Reactions    Adhesive Tape Other (See Comments)     Blisters, pt denies allergy 7-3-19     Codeine Itching     Pt denies allergy 7-3-19        Problem List:    Patient Active Problem List   Diagnosis Code    PVD (peripheral vascular disease) ([facility] [address] I73.9    Status post right hip replacement Z96.641    Shortness of breath R06.02    Palpitations R00.2    Chest discomfort R07.89    History of COVID-19 Z86.16    Spinal stenosis, lumbar M48.061    Bilateral carotid artery disease (HCC) I77.9    Palpitation R00.2    Hypothyroidism E03.9    Vazquez's esophagus K22.70    Tricuspid valve disorder I07.9    Coronary atherosclerosis of native coronary vessel I25.10    Hypertension, benign I10    Hyperlipidemia E78.5    Pain due to right shoulder joint prosthesis ([facility] [address] T84.84XA, Z96.611    Traumatic closed fracture of greater tuberosity of humerus with minimal displacement, right, initial encounter S42.251A    Periprosthetic fracture around internal prosthetic right shoulder joint M97. 31XA    Presence of right artificial shoulder joint Z96.611    Periprosthetic fracture around internal prosthetic right shoulder joint, initial encounter M97. 31XA       Past Medical History:        Diagnosis Date    Arthritis     osteo    Vazquez's esophagus     Managed with as needed meds    Bilateral carotid artery disease ([facility] [address]     Followed by cardiology- Dr. Merced Hadley Chest discomfort     Coronary atherosclerosis of native coronary vessel     Degeneration of lumbar or lumbosacral intervertebral disc     Depression     medication     Gastric ulcer, unspecified as acute or chronic, without mention of hemorrhage or perforation 1989    History of blood transfusion     Hyperlipidemia     Hypothyroidism     no meds needed currently (low normal range per pt)    Infectious disease     lyme disease 2004    Nausea & vomiting     Palpitations     PVD (peripheral vascular disease) ([facility] [address]     Scoliosis (and kyphoscoliosis), idiopathic     Spinal stenosis, lumbar 12/7/2009    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    Thoracic or lumbosacral neuritis or radiculitis, unspecified     Tricuspid regurgitation     mild per echo dated 1-11-13    Tricuspid valve disorder        Past Surgical History:        Procedure Laterality Date    APPENDECTOMY  1987    BREAST BIOPSY Left 1980    ENLARGED MILK GLAND    BUNIONECTOMY Bilateral 2009    with hammertoe correction    CARPAL TUNNEL RELEASE Right 10/31/13    ESOPHAGOGASTRODUODENOSCOPY  07/27/2018    2016, 2018    HAMMER TOE SURGERY  2010    revision    HYSTERECTOMY (CERVIX STATUS UNKNOWN)  1988    LAMINECTOMY  04/05/2022    T10 OPEN TOTAL LAMINECTOMY    LUMBAR LAMINECTOMY  2009    L4-L5    ORTHOPEDIC SURGERY  2010    Right hand    REVERSE TOTAL SHOULDER ARTHROPLASTY Right 2017    SHOULDER ARTHROSCOPY  2013    left shoulder cyst    TOTAL HIP ARTHROPLASTY Left 2007    revision 3/2000    TOTAL HIP ARTHROPLASTY Left 2009    revision    TOTAL KNEE ARTHROPLASTY Right 2007       Social History:    Social History     Tobacco Use    Smoking status: Never    Smokeless tobacco: Never   Substance Use Topics    Alcohol use: Not Currently                                Counseling given: Not Answered      Vital Signs (Current):   Vitals:    08/18/22 1151   BP: 122/67   Pulse: 58   Resp: 18   Temp: 97.9 °F (36.6 °C)   TempSrc: Temporal   SpO2: 95%   Weight: 153 lb 3.2 oz (69.5 kg)   Height: 5' 2\" (1.575 m)                                              BP Readings from Last 3 Encounters:   08/18/22 122/67   08/12/22 115/78   07/29/22 115/61       NPO Status: Time of last liquid consumption: 2345                        Time of last solid consumption: 2000                        Date of last liquid consumption: 08/17/22                        Date of last solid food consumption: 08/17/22    BMI:   Wt Readings from Last 3 Encounters:   08/18/22 153 lb 3.2 oz (69.5 kg)   08/12/22 151 lb 8 oz (68.7 kg)   07/29/22 153 lb (69.4 kg)     Body mass index is 28.02 kg/m².     CBC:   Lab Results   Component Value Date/Time    WBC 5.3 08/12/2022 11:01 AM    RBC 3.88 08/12/2022 11:01 AM    HGB 12.4 08/12/2022 11:01 AM    HCT 37.7 08/12/2022 11:01 AM    MCV 97.2 08/12/2022 11:01 AM    RDW 13.7 08/12/2022 11:01 AM     08/12/2022 11:01 AM       CMP:   Lab Results   Component Value Date/Time     08/12/2022 11:01 AM    K 4.2 08/12/2022 11:01 AM     08/12/2022 11:01 AM    CO2 31 08/12/2022 11:01 AM    BUN 17 08/12/2022 11:01 AM    CREATININE 0.99 08/12/2022 11:01 AM    GFRAA >60 08/12/2022 11:01 AM    AGRATIO 1.0 11/30/2021 01:36 PM    LABGLOM 57 08/12/2022 11:01 AM    GLUCOSE 92 08/12/2022 11:01 AM    PROT 6.9 11/30/2021 01:36 PM    CALCIUM 9.3 08/12/2022 11:01 AM    BILITOT 0.6 11/30/2021 01:36 PM    ALKPHOS 74 11/30/2021 01:36 PM    AST 41 11/30/2021 01:36 PM    ALT 22 11/30/2021 01:36 PM       POC Tests: No results for input(s): POCGLU, POCNA, POCK, POCCL, POCBUN, POCHEMO, POCHCT in the last 72 hours. Coags:   Lab Results   Component Value Date/Time    PROTIME 13.5 08/12/2022 11:01 AM    INR 1.0 08/12/2022 11:01 AM    APTT 35.3 08/12/2022 11:01 AM       HCG (If Applicable): No results found for: PREGTESTUR, PREGSERUM, HCG, HCGQUANT     ABGs: No results found for: PHART, PO2ART, DVP5TJE, XYX1GEM, BEART, U6GVVOGL     Type & Screen (If Applicable):  No results found for: LABABO, LABRH    Drug/Infectious Status (If Applicable):  No results found for: HIV, HEPCAB    COVID-19 Screening (If Applicable): No results found for: COVID19        Anesthesia Evaluation  Patient summary reviewed and Nursing notes reviewed no history of anesthetic complications:   Airway: Mallampati: II  TM distance: >3 FB   Neck ROM: full  Mouth opening: > = 3 FB   Dental:      Comment: Crowns, fillings    Pulmonary:Negative Pulmonary ROS breath sounds clear to auscultation                             Cardiovascular:    (+) hypertension:, CAD (Denies MI and chest pain):, dysrhythmias: PAC, pulmonary hypertension (RVSP 43): mild,         Rhythm: regular  Rate: normal                    Neuro/Psych:   (+) depression/anxiety             GI/Hepatic/Renal:   (+) GERD ([place]): well controlled,           Endo/Other:    (+) : arthritis: OA., .                 Abdominal:             Vascular:   + PVD, aortic or cerebral, . Other Findings:           Anesthesia Plan      general     ASA 3     (GETA)  Induction: intravenous. MIPS: Postoperative opioids intended. Anesthetic plan and risks discussed with patient. Use of blood products discussed with patient whom consented to blood products.            Post-op pain plan if not by surgeon: single peripheral nerve block            Aicha Perez MD   8/18/2022

## 2022-08-18 NOTE — DISCHARGE SUMMARY
[address] [facility] Discharge Summary      Patient ID:  Eda Closs  [id number]  78 y.o.  1940    Allergies: Adhesive tape and Codeine    Admit date: 8/18/2022    Discharge date and time: 8/23/2022    Admitting Physician: Dalila Salguero MD     Discharge Physician: Ismael Antony MD      * Admission Diagnoses: Pain due to right shoulder joint prosthesis [facility]) [B69.86ZE, Z96.611]  Traumatic closed fracture of greater tuberosity of humerus with minimal displacement, right, initial encounter [S42.251A]  Periprosthetic fracture around internal prosthetic right shoulder joint, initial encounter [M97.31XA]  Presence of right artificial shoulder joint [Z96.611]    * Discharge Diagnoses: Principal Problem:    Periprosthetic fracture around internal prosthetic right shoulder joint  Active Problems:    Periprosthetic fracture around internal prosthetic right shoulder joint, initial encounter    Pain due to right shoulder joint prosthesis ([address]    Traumatic closed fracture of greater tuberosity of humerus with minimal displacement, right, initial encounter    Presence of right artificial shoulder joint  Resolved Problems:    * No resolved hospital problems. *      Surgeon: Ismael Antony MD      Preoperative Medical Clearance: yes    * Procedure: Procedure(s) with comments:  right shoulder irrigation and debridement, right shoulder removal of manjit total shoulder arthroplasty implants, revision right reverse total shoulder arthroplasty with a delta xtend prosthesis and biceps tenodesis, possible remedy spacer. general/interscalene. 23hr observation. - interscalene           Perioperative Antibiotics: Ancef  ___                                                Vancomycin  __x_        Post Op complications: none      * Discharge Condition: Good  Wound appears to be healing without any evidence of infection.        * Discharged to: SNF    * Follow-up Care/Discharge instructions:  - Oxy 5 prescription left on chart  - Resume pre hospital diet            - Resume home medications per medical continuation form     CONTINUE PHYSICAL THERAPY  Sling right shoulder  - patient was held in hospital longer due to GI issues, cleared now.    - Follow up in office as scheduled       Signed:  Florencia Stafford MD  8/18/2022  5:09 PM

## 2022-08-18 NOTE — BRIEF OP NOTE
[facility] [id number] Right 1 Implanted   SCREW BNE L36MM DIA4. 5MM PROX [place] TIB S [facility] [facility] [place] FULL - [id number]  SCREW BNE L36MM DIA4. 5MM PROX [place] TIB S [place] [facility] [place] FULL [id number] [employer] [id number] Right 2 Implanted   SCREW BNE L38MM DIA4. 5MM PROX [place] TIB S [place] [facility] [place] FULL - [id number]  SCREW BNE L38MM DIA4. 5MM PROX [place] TIB S [place] [address] [place] FULL [id number] [employer] [id number] Right 1 Implanted   CEMENT BNE 20ML 41GM FULL DOSE PMMA W/ TOBRA M VISC RADPQ - [id number]  CEMENT BNE 20ML 41GM FULL DOSE PMMA W/ TOBRA M VISC RADPQ [id number] [employer] [facility] [id number] Right 1 Implanted   CEMENT BNE 20ML 41GM FULL DOSE PMMA W/ TOBRA M VISC RADPQ - [id number]  CEMENT BNE 20ML 41GM FULL DOSE PMMA W/ TOBRA M VISC RADPQ [id number] [employer] [facility] [id number] Right 1 Implanted   SPACER HUM +9MM OFFSET SHLDR POLYETH FOR DELT XTEND REV SYS - [id number]  SPACER HUM +9MM OFFSET SHLDR POLYETH FOR DELT XTEND REV SYS [id number] [facility] [employer] [facility] [id number] Right 1 Implanted   STEM HUM SZ 1 DIA8MM LNG CO CHROM [place] EPIPHYSIS - [id number]  STEM HUM SZ 1 DIA8MM LNG CO CHROM [place] EPIPHYSIS [id number] [facility] [employer] [facility] [id number] Right 1 Implanted   RESTRICTOR ROSA DIA8MM UNIV FEM CNL UHMWPE BIOSTP G - [id number]  RESTRICTOR ROSA DIA8MM UNIV FEM CNL UHMWPE BIOSTP G [id number] [facility] [employer] [facility] [id number] Right 1 Implanted   SPACER HUM +9MM OFFSET SHLDR POLYETH FOR DELT XTEND REV SYS - [id number]  SPACER HUM +9MM OFFSET SHLDR POLYETH FOR DELT XTEND REV SYS [id number] [facility] [id number] Right 1 Implanted   SPACER HUM +9MM OFFSET SHLDR POLYETH FOR DELT XTEND REV SYS - [id number]  SPACER HUM +9MM OFFSET SHLDR POLYETH FOR DELT XTEND REV SYS [id number] [facility] [id number] Right 1 Implanted   CUP HUM [id number] +3MM OFFSET STD SHLDR POLYETH DELT XTEND - [id number]  CUP HUM [id number] +3MM OFFSET STD SHLDR POLYETH DELT XTEND [id number] [facility]- [id number] Right 1 Implanted       Russ Easton MD

## 2022-08-18 NOTE — PERIOP NOTE
TRANSFER - OUT REPORT:    Verbal report given to Cate Lopez on [address]  being transferred to [facility] for routine post-op       Report consisted of patient's Situation, Background, Assessment and   Recommendations(SBAR). Information from the following report(s) Nurse Handoff Report was reviewed with the receiving nurse. Lines:   Peripheral IV 08/18/22 Left; Anterior Forearm (Active)   Site Assessment Clean, dry & intact 08/18/22 1712   Line Status Infusing 08/18/22 1712   Phlebitis Assessment No symptoms 08/18/22 1712   Infiltration Assessment 0 08/18/22 1712   Dressing Status Clean, dry & intact 08/18/22 1712   Dressing Type Transparent 08/18/22 1712        Opportunity for questions and clarification was provided.       Patient transported with:  O2 @ 3lpm

## 2022-08-18 NOTE — PROGRESS NOTES
TRANSFER - IN REPORT:    Verbal report received from ABHIJIT gNuyen on [address] [facility]  being received from PACU for routine post-op      Report consisted of patient's Situation, Background, Assessment and   Recommendations(SBAR). Information from the following report(s) Surgery Report, Intake/Output, MAR, and Recent Results was reviewed with the receiving nurse. Opportunity for questions and clarification was provided. Assessment completed upon patient's arrival to unit and care assumed.

## 2022-08-18 NOTE — ANESTHESIA POSTPROCEDURE EVALUATION
Department of Anesthesiology  Postprocedure Note    Patient: Gerda Epley  MRN: [id number]  YOB: 1940  Date of evaluation: 8/18/2022      Procedure Summary     Date: 08/18/22 [place] [facility] [place] [facility] [place]    Anesthesia Start: 1362 Anesthesia Stop: 9073    Procedure: right shoulder irrigation and debridement, right shoulder removal of manjit total shoulder arthroplasty implants, revision right reverse total shoulder arthroplasty with a delta xtend prosthesis and biceps tenodesis, possible remedy spacer. general/interscalene. 23hr observation. (Right: Shoulder) Diagnosis:       Pain due to right shoulder joint prosthesis (HCC)      Traumatic closed fracture of greater tuberosity of humerus with minimal displacement, right, initial encounter      Periprosthetic fracture around internal prosthetic right shoulder joint, initial encounter      Presence of right artificial shoulder joint      (Pain due to right shoulder joint prosthesis (HCC) [B10.43HG, Z96.611])      (Traumatic closed fracture of greater tuberosity of humerus with minimal displacement, right, initial encounter [S42.251A])      (Periprosthetic fracture around internal prosthetic right shoulder joint, initial encounter [M97.31XA])      (Presence of right artificial shoulder joint [Z96.611])    Surgeons: Christel Manriquez MD Responsible Provider: Michelle Vega MD    Anesthesia Type: general ASA Status: 3          Anesthesia Type: No value filed.     Bryce Phase I: Bryce Score: 8    Bryce Phase II:        Anesthesia Post Evaluation    Patient location during evaluation: PACU  Patient participation: complete - patient participated  Level of consciousness: awake and alert  Airway patency: patent  Nausea & Vomiting: no nausea and no vomiting  Complications: no  Cardiovascular status: hemodynamically stable  Respiratory status: acceptable, nonlabored ventilation and spontaneous ventilation  Hydration status: euvolemic  Comments: BP (!) 95/53   Pulse 50   Temp 98.6 °F (37 °C) (Temporal)   Resp 18   Ht 5' 2\" (1.575 m)   Wt 153 lb 3.2 oz (69.5 kg)   SpO2 97%   BMI 28.02 kg/m²     Multimodal analgesia pain management approach

## 2022-08-19 ENCOUNTER — APPOINTMENT (OUTPATIENT)
Dept: GENERAL RADIOLOGY | Age: 82
DRG: 483 | End: 2022-08-19
Attending: ORTHOPAEDIC SURGERY
Payer: MEDICARE

## 2022-08-19 PROBLEM — I95.9 HYPOTENSION: Status: ACTIVE | Noted: 2022-08-19

## 2022-08-19 PROBLEM — R07.81 RIB PAIN: Status: ACTIVE | Noted: 2022-08-19

## 2022-08-19 LAB
ALBUMIN SERPL-MCNC: 2.8 G/DL (ref 3.2–4.6)
ALBUMIN/GLOB SERPL: 1 {RATIO} (ref 1.2–3.5)
ALP SERPL-CCNC: 63 U/L (ref 50–130)
ALT SERPL-CCNC: 20 U/L (ref 12–65)
ANION GAP SERPL CALC-SCNC: 5 MMOL/L (ref 7–16)
AST SERPL-CCNC: 33 U/L (ref 15–37)
BILIRUB DIRECT SERPL-MCNC: 0.1 MG/DL
BILIRUB SERPL-MCNC: 0.5 MG/DL (ref 0.2–1.1)
BUN SERPL-MCNC: 12 MG/DL (ref 8–23)
CALCIUM SERPL-MCNC: 8 MG/DL (ref 8.3–10.4)
CHLORIDE SERPL-SCNC: 109 MMOL/L (ref 98–107)
CO2 SERPL-SCNC: 26 MMOL/L (ref 21–32)
CREAT SERPL-MCNC: 0.85 MG/DL (ref 0.6–1)
ERYTHROCYTE [DISTWIDTH] IN BLOOD BY AUTOMATED COUNT: 13.8 % (ref 11.9–14.6)
GLOBULIN SER CALC-MCNC: 2.7 G/DL (ref 2.3–3.5)
GLUCOSE SERPL-MCNC: 154 MG/DL (ref 65–100)
HCT VFR BLD AUTO: 24.5 % (ref 35.8–46.3)
HCT VFR BLD AUTO: 25.8 % (ref 35.8–46.3)
HCT VFR BLD AUTO: 26.5 % (ref 35.8–46.3)
HGB BLD-MCNC: 8 G/DL (ref 11.7–15.4)
HGB BLD-MCNC: 8.4 G/DL (ref 11.7–15.4)
HGB BLD-MCNC: 8.7 G/DL (ref 11.7–15.4)
MAGNESIUM SERPL-MCNC: 2 MG/DL (ref 1.8–2.4)
MCH RBC QN AUTO: 32.2 PG (ref 26.1–32.9)
MCHC RBC AUTO-ENTMCNC: 32.6 G/DL (ref 31.4–35)
MCV RBC AUTO: 98.9 FL (ref 79.6–97.8)
MM INDURATION, POC: 0 MM (ref 0–5)
NRBC # BLD: 0 K/UL (ref 0–0.2)
PLATELET # BLD AUTO: 189 K/UL (ref 150–450)
PMV BLD AUTO: 10.8 FL (ref 9.4–12.3)
POTASSIUM SERPL-SCNC: 4.5 MMOL/L (ref 3.5–5.1)
PPD, POC: NEGATIVE
PROT SERPL-MCNC: 5.5 G/DL (ref 6.3–8.2)
RBC # BLD AUTO: 2.61 M/UL (ref 4.05–5.2)
SODIUM SERPL-SCNC: 140 MMOL/L (ref 136–145)
WBC # BLD AUTO: 7.9 K/UL (ref 4.3–11.1)

## 2022-08-19 PROCEDURE — 6360000002 HC RX W HCPCS: Performed by: ORTHOPAEDIC SURGERY

## 2022-08-19 PROCEDURE — 97161 PT EVAL LOW COMPLEX 20 MIN: CPT

## 2022-08-19 PROCEDURE — 94761 N-INVAS EAR/PLS OXIMETRY MLT: CPT

## 2022-08-19 PROCEDURE — 2580000003 HC RX 258: Performed by: ORTHOPAEDIC SURGERY

## 2022-08-19 PROCEDURE — 6370000000 HC RX 637 (ALT 250 FOR IP): Performed by: ORTHOPAEDIC SURGERY

## 2022-08-19 PROCEDURE — 85014 HEMATOCRIT: CPT

## 2022-08-19 PROCEDURE — 36415 COLL VENOUS BLD VENIPUNCTURE: CPT

## 2022-08-19 PROCEDURE — 97530 THERAPEUTIC ACTIVITIES: CPT

## 2022-08-19 PROCEDURE — 2580000003 HC RX 258: Performed by: INTERNAL MEDICINE

## 2022-08-19 PROCEDURE — 1100000000 HC RM PRIVATE

## 2022-08-19 PROCEDURE — 80048 BASIC METABOLIC PNL TOTAL CA: CPT

## 2022-08-19 PROCEDURE — 85027 COMPLETE CBC AUTOMATED: CPT

## 2022-08-19 PROCEDURE — 94760 N-INVAS EAR/PLS OXIMETRY 1: CPT

## 2022-08-19 PROCEDURE — 80076 HEPATIC FUNCTION PANEL: CPT

## 2022-08-19 PROCEDURE — 71101 X-RAY EXAM UNILAT RIBS/CHEST: CPT

## 2022-08-19 PROCEDURE — 2700000000 HC OXYGEN THERAPY PER DAY

## 2022-08-19 PROCEDURE — 83735 ASSAY OF MAGNESIUM: CPT

## 2022-08-19 RX ORDER — DOXYCYCLINE HYCLATE 100 MG/1
100 CAPSULE ORAL EVERY 12 HOURS SCHEDULED
Status: DISCONTINUED | OUTPATIENT
Start: 2022-08-19 | End: 2022-08-23 | Stop reason: SDUPTHER

## 2022-08-19 RX ORDER — SODIUM CHLORIDE, SODIUM LACTATE, POTASSIUM CHLORIDE, CALCIUM CHLORIDE 600; 310; 30; 20 MG/100ML; MG/100ML; MG/100ML; MG/100ML
INJECTION, SOLUTION INTRAVENOUS CONTINUOUS
Status: DISCONTINUED | OUTPATIENT
Start: 2022-08-19 | End: 2022-08-21

## 2022-08-19 RX ORDER — 0.9 % SODIUM CHLORIDE 0.9 %
250 INTRAVENOUS SOLUTION INTRAVENOUS ONCE
Status: COMPLETED | OUTPATIENT
Start: 2022-08-19 | End: 2022-08-19

## 2022-08-19 RX ORDER — CITALOPRAM 20 MG/1
20 TABLET ORAL DAILY
COMMUNITY

## 2022-08-19 RX ORDER — CITALOPRAM 20 MG/1
20 TABLET ORAL
Status: DISCONTINUED | OUTPATIENT
Start: 2022-08-19 | End: 2022-08-23 | Stop reason: HOSPADM

## 2022-08-19 RX ADMIN — HYDROMORPHONE HYDROCHLORIDE 2 MG: 2 TABLET ORAL at 20:23

## 2022-08-19 RX ADMIN — DOXYCYCLINE HYCLATE 100 MG: 100 CAPSULE ORAL at 20:48

## 2022-08-19 RX ADMIN — SODIUM CHLORIDE, PRESERVATIVE FREE 10 ML: 5 INJECTION INTRAVENOUS at 08:56

## 2022-08-19 RX ADMIN — ACETAMINOPHEN 1000 MG: 500 TABLET, FILM COATED ORAL at 08:55

## 2022-08-19 RX ADMIN — SODIUM CHLORIDE, SODIUM LACTATE, POTASSIUM CHLORIDE, AND CALCIUM CHLORIDE: 600; 310; 30; 20 INJECTION, SOLUTION INTRAVENOUS at 11:22

## 2022-08-19 RX ADMIN — FERROUS SULFATE TAB 325 MG (65 MG ELEMENTAL FE) 325 MG: 325 (65 FE) TAB at 08:55

## 2022-08-19 RX ADMIN — SODIUM CHLORIDE, PRESERVATIVE FREE 10 ML: 5 INJECTION INTRAVENOUS at 20:49

## 2022-08-19 RX ADMIN — DOXYCYCLINE HYCLATE 100 MG: 100 CAPSULE ORAL at 08:54

## 2022-08-19 RX ADMIN — SODIUM CHLORIDE 250 ML: 900 INJECTION, SOLUTION INTRAVENOUS at 09:05

## 2022-08-19 RX ADMIN — PANTOPRAZOLE SODIUM 40 MG: 40 TABLET, DELAYED RELEASE ORAL at 08:54

## 2022-08-19 RX ADMIN — HYDROMORPHONE HYDROCHLORIDE 2 MG: 2 TABLET ORAL at 12:36

## 2022-08-19 RX ADMIN — VANCOMYCIN HYDROCHLORIDE 1000 MG: 1 INJECTION, POWDER, LYOPHILIZED, FOR SOLUTION INTRAVENOUS at 15:01

## 2022-08-19 RX ADMIN — HYDROMORPHONE HYDROCHLORIDE 2 MG: 2 TABLET ORAL at 02:23

## 2022-08-19 RX ADMIN — DOCUSATE SODIUM 100 MG: 100 CAPSULE ORAL at 08:54

## 2022-08-19 RX ADMIN — HYDROMORPHONE HYDROCHLORIDE 2 MG: 2 TABLET ORAL at 17:16

## 2022-08-19 RX ADMIN — CITALOPRAM HYDROBROMIDE 20 MG: 20 TABLET ORAL at 10:53

## 2022-08-19 RX ADMIN — FERROUS SULFATE TAB 325 MG (65 MG ELEMENTAL FE) 325 MG: 325 (65 FE) TAB at 17:17

## 2022-08-19 RX ADMIN — DOCUSATE SODIUM 100 MG: 100 CAPSULE ORAL at 20:48

## 2022-08-19 RX ADMIN — LOSARTAN POTASSIUM 50 MG: 50 TABLET, FILM COATED ORAL at 08:54

## 2022-08-19 RX ADMIN — VANCOMYCIN HYDROCHLORIDE 1000 MG: 1 INJECTION, POWDER, LYOPHILIZED, FOR SOLUTION INTRAVENOUS at 02:25

## 2022-08-19 ASSESSMENT — PAIN DESCRIPTION - ORIENTATION
ORIENTATION: RIGHT

## 2022-08-19 ASSESSMENT — PAIN DESCRIPTION - DESCRIPTORS
DESCRIPTORS: ACHING

## 2022-08-19 ASSESSMENT — PAIN DESCRIPTION - LOCATION
LOCATION: SHOULDER
LOCATION: SHOULDER;GROIN
LOCATION: SHOULDER

## 2022-08-19 ASSESSMENT — PAIN SCALES - GENERAL
PAINLEVEL_OUTOF10: 2
PAINLEVEL_OUTOF10: 1
PAINLEVEL_OUTOF10: 3
PAINLEVEL_OUTOF10: 2
PAINLEVEL_OUTOF10: 10
PAINLEVEL_OUTOF10: 3
PAINLEVEL_OUTOF10: 1

## 2022-08-19 ASSESSMENT — PAIN - FUNCTIONAL ASSESSMENT: PAIN_FUNCTIONAL_ASSESSMENT: PREVENTS OR INTERFERES SOME ACTIVE ACTIVITIES AND ADLS

## 2022-08-19 ASSESSMENT — PAIN DESCRIPTION - FREQUENCY: FREQUENCY: CONTINUOUS

## 2022-08-19 NOTE — CONSULTS
[facility] Hospitalist Consult   Admit Date:  2022 11:25 AM   Name:  Marian Zarate   Age:  78 y.o. Sex:  female  :  1940   MRN:  [id number]   Room:  [facility]/    Presenting Complaint: No chief complaint on file. Reason(s) for Admission: Pain due to right shoulder joint prosthesis ([address] [H36.45HV, Z96.611]  Traumatic closed fracture of greater tuberosity of humerus with minimal displacement, right, initial encounter [S42.251A]  Periprosthetic fracture around internal prosthetic right shoulder joint, initial encounter [M97.31XA]  Presence of right artificial shoulder joint [Z96.611]     Hospitalists consulted by Edwina Reyes MD for: Cardiac disease    History of Presenting Illness:   Marian Zarate is a 78 y.o. female with history of hypertension, PVD, carotid artery disease, and CAD, who was admitted for revision of right total shoulder arthroplasty. We were consulted regarding her cardiac disease. This appears to be stable on her current medications. She has no cardiac type complaints. She denies fever/chills, NVD, abdominal pain, chest pain, shortness of breath. Review of Systems:  10 systems reviewed and negative except as noted in HPI. Assessment & Plan:     Principal Problem:  Hypertension  CAD  Her home medications have been ordered. She has no symptoms in this regard. Continue her current medications and contact us if any changes in symptoms. 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    Traumatic closed fracture of greater tuberosity of humerus with minimal displacement, right, initial encounter    Presence of right artificial shoulder joint  Resolved Problems:    * No resolved hospital problems.  *      Past History:    Past Medical History:   Diagnosis Date    Arthritis     osteo    Vazquez's esophagus     Managed with as needed meds    Bilateral carotid artery disease ([address]     Followed by cardiology- Dr. Shelley Barillas    Chest discomfort     Coronary atherosclerosis of native coronary vessel     Degeneration of lumbar or lumbosacral intervertebral disc     Depression     medication     Gastric ulcer, unspecified as acute or chronic, without mention of hemorrhage or perforation 1989    History of blood transfusion     Hyperlipidemia     Hypothyroidism     no meds needed currently (low normal range per pt)    Infectious disease     lyme disease 2004    Nausea & vomiting     Palpitations     PVD (peripheral vascular disease) ([facility]     Scoliosis (and kyphoscoliosis), idiopathic     Spinal stenosis, lumbar 12/7/2009    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    Thoracic or lumbosacral neuritis or radiculitis, unspecified     Tricuspid regurgitation     mild per echo dated 1-11-13    Tricuspid valve disorder        Past Surgical History:   Procedure Laterality Date    APPENDECTOMY  1987    BREAST BIOPSY Left 1980    ENLARGED MILK GLAND    BUNIONECTOMY Bilateral 2009    with hammertoe correction    CARPAL TUNNEL RELEASE Right 10/31/13    ESOPHAGOGASTRODUODENOSCOPY  07/27/2018    2016, 2018    HAMMER TOE SURGERY  2010    revision    HYSTERECTOMY (CERVIX STATUS UNKNOWN)  1988    LAMINECTOMY  04/05/2022    T10 OPEN TOTAL LAMINECTOMY    LUMBAR LAMINECTOMY  2009    L4-L5    ORTHOPEDIC SURGERY  2010    Right hand    REVERSE TOTAL SHOULDER ARTHROPLASTY Right 2017    SHOULDER ARTHROSCOPY  2013    left shoulder cyst    TOTAL HIP ARTHROPLASTY Left 2007    revision 3/2000    TOTAL HIP ARTHROPLASTY Left 2009    revision    TOTAL KNEE ARTHROPLASTY Right 2007        Social History     Tobacco Use    Smoking status: Never    Smokeless tobacco: Never   Substance Use Topics    Alcohol use: Not Currently      Social History     Substance and Sexual Activity   Drug Use No       Family History   Problem Relation Age of Onset    Stroke Other     Diabetes Other     Heart Disease Other     Asthma Other     Breast Cancer Neg Hx     Heart Disease Mother         CONGESTIVE HEART DISEASE    Stroke Father     Hypertension Other           Immunization History   Administered Date(s) Administered    DT (pediatric) 09/01/2011    PPD Test 02/26/2016, 08/18/2022    Pneumococcal Conjugate 13-valent ([id number]) 10/25/2016    Pneumococcal Polysaccharide ([id number]) 10/02/2013    Td vaccine (adult) 09/01/2011     Allergies   Allergen Reactions    Adhesive Tape Other (See Comments)     Blisters, pt denies allergy 7-3-19     Codeine Itching     Pt denies allergy 7-3-19       Current Facility-Administered Medications   Medication Dose Route Frequency    0.9 % sodium chloride infusion   IntraVENous PRN    lactated ringers infusion   IntraVENous Continuous    vancomycin (VANCOCIN) 1,000 mg in sodium chloride 0.9 % 250 mL IVPB ([id number])  1,000 mg IntraVENous Once    tuberculin injection 5 Units  5 Units IntraDERmal Once    HYDROmorphone (DILAUDID) injection 1 mg  1 mg IntraVENous Q1H PRN    HYDROmorphone (DILAUDID) tablet 4 mg  4 mg Oral Q3H PRN    HYDROmorphone (DILAUDID) tablet 2 mg  2 mg Oral Q3H PRN    docusate sodium (COLACE) capsule 100 mg  100 mg Oral BID    ferrous sulfate (IRON 325) tablet 325 mg  325 mg Oral BID WC    promethazine (PHENERGAN) tablet 25 mg  25 mg Oral Q4H PRN    ondansetron (ZOFRAN-ODT) disintegrating tablet 4 mg  4 mg Oral Q8H PRN    vancomycin (VANCOCIN) 1,000 mg in sodium chloride 0.9 % 250 mL IVPB ([id number])  1,000 mg IntraVENous Q12H    sodium chloride flush 0.9 % injection 5-40 mL  5-40 mL IntraVENous 2 times per day    sodium chloride flush 0.9 % injection 5-40 mL  5-40 mL IntraVENous PRN    0.9 % sodium chloride infusion   IntraVENous PRN    losartan (COZAAR) tablet 50 mg  50 mg Oral Daily    pantoprazole (PROTONIX) tablet 40 mg  40 mg Oral Daily    acetaminophen (TYLENOL) tablet 1,000 mg  1,000 mg Oral Q6H PRN       Objective:   Patient Vitals for the past 24 hrs:   Temp Pulse Resp BP SpO2   08/19/22 0310 97.5 °F (36.4 °C) 80 17 (!) 96/53 94 %   08/19/22 0253 -- -- 17 -- --   08/19/22 0223 -- -- 17 -- --   08/19/22 0215 -- -- -- 115/65 --   08/19/22 0132 -- 57 -- (!) 99/49 97 %   08/18/22 2334 97.7 °F (36.5 °C) 57 17 (!) 96/48 96 %   08/18/22 2045 -- 80 -- -- 96 %   08/18/22 2006 97.3 °F (36.3 °C) 55 17 (!) 98/52 98 %   08/18/22 1832 -- 50 18 (!) 95/53 97 %   08/18/22 1747 -- 50 16 (!) 102/54 99 %   08/18/22 1742 -- 51 16 (!) 99/51 99 %   08/18/22 1737 -- 66 16 (!) 105/51 99 %   08/18/22 1732 -- 61 16 (!) 105/51 99 %   08/18/22 1727 -- 54 16 112/67 92 %   08/18/22 1722 98.6 °F (37 °C) 54 16 (!) 117/58 95 %   08/18/22 1717 -- 55 16 (!) 116/55 96 %   08/18/22 1712 -- 58 16 (!) 95/50 92 %   08/18/22 1424 -- 55 16 (!) 148/65 98 %   08/18/22 1411 -- 50 14 (!) 141/65 98 %   08/18/22 1406 -- 50 16 (!) 117/59 98 %   08/18/22 1401 -- (!) 49 14 (!) 126/58 99 %   08/18/22 1356 -- 52 14 (!) 168/73 99 %   08/18/22 1352 -- 54 16 138/69 100 %   08/18/22 1151 97.9 °F (36.6 °C) 58 18 122/67 95 %       Oxygen Therapy  SpO2: 94 %  Pulse via Oximetry: 51 beats per minute  Pulse Oximeter Device Mode: Continuous  Pulse Oximeter Device Location: Left, Finger  O2 Device: PAP (positive airway pressure)  Skin Assessment: Clean, dry, & intact  FiO2 : 45 %  O2 Flow Rate (L/min): 30 L/min    Estimated body mass index is 28.02 kg/m² as calculated from the following:    Height as of this encounter: 5' 2\" (1.575 m). Weight as of this encounter: 153 lb 3.2 oz (69.5 kg).     Intake/Output Summary (Last 24 hours) at 8/19/2022 1075  Last data filed at 8/18/2022 1750  Gross per 24 hour   Intake 2100 ml   Output 1400 ml   Net 700 ml         Physical Exam:  History  Blood pressure (!) 96/53, pulse 80, temperature 97.5 °F (36.4 °C), temperature source Axillary, resp. rate 17, height 5' 2\" (1.575 m), weight 153 lb 3.2 oz (69.5 kg), SpO2 94 %. General:    Well nourished. Head:  Normocephalic, atraumatic  Eyes:  Sclerae appear normal.  Pupils equally round. ENT:  Nares appear normal, no drainage. Moist oral mucosa  Neck:  No restricted ROM. Trachea midline   CV:   RRR. No m/r/g. No jugular venous distension. Lungs:   CTAB. No wheezing, rhonchi, or rales. Symmetric expansion. Abdomen: Bowel sounds present. Soft, nontender, nondistended. Extremities: No cyanosis or clubbing. No edema  Skin:     No rashes and normal coloration. Warm and dry. Neuro:  CN II-XII grossly intact. Sensation intact. A&Ox3  Psych:  Normal mood and affect.       I have personally reviewed labs and tests showing:  Recent Labs:  Recent Results (from the past 24 hour(s))   Hemoglobin A1C    Collection Time: 08/18/22 12:37 PM   Result Value Ref Range    Hemoglobin A1C 5.7 (H) 4.8 - 5.6 %    eAG 117 mg/dL   POCT Glucose    Collection Time: 08/18/22 12:44 PM   Result Value Ref Range    POC Glucose 91 65 - 100 mg/dL    Performed by: Delmi Grey    Magnesium    Collection Time: 08/18/22  5:27 PM   Result Value Ref Range    Magnesium 2.1 1.8 - 2.4 mg/dL   Basic Metabolic Panel    Collection Time: 08/18/22  5:27 PM   Result Value Ref Range    Sodium 141 136 - 145 mmol/L    Potassium 4.1 3.5 - 5.1 mmol/L    Chloride 111 (H) 98 - 107 mmol/L    CO2 27 21 - 32 mmol/L    Anion Gap 3 (L) 7 - 16 mmol/L    Glucose 141 (H) 65 - 100 mg/dL    BUN 11 8 - 23 MG/DL    Creatinine 0.78 0.6 - 1.0 MG/DL    GFR African American >60 >60 ml/min/1.73m2    GFR Non- >60 >60 ml/min/1.73m2    Calcium 8.2 (L) 8.3 - 10.4 MG/DL   CBC with Auto Differential Collection Time: 08/18/22  5:27 PM   Result Value Ref Range    WBC 9.0 4.3 - 11.1 K/uL    RBC 2.87 (L) 4.05 - 5.2 M/uL    Hemoglobin 9.3 (L) 11.7 - 15.4 g/dL    Hematocrit 28.1 (L) 35.8 - 46.3 %    MCV 97.9 (H) 79.6 - 97.8 FL    MCH 32.4 26.1 - 32.9 PG    MCHC 33.1 31.4 - 35.0 g/dL    RDW 13.8 11.9 - 14.6 %    Platelets 311 819 - 419 K/uL    MPV 10.6 9.4 - 12.3 FL    nRBC 0.00 0.0 - 0.2 K/uL    Differential Type AUTOMATED      Seg Neutrophils 92 (H) 43 - 78 %    Lymphocytes 5 (L) 13 - 44 %    Monocytes 2 (L) 4.0 - 12.0 %    Eosinophils % 0 (L) 0.5 - 7.8 %    Basophils 0 0.0 - 2.0 %    Immature Granulocytes 1 0.0 - 5.0 %    Segs Absolute 8.3 (H) 1.7 - 8.2 K/UL    Absolute Lymph # 0.5 0.5 - 4.6 K/UL    Absolute Mono # 0.2 0.1 - 1.3 K/UL    Absolute Eos # 0.0 0.0 - 0.8 K/UL    Basophils Absolute 0.0 0.0 - 0.2 K/UL    Absolute Immature Granulocyte 0.1 0.0 - 0.5 K/UL       I have personally reviewed imaging studies showing:  XR SHOULDER RIGHT (MIN 2 VIEWS)    Result Date: 8/18/2022  Right shoulder radiographs 8/18/2022 CLINICAL HISTORY: Postop. FINDINGS: The patient is status post right total shoulder replacement. The humeral component appears well seated. No clear abnormal displacement of the glenoid component is seen. No evidence or dislocation is seen. There is a bony fragment involving the greater tuberosity of the proximal humerus. However, this was present on presurgical imaging and therefore does not represent an acute change. Adjacent soft tissue gas is seen consistent with recent surgery. 1. Postoperative changes of the right shoulder as described above. Echocardiogram:  12/22/21    TRANSTHORACIC ECHOCARDIOGRAM (TTE) COMPLETE (CONTRAST/BUBBLE/3D PRN) 12/23/2021  3:16 PM (Final)    Narrative  This is a summary report. The complete report is available in the patient's medical record. If you cannot access the medical record, please contact the sending organization for a detailed fax or copy.       Left Ventricle: Left ventricle size is normal. Normal wall thickness. Normal wall motion. Normal left ventricular systolic function with a visually estimated EF of 60 - 65%. Normal diastolic function. Tricuspid Valve: RVSP is 43 mmHg. Left Atrium: Left atrium is mildly dilated. LA Vol Index is  22 ml/m2. Signed by: Jatin Hutson on 12/23/2021  3:16 PM        Signed:  Delilah Bautista MD    Part of this note may have been written by using a voice dictation software. The note has been proof read but may still contain some grammatical/other typographical errors.

## 2022-08-19 NOTE — PROGRESS NOTES
PHYSICAL THERAPY: SHOULDER Initial Assessment and AM  (Link to Caseload Tracking: PT Visit Days : 1  Acknowledge Orders Time In/Out  PT Charge Capture  Rehab Caseload Tracker    Marian Zarate is a 78 y.o. female   PRIMARY DIAGNOSIS: Periprosthetic fracture around internal prosthetic right shoulder joint  Pain due to right shoulder joint prosthesis ([facility] [address] [T84.84XA, Z96.611]  Traumatic closed fracture of greater tuberosity of humerus with minimal displacement, right, initial encounter [S42.251A]  Periprosthetic fracture around internal prosthetic right shoulder joint, initial encounter [M97.31XA]  Presence of right artificial shoulder joint [Z96.611]  Procedure(s) (LRB):  right shoulder irrigation and debridement, right shoulder removal of manjit total shoulder arthroplasty implants, revision right reverse total shoulder arthroplasty with a delta xtend prosthesis and biceps tenodesis, possible remedy spacer. general/interscalene. 23hr observation. (Right)  1 Day Post-Op  Reason for Referral: Pain in Right Shoulder (M25.511)  Stiffness of Right Shoulder, Not elsewhere classified (M25.611)  Difficulty in walking, Not elsewhere classified (R26.2)  Inpatient: Payor: MEDICARE / Plan: MEDICARE PART A AND B / Product Type: *No Product type* /     MOVEMENT PRECAUTIONS   TWICE A DAY   Wrist and  hand, gentle motion right arm   NO OTHER MOTION. Nwb   Sling right shoulder     REHAB RECOMMENDATIONS:   Recommendation to date pending progress:  Setting:  Short-term Rehab    Equipment:    To Be Determined     ASSESSMENT:   ASSESSMENT:  Ms. Rosa Connors is admitted with above diagnosis and s/p above surgery. Pt presents with decreased strength and range of motion right upper extremity and decreased post op mobility. Pt will benefit from skilled PT interventions to maximize independence with mobility and HEP. Pt lives at home with spouse.  She has a history of multiple orthopedic surgeries and has gone to a SNF afterwards for rehab and she would like to pursue this plan. She is right handed. Today worked on getting out of bed, HEP and getting to recliner chair. Hope to progress this afternoon.       [address] [facility] [address] [address] AM-PAC 6 Clicks Basic Mobility Inpatient Short Form  AM-PAC Mobility Inpatient   How much difficulty turning over in bed?: A Little  How much difficulty sitting down on / standing up from a chair with arms?: A Little  How much difficulty moving from lying on back to sitting on side of bed?: A Little  How much help from another person moving to and from a bed to a chair?: A Little  How much help from another person needed to walk in hospital room?: A Little  How much help from another person for climbing 3-5 steps with a railing?: A Little  AM-[facility] Inpatient Mobility Raw Score : 18  AM-PAC Inpatient T-Scale Score : 43.63  Mobility Inpatient CMS 0-100% Score: 46.58  Mobility Inpatient CMS G-Code Modifier : CK    SUBJECTIVE:   Ms. Brett Bowden is willing to get up to recliner     Social/Functional Lives With: Spouse  Type of Home: House  Home Layout: One level  Home Access: Stairs to enter with rails  Entrance Stairs - Number of Steps: 4  Bathroom Shower/Tub: Walk-in shower  Bathroom Equipment: 3-in-1 commode  Home Equipment: zachary Sow    OBJECTIVE:     PAIN: VITAL SIGNS: LINES/DRAINS:   Pre Treatment: sore         Post Treatment: sore Vitals        Oxygen    IV    RESTRICTIONS/PRECAUTIONS:  Restrictions/Precautions: Fall Risk           Restrictions/Precautions: Fall Risk        HAND DOMINANCE:  Left []  Right [x]      UPPER EXTREMITY GROSS EVALUATION:  RIGHT UE   Within Functional Limits   Abnormal   Comments   Strength [] [x]  S/p surgery   Active Range of Motion [] [x]     Passive Range of Motion           [] [x]        LEFT UE Within Functional Limits   Abnormal   Comments   Strength [x] []  Generalized weakness   Active Range of Motion [x] []     Passive Range of Motion [x] []       LOWER EXTREMITY GROSS EVALUATION:     Within Functional Limits   Abnormal   Comments   Strength [x] [] Generalized weakness   Range of Motion [x] []        COGNITION/  PERCEPTION: Intact Impaired (Comments):   Orientation [x]     Vision [x]     Hearing [] Bilateral hearing aid   Cognition  [x]       MOBILITY: I Mod I S SBA CGA Min Mod Max Total  NT x2 Comments:   Bed Mobility    Rolling [] [] [] [] [] [x] [] [] [] [] []    Supine to Sit [] [] [] [] [] [] [] [] [] [] []    Scooting [] [] [] [] [] [x] [] [] [] [] []    Sit to Supine [] [] [] [] [] [] [] [] [] [] []    Transfers    Sit to Stand [] [] [] [] [] [x] [] [] [] [] []    Bed to Chair [] [] [] [] [] [x] [] [] [] [] []    Stand to Sit [] [] [] [] [] [x] [] [] [] [] []    Stand Pivot [] [] [] [] [] [] [] [] [] [] []    Toilet [] [] [] [] [] [] [] [] [] [] []     [] [] [] [] [] [] [] [] [] [] []    I=Independent, Mod I=Modified Independent, S=Supervision, SBA=Standby Assistance, CGA=Contact Guard Assistance,   Min=Minimal Assistance, Mod=Moderate Assistance, Max=Maximal Assistance, Total=Total Assistance, NT=Not Tested    GAIT: I Mod I S SBA CGA Min Mod Max Total  NT x2 Comments:   Level of Assistance [] [] [] [] [] [x] [] [] [] [] []    Weightbearing Status  Restrictions/Precautions  Restrictions/Precautions: Fall Risk    Distance  3 feet    Gait Quality Decreased burke , Decreased step clearance, Decreased step length, and Shuffling     DME Hand held assist      Stairs      I=Independent, Mod I=Modified Independent, S=Supervision, SBA=Standby Assistance, CGA=Contact Guard Assistance,   Min=Minimal Assistance, Mod=Moderate Assistance, Max=Maximal Assistance, Total=Total Assistance, NT=Not Tested    BALANCE: Good Fair+ Fair Fair- Poor NT Comments   Sitting Static [x] [] [] [] [] []    Sitting Dynamic [x] [x] [] [] [] []              Standing Static [] [x] [] [] [] []    Standing Dynamic [] [] [x] [] [] []      PLAN:   ACUTE PHYSICAL THERAPY GOALS:   (Developed with and agreed upon by patient and/or caregiver.)  GOALS (1-4 days):  (1.)  Patient will move from supine to sit and sit to supine  in bed with CONTACT GUARD ASSIST.    (2.)  Patient will transfer from bed to chair and chair to bed with CONTACT GUARD ASSIST using the least restrictive device. (3.)  Patient will ambulate with CONTACT GUARD ASSIST for 75 feet with the least restrictive device. (4.)  Patient will be independent with shoulder HEP to increase range of motion per MD orders. ________________________________________________________________________________________________      FREQUENCY AND DURATION: BID for duration of hospital stay or until stated goals are met, whichever comes first.    THERAPY PROGNOSIS: Good    PROBLEM LIST:   (Skilled intervention is medically necessary to address:)  Decreased ADL/Functional Activities  Decreased Activity Tolerance  Decreased AROM/PROM  Decreased Gait Ability  Decreased Safety Awareness  Decreased Strength  Decreased Transfer Abilities  Increased Pain   INTERVENTIONS PLANNED:   (Benefits and precautions of physical therapy have been discussed with the patient.)  Therapeutic Activity  Therapeutic Exercise/HEP  Neuromuscular Re-education  Gait Training  Manual Therapy  Education       TREATMENT:   EVALUATION: LOW COMPLEXITY: (Untimed Charge)    TREATMENT:   Therapeutic Activity (15 Minutes): Therapeutic activity included Transfer Training, Sitting balance , Standing balance, and HEP to improve functional Activity tolerance, Balance, Mobility, Strength, and ROM.     TREATMENT GRID:   Date:  8/19 Date:   Date:     ACTIVITY/EXERCISE: AM PM AM PM AM PM   Gripping 10        Wrist Flexion/Extension 10        Wrist Ulnar/Radial Deviation 10        Pronation/Supination 10        Finger/thumb opposition twice        Elbow Flexion/Extension         Shoulder Flexion/Extension         Shoulder AB/ADduction         Shoulder IR/ER         Pulleys         Pendulums         Shrugs         ISOMETRIC: Flexion         Extension         ABduction         ADduction         Biceps/Triceps         B = bilateral; AA = active assistive; A = active; P = passive      EDUCATION: Education Given To: Patient  Education Provided: Role of Therapy;Plan of Care  Education Outcome: Verbalized understanding  EDUCATION:  [x]  Home Exercises  []  Sling Application []  Movement Precautions   []  Pulleys []  Use of Ice  []  Other:      AFTER TREATMENT PRECAUTIONS: Bed/Chair Locked, Call light within reach, and Chair    INTERDISCIPLINARY COLLABORATION:  RN/ PCT    COMPLIANCE WITH PROGRAM/EXERCISES: Will assess as treatment progresses. RECOMMENDATIONS/INTENT FOR NEXT TREATMENT SESSION: Treatment next visit will focus on increasing Ms. Nolan Olivera independence with bed mobility, transfers, gait training, strength/ROM exercises, modalities for pain, and patient education.      TIME IN/OUT:  Time In: 0923  Time Out: 0953  Minutes: [facility]

## 2022-08-19 NOTE — PROGRESS NOTES
PHYSICAL THERAPY: SHOULDER Initial Assessment and AM  (Link to Caseload Tracking: PT Visit Days : 1  Acknowledge Orders Time In/Out  PT Charge Capture  Rehab Caseload Tracker    Arnulfo Hernandez is a 78 y.o. 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 general/interscalene. 23hr observation. (Right)  1 Day Post-Op  Reason for Referral: Pain in Right Shoulder (M25.511)  Stiffness of Right Shoulder, Not elsewhere classified (M25.611)  Difficulty in walking, Not elsewhere classified (R26.2)  Inpatient: Payor: MEDICARE / Plan: MEDICARE PART A AND B / Product Type: *No Product type* /     MOVEMENT PRECAUTIONS   TWICE A DAY   Wrist and  hand, gentle motion right arm   NO OTHER MOTION. Nwb   Sling right shoulder     REHAB RECOMMENDATIONS:   Recommendation to date pending progress:  Setting:  Short-term Rehab    Equipment:    To Be Determined     ASSESSMENT:   ASSESSMENT:  Ms. Zia Abraham is admitted with above diagnosis and s/p above surgery. Pt presents with decreased strength and range of motion right upper extremity and decreased post op mobility. Pt will benefit from skilled PT interventions to maximize independence with mobility and HEP. Pt lives at home with spouse.  She has a history of multiple orthopedic surgeries and has gone to a SNF afterwards for IV    RESTRICTIONS/PRECAUTIONS:  Restrictions/Precautions: Fall Risk           Restrictions/Precautions: Fall Risk        HAND DOMINANCE:  Left []  Right [x]      UPPER EXTREMITY GROSS EVALUATION:  RIGHT UE   Within Functional Limits   Abnormal   Comments   Strength [] [x]  S/p surgery   Active Range of Motion [] [x]     Passive Range of Motion           [] [x]        LEFT UE Within Functional Limits   Abnormal   Comments   Strength [x] []  Generalized weakness   Active Range of Motion [x] []     Passive Range of Motion [x] []       LOWER EXTREMITY GROSS EVALUATION:     Within Functional Limits   Abnormal   Comments   Strength [x] [] Generalized weakness   Range of Motion [x] []        COGNITION/  PERCEPTION: Intact Impaired (Comments):   Orientation [x]     Vision [x]     Hearing [] Bilateral hearing aid   Cognition  [x]       MOBILITY: I Mod I S SBA CGA Min Mod Max Total  NT x2 Comments:   Bed Mobility    Rolling [] [] [] [] [] [x] [] [] [] [] []    Supine to Sit [] [] [] [] [] [] [] [] [] [] []    Scooting [] [] [] [] [] [x] [] [] [] [] []    Sit to Supine [] [] [] [] [] [] [] [] [] [] []    Transfers    Sit to Stand [] [] [] [] [] [x] [] [] [] [] []    Bed to Chair [] [] [] [] [] [x] [] [] [] [] []    Stand to Sit [] [] [] [] [] [x] [] [] [] [] []    Stand Pivot [] [] [] [] [] [] [] [] [] [] []    Toilet [] [] [] [] [] [] [] [] [] [] []     [] [] [] [] [] [] [] [] [] [] []    I=Independent, Mod I=Modified Independent, S=Supervision, SBA=Standby Assistance, CGA=Contact Guard Assistance,   Min=Minimal Assistance, Mod=Moderate Assistance, Max=Maximal Assistance, Total=Total Assistance, NT=Not Tested    GAIT: I Mod I S SBA CGA Min Mod Max Total  NT x2 Comments:   Level of Assistance [] [] [] [] [] [x] [] [] [] [] []    Weightbearing Status  Restrictions/Precautions  Restrictions/Precautions: Fall Risk    Distance  20 (and another 20 feet) feet    Gait Quality Decreased burke , Decreased step clearance, Decreased step length, and Shuffling     DME Hand held assist      Stairs      I=Independent, Mod I=Modified Independent, S=Supervision, SBA=Standby Assistance, CGA=Contact Guard Assistance,   Min=Minimal Assistance, Mod=Moderate Assistance, Max=Maximal Assistance, Total=Total Assistance, NT=Not Tested    BALANCE: Good Fair+ Fair Fair- Poor NT Comments   Sitting Static [x] [] [] [] [] []    Sitting Dynamic [x] [x] [] [] [] []              Standing Static [] [] [x] [] [] []    Standing Dynamic [] [] [] [] [x] []      PLAN:   ACUTE PHYSICAL THERAPY GOALS:   (Developed with and agreed upon by patient and/or caregiver.)  GOALS (1-4 days):  (1.)  Patient will move from supine to sit and sit to supine  in bed with CONTACT GUARD ASSIST.    (2.)  Patient will transfer from bed to chair and chair to bed with CONTACT GUARD ASSIST using the least restrictive device. (3.)  Patient will ambulate with CONTACT GUARD ASSIST for 75 feet with the least restrictive device. (4.)  Patient will be independent with shoulder HEP to increase range of motion per MD orders. ________________________________________________________________________________________________      FREQUENCY AND DURATION: BID for duration of hospital stay or until stated goals are met, whichever comes first.    THERAPY PROGNOSIS: Good    PROBLEM LIST:   (Skilled intervention is medically necessary to address:)  Decreased ADL/Functional Activities  Decreased Activity Tolerance  Decreased AROM/PROM  Decreased Gait Ability  Decreased Safety Awareness  Decreased Strength  Decreased Transfer Abilities  Increased Pain   INTERVENTIONS PLANNED:   (Benefits and precautions of physical therapy have been discussed with the patient.)  Therapeutic Activity  Therapeutic Exercise/HEP  Neuromuscular Re-education  Gait Training  Manual Therapy  Education       TREATMENT:   EVALUATION: LOW COMPLEXITY: (Untimed Charge)    TREATMENT:   Therapeutic Activity (15 Minutes):  Therapeutic activity included Transfer Training, Sitting balance , Standing balance, and HEP to improve functional Activity tolerance, Balance, Mobility, Strength, and ROM. TREATMENT GRID:   Date:  8/19 Date:   Date:     ACTIVITY/EXERCISE: AM PM AM PM AM PM   Gripping 10 12       Wrist Flexion/Extension 10 12       Wrist Ulnar/Radial Deviation 10 12       Pronation/Supination 10 12       Finger/thumb opposition twice 5 times       Elbow Flexion/Extension         Shoulder Flexion/Extension         Shoulder AB/ADduction         Shoulder IR/ER         Pulleys         Pendulums         Shrugs         ISOMETRIC:                          Flexion         Extension         ABduction         ADduction         Biceps/Triceps         B = bilateral; AA = active assistive; A = active; P = passive      EDUCATION: Education Given To: Patient  Education Provided: Role of Therapy;Plan of Care  Education Outcome: Verbalized understanding  EDUCATION:  [x]  Home Exercises  []  Sling Application []  Movement Precautions   []  Pulleys []  Use of Ice  []  Other:      AFTER TREATMENT PRECAUTIONS: Bed/Chair Locked, Call light within reach, and Chair    INTERDISCIPLINARY COLLABORATION:  RN/ PCT    COMPLIANCE WITH PROGRAM/EXERCISES: Will assess as treatment progresses. RECOMMENDATIONS/INTENT FOR NEXT TREATMENT SESSION: Treatment next visit will focus on increasing Ms. Frank Benton independence with bed mobility, transfers, gait training, strength/ROM exercises, modalities for pain, and patient education.      TIME IN/OUT:  Time In: 1315  Time Out: [address] [facility]  Minutes: [facility], PT

## 2022-08-19 NOTE — PROGRESS NOTES
Hospitalist Progress Note   Admit Date:  2022 11:25 AM   Name:  Darci Hua   Age:  78 y.o. Sex:  female  :  1940   MRN:  [id number]   Room:  [facility]/    Presenting Complaint: No chief complaint on file. Reason(s) for Admission: Pain due to right shoulder joint prosthesis ([address] [T84.84XA, Z96.611]  Traumatic closed fracture of greater tuberosity of humerus with minimal displacement, right, initial encounter [S42.251A]  Periprosthetic fracture around internal prosthetic right shoulder joint, initial encounter [M97.31XA]  Presence of right artificial shoulder joint [Z96.611]     Hospital Course & Interval History:     Ms. Alvino Gay is a 77 yo female with PMH of HTN- no longer taking losartan, OA, depression, seen postop right shoulder   \"    Procedure(s)              1.  INCISION, DEBRIDEMENT, REMOVAL IMPLANT RIGHT SHOULDER \"MIKE REVERSE TSA\" WITH PROXIMAL HUMERAL OSTEOTOMY  2.  REVISION, REVERSE RIGHT TOTAL SHOULDER ARTHROPLASTY WITH LONG-STEM DELTA EXTEND PROSTHESIS, BICEPS TENODESIS, LATISSIMUS DORSI AND TERES MAJOR TENDON TRANSFERS  \"        She has postop anemia. She has been hypotensive. No longer taking losartan. Subjective/24hr Events (22): Family present, has right rib nd side pain since day of admit, no nausea, ate ok, asking about gallbladder, no trauma, some whoozy feeling with getting up, feels pale, no dyspnea    Assessment & Plan:     Principal Problem:    Periprosthetic fracture around internal prosthetic right shoulder joint  Plan:    Active Problems:    Periprosthetic fracture around internal prosthetic right shoulder joint, initial encounter  Plan:   Defer to orthopedics           Acute blood loss anemia  Plan:   Trend every 12 hours   Transfuse HGB < 7.0           Rib pain  Plan:   Check rib films and CXR  Add LFTS to next lab check           Hypotension  Plan:     Hypertension, benign  Plan:   On  cc/hr  S/p IVF boluses   Stopped antihypertensive              Discharge Planning:      pending    Diet:  ADULT DIET; Regular  DVT PPx: SCD  Code status: Full Code    Hospital Problems:  Principal Problem:    Periprosthetic fracture around internal prosthetic right shoulder joint  Active Problems:    Periprosthetic fracture around internal prosthetic right shoulder joint, initial encounter    Acute blood loss anemia    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  *      Objective:   Patient Vitals for the past 24 hrs:   Temp Pulse Resp BP SpO2   08/19/22 1426 98.6 °F (37 °C) 78 18 (!) 93/54 93 %   08/19/22 1306 -- -- 17 -- --   08/19/22 1236 -- -- 18 -- --   08/19/22 1115 98.2 °F (36.8 °C) 66 18 (!) 90/49 95 %   08/19/22 0845 -- -- -- -- 95 %   08/19/22 0714 98 °F (36.7 °C) 81 20 (!) 91/53 93 %   08/19/22 0310 97.5 °F (36.4 °C) 80 17 (!) 96/53 94 %   08/19/22 0253 -- -- 17 -- --   08/19/22 0223 -- -- 17 -- --   08/19/22 0215 -- -- -- 115/65 --   08/19/22 0132 -- 57 -- (!) 99/49 97 %   08/18/22 2334 97.7 °F (36.5 °C) 57 17 (!) 96/48 96 %   08/18/22 2045 -- 80 -- -- 96 %   08/18/22 2006 97.3 °F (36.3 °C) 55 17 (!) 98/52 98 %   08/18/22 1832 -- 50 18 (!) 95/53 97 %   08/18/22 1747 -- 50 16 (!) 102/54 99 %   08/18/22 1742 -- 51 16 (!) 99/51 99 %   08/18/22 1737 -- 66 16 (!) 105/51 99 %   08/18/22 1732 -- 61 16 (!) 105/51 99 %   08/18/22 1727 -- 54 16 112/67 92 %   08/18/22 1722 98.6 °F (37 °C) 54 16 (!) 117/58 95 %   08/18/22 1717 -- 55 16 (!) 116/55 96 %   08/18/22 1712 -- 58 16 (!) 95/50 92 %       Oxygen Therapy  SpO2: 93 %  Pulse via Oximetry: 51 beats per minute  Pulse Oximeter Device Mode: Continuous  Pulse Oximeter Device Location: Left, Finger  O2 Device: None (Room air)  Skin Assessment: Clean, dry, & intact  FiO2 : 45 %  O2 Flow Rate (L/min): 30 L/min    Estimated body mass index is 28.02 kg/m² as calculated from the following:    Height as of this encounter: 5' 2\" (1.575 m). Weight as of this encounter: 153 lb 3.2 oz (69.5 kg). Intake/Output Summary (Last 24 hours) at 8/19/2022 1454  Last data filed at 8/19/2022 1236  Gross per 24 hour   Intake 2580 ml   Output 2400 ml   Net 180 ml         Physical Exam:     Blood pressure (!) 93/54, pulse 78, temperature 98.6 °F (37 °C), resp. rate 18, height 5' 2\" (1.575 m), weight 153 lb 3.2 oz (69.5 kg), SpO2 93 %. General:    Well nourished. Alert, no distress   Head:  Normocephalic, atraumatic  Eyes:  Sclerae appear normal.  Pupils equally round. ENT:  Nares appear normal, no drainage. Moist oral mucosa  Neck:  No restricted ROM. Trachea midline   CV:   RRR. No m/r/g. No jugular venous distension. Lungs:   CTAB. No wheezing, rhonchi, or rales. Symmetric expansion. anterior   Abdomen: Bowel sounds present. Soft, nontender, nondistended. Extremities: No cyanosis or clubbing. Skin:     No rashes and normal coloration. Warm and dry. Neuro:   grossly intact. Psych:  Normal mood and affect. I have personally reviewed labs and tests showing:  Recent Labs:  Recent Results (from the past 48 hour(s))   Hemoglobin A1C    Collection Time: 08/18/22 12:37 PM   Result Value Ref Range    Hemoglobin A1C 5.7 (H) 4.8 - 5.6 %    eAG 117 mg/dL   POCT Glucose    Collection Time: 08/18/22 12:44 PM   Result Value Ref Range    POC Glucose 91 65 - 100 mg/dL    Performed by: Sin Dust    Culture, Wound    Collection Time: 08/18/22  3:00 PM    Specimen: Right   Result Value Ref Range    Special Requests RIGHT  SHOULDER        Gram stain NO WBC'S SEEN      Gram stain NO DEFINITE ORGANISM SEEN      Culture        No growth after short period of incubation.  Further results to follow after overnight incubation. Culture, Wound    Collection Time: 08/18/22  3:00 PM    Specimen: Right   Result Value Ref Range    Special Requests RIGHT  SHOULDER 2        Gram stain 0 to 1 WBCS SEEN PER OIF     Gram stain NO DEFINITE ORGANISM SEEN      Culture        No growth after short period of incubation. Further results to follow after overnight incubation. Culture, Wound    Collection Time: 08/18/22  3:00 PM    Specimen: Right   Result Value Ref Range    Special Requests RIGHT  SHOULDER 3        Gram stain 0 TO 5 WBCS SEEN PER OIF     Gram stain NO DEFINITE ORGANISM SEEN      Culture        No growth after short period of incubation. Further results to follow after overnight incubation.    Culture, Anaerobic    Collection Time: 08/18/22  3:30 PM    Specimen: Right   Result Value Ref Range    Special Requests RIGHT  SHOULDER        Culture NO GROWTH THUS FAR IN THIO BROTH, HOLD 21 DAYS     Culture, Anaerobic    Collection Time: 08/18/22  3:30 PM    Specimen: Right   Result Value Ref Range    Special Requests RIGHT  SHOULDER 2        Culture NO GROWTH THUS FAR IN THIO BROTH, HOLD 21 DAYS     Culture, Anaerobic    Collection Time: 08/18/22  3:30 PM    Specimen: Right   Result Value Ref Range    Special Requests RIGHT  SHOULDER 3        Culture NO GROWTH THUS FAR IN THIO BROTH, HOLD 21 DAYS     Magnesium    Collection Time: 08/18/22  5:27 PM   Result Value Ref Range    Magnesium 2.1 1.8 - 2.4 mg/dL   Basic Metabolic Panel    Collection Time: 08/18/22  5:27 PM   Result Value Ref Range    Sodium 141 136 - 145 mmol/L    Potassium 4.1 3.5 - 5.1 mmol/L    Chloride 111 (H) 98 - 107 mmol/L    CO2 27 21 - 32 mmol/L    Anion Gap 3 (L) 7 - 16 mmol/L    Glucose 141 (H) 65 - 100 mg/dL    BUN 11 8 - 23 MG/DL    Creatinine 0.78 0.6 - 1.0 MG/DL    GFR African American >60 >60 ml/min/1.73m2    GFR Non- >60 >60 ml/min/1.73m2    Calcium 8.2 (L) 8.3 - 10.4 MG/DL   CBC with Auto Differential Collection Time: 08/18/22  5:27 PM   Result Value Ref Range    WBC 9.0 4.3 - 11.1 K/uL    RBC 2.87 (L) 4.05 - 5.2 M/uL    Hemoglobin 9.3 (L) 11.7 - 15.4 g/dL    Hematocrit 28.1 (L) 35.8 - 46.3 %    MCV 97.9 (H) 79.6 - 97.8 FL    MCH 32.4 26.1 - 32.9 PG    MCHC 33.1 31.4 - 35.0 g/dL    RDW 13.8 11.9 - 14.6 %    Platelets 520 115 - 199 K/uL    MPV 10.6 9.4 - 12.3 FL    nRBC 0.00 0.0 - 0.2 K/uL    Differential Type AUTOMATED      Seg Neutrophils 92 (H) 43 - 78 %    Lymphocytes 5 (L) 13 - 44 %    Monocytes 2 (L) 4.0 - 12.0 %    Eosinophils % 0 (L) 0.5 - 7.8 %    Basophils 0 0.0 - 2.0 %    Immature Granulocytes 1 0.0 - 5.0 %    Segs Absolute 8.3 (H) 1.7 - 8.2 K/UL    Absolute Lymph # 0.5 0.5 - 4.6 K/UL    Absolute Mono # 0.2 0.1 - 1.3 K/UL    Absolute Eos # 0.0 0.0 - 0.8 K/UL    Basophils Absolute 0.0 0.0 - 0.2 K/UL    Absolute Immature Granulocyte 0.1 0.0 - 0.5 K/UL   Basic Metabolic Panel    Collection Time: 08/19/22  4:41 AM   Result Value Ref Range    Sodium 140 136 - 145 mmol/L    Potassium 4.5 3.5 - 5.1 mmol/L    Chloride 109 (H) 98 - 107 mmol/L    CO2 26 21 - 32 mmol/L    Anion Gap 5 (L) 7 - 16 mmol/L    Glucose 154 (H) 65 - 100 mg/dL    BUN 12 8 - 23 MG/DL    Creatinine 0.85 0.6 - 1.0 MG/DL    GFR African American >60 >60 ml/min/1.73m2    GFR Non- >60 >60 ml/min/1.73m2    Calcium 8.0 (L) 8.3 - 10.4 MG/DL   Magnesium    Collection Time: 08/19/22  4:41 AM   Result Value Ref Range    Magnesium 2.0 1.8 - 2.4 mg/dL   CBC    Collection Time: 08/19/22  4:41 AM   Result Value Ref Range    WBC 7.9 4.3 - 11.1 K/uL    RBC 2.61 (L) 4.05 - 5.2 M/uL    Hemoglobin 8.4 (L) 11.7 - 15.4 g/dL    Hematocrit 25.8 (L) 35.8 - 46.3 %    MCV 98.9 (H) 79.6 - 97.8 FL    MCH 32.2 26.1 - 32.9 PG    MCHC 32.6 31.4 - 35.0 g/dL    RDW 13.8 11.9 - 14.6 %    Platelets 685 571 - 141 K/uL    MPV 10.8 9.4 - 12.3 FL    nRBC 0.00 0.0 - 0.2 K/uL   Hemoglobin and Hematocrit    Collection Time: 08/19/22  9:38 AM   Result Value Ref Range Hemoglobin 8.7 (L) 11.7 - 15.4 g/dL    Hematocrit 26.5 (L) 35.8 - 46.3 %       I have personally reviewed imaging studies showing: Other Studies:  XR SHOULDER RIGHT (MIN 2 VIEWS)   Final Result   1. Postoperative changes of the right shoulder as described above. XR RIBS RIGHT INCLUDE CHEST (MIN 3 VIEWS)    (Results Pending)       Current Meds:  Current Facility-Administered Medications   Medication Dose Route Frequency    doxycycline hyclate (VIBRAMYCIN) capsule 100 mg  100 mg Oral 2 times per day    lactated ringers infusion   IntraVENous Continuous    citalopram (CELEXA) tablet 20 mg  20 mg Oral QAM AC    0.9 % sodium chloride infusion   IntraVENous PRN    vancomycin (VANCOCIN) 1,000 mg in sodium chloride 0.9 % 250 mL IVPB ([id number])  1,000 mg IntraVENous Once    tuberculin injection 5 Units  5 Units IntraDERmal Once    HYDROmorphone (DILAUDID) injection 1 mg  1 mg IntraVENous Q1H PRN    HYDROmorphone (DILAUDID) tablet 4 mg  4 mg Oral Q3H PRN    HYDROmorphone (DILAUDID) tablet 2 mg  2 mg Oral Q3H PRN    docusate sodium (COLACE) capsule 100 mg  100 mg Oral BID    ferrous sulfate (IRON 325) tablet 325 mg  325 mg Oral BID WC    promethazine (PHENERGAN) tablet 25 mg  25 mg Oral Q4H PRN    ondansetron (ZOFRAN-ODT) disintegrating tablet 4 mg  4 mg Oral Q8H PRN    vancomycin (VANCOCIN) 1,000 mg in sodium chloride 0.9 % 250 mL IVPB ([id number])  1,000 mg IntraVENous Q12H    sodium chloride flush 0.9 % injection 5-40 mL  5-40 mL IntraVENous 2 times per day    sodium chloride flush 0.9 % injection 5-40 mL  5-40 mL IntraVENous PRN    0.9 % sodium chloride infusion   IntraVENous PRN    pantoprazole (PROTONIX) tablet 40 mg  40 mg Oral Daily    acetaminophen (TYLENOL) tablet 1,000 mg  1,000 mg Oral Q6H PRN       Signed:  Kassie Bartholomew MD    Part of this note may have been written by using a voice dictation software. The note has been proof read but may still contain some grammatical/other typographical errors.

## 2022-08-19 NOTE — CARE COORDINATION
08/19/22 1614   Service Assessment   Patient Orientation Alert and Oriented;Person;Place;Self;Situation   Cognition Alert   History Provided By Patient   Primary Caregiver Self   Support Systems Spouse/Significant Other   PCP Verified by CM Yes   Prior Functional Level Independent in ADLs/IADLs   Current Functional Level Assistance with the following:;Mobility;Dressing; Toileting   Can patient return to prior living arrangement No  (SNF placement)   Ability to make needs known: Good   Family able to assist with home care needs: No  (she helps take care of her spouse)   Social/Functional History   Lives With Spouse   Type of [address] One level   Home Access Stairs to enter with rails   Entrance Stairs - Number of Steps 4   Bathroom Shower/Tub Walk-in shower   Bathroom Equipment 3-in-1 commode   Home Equipment Walker, standard   ADL Assistance Needs assistance   Ambulation Assistance Needs assistance   Transfer Assistance Needs assistance   Active  Yes   Discharge Planning   Type of Residence Trailer/Mobile [address] [facility] Prior To Admission None   Potential [address]   DME Will Klippel; Shower Chair; Other (Comment)  (elevated toilet seat)   DME Ordered? No   Patient expects to be discharged to: [address] [place] [address] Discharge   Transition of Care Consult (CM Consult) SNF   Services At/After Discharge [facility] (SNF)   Confirm Follow Up Transport Family   Condition of Participation: Discharge Planning   The Plan for Transition of Care is related to the following treatment goals: Plans if for rehab once bed available   The Patient and/or Patient Representative was provided with a Choice of Provider? Patient   The Patient and/Or Patient Representative agree with the Discharge Plan?  Yes   Freedom of Choice list was provided with basic dialogue that supports the patient's individualized plan of care/goals, treatment preferences, and shares the quality data associated with the providers? Yes   CM met with patient for d/c planning. Patient alert and oriented x 3, independent of ADL's and lives with her spouse who she assist with care. DME includes walker and elevated commode seat. She has transportation and able to drive. She has Medicare and [employer]. Confirmed demographics are correct. Patient request SNF at d/c. Provided with choice list and she chose [facility]. [facility] declined as facility full and waiting to hear from [facility]. Patient to d/c to SNF when bed available. CM following.

## 2022-08-19 NOTE — PROGRESS NOTES
Orthopedic Joint Progress Note    2022  Admit Date: 2022  Admit Diagnosis: Pain due to right shoulder joint prosthesis ([facility] [address] [E71.00BB, Z96.611]  Traumatic closed fracture of greater tuberosity of humerus with minimal displacement, right, initial encounter [S42.251A]  Periprosthetic fracture around internal prosthetic right shoulder joint, initial encounter [M97.31XA]  Presence of right artificial shoulder joint [Z96.611]    1 Day Post-Op    Subjective: no complaints, wants to go to rehab     Ortiz Stuart     Review of Systems: pertinent items are noted in HPI    Objective:     PT/OT:     PATIENT MOBILITY                           Vital Signs:    Blood pressure (!) 96/53, pulse 80, temperature 97.5 °F (36.4 °C), temperature source Axillary, resp. rate 17, height 5' 2\" (1.575 m), weight 153 lb 3.2 oz (69.5 kg), SpO2 94 %.   Temp (24hrs), Av.8 °F (36.6 °C), Min:97.3 °F (36.3 °C), Max:98.6 °F (37 °C)      Pain Control:        Meds:  Current Facility-Administered Medications   Medication Dose Route Frequency    0.9 % sodium chloride infusion   IntraVENous PRN    lactated ringers infusion   IntraVENous Continuous    vancomycin (VANCOCIN) 1,000 mg in sodium chloride 0.9 % 250 mL IVPB ([id number])  1,000 mg IntraVENous Once    tuberculin injection 5 Units  5 Units IntraDERmal Once    HYDROmorphone (DILAUDID) injection 1 mg  1 mg IntraVENous Q1H PRN    HYDROmorphone (DILAUDID) tablet 4 mg  4 mg Oral Q3H PRN    HYDROmorphone (DILAUDID) tablet 2 mg  2 mg Oral Q3H PRN    docusate sodium (COLACE) capsule 100 mg  100 mg Oral BID    ferrous sulfate (IRON 325) tablet 325 mg  325 mg Oral BID WC    promethazine (PHENERGAN) tablet 25 mg  25 mg Oral Q4H PRN    ondansetron (ZOFRAN-ODT) disintegrating tablet 4 mg  4 mg Oral Q8H PRN    vancomycin (VANCOCIN) 1,000 mg in sodium chloride 0.9 % 250 mL IVPB ([id number])  1,000 mg IntraVENous Q12H    sodium chloride flush 0.9 % injection 5-40 mL  5-40 mL IntraVENous 2 times per day    sodium chloride flush 0.9 % injection 5-40 mL  5-40 mL IntraVENous PRN    0.9 % sodium chloride infusion   IntraVENous PRN    losartan (COZAAR) tablet 50 mg  50 mg Oral Daily    pantoprazole (PROTONIX) tablet 40 mg  40 mg Oral Daily    acetaminophen (TYLENOL) tablet 1,000 mg  1,000 mg Oral Q6H PRN        LAB:    Lab Results   Component Value Date/Time    INR 1.0 08/12/2022 11:01 AM     Lab Results   Component Value Date/Time    HGB 8.4 08/19/2022 04:41 AM    HGB 9.3 08/18/2022 05:27 PM    HGB 12.4 08/12/2022 11:01 AM              Physical Exam:  No significant changes    Assessment:      Principal Problem:    Periprosthetic fracture around internal prosthetic right shoulder joint  Active Problems:    Periprosthetic fracture around internal prosthetic right shoulder joint, initial encounter    Postoperative anemia due to acute blood loss    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 *       Plan:     Continue PT/OT/Rehab  Cultures no growth to date  Continue vanco until final aerobic cultures negative  Doxycycline for 3 weeks until final anaerobic cultures negative  Observe  Continue care  Transfer to rehab when stable         Arsalan Ramsay MD    I have reviewed the patients controlled substance prescription history, as maintained in the [place] prescription monitoring program, so that the prescription(s) for a  controlled substance can be given.

## 2022-08-19 NOTE — PROGRESS NOTES
Notified Dr. Alicia Crow that patient BP is now 99/49. No new orders at this time. Hold BP medications.

## 2022-08-19 NOTE — PROGRESS NOTES
08/18/22 2045   Oxygen Therapy/Pulse Ox   O2 Therapy Oxygen humidified   $Oxygen $Daily Charge   O2 Device Heated high flow cannula   O2 Flow Rate (L/min) 30 L/min   FiO2  45 %   Heart Rate 80   SpO2 96 %   Skin Assessment Clean, dry, & intact   Humidification Source Heated wire   Humidification Temp 31   Humidification Temp Measured 31   Circuit Condensation Not drained   Pulse Oximeter Device Mode Continuous   Pulse Oximeter Device Location Left;Finger   $Pulse Oximeter $Spot check (multiple/continuous)   Pt on continuous monitor for HS. Alarm limits set. Pt working on IS. Pt on HFNC for tonight per MD orders.

## 2022-08-19 NOTE — PROGRESS NOTES
Patient's blood pressure continues to be low. Notified Dr. Jd Meyers patient current BP is 96/48 and her heart rate is 57. She is on LR @ 75.    500cc NS Bolus ordered and administered.

## 2022-08-20 LAB
ANION GAP SERPL CALC-SCNC: 1 MMOL/L (ref 7–16)
BUN SERPL-MCNC: 14 MG/DL (ref 8–23)
CALCIUM SERPL-MCNC: 8 MG/DL (ref 8.3–10.4)
CHLORIDE SERPL-SCNC: 107 MMOL/L (ref 98–107)
CO2 SERPL-SCNC: 28 MMOL/L (ref 21–32)
CREAT SERPL-MCNC: 1.01 MG/DL (ref 0.6–1)
ERYTHROCYTE [DISTWIDTH] IN BLOOD BY AUTOMATED COUNT: 14.3 % (ref 11.9–14.6)
GLUCOSE SERPL-MCNC: 117 MG/DL (ref 65–100)
HCT VFR BLD AUTO: 22.1 % (ref 35.8–46.3)
HCT VFR BLD AUTO: 24 % (ref 35.8–46.3)
HGB BLD-MCNC: 7.2 G/DL (ref 11.7–15.4)
HGB BLD-MCNC: 7.8 G/DL (ref 11.7–15.4)
MAGNESIUM SERPL-MCNC: 2.3 MG/DL (ref 1.8–2.4)
MCH RBC QN AUTO: 32.8 PG (ref 26.1–32.9)
MCHC RBC AUTO-ENTMCNC: 32.5 G/DL (ref 31.4–35)
MCV RBC AUTO: 100.8 FL (ref 79.6–97.8)
MM INDURATION, POC: 0 MM (ref 0–5)
NRBC # BLD: 0 K/UL (ref 0–0.2)
PLATELET # BLD AUTO: 180 K/UL (ref 150–450)
PMV BLD AUTO: 11.3 FL (ref 9.4–12.3)
POTASSIUM SERPL-SCNC: 3.9 MMOL/L (ref 3.5–5.1)
PPD, POC: NEGATIVE
RBC # BLD AUTO: 2.38 M/UL (ref 4.05–5.2)
SODIUM SERPL-SCNC: 136 MMOL/L (ref 136–145)
WBC # BLD AUTO: 5.6 K/UL (ref 4.3–11.1)

## 2022-08-20 PROCEDURE — 83735 ASSAY OF MAGNESIUM: CPT

## 2022-08-20 PROCEDURE — 85027 COMPLETE CBC AUTOMATED: CPT

## 2022-08-20 PROCEDURE — 36415 COLL VENOUS BLD VENIPUNCTURE: CPT

## 2022-08-20 PROCEDURE — 6360000002 HC RX W HCPCS: Performed by: ORTHOPAEDIC SURGERY

## 2022-08-20 PROCEDURE — 94760 N-INVAS EAR/PLS OXIMETRY 1: CPT

## 2022-08-20 PROCEDURE — 6370000000 HC RX 637 (ALT 250 FOR IP): Performed by: ORTHOPAEDIC SURGERY

## 2022-08-20 PROCEDURE — 80048 BASIC METABOLIC PNL TOTAL CA: CPT

## 2022-08-20 PROCEDURE — 2580000003 HC RX 258: Performed by: INTERNAL MEDICINE

## 2022-08-20 PROCEDURE — 85018 HEMOGLOBIN: CPT

## 2022-08-20 PROCEDURE — 1100000000 HC RM PRIVATE

## 2022-08-20 PROCEDURE — 2580000003 HC RX 258: Performed by: ORTHOPAEDIC SURGERY

## 2022-08-20 PROCEDURE — 2700000000 HC OXYGEN THERAPY PER DAY

## 2022-08-20 PROCEDURE — 97530 THERAPEUTIC ACTIVITIES: CPT

## 2022-08-20 RX ORDER — OXYCODONE HYDROCHLORIDE 5 MG/1
10 TABLET ORAL EVERY 4 HOURS PRN
Status: DISCONTINUED | OUTPATIENT
Start: 2022-08-20 | End: 2022-08-23 | Stop reason: HOSPADM

## 2022-08-20 RX ORDER — OXYCODONE HYDROCHLORIDE 5 MG/1
5 TABLET ORAL EVERY 4 HOURS PRN
Status: DISCONTINUED | OUTPATIENT
Start: 2022-08-20 | End: 2022-08-23 | Stop reason: HOSPADM

## 2022-08-20 RX ADMIN — SODIUM CHLORIDE, SODIUM LACTATE, POTASSIUM CHLORIDE, AND CALCIUM CHLORIDE: 600; 310; 30; 20 INJECTION, SOLUTION INTRAVENOUS at 14:20

## 2022-08-20 RX ADMIN — DOCUSATE SODIUM 100 MG: 100 CAPSULE ORAL at 20:15

## 2022-08-20 RX ADMIN — HYDROMORPHONE HYDROCHLORIDE 2 MG: 2 TABLET ORAL at 09:50

## 2022-08-20 RX ADMIN — ACETAMINOPHEN 1000 MG: 500 TABLET, FILM COATED ORAL at 17:31

## 2022-08-20 RX ADMIN — VANCOMYCIN HYDROCHLORIDE 1000 MG: 1 INJECTION, POWDER, LYOPHILIZED, FOR SOLUTION INTRAVENOUS at 15:04

## 2022-08-20 RX ADMIN — DOCUSATE SODIUM 100 MG: 100 CAPSULE ORAL at 08:32

## 2022-08-20 RX ADMIN — FERROUS SULFATE TAB 325 MG (65 MG ELEMENTAL FE) 325 MG: 325 (65 FE) TAB at 08:32

## 2022-08-20 RX ADMIN — PANTOPRAZOLE SODIUM 40 MG: 40 TABLET, DELAYED RELEASE ORAL at 08:32

## 2022-08-20 RX ADMIN — DOXYCYCLINE HYCLATE 100 MG: 100 CAPSULE ORAL at 08:32

## 2022-08-20 RX ADMIN — FERROUS SULFATE TAB 325 MG (65 MG ELEMENTAL FE) 325 MG: 325 (65 FE) TAB at 17:31

## 2022-08-20 RX ADMIN — VANCOMYCIN HYDROCHLORIDE 1000 MG: 1 INJECTION, POWDER, LYOPHILIZED, FOR SOLUTION INTRAVENOUS at 03:25

## 2022-08-20 RX ADMIN — CITALOPRAM HYDROBROMIDE 20 MG: 20 TABLET ORAL at 06:34

## 2022-08-20 RX ADMIN — SODIUM CHLORIDE, PRESERVATIVE FREE 10 ML: 5 INJECTION INTRAVENOUS at 20:15

## 2022-08-20 RX ADMIN — ACETAMINOPHEN 1000 MG: 500 TABLET, FILM COATED ORAL at 09:49

## 2022-08-20 RX ADMIN — HYDROMORPHONE HYDROCHLORIDE 2 MG: 2 TABLET ORAL at 14:12

## 2022-08-20 RX ADMIN — DOXYCYCLINE HYCLATE 100 MG: 100 CAPSULE ORAL at 20:15

## 2022-08-20 ASSESSMENT — PAIN SCALES - GENERAL
PAINLEVEL_OUTOF10: 0
PAINLEVEL_OUTOF10: 0
PAINLEVEL_OUTOF10: 3

## 2022-08-20 ASSESSMENT — PAIN DESCRIPTION - FREQUENCY: FREQUENCY: CONTINUOUS

## 2022-08-20 ASSESSMENT — PAIN DESCRIPTION - LOCATION: LOCATION: SHOULDER

## 2022-08-20 ASSESSMENT — PAIN DESCRIPTION - ORIENTATION: ORIENTATION: RIGHT

## 2022-08-20 ASSESSMENT — PAIN DESCRIPTION - DESCRIPTORS: DESCRIPTORS: ACHING

## 2022-08-20 NOTE — PROGRESS NOTES
Hospitalist Progress Note   Admit Date:  2022 11:25 AM   Name:  Antonella Orona   Age:  78 y.o. Sex:  female  :  1940   MRN:  [id number]   Room:  316/01    Presenting Complaint: No chief complaint on file. Reason(s) for Admission: Pain due to right shoulder joint prosthesis ([address] [T84.84XA, Z96.611]  Traumatic closed fracture of greater tuberosity of humerus with minimal displacement, right, initial encounter [S42.251A]  Periprosthetic fracture around internal prosthetic right shoulder joint, initial encounter [M97.31XA]  Presence of right artificial shoulder joint [Z96.611]     Hospital Course & Interval History:     Ms. Kim Stanton is a 77 yo female with PMH of HTN- no longer taking losartan, OA, depression, seen postop right shoulder   \"     Procedure(s)              1.  INCISION, DEBRIDEMENT, REMOVAL IMPLANT RIGHT SHOULDER \"MIKE REVERSE TSA\" WITH PROXIMAL HUMERAL OSTEOTOMY  2.  REVISION, REVERSE RIGHT TOTAL SHOULDER ARTHROPLASTY WITH LONG-STEM DELTA EXTEND PROSTHESIS, BICEPS TENODESIS, LATISSIMUS DORSI AND TERES MAJOR TENDON TRANSFERS  \"           She has postop anemia. She has been hypotensive. No longer taking losartan. BP improved s/p bolus and holding antihypertensive. She developed right rib pain since admit. CXR with right rib films negative. LFTS are normal.          Subjective/24hr Events (22): Declines blood at the moment until she speaks with her family, declines RUQ US for now until she speaks with family, eating ok without nausea, still has right sided pain that has been pretty constant since admit, no change with breaths or movements, RN reports decreased urine output       Assessment & Plan:       Principal Problem:    Periprosthetic fracture around internal prosthetic right shoulder joint  Plan:    Active Problems:    Periprosthetic fracture around internal prosthetic right shoulder joint, initial encounter  Plan:   Defer to orthopedics   On vancomycin, doxycycline             Acute blood loss anemia  Plan:   Trend every 12 hours   Down to 7.8  Recommending transfusion today for recent hypotension and she wishes to confirm with her family prior to consenting              Rib pain  Plan:   Negative rib films and CXR  LFTS normal  Not reproducible on exam  She declines RUQ US for now              Hypotension  Plan:     Hypertension, benign  Plan:   On  cc/hr  S/p IVF boluses   Stopped antihypertensive   Improved           Increased creatinine:  Monitor renal function  Continued IVF              Discharge Planning:      pending     Diet:  ADULT DIET; Regular  DVT PPx: SCD  Code status: Full Code    Hospital Problems:  Principal Problem:    Periprosthetic fracture around internal prosthetic right shoulder joint  Active Problems:    Periprosthetic fracture around internal prosthetic right shoulder joint, initial encounter    Acute blood loss anemia    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  *      Objective:   Patient Vitals for the past 24 hrs:   Temp Pulse Resp BP SpO2   08/20/22 0400 99.1 °F (37.3 °C) 70 16 126/66 --   08/19/22 2359 -- 80 -- -- 100 %   08/19/22 2339 97.7 °F (36.5 °C) 80 18 (!) 99/56 100 %   08/19/22 2020 98.1 °F (36.7 °C) 79 16 (!) 107/55 100 %   08/19/22 1949 -- 78 18 -- 98 %   08/19/22 1943 -- 80 18 -- 96 %   08/19/22 1746 -- -- 16 -- --   08/19/22 1716 -- -- 18 -- --   08/19/22 1700 -- -- -- (!) 95/58 --   08/19/22 1426 98.6 °F (37 °C) 78 18 (!) 93/54 93 %   08/19/22 1306 -- -- 17 -- --   08/19/22 1236 -- -- 18 -- --   08/19/22 1115 98.2 °F (36.8 °C) 66 18 (!) 90/49 95 %   08/19/22 0845 -- -- -- -- 95 % Culture, Wound    Collection Time: 08/18/22  3:00 PM    Specimen: Right   Result Value Ref Range    Special Requests RIGHT  SHOULDER        Gram stain NO WBC'S SEEN      Gram stain NO DEFINITE ORGANISM SEEN      Culture        No growth after short period of incubation. Further results to follow after overnight incubation. Culture, Wound    Collection Time: 08/18/22  3:00 PM    Specimen: Right   Result Value Ref Range    Special Requests RIGHT  SHOULDER 2        Gram stain 0 to 1 WBCS SEEN PER OIF     Gram stain NO DEFINITE ORGANISM SEEN      Culture        No growth after short period of incubation. Further results to follow after overnight incubation. Culture, Wound    Collection Time: 08/18/22  3:00 PM    Specimen: Right   Result Value Ref Range    Special Requests RIGHT  SHOULDER 3        Gram stain 0 TO 5 WBCS SEEN PER OIF     Gram stain NO DEFINITE ORGANISM SEEN      Culture        No growth after short period of incubation. Further results to follow after overnight incubation.    Culture, Anaerobic    Collection Time: 08/18/22  3:30 PM    Specimen: Right   Result Value Ref Range    Special Requests RIGHT  SHOULDER        Culture NO GROWTH THUS FAR IN THIO BROTH, HOLD 21 DAYS     Culture, Anaerobic    Collection Time: 08/18/22  3:30 PM    Specimen: Right   Result Value Ref Range    Special Requests RIGHT  SHOULDER 2        Culture NO GROWTH THUS FAR IN THIO BROTH, HOLD 21 DAYS     Culture, Anaerobic    Collection Time: 08/18/22  3:30 PM    Specimen: Right   Result Value Ref Range    Special Requests RIGHT  SHOULDER 3        Culture NO GROWTH THUS FAR IN THIO BROTH, HOLD 21 DAYS     Magnesium    Collection Time: 08/18/22  5:27 PM   Result Value Ref Range    Magnesium 2.1 1.8 - 2.4 mg/dL   Basic Metabolic Panel    Collection Time: 08/18/22  5:27 PM   Result Value Ref Range    Sodium 141 136 - 145 mmol/L    Potassium 4.1 3.5 - 5.1 mmol/L    Chloride 111 (H) 98 - 107 mmol/L    CO2 27 21 - 32 mmol/L    Anion (H) 79.6 - 97.8 FL    MCH 32.2 26.1 - 32.9 PG    MCHC 32.6 31.4 - 35.0 g/dL    RDW 13.8 11.9 - 14.6 %    Platelets 599 127 - 682 K/uL    MPV 10.8 9.4 - 12.3 FL    nRBC 0.00 0.0 - 0.2 K/uL   Hemoglobin and Hematocrit    Collection Time: 08/19/22  9:38 AM   Result Value Ref Range    Hemoglobin 8.7 (L) 11.7 - 15.4 g/dL    Hematocrit 26.5 (L) 35.8 - 46.3 %   Hepatic Function Panel    Collection Time: 08/19/22  7:51 PM   Result Value Ref Range    Total Protein 5.5 (L) 6.3 - 8.2 g/dL    Albumin 2.8 (L) 3.2 - 4.6 g/dL    Globulin 2.7 2.3 - 3.5 g/dL    Albumin/Globulin Ratio 1.0 (L) 1.2 - 3.5      Total Bilirubin 0.5 0.2 - 1.1 MG/DL    Bilirubin, Direct 0.1 <0.4 MG/DL    Alk Phosphatase 63 50 - 130 U/L    AST 33 15 - 37 U/L    ALT 20 12 - 65 U/L   Hemoglobin and Hematocrit    Collection Time: 08/19/22  8:01 PM   Result Value Ref Range    Hemoglobin 8.0 (L) 11.7 - 15.4 g/dL    Hematocrit 24.5 (L) 35.8 - 46.3 %   PLEASE READ & DOCUMENT PPD TEST IN 24 HRS    Collection Time: 08/19/22 10:10 PM   Result Value Ref Range    PPD, (POC) Negative Negative    mm Induration 0 0 - 5 mm   CBC    Collection Time: 08/20/22  3:50 AM   Result Value Ref Range    WBC 5.6 4.3 - 11.1 K/uL    RBC 2.38 (L) 4.05 - 5.2 M/uL    Hemoglobin 7.8 (L) 11.7 - 15.4 g/dL    Hematocrit 24.0 (L) 35.8 - 46.3 %    .8 (H) 79.6 - 97.8 FL    MCH 32.8 26.1 - 32.9 PG    MCHC 32.5 31.4 - 35.0 g/dL    RDW 14.3 11.9 - 14.6 %    Platelets 456 060 - 297 K/uL    MPV 11.3 9.4 - 12.3 FL    nRBC 0.00 0.0 - 0.2 K/uL   Basic Metabolic Panel    Collection Time: 08/20/22  3:50 AM   Result Value Ref Range    Sodium 136 136 - 145 mmol/L    Potassium 3.9 3.5 - 5.1 mmol/L    Chloride 107 98 - 107 mmol/L    CO2 28 21 - 32 mmol/L    Anion Gap 1 (L) 7 - 16 mmol/L    Glucose 117 (H) 65 - 100 mg/dL    BUN 14 8 - 23 MG/DL    Creatinine 1.01 (H) 0.6 - 1.0 MG/DL    GFR African American >60 >60 ml/min/1.73m2    GFR Non- 56 (L) >60 ml/min/1.73m2    Calcium 8.0 (L) 8.3 - 10.4 MG/DL   Magnesium    Collection Time: 08/20/22  3:50 AM   Result Value Ref Range    Magnesium 2.3 1.8 - 2.4 mg/dL       I have personally reviewed imaging studies showing: Other Studies:  XR RIBS RIGHT INCLUDE CHEST (MIN 3 VIEWS)   Final Result   No right-sided rib pathology appreciated. XR SHOULDER RIGHT (MIN 2 VIEWS)   Final Result   1. Postoperative changes of the right shoulder as described above. Current Meds:  Current Facility-Administered Medications   Medication Dose Route Frequency    doxycycline hyclate (VIBRAMYCIN) capsule 100 mg  100 mg Oral 2 times per day    lactated ringers infusion   IntraVENous Continuous    citalopram (CELEXA) tablet 20 mg  20 mg Oral QAM AC    0.9 % sodium chloride infusion   IntraVENous PRN    vancomycin (VANCOCIN) 1,000 mg in sodium chloride 0.9 % 250 mL IVPB ([id number])  1,000 mg IntraVENous Once    HYDROmorphone (DILAUDID) injection 1 mg  1 mg IntraVENous Q1H PRN    HYDROmorphone (DILAUDID) tablet 4 mg  4 mg Oral Q3H PRN    HYDROmorphone (DILAUDID) tablet 2 mg  2 mg Oral Q3H PRN    docusate sodium (COLACE) capsule 100 mg  100 mg Oral BID    ferrous sulfate (IRON 325) tablet 325 mg  325 mg Oral BID WC    promethazine (PHENERGAN) tablet 25 mg  25 mg Oral Q4H PRN    ondansetron (ZOFRAN-ODT) disintegrating tablet 4 mg  4 mg Oral Q8H PRN    vancomycin (VANCOCIN) 1,000 mg in sodium chloride 0.9 % 250 mL IVPB ([id number])  1,000 mg IntraVENous Q12H    sodium chloride flush 0.9 % injection 5-40 mL  5-40 mL IntraVENous 2 times per day    sodium chloride flush 0.9 % injection 5-40 mL  5-40 mL IntraVENous PRN    0.9 % sodium chloride infusion   IntraVENous PRN    pantoprazole (PROTONIX) tablet 40 mg  40 mg Oral Daily    acetaminophen (TYLENOL) tablet 1,000 mg  1,000 mg Oral Q6H PRN       Signed:  Michael Childers MD    Part of this note may have been written by using a voice dictation software.   The note has been proof read but may still contain some grammatical/other typographical errors.

## 2022-08-20 NOTE — PROGRESS NOTES
Patient's last Hgb value of 7.8    Placed patient on 3 L nasal cannula.        08/20/22 1702   Oxygen Therapy/Pulse Ox   O2 Device Nasal cannula   O2 Flow Rate (L/min) 3 L/min   Heart Rate 82   SpO2 94 %

## 2022-08-20 NOTE — PROGRESS NOTES
PHYSICAL THERAPY: SHOULDER Initial Assessment and AM  (Link to Caseload Tracking: PT Visit Days : 1  Acknowledge Orders Time In/Out  PT Charge Capture  Rehab Caseload Tracker    Emil Stubbs is a 78 y.o. female   PRIMARY DIAGNOSIS: Periprosthetic fracture around internal prosthetic right shoulder joint  Pain due to right shoulder joint prosthesis ([facility] [address] [T84.84XA, Z96.611]  Traumatic closed fracture of greater tuberosity of humerus with minimal displacement, right, initial encounter [S42.251A]  Periprosthetic fracture around internal prosthetic right shoulder joint, initial encounter [M97.31XA]  Presence of right artificial shoulder joint [Z96.611]  Procedure(s) (LRB):  right shoulder irrigation and debridement, right shoulder removal of manjit total shoulder arthroplasty implants, revision right reverse total shoulder arthroplasty with a delta xtend prosthesis and biceps tenodesis, possible remedy spacer. general/interscalene. 23hr observation. (Right)  2 Days Post-Op  Reason for Referral: Pain in Right Shoulder (M25.511)  Stiffness of Right Shoulder, Not elsewhere classified (M25.611)  Difficulty in walking, Not elsewhere classified (R26.2)  Inpatient: Payor: MEDICARE / Plan: MEDICARE PART A AND B / Product Type: *No Product type* /     MOVEMENT PRECAUTIONS   TWICE A DAY   Wrist and  hand, gentle motion right arm   NO OTHER MOTION. Nwb   Sling right shoulder     REHAB RECOMMENDATIONS:   Recommendation to date pending progress:  Setting:  Short-term Rehab    Equipment:    To Be Determined     ASSESSMENT:   ASSESSMENT:  Ms. Dia Haynes is admitted with above diagnosis and s/p above surgery. Pt presents with decreased strength and range of motion right upper extremity and decreased post op mobility. Pt will benefit from skilled PT interventions to maximize independence with mobility and HEP. Pt lives at home with spouse.  She has a history of multiple orthopedic surgeries and has gone to a SNF afterwards for Equipment: KODA  ADL Assistance: Needs assistance  Ambulation Assistance: Needs assistance  Transfer Assistance: Needs assistance  Active : Yes    OBJECTIVE:     PAIN: VITAL SIGNS: LINES/DRAINS:   Pre Treatment: sore         Post Treatment: sore Vitals        Oxygen    IV    RESTRICTIONS/PRECAUTIONS:  Restrictions/Precautions: Fall Risk           Restrictions/Precautions: Fall Risk        HAND DOMINANCE:  Left []  Right [x]      UPPER EXTREMITY GROSS EVALUATION:  RIGHT UE   Within Functional Limits   Abnormal   Comments   Strength [] [x]  S/p surgery   Active Range of Motion [] [x]     Passive Range of Motion           [] [x]        LEFT UE Within Functional Limits   Abnormal   Comments   Strength [x] []  Generalized weakness   Active Range of Motion [x] []     Passive Range of Motion [x] []       LOWER EXTREMITY GROSS EVALUATION:     Within Functional Limits   Abnormal   Comments   Strength [x] [] Generalized weakness   Range of Motion [x] []        COGNITION/  PERCEPTION: Intact Impaired (Comments):   Orientation [x]     Vision [x]     Hearing []     Cognition  [x]       MOBILITY: I Mod I S SBA CGA Min Mod Max Total  NT x2 Comments:   Bed Mobility    Rolling [] [] [] [] [] [x] [] [] [] [] []    Supine to Sit [] [] [] [] [] [] [] [] [] [] []    Scooting [] [] [] [] [] [x] [] [] [] [] []    Sit to Supine [] [] [] [] [] [] [] [] [] [] []    Transfers    Sit to Stand [] [] [] [] [] [x] [] [] [] [] []    Bed to Chair [] [] [] [] [] [x] [] [] [] [] []    Stand to Sit [] [] [] [] [] [x] [] [] [] [] []    Stand Pivot [] [] [] [] [] [] [] [] [] [] []    Toilet [] [] [] [] [] [] [] [] [] [] []     [] [] [] [] [] [] [] [] [] [] []    I=Independent, Mod I=Modified Independent, S=Supervision, SBA=Standby Assistance, CGA=Contact Guard Assistance,   Min=Minimal Assistance, Mod=Moderate Assistance, Max=Maximal Assistance, Total=Total Assistance, NT=Not Tested    GAIT: I Mod I S SBA CGA Min Mod Max Total  NT x2 Comments:   Level of Assistance [] [] [] [] [] [x] [] [] [] [] []    Weightbearing Status       Distance  20 (and another 20 feet) feet    Gait Quality Decreased burke , Decreased step clearance, Decreased step length, and Shuffling     DME Hand held assist      Stairs      I=Independent, Mod I=Modified Independent, S=Supervision, SBA=Standby Assistance, CGA=Contact Guard Assistance,   Min=Minimal Assistance, Mod=Moderate Assistance, Max=Maximal Assistance, Total=Total Assistance, NT=Not Tested    BALANCE: Good Fair+ Fair Fair- Poor NT Comments   Sitting Static [x] [] [] [] [] []    Sitting Dynamic [x] [x] [] [] [] []              Standing Static [] [] [x] [] [] []    Standing Dynamic [] [] [] [x] [] []      PLAN:   ACUTE PHYSICAL THERAPY GOALS:   (Developed with and agreed upon by patient and/or caregiver.)  GOALS (1-4 days):  (1.)  Patient will move from supine to sit and sit to supine  in bed with CONTACT GUARD ASSIST.    (2.)  Patient will transfer from bed to chair and chair to bed with CONTACT GUARD ASSIST using the least restrictive device. (3.)  Patient will ambulate with CONTACT GUARD ASSIST for 75 feet with the least restrictive device. (4.)  Patient will be independent with shoulder HEP to increase range of motion per MD orders.   ________________________________________________________________________________________________      FREQUENCY AND DURATION: BID for duration of hospital stay or until stated goals are met, whichever comes first.    THERAPY PROGNOSIS: Good    PROBLEM LIST:   (Skilled intervention is medically necessary to address:)  Decreased ADL/Functional Activities  Decreased Activity Tolerance  Decreased AROM/PROM  Decreased Gait Ability  Decreased Safety Awareness  Decreased Strength  Decreased Transfer Abilities  Increased Pain   INTERVENTIONS PLANNED:   (Benefits and precautions of physical therapy have been discussed with the patient.)  Therapeutic Activity  Therapeutic Exercise/HEP  Neuromuscular Re-education  Gait Training  Manual Therapy  Education       TREATMENT:   EVALUATION: LOW COMPLEXITY: (Untimed Charge)    TREATMENT:   Therapeutic Activity (30 Minutes): Therapeutic activity included Transfer Training, Sitting balance , Standing balance, and HEP to improve functional Activity tolerance, Balance, Mobility, Strength, and ROM. TREATMENT GRID:   Date:  8/19 Date:  8/20 Date:     ACTIVITY/EXERCISE: AM PM AM PM AM PM   Gripping 10 12 12      Wrist Flexion/Extension 10 12 12      Wrist Ulnar/Radial Deviation 10 12 12      Pronation/Supination 10 12 12      Finger/thumb opposition twice 5 times 12      Elbow Flexion/Extension         Shoulder Flexion/Extension         Shoulder AB/ADduction         Shoulder IR/ER         Pulleys         Pendulums         Shrugs         ISOMETRIC:                          Flexion         Extension         ABduction         ADduction         Biceps/Triceps         B = bilateral; AA = active assistive; A = active; P = passive      EDUCATION:    EDUCATION:  [x]  Home Exercises  []  Sling Application []  Movement Precautions   []  Pulleys []  Use of Ice  []  Other:      AFTER TREATMENT PRECAUTIONS: Bed/Chair Locked, Call light within reach, and Chair    INTERDISCIPLINARY COLLABORATION:  RN/ PCT    COMPLIANCE WITH PROGRAM/EXERCISES: Will assess as treatment progresses. RECOMMENDATIONS/INTENT FOR NEXT TREATMENT SESSION: Treatment next visit will focus on increasing Ms. Jaimee Felipe independence with bed mobility, transfers, gait training, strength/ROM exercises, modalities for pain, and patient education.      TIME IN/OUT:  Time In: 0945  Time Out: 21   Minutes: [facility] [place],

## 2022-08-20 NOTE — PROGRESS NOTES
Patient has not voided since shift change. Bladder scan performed. Scan reveals 282 mL. Patient states she would like to try to void before trying in/out cath.

## 2022-08-20 NOTE — PROGRESS NOTES
Reviewed urine output, persistent crackles in left lung base and AM labs with Dr. Kyle Treadwell and Dr. Venkatesh Quintana of [facility].   Continue current plan of care, monitoring output, q 4hour vitals, fluids at 100mL/hr and hgb q12

## 2022-08-20 NOTE — PROGRESS NOTES
post right hip replacement    Shortness of breath    Palpitations    Chest discomfort    History of COVID-19    Spinal stenosis, lumbar    Bilateral carotid artery disease (HCC)    Palpitation    Hypothyroidism    Vazquez's esophagus    Tricuspid valve disorder    Coronary atherosclerosis of native coronary vessel    Hypertension, benign    Hyperlipidemia    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    Periprosthetic fracture around internal prosthetic right shoulder joint    Presence of right artificial shoulder joint    Periprosthetic fracture around internal prosthetic right shoulder joint, initial encounter    Acute blood loss anemia    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

## 2022-08-20 NOTE — PROGRESS NOTES
Notified ortho on call of pt  being drowsy, wakes up and talks but is more sleepy now. Also, getting up to the bsc she could not figure out how to move her feet.

## 2022-08-20 NOTE — PROGRESS NOTES
PHYSICAL THERAPY: SHOULDER Initial Assessment and AM  (Link to Caseload Tracking: PT Visit Days : 2  Acknowledge Orders Time In/Out  PT Charge Capture  Rehab Caseload Tracker    Leann Fleischer is a 78 y.o. female   PRIMARY DIAGNOSIS: Periprosthetic fracture around internal prosthetic right shoulder joint  Pain due to right shoulder joint prosthesis ([facility] [address] [T84.84XA, Z96.611]  Traumatic closed fracture of greater tuberosity of humerus with minimal displacement, right, initial encounter [S42.251A]  Periprosthetic fracture around internal prosthetic right shoulder joint, initial encounter [M97.31XA]  Presence of right artificial shoulder joint [Z96.611]  Procedure(s) (LRB):  right shoulder irrigation and debridement, right shoulder removal of manjit total shoulder arthroplasty implants, revision right reverse total shoulder arthroplasty with a delta xtend prosthesis and biceps tenodesis, possible remedy spacer. general/interscalene. 23hr observation. (Right)  2 Days Post-Op  Reason for Referral: Pain in Right Shoulder (M25.511)  Stiffness of Right Shoulder, Not elsewhere classified (M25.611)  Difficulty in walking, Not elsewhere classified (R26.2)  Inpatient: Payor: MEDICARE / Plan: MEDICARE PART A AND B / Product Type: *No Product type* /     MOVEMENT PRECAUTIONS   TWICE A DAY   Wrist and  hand, gentle motion right arm   NO OTHER MOTION. Nwb right upper extremity  Sling right shoulder     REHAB RECOMMENDATIONS:   Recommendation to date pending progress:  Setting:  Short-term Rehab    Equipment:    To Be Determined     ASSESSMENT:   ASSESSMENT:  Ms. Darnell Brooks is admitted with above diagnosis and s/p above surgery. Pt presents with decreased strength and range of motion right upper extremity and decreased post op mobility. Pt will benefit from skilled PT interventions to maximize independence with mobility and HEP. Pt lives at home with spouse.  She has a history of multiple orthopedic surgeries and has gone to a SNF afterwards for rehab and she would like to pursue this plan. She is right handed. Today worked on getting out of bed, HEP and getting to recliner chair. Hope to progress this afternoon. 8/19 in the pm- pt sitting up in chair on contact and eager to get back in the bed. Worked on her HEP in the recliner with manual and verbal cues. Worked on sit to stand and walking in room with cane and min assist. Pt unsteady and a fall risk, would be unsafe to be up without assist. Walked to bathroom to void, she needed assist reaching toilet paper and with her underwear. Her dynamic balance is fair (-) to poor. After walking out of bathroom she wanted to get back in the bed. She needed min assist to return supine, needs in reach. 8/20--Pt supine on arrival. Pt performed supine to sit to stand. Pt took steps to bedside commode. Pt ambulated in room. Pt in bedside chair with needs in reach. PM--Pt supine, very groggy. Pt performed upper extremity exercises in supine. Pt supine with needs in reach. [address] [facility] [address] [id number] AM-PAC 6 Clicks Basic Mobility Inpatient Short Form  -PAC Mobility Inpatient   How much difficulty turning over in bed?: A Little  How much difficulty sitting down on / standing up from a chair with arms?: A Little  How much difficulty moving from lying on back to sitting on side of bed?: A Little  How much help from another person moving to and from a bed to a chair?: A Little  How much help from another person needed to walk in hospital room?: A Little  How much help from another person for climbing 3-5 steps with a railing?: A Little  -[facility] Inpatient Mobility Raw Score : 18  -PAC Inpatient T-Scale Score : 43.63  Mobility Inpatient CMS 0-100% Score: 46.58  Mobility Inpatient CMS G-Code Modifier : CK    SUBJECTIVE:   Ms. Arsen Robisonw agreeable to get to bedside commode and ambulate.     Social/Functional Lives With: Spouse  Type of Home: House  Home Layout: One level  Home Access: Stairs to enter with rails  Entrance Stairs - Number of Steps: 4  Bathroom Shower/Tub: Walk-in shower  Bathroom Equipment: 3-in-1 commode  Home Equipment: Walker, standard  ADL Assistance: Needs assistance  Ambulation Assistance: Needs assistance  Transfer Assistance: Needs assistance  Active : Yes    OBJECTIVE:     PAIN: VITAL SIGNS: LINES/DRAINS:   Pre Treatment: sore         Post Treatment: sore Vitals        Oxygen    IV    RESTRICTIONS/PRECAUTIONS:  Restrictions/Precautions: Fall Risk           Restrictions/Precautions: Fall Risk        HAND DOMINANCE:  Left []  Right [x]      UPPER EXTREMITY GROSS EVALUATION:  RIGHT UE   Within Functional Limits   Abnormal   Comments   Strength [] [x]  S/p surgery   Active Range of Motion [] [x]     Passive Range of Motion           [] [x]        LEFT UE Within Functional Limits   Abnormal   Comments   Strength [x] []  Generalized weakness   Active Range of Motion [x] []     Passive Range of Motion [x] []       LOWER EXTREMITY GROSS EVALUATION:     Within Functional Limits   Abnormal   Comments   Strength [x] [] Generalized weakness   Range of Motion [x] []        COGNITION/  PERCEPTION: Intact Impaired (Comments):   Orientation [x]     Vision [x]     Hearing []     Cognition  [x]       MOBILITY: I Mod I S SBA CGA Min Mod Max Total  NT x2 Comments:   Bed Mobility    Rolling [] [] [] [] [] [x] [] [] [] [] []    Supine to Sit [] [] [] [] [] [] [] [] [] [] []    Scooting [] [] [] [] [] [x] [] [] [] [] []    Sit to Supine [] [] [] [] [] [] [] [] [] [] []    Transfers    Sit to Stand [] [] [] [] [] [x] [] [] [] [] []    Bed to Chair [] [] [] [] [] [x] [] [] [] [] []    Stand to Sit [] [] [] [] [] [x] [] [] [] [] []    Stand Pivot [] [] [] [] [] [] [] [] [] [] []    Toilet [] [] [] [] [] [] [] [] [] [] []     [] [] [] [] [] [] [] [] [] [] []    I=Independent, Mod I=Modified Independent, S=Supervision, SBA=Standby Assistance, CGA=Contact Guard Assistance,   Min=Minimal Assistance, Mod=Moderate Assistance, Max=Maximal Assistance, Total=Total Assistance, NT=Not Tested    GAIT: I Mod I S SBA CGA Min Mod Max Total  NT x2 Comments:   Level of Assistance [] [] [] [] [] [x] [] [] [] [] []    Weightbearing Status       Distance  20 (and another 20 feet) feet    Gait Quality Decreased burke , Decreased step clearance, Decreased step length, and Shuffling     DME Hand held assist      Stairs      I=Independent, Mod I=Modified Independent, S=Supervision, SBA=Standby Assistance, CGA=Contact Guard Assistance,   Min=Minimal Assistance, Mod=Moderate Assistance, Max=Maximal Assistance, Total=Total Assistance, NT=Not Tested    BALANCE: Good Fair+ Fair Fair- Poor NT Comments   Sitting Static [x] [] [] [] [] []    Sitting Dynamic [x] [x] [] [] [] []              Standing Static [] [] [x] [] [] []    Standing Dynamic [] [] [] [x] [] []      PLAN:   ACUTE PHYSICAL THERAPY GOALS:   (Developed with and agreed upon by patient and/or caregiver.)  GOALS (1-4 days):  (1.)  Patient will move from supine to sit and sit to supine  in bed with CONTACT GUARD ASSIST.    (2.)  Patient will transfer from bed to chair and chair to bed with CONTACT GUARD ASSIST using the least restrictive device. (3.)  Patient will ambulate with CONTACT GUARD ASSIST for 75 feet with the least restrictive device. (4.)  Patient will be independent with shoulder HEP to increase range of motion per MD orders.   ________________________________________________________________________________________________      FREQUENCY AND DURATION: BID for duration of hospital stay or until stated goals are met, whichever comes first.    THERAPY PROGNOSIS: Good    PROBLEM LIST:   (Skilled intervention is medically necessary to address:)  Decreased ADL/Functional Activities  Decreased Activity Tolerance  Decreased AROM/PROM  Decreased Gait Ability  Decreased Safety Awareness  Decreased Strength  Decreased Transfer Abilities  Increased Pain   INTERVENTIONS PLANNED: (Benefits and precautions of physical therapy have been discussed with the patient.)  Therapeutic Activity  Therapeutic Exercise/HEP  Neuromuscular Re-education  Gait Training  Manual Therapy  Education       TREATMENT:   EVALUATION: LOW COMPLEXITY: (Untimed Charge)    TREATMENT:   Therapeutic Activity (30 Minutes): Therapeutic activity included Transfer Training, Sitting balance , Standing balance, and HEP to improve functional Activity tolerance, Balance, Mobility, Strength, and ROM. TREATMENT GRID:   Date:  8/19 Date:  8/20 Date:     ACTIVITY/EXERCISE: AM PM AM PM AM PM   Gripping 10 12 12 15     Wrist Flexion/Extension 10 12 12 15     Wrist Ulnar/Radial Deviation 10 12 12 15     Pronation/Supination 10 12 12 15     Finger/thumb opposition twice 5 times 12 15     Elbow Flexion/Extension         Shoulder Flexion/Extension         Shoulder AB/ADduction         Shoulder IR/ER         Pulleys         Pendulums         Shrugs         ISOMETRIC:                          Flexion         Extension         ABduction         ADduction         Biceps/Triceps         B = bilateral; AA = active assistive; A = active; P = passive      EDUCATION:    EDUCATION:  [x]  Home Exercises  []  Sling Application []  Movement Precautions   []  Pulleys []  Use of Ice  []  Other:      AFTER TREATMENT PRECAUTIONS: Bed/Chair Locked, Call light within reach, and Chair    INTERDISCIPLINARY COLLABORATION:  RN/ PCT    COMPLIANCE WITH PROGRAM/EXERCISES: Will assess as treatment progresses. RECOMMENDATIONS/INTENT FOR NEXT TREATMENT SESSION: Treatment next visit will focus on increasing Ms. Pinky Dinero independence with bed mobility, transfers, gait training, strength/ROM exercises, modalities for pain, and patient education.      TIME IN/OUT:  Time In: 1330  Time Out: 1400 [facility]  Minutes: [address],

## 2022-08-21 ENCOUNTER — APPOINTMENT (OUTPATIENT)
Dept: CT IMAGING | Age: 82
DRG: 483 | End: 2022-08-21
Attending: ORTHOPAEDIC SURGERY
Payer: MEDICARE

## 2022-08-21 LAB
ANION GAP SERPL CALC-SCNC: 4 MMOL/L (ref 7–16)
BACTERIA SPEC CULT: NORMAL
BUN SERPL-MCNC: 12 MG/DL (ref 8–23)
CALCIUM SERPL-MCNC: 7.8 MG/DL (ref 8.3–10.4)
CHLORIDE SERPL-SCNC: 107 MMOL/L (ref 98–107)
CO2 SERPL-SCNC: 26 MMOL/L (ref 21–32)
CREAT SERPL-MCNC: 0.84 MG/DL (ref 0.6–1)
EKG ATRIAL RATE: 83 BPM
EKG DIAGNOSIS: NORMAL
EKG P AXIS: 26 DEGREES
EKG P-R INTERVAL: 154 MS
EKG Q-T INTERVAL: 360 MS
EKG QRS DURATION: 88 MS
EKG QTC CALCULATION (BAZETT): 423 MS
EKG R AXIS: -19 DEGREES
EKG T AXIS: 24 DEGREES
EKG VENTRICULAR RATE: 83 BPM
ERYTHROCYTE [DISTWIDTH] IN BLOOD BY AUTOMATED COUNT: 14.2 % (ref 11.9–14.6)
GLUCOSE SERPL-MCNC: 126 MG/DL (ref 65–100)
GRAM STN SPEC: NORMAL
HCT VFR BLD AUTO: 21.2 % (ref 35.8–46.3)
HCT VFR BLD AUTO: 25.1 % (ref 35.8–46.3)
HCT VFR BLD AUTO: 27.4 % (ref 35.8–46.3)
HGB BLD-MCNC: 6.8 G/DL (ref 11.7–15.4)
HGB BLD-MCNC: 8.3 G/DL (ref 11.7–15.4)
HGB BLD-MCNC: 8.8 G/DL (ref 11.7–15.4)
HISTORY CHECK: NORMAL
MAGNESIUM SERPL-MCNC: 1.9 MG/DL (ref 1.8–2.4)
MCH RBC QN AUTO: 31.8 PG (ref 26.1–32.9)
MCHC RBC AUTO-ENTMCNC: 32.1 G/DL (ref 31.4–35)
MCV RBC AUTO: 99.1 FL (ref 79.6–97.8)
MM INDURATION, POC: 0 MM (ref 0–5)
NRBC # BLD: 0 K/UL (ref 0–0.2)
PLATELET # BLD AUTO: 154 K/UL (ref 150–450)
PMV BLD AUTO: 11.2 FL (ref 9.4–12.3)
POTASSIUM SERPL-SCNC: 3.7 MMOL/L (ref 3.5–5.1)
PPD, POC: NEGATIVE
RBC # BLD AUTO: 2.14 M/UL (ref 4.05–5.2)
SERVICE CMNT-IMP: NORMAL
SODIUM SERPL-SCNC: 137 MMOL/L (ref 136–145)
TROPONIN I SERPL HS-MCNC: 12.2 PG/ML (ref 0–14)
WBC # BLD AUTO: 4.5 K/UL (ref 4.3–11.1)

## 2022-08-21 PROCEDURE — 86923 COMPATIBILITY TEST ELECTRIC: CPT

## 2022-08-21 PROCEDURE — 97530 THERAPEUTIC ACTIVITIES: CPT

## 2022-08-21 PROCEDURE — A4216 STERILE WATER/SALINE, 10 ML: HCPCS | Performed by: INTERNAL MEDICINE

## 2022-08-21 PROCEDURE — 2580000003 HC RX 258: Performed by: INTERNAL MEDICINE

## 2022-08-21 PROCEDURE — 6360000004 HC RX CONTRAST MEDICATION: Performed by: INTERNAL MEDICINE

## 2022-08-21 PROCEDURE — P9016 RBC LEUKOCYTES REDUCED: HCPCS

## 2022-08-21 PROCEDURE — C9113 INJ PANTOPRAZOLE SODIUM, VIA: HCPCS | Performed by: INTERNAL MEDICINE

## 2022-08-21 PROCEDURE — 6360000002 HC RX W HCPCS: Performed by: ORTHOPAEDIC SURGERY

## 2022-08-21 PROCEDURE — 6370000000 HC RX 637 (ALT 250 FOR IP): Performed by: PHYSICIAN ASSISTANT

## 2022-08-21 PROCEDURE — 85027 COMPLETE CBC AUTOMATED: CPT

## 2022-08-21 PROCEDURE — 84484 ASSAY OF TROPONIN QUANT: CPT

## 2022-08-21 PROCEDURE — 83735 ASSAY OF MAGNESIUM: CPT

## 2022-08-21 PROCEDURE — 93005 ELECTROCARDIOGRAM TRACING: CPT | Performed by: INTERNAL MEDICINE

## 2022-08-21 PROCEDURE — 6370000000 HC RX 637 (ALT 250 FOR IP): Performed by: ORTHOPAEDIC SURGERY

## 2022-08-21 PROCEDURE — 6360000002 HC RX W HCPCS: Performed by: INTERNAL MEDICINE

## 2022-08-21 PROCEDURE — 36415 COLL VENOUS BLD VENIPUNCTURE: CPT

## 2022-08-21 PROCEDURE — 30233N1 TRANSFUSION OF NONAUTOLOGOUS RED BLOOD CELLS INTO PERIPHERAL VEIN, PERCUTANEOUS APPROACH: ICD-10-PCS | Performed by: ORTHOPAEDIC SURGERY

## 2022-08-21 PROCEDURE — 86901 BLOOD TYPING SEROLOGIC RH(D): CPT

## 2022-08-21 PROCEDURE — 2580000003 HC RX 258: Performed by: ORTHOPAEDIC SURGERY

## 2022-08-21 PROCEDURE — 85014 HEMATOCRIT: CPT

## 2022-08-21 PROCEDURE — 1100000000 HC RM PRIVATE

## 2022-08-21 PROCEDURE — 74176 CT ABD & PELVIS W/O CONTRAST: CPT

## 2022-08-21 PROCEDURE — 80048 BASIC METABOLIC PNL TOTAL CA: CPT

## 2022-08-21 PROCEDURE — 36430 TRANSFUSION BLD/BLD COMPNT: CPT

## 2022-08-21 RX ORDER — POLYETHYLENE GLYCOL 3350 17 G/17G
17 POWDER, FOR SOLUTION ORAL DAILY PRN
Status: DISCONTINUED | OUTPATIENT
Start: 2022-08-21 | End: 2022-08-23 | Stop reason: HOSPADM

## 2022-08-21 RX ORDER — SODIUM CHLORIDE 9 MG/ML
INJECTION, SOLUTION INTRAVENOUS PRN
Status: DISCONTINUED | OUTPATIENT
Start: 2022-08-21 | End: 2022-08-23 | Stop reason: HOSPADM

## 2022-08-21 RX ADMIN — POLYETHYLENE GLYCOL (3350) 17 G: 17 POWDER, FOR SOLUTION ORAL at 15:53

## 2022-08-21 RX ADMIN — OXYCODONE HYDROCHLORIDE 10 MG: 5 TABLET ORAL at 11:20

## 2022-08-21 RX ADMIN — VANCOMYCIN HYDROCHLORIDE 1000 MG: 1 INJECTION, POWDER, LYOPHILIZED, FOR SOLUTION INTRAVENOUS at 15:50

## 2022-08-21 RX ADMIN — SODIUM CHLORIDE, PRESERVATIVE FREE 10 ML: 5 INJECTION INTRAVENOUS at 21:33

## 2022-08-21 RX ADMIN — VANCOMYCIN HYDROCHLORIDE 1000 MG: 1 INJECTION, POWDER, LYOPHILIZED, FOR SOLUTION INTRAVENOUS at 02:39

## 2022-08-21 RX ADMIN — SODIUM CHLORIDE, PRESERVATIVE FREE 40 MG: 5 INJECTION INTRAVENOUS at 11:09

## 2022-08-21 RX ADMIN — SODIUM CHLORIDE, SODIUM LACTATE, POTASSIUM CHLORIDE, AND CALCIUM CHLORIDE: 600; 310; 30; 20 INJECTION, SOLUTION INTRAVENOUS at 02:39

## 2022-08-21 RX ADMIN — DIATRIZOATE MEGLUMINE AND DIATRIZOATE SODIUM 15 ML: 660; 100 LIQUID ORAL; RECTAL at 12:40

## 2022-08-21 RX ADMIN — DOXYCYCLINE HYCLATE 100 MG: 100 CAPSULE ORAL at 21:28

## 2022-08-21 RX ADMIN — CITALOPRAM HYDROBROMIDE 20 MG: 20 TABLET ORAL at 05:20

## 2022-08-21 RX ADMIN — OXYCODONE HYDROCHLORIDE 10 MG: 5 TABLET ORAL at 21:38

## 2022-08-21 RX ADMIN — DOXYCYCLINE HYCLATE 100 MG: 100 CAPSULE ORAL at 10:01

## 2022-08-21 RX ADMIN — FERROUS SULFATE TAB 325 MG (65 MG ELEMENTAL FE) 325 MG: 325 (65 FE) TAB at 10:01

## 2022-08-21 RX ADMIN — FERROUS SULFATE TAB 325 MG (65 MG ELEMENTAL FE) 325 MG: 325 (65 FE) TAB at 17:57

## 2022-08-21 RX ADMIN — DOCUSATE SODIUM 100 MG: 100 CAPSULE ORAL at 10:01

## 2022-08-21 RX ADMIN — DOCUSATE SODIUM 100 MG: 100 CAPSULE ORAL at 21:28

## 2022-08-21 RX ADMIN — SODIUM CHLORIDE, PRESERVATIVE FREE 40 MG: 5 INJECTION INTRAVENOUS at 23:05

## 2022-08-21 RX ADMIN — OXYCODONE HYDROCHLORIDE 10 MG: 5 TABLET ORAL at 05:20

## 2022-08-21 RX ADMIN — ACETAMINOPHEN 1000 MG: 500 TABLET, FILM COATED ORAL at 12:40

## 2022-08-21 ASSESSMENT — PAIN - FUNCTIONAL ASSESSMENT: PAIN_FUNCTIONAL_ASSESSMENT: PREVENTS OR INTERFERES SOME ACTIVE ACTIVITIES AND ADLS

## 2022-08-21 ASSESSMENT — PAIN DESCRIPTION - ORIENTATION
ORIENTATION: RIGHT
ORIENTATION: MID;LOWER;RIGHT

## 2022-08-21 ASSESSMENT — PAIN SCALES - GENERAL
PAINLEVEL_OUTOF10: 2
PAINLEVEL_OUTOF10: 7
PAINLEVEL_OUTOF10: 7
PAINLEVEL_OUTOF10: 10

## 2022-08-21 ASSESSMENT — PAIN DESCRIPTION - DESCRIPTORS
DESCRIPTORS: ACHING;BURNING
DESCRIPTORS: ACHING

## 2022-08-21 ASSESSMENT — PAIN DESCRIPTION - LOCATION: LOCATION: SHOULDER;RIB CAGE

## 2022-08-21 NOTE — CONSULTS
[facility] Consult Note       Primary GI Physician: Jc Mari MD  Consulting GI Physician: Geoffrey Simon MD   Referring Provider:  Javi Michael MD    Consult Date:  8/21/2022  Admit Date:  8/18/2022    Chief Complaint:  Anemia    Subjective:     History of Present Illness:  Patient is a 78 y.o. female with PMHx including but not limited to OA, HTN, Vazquez's esophagus, thyroid disease, HLD, and duodenal ulcer, who is seen in consultation at the request of Dr. Robe Yuan for anemia. Patient here with recent postop right shoulder, now with post op anemia, Hgb down to 6.8 from 12.4 on 8/12/22. Got 1u pRBCs yesterday, Hgb improved to 8.3. MCV 98. Normal BUN. Plt 154. Daughter and son at bedside. Per daughter, she has had post-op anemia in the past. Patient reports having dark stools for 5 years, but became darker over the past week. No BM since admission. Denies Pepto-bismol use nor on oral iron prior to admission. She is on Prilosec 20mg BID for GERD with Vazquez's. She takes Mobic qam. No other NSAIDs use. She had recent N/V without hematemesis or CG emesis. Denies any abdominal pain. Recent appetite has been poor. Last EGD 5/2021 revealed [facility] and Vazquez's. Recall EGD 5/2024. 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 Rectal bxs negative.      PMH:  Past Medical History:   Diagnosis Date    Arthritis     osteo    Vazquez's esophagus     Managed with as needed meds    Bilateral carotid artery disease ([facility] [address]     Followed by cardiology- Dr. Jerad Whitfield    Chest discomfort     Coronary atherosclerosis of native coronary vessel     Degeneration of lumbar or lumbosacral intervertebral disc     Depression     medication     Gastric ulcer, unspecified as acute or chronic, without mention of hemorrhage or perforation 1989    History of blood transfusion     Hyperlipidemia     Hypothyroidism     no meds needed currently (low normal range per pt)    Infectious disease lyme disease 2004    Nausea & vomiting     Palpitations     PVD (peripheral vascular disease) ([facility])     Scoliosis (and kyphoscoliosis), idiopathic     Spinal stenosis, lumbar 12/7/2009    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    Thoracic or lumbosacral neuritis or radiculitis, unspecified     Tricuspid regurgitation     mild per echo dated 1-11-13    Tricuspid valve disorder        PSH:  Past Surgical History:   Procedure Laterality Date    APPENDECTOMY  1987    BREAST BIOPSY Left 1980    ENLARGED MILK GLAND    BUNIONECTOMY Bilateral 2009    with hammertoe correction    CARPAL TUNNEL RELEASE Right 10/31/13    ESOPHAGOGASTRODUODENOSCOPY  07/27/2018 2016, 2018    HAMMER TOE SURGERY  2010    revision    HYSTERECTOMY (CERVIX STATUS UNKNOWN)  1988    LAMINECTOMY  04/05/2022    T10 OPEN TOTAL LAMINECTOMY    LUMBAR LAMINECTOMY  2009    L4-L5    ORTHOPEDIC SURGERY  2010    Right hand    REVERSE TOTAL SHOULDER ARTHROPLASTY Right 2017    SHOULDER ARTHROSCOPY  2013    left shoulder cyst    SHOULDER SURGERY Right 8/18/2022    right shoulder irrigation and debridement, right shoulder removal of manjit total shoulder arthroplasty implants, revision right reverse total shoulder arthroplasty with a delta xtend prosthesis and biceps tenodesis, possible remedy spacer. general/interscalene. 23hr observation. performed by Joaquim Reagan MD at [facility] Left 2007    revision 3/2000    TOTAL HIP ARTHROPLASTY Left 2009    revision    TOTAL KNEE ARTHROPLASTY Right 2007       Allergies: Allergies   Allergen Reactions    Adhesive Tape Other (See Comments)     Blisters, pt denies allergy 7-3-19     Codeine Itching     Pt denies allergy 7-3-19        Home Medications:  Prior to Admission medications    Medication Sig Start Date End Date Taking?  Authorizing Provider   citalopram (CELEXA) 20 MG tablet Take 20 mg by mouth daily   Yes Historical Provider, MD   acetaminophen (TYLENOL) 500 MG tablet Take (DILAUDID) injection 1 mg  1 mg IntraVENous Q1H PRN    docusate sodium (COLACE) capsule 100 mg  100 mg Oral BID    ferrous sulfate (IRON 325) tablet 325 mg  325 mg Oral BID WC    promethazine (PHENERGAN) tablet 25 mg  25 mg Oral Q4H PRN    ondansetron (ZOFRAN-ODT) disintegrating tablet 4 mg  4 mg Oral Q8H PRN    vancomycin (VANCOCIN) 1,000 mg in sodium chloride 0.9 % 250 mL IVPB (Zbua8Add)  1,000 mg IntraVENous Q12H    sodium chloride flush 0.9 % injection 5-40 mL  5-40 mL IntraVENous 2 times per day    sodium chloride flush 0.9 % injection 5-40 mL  5-40 mL IntraVENous PRN    0.9 % sodium chloride infusion   IntraVENous PRN    acetaminophen (TYLENOL) tablet 1,000 mg  1,000 mg Oral Q6H PRN       Social History:  Social History     Tobacco Use    Smoking status: Never    Smokeless tobacco: Never   Substance Use Topics    Alcohol use: Not Currently       Pt denies any history of drug use, blood transfusions, or tattoos. Family History:  Family History   Problem Relation Age of Onset    Stroke Other     Diabetes Other     Heart Disease Other     Asthma Other     Breast Cancer Neg Hx     Heart Disease Mother         CONGESTIVE HEART DISEASE    Stroke Father     Hypertension Other        Review of Systems:  A detailed 10 system ROS is obtained, with pertinent positives as listed above. All others are negative. Diet:  NPO    Objective:     Physical Exam:  Vitals:  /62   Pulse 75   Temp 99 °F (37.2 °C) (Oral)   Resp 20   Ht 5' 2\" (1.575 m)   Wt 153 lb 3.2 oz (69.5 kg)   SpO2 95%   BMI 28.02 kg/m²   Gen:  Pt is alert, cooperative, elderly female in NAD. Skin:  Extremities and face reveal no rashes. HEENT: Sclerae anicteric. Extra-occular muscles are intact. No oral ulcers. No abnormal pigmentation of the lips. The neck is supple. Cardiovascular: RRR. No murmurs, gallops, or rubs. Respiratory:  CTAB. GI:  Abdomen nondistended, soft, and nontender. NABS.   Rectal:  Deferred  Musculoskeletal:  No pitting edema of the lower legs. In right arm sling. Neurological:  Gross memory appears intact. Patient is alert and oriented. Psychiatric:  Mood appears appropriate with judgement intact. Lymphatic:  No cervical or supraclavicular adenopathy. Laboratory:    Recent Labs     08/18/22  1727 08/19/22  0441 08/19/22  0938 08/19/22  1951 08/19/22 2001 08/20/22  0350 08/20/22 2000 08/21/22  0344 08/21/22  1147   WBC 9.0 7.9  --   --   --  5.6  --  4.5  --    HGB 9.3* 8.4* 8.7*  --  8.0* 7.8* 7.2* 6.8* 8.3*   HCT 28.1* 25.8* 26.5*  --  24.5* 24.0* 22.1* 21.2* 25.1*    189  --   --   --  180  --  154  --    MCV 97.9* 98.9*  --   --   --  100.8*  --  99.1*  --     140  --   --   --  136  --  137  --    K 4.1 4.5  --   --   --  3.9  --  3.7  --    * 109*  --   --   --  107  --  107  --    CO2 27 26  --   --   --  28  --  26  --    BUN 11 12  --   --   --  14  --  12  --    MG 2.1 2.0  --   --   --  2.3  --  1.9  --    AST  --   --   --  33  --   --   --   --   --    ALT  --   --   --  20  --   --   --   --   --           Assessment:     Principal Problem:    Periprosthetic fracture around internal prosthetic right shoulder joint    Active Problems:    Periprosthetic fracture around internal prosthetic right shoulder joint, initial encounter    Acute blood loss anemia    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)    Rib pain    Hypotension    Hypertension, benign    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    Presence of right artificial shoulder joint    79 y/o F with MMPs outlined above here with recent postop right shoulder surgery 8/18, now with post op anemia, Hgb down to 6.8 from 12.4 on 8/12/22. Reports black stools prior to admission, no BM since admit. Anemia likely post-op related, but GI source can not be totally ruled out.  She is at risk for PUD given the NSAIDs use. Last EGD and colonoscopy as above. Has hx of duodenal ulcer in '95. Plan: On PPI IV BID. Tentatively plan for EGD tomorrow. Monitor H/H. Transfuse as needed. Avoid NSAIDs. Patient is seen and examined in collaboration with Dr. Rajani Wilhelm. Assessment and plan as per Dr. All Valero. Gertrude Tineo PA-C  [facility]     ATTENDING NOTE:  I have seen the patient and agree with the above assessment and plan. Briefly 78-year-old female with recent right shoulder surgery seen in consultation for anemia with drop in hemoglobin down to 6.8. Bleeding may have been primarily perioperative and related to surgery but given recent NSAID use, could represent peptic ulcer disease. PPI b.i.d. Plan for EGD tomorrow.      Romina Wellington MD

## 2022-08-21 NOTE — PROGRESS NOTES
PHYSICAL THERAPY: SHOULDER Initial Assessment and AM  (Link to Caseload Tracking: PT Visit Days : 2  Acknowledge Orders Time In/Out  PT Charge Capture  Rehab Caseload Tracker    Nellie Durbin is a 78 y.o. 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 general/interscalene. 23hr observation. (Right)  3 Days Post-Op  Reason for Referral: Pain in Right Shoulder (M25.511)  Stiffness of Right Shoulder, Not elsewhere classified (M25.611)  Difficulty in walking, Not elsewhere classified (R26.2)  Inpatient: Payor: MEDICARE / Plan: MEDICARE PART A AND B / Product Type: *No Product type* /     MOVEMENT PRECAUTIONS   TWICE A DAY   Wrist and  hand, gentle motion right arm   NO OTHER MOTION. Nwb right upper extremity  Sling right shoulder     REHAB RECOMMENDATIONS:   Recommendation to date pending progress:  Setting:  Short-term Rehab    Equipment:    To Be Determined     ASSESSMENT:   ASSESSMENT:  Ms. Marcia Paez is admitted with above diagnosis and s/p above surgery. Pt presents with decreased strength and range of motion right upper extremity and decreased post op mobility. Pt will benefit from skilled PT interventions to maximize independence with mobility and HEP. Pt lives at home with spouse.  She has a history of multiple orthopedic surgeries and has gone to a SNF afterwards for rehab and she would like to pursue this plan. She is right handed. Today worked on getting out of bed, HEP and getting to recliner chair. Hope to progress this afternoon. 8/19 in the pm- pt sitting up in chair on contact and eager to get back in the bed. Worked on her HEP in the recliner with manual and verbal cues. Worked on sit to stand and walking in room with cane and min assist. Pt unsteady and a fall risk, would be unsafe to be up without assist. Walked to bathroom to void, she needed assist reaching toilet paper and with her underwear. Her dynamic balance is fair (-) to poor. After walking out of bathroom she wanted to get back in the bed. She needed min assist to return supine, needs in reach. 8/20--Pt supine on arrival. Pt performed supine to sit to stand. Pt took steps to bedside commode. Pt ambulated in room. Pt in bedside chair with needs in reach. PM--Pt supine, very groggy. Pt performed upper extremity exercises in supine. Pt supine with needs in reach.   8/21--Pt with difficulty medically and unable to participate in AM, but performed range of motion with help of RN. PM--Pt performed exercises supine. Pt performed supine to sit to stand. Pt took steps to bedside commode then steps to wheelchair to go for CT. Pt assisted to bedside chair upon returning from CT. Pt up in bedside chair set up for lunch with needs in reach.  Hope to progress with ambulation in AM.     [address] Mobility Inpatient Short Form  AM-PAC Mobility Inpatient   How much difficulty turning over in bed?: A Little  How much difficulty sitting down on / standing up from a chair with arms?: A Little  How much difficulty moving from lying on back to sitting on side of bed?: A Little  How much help from another person moving to and from a bed to a chair?: A Little  How much help from another person needed to walk in hospital room?: A Little  How much help from another person for climbing 3-5 steps with a railing?: A Little  AM-PAC Inpatient Mobility Raw Score : 18  AM-PAC Inpatient T-Scale Score : 43.63  Mobility Inpatient CMS 0-100% Score: 46.58  Mobility Inpatient CMS G-Code Modifier : CK    SUBJECTIVE:   Ms. Christopher Man agreeable to therapy. Pt appears pleasantly confused at times but is able to stay on task.      Social/Functional Lives With: Spouse  Type of Home: House  Home Layout: One level  Home Access: Stairs to enter with rails  Entrance Stairs - Number of Steps: 4  Bathroom Shower/Tub: Walk-in shower  Bathroom Equipment: 3-in-1 commode  Home Equipment: Walker, standard  ADL Assistance: Needs assistance  Ambulation Assistance: Needs assistance  Transfer Assistance: Needs assistance  Active : Yes    OBJECTIVE:     PAIN: VITAL SIGNS: LINES/DRAINS:   Pre Treatment: sore         Post Treatment: sore Vitals        Oxygen    IV    RESTRICTIONS/PRECAUTIONS:  Restrictions/Precautions: Fall Risk           Restrictions/Precautions: Fall Risk        HAND DOMINANCE:  Left []  Right [x]      UPPER EXTREMITY GROSS EVALUATION:  RIGHT UE   Within Functional Limits   Abnormal   Comments   Strength [] [x]  S/p surgery   Active Range of Motion [] [x]     Passive Range of Motion           [] [x]        LEFT UE Within Functional Limits   Abnormal   Comments   Strength [x] []  Generalized weakness   Active Range of Motion [x] []     Passive Range of Motion [x] []       LOWER EXTREMITY GROSS EVALUATION:     Within Functional Limits   Abnormal   Comments   Strength [x] [] Generalized weakness   Range of Motion [x] []        COGNITION/  PERCEPTION: Intact Impaired (Comments):   Orientation [x]     Vision [x]     Hearing []     Cognition  [x]       MOBILITY: I Mod I S SBA CGA Min Mod Max Total  NT x2 Comments:   Bed Mobility    Rolling [] [] [] [] [] [x] [] [] [] [] []    Supine to Sit [] [] [] [] [] [] [] [] [] [] []    Scooting [] [] [] [] [] [x] [] [] [] [] []    Sit to Supine [] [] [] [] [] [] [] [] [] [] []    Transfers    Sit to Stand [] [] [] [] [] [x] [] [] [] [] []    Bed to Chair [] [] [] [] [] [x] [] [] [] [] []    Stand to Sit [] [] [] [] [] [x] [] [] [] [] []    Stand Pivot [] [] [] [] [] [] [] [] [] [] []    Toilet [] [] [] [] [] [] [] [] [] [] []     [] [] [] [] [] [] [] [] [] [] []    I=Independent, Mod I=Modified Independent, S=Supervision, SBA=Standby Assistance, CGA=Contact Guard Assistance,   Min=Minimal Assistance, Mod=Moderate Assistance, Max=Maximal Assistance, Total=Total Assistance, NT=Not Tested    GAIT: I Mod I S SBA CGA Min Mod Max Total  NT x2 Comments:   Level of Assistance [] [] [] [] [] [x] [] [] [] [] []    Weightbearing Status       Distance  20 (and another 20 feet) feet    Gait Quality Decreased burke , Decreased step clearance, Decreased step length, and Shuffling     DME Hand held assist      Stairs      I=Independent, Mod I=Modified Independent, S=Supervision, SBA=Standby Assistance, CGA=Contact Guard Assistance,   Min=Minimal Assistance, Mod=Moderate Assistance, Max=Maximal Assistance, Total=Total Assistance, NT=Not Tested    BALANCE: Good Fair+ Fair Fair- Poor NT Comments   Sitting Static [x] [] [] [] [] []    Sitting Dynamic [x] [x] [] [] [] []              Standing Static [] [] [x] [] [] []    Standing Dynamic [] [] [] [x] [] []      PLAN:   ACUTE PHYSICAL THERAPY GOALS:   (Developed with and agreed upon by patient and/or caregiver.)  GOALS (1-4 days):  (1.)  Patient will move from supine to sit and sit to supine  in bed with CONTACT GUARD ASSIST.    (2.)  Patient will transfer from bed to chair and chair to bed with CONTACT GUARD ASSIST using the least restrictive device. (3.)  Patient will ambulate with CONTACT GUARD ASSIST for 75 feet with the least restrictive device.    (4.)  Patient will be independent with shoulder HEP to increase range of motion per MD orders. ________________________________________________________________________________________________      FREQUENCY AND DURATION: BID for duration of hospital stay or until stated goals are met, whichever comes first.    THERAPY PROGNOSIS: Good    PROBLEM LIST:   (Skilled intervention is medically necessary to address:)  Decreased ADL/Functional Activities  Decreased Activity Tolerance  Decreased AROM/PROM  Decreased Gait Ability  Decreased Safety Awareness  Decreased Strength  Decreased Transfer Abilities  Increased Pain   INTERVENTIONS PLANNED:   (Benefits and precautions of physical therapy have been discussed with the patient.)  Therapeutic Activity  Therapeutic Exercise/HEP  Neuromuscular Re-education  Gait Training  Manual Therapy  Education       TREATMENT:   EVALUATION: LOW COMPLEXITY: (Untimed Charge)    TREATMENT:   Therapeutic Activity (45 Minutes): Therapeutic activity included Transfer Training, Sitting balance , Standing balance, and HEP to improve functional Activity tolerance, Balance, Mobility, Strength, and ROM.     TREATMENT GRID:   Date:  8/19 Date:  8/20 Date:  8/21   ACTIVITY/EXERCISE: AM PM AM PM AM PM   Gripping 10 12 12 15 15 20   Wrist Flexion/Extension 10 12 12 15 15 20   Wrist Ulnar/Radial Deviation 10 12 12 15 15 20   Pronation/Supination 10 12 12 15 15 20   Finger/thumb opposition twice 5 times 12 15 15 20   Elbow Flexion/Extension         Shoulder Flexion/Extension         Shoulder AB/ADduction         Shoulder IR/ER         Pulleys         Pendulums         Shrugs      5   ISOMETRIC:                          Flexion         Extension         ABduction         ADduction         Biceps/Triceps         B = bilateral; AA = active assistive; A = active; P = passive      EDUCATION:    EDUCATION:  [x]  Home Exercises  []  Sling Application []  Movement Precautions   []  Pulleys []  Use of Ice  []  Other:      AFTER TREATMENT PRECAUTIONS: Bed/Chair Locked, Call light within reach, and Chair    INTERDISCIPLINARY COLLABORATION:  RN/ PCT and PT/ PTA    COMPLIANCE WITH PROGRAM/EXERCISES: Will assess as treatment progresses. RECOMMENDATIONS/INTENT FOR NEXT TREATMENT SESSION: Treatment next visit will focus on increasing Ms. Ed Rachael independence with bed mobility, transfers, gait training, strength/ROM exercises, modalities for pain, and patient education.      TIME IN/OUT:  Time In: 1330  Time Out: 1430  Minutes: Reji Fitzgerald PT unable to assess

## 2022-08-21 NOTE — PROGRESS NOTES
Pt has done much better moving today and ambulated a few more steps and was a little stronger. She is unsteady and walks with assistance. She was much more alert also. Notified md that she has some edema just noted around her hips and thighs. Iv fluids stopped.

## 2022-08-21 NOTE — PROGRESS NOTES
Hospitalist Progress Note   Admit Date:  2022 11:25 AM   Name:  Vincenzo Vu   Age:  78 y.o. Sex:  female  :  1940   MRN:  [id number]   Room:  [facility]/    Presenting Complaint: No chief complaint on file. Reason(s) for Admission: Pain due to right shoulder joint prosthesis ([address] [T84.84XA, Z96.611]  Traumatic closed fracture of greater tuberosity of humerus with minimal displacement, right, initial encounter [S42.251A]  Periprosthetic fracture around internal prosthetic right shoulder joint, initial encounter [M97.31XA]  Presence of right artificial shoulder joint [Z96.611]     Hospital Course & Interval History:     Ms. Aric Zaragoza is a 79 yo female with PMH of HTN- no longer taking losartan, OA, depression, seen postop right shoulder   \"     Procedure(s)              1.  INCISION, DEBRIDEMENT, REMOVAL IMPLANT RIGHT SHOULDER \"MIKE REVERSE TSA\" WITH PROXIMAL HUMERAL OSTEOTOMY  2.  REVISION, REVERSE RIGHT TOTAL SHOULDER ARTHROPLASTY WITH LONG-STEM DELTA EXTEND PROSTHESIS, BICEPS TENODESIS, LATISSIMUS DORSI AND TERES MAJOR TENDON TRANSFERS  \"           She has postop anemia. HGB down to 6.8 and now agrees to PRBC transfusion. She has been hypotensive. No longer taking losartan. BP improved s/p bolus and holding antihypertensive. She developed right rib pain since admit. CXR with right rib films negative. LFTS are normal.          Subjective/24hr Events (22):       Daughter Gaby present, ate ok, no BM since admit, no tara GI bleeding, did have dyspnea/ chest pressure earlier today, has ongoing right side pain, had dark stools prior to admit      Assessment & Plan:       Principal Problem:    Periprosthetic fracture around internal prosthetic right shoulder joint  Plan:    Active Problems:    Periprosthetic fracture around internal prosthetic right shoulder joint, initial encounter  Plan:   Defer to orthopedics   On vancomycin, doxycycline             Acute blood loss anemia  Plan:   Trend every 12 hours   Down to 6.8  Transfusing 1 PRBC now  STAT CTAP  Some expected acute blood loss due to right shoulder revision  GI consult  NPO  IV protonix every 12 hours   Check occult stool             Rib pain  Plan:   Negative rib films and CXR  LFTS normal  Not reproducible on exam  CTAP ordered              Hypotension  Plan:     Hypertension, benign  Plan:   On  cc/hr  S/p IVF boluses   Stopped antihypertensive   Improved           Increased creatinine:  Monitor renal function  Continued IVF        Dyspnea  Chest pressure:  Check EKG  Trend troponin           Discharge Planning:      pending     Diet:  ADULT DIET; Regular  DVT PPx: SCD  Code status: Full Code    Hospital Problems:  Principal Problem:    Periprosthetic fracture around internal prosthetic right shoulder joint  Active Problems:    Periprosthetic fracture around internal prosthetic right shoulder joint, initial encounter    Acute blood loss anemia    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  *      Objective:   Patient Vitals for the past 24 hrs:   Temp Pulse Resp BP SpO2   08/21/22 1025 98.4 °F (36.9 °C) 66 -- (!) 115/56 100 %   08/21/22 0818 98.4 °F (36.9 °C) 65 20 (!) 98/54 99 %   08/21/22 0717 98.8 °F (37.1 °C) 70 18 99/60 96 %   08/21/22 0550 -- -- 16 -- --   08/21/22 0351 97.6 °F (36.4 °C) 79 16 (!) 103/48 96 %   08/21/22 0022 98.7 °F (37.1 °C) 82 18 (!) 92/57 96 %   08/20/22 2005 99.7 °F (37.6 °C) 90 16 (!) 93/54 98 %   08/20/22 1955 -- 86 16 -- 99 %   08/20/22 1702 -- 82 -- -- 94 %   08/20/22 1541 97.3 °F (36.3 °C) 86 17 (!) 101/51 90 %   08/20/22 1118 -- 88 16 (!) 99/43 93 % Oxygen Therapy  SpO2: 100 %  Pulse Oximetry Type: Continuous  Pulse via Oximetry: 79 beats per minute  Pulse Oximeter Device Mode: Intermittent  Pulse Oximeter Device Location: Left, Finger  O2 Device: Nasal cannula  Skin Assessment: Clean, dry, & intact  FiO2 : 45 %  O2 Flow Rate (L/min): 2 L/min  Oxygen Therapy: Supplemental oxygen    Estimated body mass index is 28.02 kg/m² as calculated from the following:    Height as of this encounter: 5' 2\" (1.575 m). Weight as of this encounter: 153 lb 3.2 oz (69.5 kg). Intake/Output Summary (Last 24 hours) at 8/21/2022 1030  Last data filed at 8/21/2022 1025  Gross per 24 hour   Intake 272 ml   Output 1100 ml   Net -828 ml         Physical Exam:     Blood pressure (!) 115/56, pulse 66, temperature 98.4 °F (36.9 °C), resp. rate 20, height 5' 2\" (1.575 m), weight 153 lb 3.2 oz (69.5 kg), SpO2 100 %. General:    Well nourished. Alert, elderly, no distress   Head:  Normocephalic, atraumatic  Eyes:  Sclerae appear normal.  Pupils equally round. ENT:  Nares appear normal, no drainage. Moist oral mucosa  Neck:  No restricted ROM. Trachea midline   CV:   RRR. No m/r/g. No jugular venous distension. no edema   Lungs:   CTAB. No wheezing, rhonchi, or rales. Symmetric expansion. anterior   Abdomen: Bowel sounds present. Soft, nontender, nondistended. tender RUQ    Extremities: No cyanosis or clubbing. No edema  Skin:     No rashes and normal coloration. Warm and dry. Neuro:  grossly intact. Psych:  Normal mood and affect.       I have personally reviewed labs and tests showing:  Recent Labs:  Recent Results (from the past 48 hour(s))   Hepatic Function Panel    Collection Time: 08/19/22  7:51 PM   Result Value Ref Range    Total Protein 5.5 (L) 6.3 - 8.2 g/dL    Albumin 2.8 (L) 3.2 - 4.6 g/dL    Globulin 2.7 2.3 - 3.5 g/dL    Albumin/Globulin Ratio 1.0 (L) 1.2 - 3.5      Total Bilirubin 0.5 0.2 - 1.1 MG/DL    Bilirubin, Direct 0.1 <0.4 MG/DL    Alk Phosphatase 63 50 - 130 U/L    AST 33 15 - 37 U/L    ALT 20 12 - 65 U/L   Hemoglobin and Hematocrit    Collection Time: 08/19/22  8:01 PM   Result Value Ref Range    Hemoglobin 8.0 (L) 11.7 - 15.4 g/dL    Hematocrit 24.5 (L) 35.8 - 46.3 %   PLEASE READ & DOCUMENT PPD TEST IN 24 HRS    Collection Time: 08/19/22 10:10 PM   Result Value Ref Range    PPD, (POC) Negative Negative    mm Induration 0 0 - 5 mm   CBC    Collection Time: 08/20/22  3:50 AM   Result Value Ref Range    WBC 5.6 4.3 - 11.1 K/uL    RBC 2.38 (L) 4.05 - 5.2 M/uL    Hemoglobin 7.8 (L) 11.7 - 15.4 g/dL    Hematocrit 24.0 (L) 35.8 - 46.3 %    .8 (H) 79.6 - 97.8 FL    MCH 32.8 26.1 - 32.9 PG    MCHC 32.5 31.4 - 35.0 g/dL    RDW 14.3 11.9 - 14.6 %    Platelets 683 772 - 610 K/uL    MPV 11.3 9.4 - 12.3 FL    nRBC 0.00 0.0 - 0.2 K/uL   Basic Metabolic Panel    Collection Time: 08/20/22  3:50 AM   Result Value Ref Range    Sodium 136 136 - 145 mmol/L    Potassium 3.9 3.5 - 5.1 mmol/L    Chloride 107 98 - 107 mmol/L    CO2 28 21 - 32 mmol/L    Anion Gap 1 (L) 7 - 16 mmol/L    Glucose 117 (H) 65 - 100 mg/dL    BUN 14 8 - 23 MG/DL    Creatinine 1.01 (H) 0.6 - 1.0 MG/DL    GFR African American >60 >60 ml/min/1.73m2    GFR Non- 56 (L) >60 ml/min/1.73m2    Calcium 8.0 (L) 8.3 - 10.4 MG/DL   Magnesium    Collection Time: 08/20/22  3:50 AM   Result Value Ref Range    Magnesium 2.3 1.8 - 2.4 mg/dL   PLEASE READ & DOCUMENT PPD TEST IN 48 HRS    Collection Time: 08/20/22  5:00 PM   Result Value Ref Range    PPD, (POC) Negative Negative    mm Induration 0 0 - 5 mm   Hemoglobin and Hematocrit    Collection Time: 08/20/22  8:00 PM   Result Value Ref Range    Hemoglobin 7.2 (L) 11.7 - 15.4 g/dL    Hematocrit 22.1 (L) 35.8 - 46.3 %   Basic Metabolic Panel    Collection Time: 08/21/22  3:44 AM   Result Value Ref Range    Sodium 137 136 - 145 mmol/L    Potassium 3.7 3.5 - 5.1 mmol/L    Chloride 107 98 - 107 mmol/L    CO2 26 21 - 32 mmol/L    Anion Gap 4 immediate release tablet 5 mg  5 mg Oral Q4H PRN    Or    oxyCODONE (ROXICODONE) immediate release tablet 10 mg  10 mg Oral Q4H PRN    doxycycline hyclate (VIBRAMYCIN) capsule 100 mg  100 mg Oral 2 times per day    lactated ringers infusion   IntraVENous Continuous    citalopram (CELEXA) tablet 20 mg  20 mg Oral QAM AC    0.9 % sodium chloride infusion   IntraVENous PRN    vancomycin (VANCOCIN) 1,000 mg in sodium chloride 0.9 % 250 mL IVPB (Adin2Wiw)  1,000 mg IntraVENous Once    HYDROmorphone (DILAUDID) injection 1 mg  1 mg IntraVENous Q1H PRN    docusate sodium (COLACE) capsule 100 mg  100 mg Oral BID    ferrous sulfate (IRON 325) tablet 325 mg  325 mg Oral BID WC    promethazine (PHENERGAN) tablet 25 mg  25 mg Oral Q4H PRN    ondansetron (ZOFRAN-ODT) disintegrating tablet 4 mg  4 mg Oral Q8H PRN    vancomycin (VANCOCIN) 1,000 mg in sodium chloride 0.9 % 250 mL IVPB (Izha9Cxc)  1,000 mg IntraVENous Q12H    sodium chloride flush 0.9 % injection 5-40 mL  5-40 mL IntraVENous 2 times per day    sodium chloride flush 0.9 % injection 5-40 mL  5-40 mL IntraVENous PRN    0.9 % sodium chloride infusion   IntraVENous PRN    acetaminophen (TYLENOL) tablet 1,000 mg  1,000 mg Oral Q6H PRN       Signed:  Tiffanie Chew MD    Part of this note may have been written by using a voice dictation software. The note has been proof read but may still contain some grammatical/other typographical errors.

## 2022-08-22 ENCOUNTER — ANESTHESIA (OUTPATIENT)
Dept: ENDOSCOPY | Age: 82
DRG: 483 | End: 2022-08-22
Payer: MEDICARE

## 2022-08-22 ENCOUNTER — APPOINTMENT (OUTPATIENT)
Dept: GENERAL RADIOLOGY | Age: 82
DRG: 483 | End: 2022-08-22
Attending: ORTHOPAEDIC SURGERY
Payer: MEDICARE

## 2022-08-22 ENCOUNTER — ANESTHESIA EVENT (OUTPATIENT)
Dept: ENDOSCOPY | Age: 82
DRG: 483 | End: 2022-08-22
Payer: MEDICARE

## 2022-08-22 LAB
ABO + RH BLD: NORMAL
ABO + RH BLD: NORMAL
ANION GAP SERPL CALC-SCNC: 2 MMOL/L (ref 7–16)
BLD PROD TYP BPU: NORMAL
BLOOD BANK CMNT PATIENT-IMP: NORMAL
BLOOD BANK DISPENSE STATUS: NORMAL
BLOOD GROUP ANTIBODIES SERPL: NORMAL
BLOOD GROUP ANTIBODIES SERPL: NORMAL
BPU ID: NORMAL
BUN SERPL-MCNC: 9 MG/DL (ref 8–23)
CALCIUM SERPL-MCNC: 7.9 MG/DL (ref 8.3–10.4)
CHLORIDE SERPL-SCNC: 108 MMOL/L (ref 98–107)
CO2 SERPL-SCNC: 25 MMOL/L (ref 21–32)
CREAT SERPL-MCNC: 0.77 MG/DL (ref 0.6–1)
CROSSMATCH RESULT: NORMAL
ERYTHROCYTE [DISTWIDTH] IN BLOOD BY AUTOMATED COUNT: 14.8 % (ref 11.9–14.6)
GLUCOSE SERPL-MCNC: 105 MG/DL (ref 65–100)
HCT VFR BLD AUTO: 25 % (ref 35.8–46.3)
HCT VFR BLD AUTO: 26.6 % (ref 35.8–46.3)
HGB BLD-MCNC: 8.2 G/DL (ref 11.7–15.4)
HGB BLD-MCNC: 8.5 G/DL (ref 11.7–15.4)
MAGNESIUM SERPL-MCNC: 2 MG/DL (ref 1.8–2.4)
MCH RBC QN AUTO: 32.3 PG (ref 26.1–32.9)
MCHC RBC AUTO-ENTMCNC: 32 G/DL (ref 31.4–35)
MCV RBC AUTO: 101.1 FL (ref 79.6–97.8)
NRBC # BLD: 0 K/UL (ref 0–0.2)
PLATELET # BLD AUTO: 161 K/UL (ref 150–450)
PMV BLD AUTO: 10.9 FL (ref 9.4–12.3)
POTASSIUM SERPL-SCNC: 4.6 MMOL/L (ref 3.5–5.1)
RBC # BLD AUTO: 2.63 M/UL (ref 4.05–5.2)
SODIUM SERPL-SCNC: 135 MMOL/L (ref 136–145)
SPECIMEN EXP DATE BLD: NORMAL
SPECIMEN EXP DATE BLD: NORMAL
TROPONIN I SERPL HS-MCNC: 10.7 PG/ML (ref 0–14)
TROPONIN I SERPL HS-MCNC: 11.2 PG/ML (ref 0–14)
UNIT DIVISION: 0
WBC # BLD AUTO: 4.5 K/UL (ref 4.3–11.1)

## 2022-08-22 PROCEDURE — 85018 HEMOGLOBIN: CPT

## 2022-08-22 PROCEDURE — 73030 X-RAY EXAM OF SHOULDER: CPT

## 2022-08-22 PROCEDURE — 85027 COMPLETE CBC AUTOMATED: CPT

## 2022-08-22 PROCEDURE — 3609012400 HC EGD TRANSORAL BIOPSY SINGLE/MULTIPLE: Performed by: INTERNAL MEDICINE

## 2022-08-22 PROCEDURE — 2580000003 HC RX 258: Performed by: INTERNAL MEDICINE

## 2022-08-22 PROCEDURE — 7100000011 HC PHASE II RECOVERY - ADDTL 15 MIN: Performed by: INTERNAL MEDICINE

## 2022-08-22 PROCEDURE — 2580000003 HC RX 258: Performed by: ORTHOPAEDIC SURGERY

## 2022-08-22 PROCEDURE — C9113 INJ PANTOPRAZOLE SODIUM, VIA: HCPCS | Performed by: INTERNAL MEDICINE

## 2022-08-22 PROCEDURE — 97530 THERAPEUTIC ACTIVITIES: CPT

## 2022-08-22 PROCEDURE — 83735 ASSAY OF MAGNESIUM: CPT

## 2022-08-22 PROCEDURE — 84484 ASSAY OF TROPONIN QUANT: CPT

## 2022-08-22 PROCEDURE — 6360000002 HC RX W HCPCS: Performed by: INTERNAL MEDICINE

## 2022-08-22 PROCEDURE — 6360000002 HC RX W HCPCS: Performed by: NURSE ANESTHETIST, CERTIFIED REGISTERED

## 2022-08-22 PROCEDURE — 0DB38ZX EXCISION OF LOWER ESOPHAGUS, VIA NATURAL OR ARTIFICIAL OPENING ENDOSCOPIC, DIAGNOSTIC: ICD-10-PCS | Performed by: INTERNAL MEDICINE

## 2022-08-22 PROCEDURE — 2580000003 HC RX 258: Performed by: NURSE ANESTHETIST, CERTIFIED REGISTERED

## 2022-08-22 PROCEDURE — 88305 TISSUE EXAM BY PATHOLOGIST: CPT

## 2022-08-22 PROCEDURE — 3700000001 HC ADD 15 MINUTES (ANESTHESIA): Performed by: INTERNAL MEDICINE

## 2022-08-22 PROCEDURE — 1100000000 HC RM PRIVATE

## 2022-08-22 PROCEDURE — 36415 COLL VENOUS BLD VENIPUNCTURE: CPT

## 2022-08-22 PROCEDURE — 3700000000 HC ANESTHESIA ATTENDED CARE: Performed by: INTERNAL MEDICINE

## 2022-08-22 PROCEDURE — 6370000000 HC RX 637 (ALT 250 FOR IP): Performed by: PHYSICIAN ASSISTANT

## 2022-08-22 PROCEDURE — 2709999900 HC NON-CHARGEABLE SUPPLY: Performed by: INTERNAL MEDICINE

## 2022-08-22 PROCEDURE — 6370000000 HC RX 637 (ALT 250 FOR IP): Performed by: INTERNAL MEDICINE

## 2022-08-22 PROCEDURE — 6370000000 HC RX 637 (ALT 250 FOR IP): Performed by: ORTHOPAEDIC SURGERY

## 2022-08-22 PROCEDURE — 7100000010 HC PHASE II RECOVERY - FIRST 15 MIN: Performed by: INTERNAL MEDICINE

## 2022-08-22 PROCEDURE — 80048 BASIC METABOLIC PNL TOTAL CA: CPT

## 2022-08-22 RX ORDER — SODIUM CHLORIDE, SODIUM LACTATE, POTASSIUM CHLORIDE, CALCIUM CHLORIDE 600; 310; 30; 20 MG/100ML; MG/100ML; MG/100ML; MG/100ML
INJECTION, SOLUTION INTRAVENOUS CONTINUOUS PRN
Status: DISCONTINUED | OUTPATIENT
Start: 2022-08-22 | End: 2022-08-22

## 2022-08-22 RX ORDER — PROPOFOL 10 MG/ML
INJECTION, EMULSION INTRAVENOUS PRN
Status: DISCONTINUED | OUTPATIENT
Start: 2022-08-22 | End: 2022-08-22 | Stop reason: SDUPTHER

## 2022-08-22 RX ORDER — SUCRALFATE 1 G/1
1 TABLET ORAL EVERY 6 HOURS SCHEDULED
Status: DISCONTINUED | OUTPATIENT
Start: 2022-08-22 | End: 2022-08-23 | Stop reason: HOSPADM

## 2022-08-22 RX ORDER — SODIUM CHLORIDE, SODIUM LACTATE, POTASSIUM CHLORIDE, CALCIUM CHLORIDE 600; 310; 30; 20 MG/100ML; MG/100ML; MG/100ML; MG/100ML
INJECTION, SOLUTION INTRAVENOUS CONTINUOUS PRN
Status: DISCONTINUED | OUTPATIENT
Start: 2022-08-22 | End: 2022-08-22 | Stop reason: SDUPTHER

## 2022-08-22 RX ORDER — DOXYCYCLINE HYCLATE 100 MG/1
100 CAPSULE ORAL EVERY 12 HOURS SCHEDULED
Status: DISCONTINUED | OUTPATIENT
Start: 2022-08-23 | End: 2022-08-22 | Stop reason: SDUPTHER

## 2022-08-22 RX ADMIN — SODIUM CHLORIDE, SODIUM LACTATE, POTASSIUM CHLORIDE, AND CALCIUM CHLORIDE: 600; 310; 30; 20 INJECTION, SOLUTION INTRAVENOUS at 12:34

## 2022-08-22 RX ADMIN — OXYCODONE HYDROCHLORIDE 10 MG: 5 TABLET ORAL at 21:03

## 2022-08-22 RX ADMIN — FERROUS SULFATE TAB 325 MG (65 MG ELEMENTAL FE) 325 MG: 325 (65 FE) TAB at 17:24

## 2022-08-22 RX ADMIN — PROPOFOL 30 MG: 10 INJECTION, EMULSION INTRAVENOUS at 12:41

## 2022-08-22 RX ADMIN — SUCRALFATE 1 G: 1 TABLET ORAL at 13:56

## 2022-08-22 RX ADMIN — OXYCODONE HYDROCHLORIDE 10 MG: 5 TABLET ORAL at 05:55

## 2022-08-22 RX ADMIN — ONDANSETRON 4 MG: 4 TABLET, ORALLY DISINTEGRATING ORAL at 14:38

## 2022-08-22 RX ADMIN — SODIUM CHLORIDE, PRESERVATIVE FREE 10 ML: 5 INJECTION INTRAVENOUS at 08:59

## 2022-08-22 RX ADMIN — SUCRALFATE 1 G: 1 TABLET ORAL at 17:24

## 2022-08-22 RX ADMIN — DOXYCYCLINE HYCLATE 100 MG: 100 CAPSULE ORAL at 14:07

## 2022-08-22 RX ADMIN — PROPOFOL 20 MG: 10 INJECTION, EMULSION INTRAVENOUS at 12:45

## 2022-08-22 RX ADMIN — PROPOFOL 50 MG: 10 INJECTION, EMULSION INTRAVENOUS at 12:38

## 2022-08-22 RX ADMIN — SODIUM CHLORIDE, PRESERVATIVE FREE 40 MG: 5 INJECTION INTRAVENOUS at 13:54

## 2022-08-22 RX ADMIN — CITALOPRAM HYDROBROMIDE 20 MG: 20 TABLET ORAL at 05:56

## 2022-08-22 RX ADMIN — SODIUM CHLORIDE, PRESERVATIVE FREE 10 ML: 5 INJECTION INTRAVENOUS at 20:56

## 2022-08-22 RX ADMIN — PROPOFOL 20 MG: 10 INJECTION, EMULSION INTRAVENOUS at 12:46

## 2022-08-22 RX ADMIN — POLYETHYLENE GLYCOL (3350) 17 G: 17 POWDER, FOR SOLUTION ORAL at 21:03

## 2022-08-22 ASSESSMENT — PAIN SCALES - GENERAL
PAINLEVEL_OUTOF10: 10
PAINLEVEL_OUTOF10: 2
PAINLEVEL_OUTOF10: 2
PAINLEVEL_OUTOF10: 7

## 2022-08-22 ASSESSMENT — PAIN - FUNCTIONAL ASSESSMENT
PAIN_FUNCTIONAL_ASSESSMENT: ACTIVITIES ARE NOT PREVENTED
PAIN_FUNCTIONAL_ASSESSMENT: PREVENTS OR INTERFERES SOME ACTIVE ACTIVITIES AND ADLS

## 2022-08-22 ASSESSMENT — PAIN DESCRIPTION - ORIENTATION
ORIENTATION: RIGHT
ORIENTATION: MID;RIGHT

## 2022-08-22 ASSESSMENT — PAIN DESCRIPTION - DESCRIPTORS
DESCRIPTORS: ACHING
DESCRIPTORS: ACHING;SORE

## 2022-08-22 ASSESSMENT — PAIN DESCRIPTION - LOCATION
LOCATION: SHOULDER;RIB CAGE
LOCATION: SHOULDER;NECK

## 2022-08-22 NOTE — PROCEDURES
ESOPHAGOGASTRODUODENOSCOPY        DATE of PROCEDURE: 8/22/2022    PT NAME: Torres Shaffer  [id number]    PHYSICIAN:  Sampson Menchaca MD    MEDICATION:  MAC    INSTRUMENT: GIFQ    PROCEDURE:  EGD with biopsies, EGD diagnostic    INDICATIONS: Melena, Transfused Acute Blood Loss Anemia    FINDINGS:  OROPHARYNX: Cords and pyriform recesses normal.  ESOPHAGUS: There is ulcerative esophagitis in the distal 2-3 cm. This is non-bleeding. The GEJ is irregular with tiny islands of salmon colored mucosa. No nodules were seen. 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 STOMACH: The fundus on antegrade and retroflex views is normal. The body, antrum and pylorus is normal.  DUODENUM: The bulb and second portions are normal.    There is no old or new blood anywhere on this exam.    ASSESSMENT:  1. Ulcerative Esophagitis  2. Irregular Z line    PLAN:  1. Follow up pathology  2. Avoid NSAIDs  3. Protonix 40 mg BID  4. Carafate 1 g QID  5. Repeat EGD in 8-12 weeks to confirm healing of the esophagitis  6. Monitor H/H. Transfuse for Hg < 7.       Sampson Menchaca MD  [facility]

## 2022-08-22 NOTE — PROGRESS NOTES
Orthopedic Joint Progress Note    2022  Admit Date: 2022  Admit Diagnosis: Pain due to right shoulder joint prosthesis ([address] [G51.23QI, Z96.611]  Traumatic closed fracture of greater tuberosity of humerus with minimal displacement, right, initial encounter [S42.251A]  Periprosthetic fracture around internal prosthetic right shoulder joint, initial encounter [M97.31XA]  Presence of right artificial shoulder joint [Z96.611]    4 Days Post-Op    Subjective: events of weekend reviewed, no complaints today     Dilia Cole     Review of Systems: pertinent items are noted in HPI    Objective:     PT/OT:     PATIENT MOBILITY                           Vital Signs:    Blood pressure (!) 104/48, pulse 81, temperature 99.3 °F (37.4 °C), temperature source Oral, resp. rate 16, height 5' 2\" (1.575 m), weight 153 lb 3.2 oz (69.5 kg), SpO2 97 %.   Temp (24hrs), Av.7 °F (37.1 °C), Min:98.1 °F (36.7 °C), Max:99.3 °F (37.4 °C)      Pain Control:        Meds:  Current Facility-Administered Medications   Medication Dose Route Frequency    0.9 % sodium chloride infusion   IntraVENous PRN    polyethylene glycol (GLYCOLAX) packet 17 g  17 g Oral Daily PRN    pantoprazole (PROTONIX) 40 mg in sodium chloride (PF) 10 mL injection  40 mg IntraVENous Q12H    diatrizoate meglumine-sodium (GASTROGRAFIN) 66-10 % solution 15 mL  15 mL Oral ONCE PRN    oxyCODONE (ROXICODONE) immediate release tablet 5 mg  5 mg Oral Q4H PRN    Or    oxyCODONE (ROXICODONE) immediate release tablet 10 mg  10 mg Oral Q4H PRN    doxycycline hyclate (VIBRAMYCIN) capsule 100 mg  100 mg Oral 2 times per day    citalopram (CELEXA) tablet 20 mg  20 mg Oral QAM AC    0.9 % sodium chloride infusion   IntraVENous PRN    vancomycin (VANCOCIN) 1,000 mg in sodium chloride 0.9 % 250 mL IVPB ([id number])  1,000 mg IntraVENous Once    HYDROmorphone (DILAUDID) injection 1 mg  1 mg IntraVENous Q1H PRN    docusate sodium (COLACE) capsule 100 mg  100 mg Oral BID    ferrous sulfate (IRON 325) tablet 325 mg  325 mg Oral BID WC    promethazine (PHENERGAN) tablet 25 mg  25 mg Oral Q4H PRN    ondansetron (ZOFRAN-ODT) disintegrating tablet 4 mg  4 mg Oral Q8H PRN    sodium chloride flush 0.9 % injection 5-40 mL  5-40 mL IntraVENous 2 times per day    sodium chloride flush 0.9 % injection 5-40 mL  5-40 mL IntraVENous PRN    0.9 % sodium chloride infusion   IntraVENous PRN    acetaminophen (TYLENOL) tablet 1,000 mg  1,000 mg Oral Q6H PRN        LAB:    Lab Results   Component Value Date/Time    INR 1.0 08/12/2022 11:01 AM     Lab Results   Component Value Date/Time    HGB 8.5 08/22/2022 04:05 AM    HGB 8.8 08/21/2022 07:07 PM    HGB 8.3 08/21/2022 11:47 AM              Physical Exam:  No significant changes    Assessment:      Principal Problem:    Periprosthetic fracture around internal prosthetic right shoulder joint  Active Problems:    Periprosthetic fracture around internal prosthetic right shoulder joint, initial encounter    Acute blood loss anemia    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 *       Plan:     Continue PT/OT/Rehab  Hemoglobin 8.5  EGD today  Aerobic cultures negative stop vanco  Anaerobic cultures  no growth to date doxycycline for 3 weeks  Observe  Continue care  Transfer to rehab ? [facility] when stable           Demetrice Grubbs MD    I have reviewed the patients controlled substance prescription history, as maintained in the [address] [place] [address] prescription monitoring program, so that the prescription(s) for a  controlled substance can be given.

## 2022-08-22 NOTE — PROGRESS NOTES
PHYSICAL THERAPY: SHOULDER Daily Note and AM  (Link to Caseload Tracking: PT Visit Days : 4  Acknowledge Orders Time In/Out  PT Charge Capture  Rehab Caseload Tracker    Philly Valdovinos is a 78 y.o. female   PRIMARY DIAGNOSIS: Periprosthetic fracture around internal prosthetic right shoulder joint  Pain due to right shoulder joint prosthesis ([facility] [T84.84XA, Z96.611]  Traumatic closed fracture of greater tuberosity of humerus with minimal displacement, right, initial encounter [S42.251A]  Periprosthetic fracture around internal prosthetic right shoulder joint, initial encounter [M97.31XA]  Presence of right artificial shoulder joint [Z96.611]  Procedure(s) (LRB):  right shoulder irrigation and debridement, right shoulder removal of manjit total shoulder arthroplasty implants, revision right reverse total shoulder arthroplasty with a delta xtend prosthesis and biceps tenodesis, possible remedy spacer. general/interscalene. 23hr observation. (Right)  4 Days Post-Op  Reason for Referral: Pain in Right Shoulder (M25.511)  Stiffness of Right Shoulder, Not elsewhere classified (M25.611)  Difficulty in walking, Not elsewhere classified (R26.2)  Inpatient: Payor: MEDICARE / Plan: MEDICARE PART A AND B / Product Type: *No Product type* /     MOVEMENT PRECAUTIONS   TWICE A DAY   Wrist and  hand, gentle motion right arm   NO OTHER MOTION. Nwb right upper extremity  Sling right shoulder     REHAB RECOMMENDATIONS:   Recommendation to date pending progress:  Setting:  Short-term Rehab    Equipment:    To Be Determined     ASSESSMENT:   ASSESSMENT:  Ms. Delonte Galindo is admitted with above diagnosis and s/p above surgery. Pt presents with decreased strength and range of motion right upper extremity and decreased post op mobility. Pt will benefit from skilled PT interventions to maximize independence with mobility and HEP. Pt lives at home with spouse.  She has a history of multiple orthopedic surgeries and has gone to a SNF afterwards for rehab and she would like to pursue this plan. She is right handed. Today worked on getting out of bed, HEP and getting to recliner chair. Hope to progress this afternoon. 822 supine upon arrival.  Performs R shoulder exercises with help. Pt kept falling a sleep during therapy. Work on bed mobility as follows:supine>eob with Min A. Sat on eob few min before transferring in the chair. Sit>stand with walker in front and Min A. Ambulated 20 ft to the chair with Min A. Remain in the chair with needs in reach, ice to R shoulder and instructed to call for assist, before getting up.      [facility] [address] [address] AM-PAC 6 Clicks Basic Mobility Inpatient Short Form  AM-PAC Mobility Inpatient   How much difficulty turning over in bed?: A Little  How much difficulty sitting down on / standing up from a chair with arms?: A Little  How much difficulty moving from lying on back to sitting on side of bed?: A Little  How much help from another person moving to and from a bed to a chair?: A Little  How much help from another person needed to walk in hospital room?: A Little  How much help from another person for climbing 3-5 steps with a railing?: A Little  [facility] Inpatient Mobility Raw Score : 18  AM-PAC Inpatient T-Scale Score : 43.63  Mobility Inpatient CMS 0-100% Score: 46.58  Mobility Inpatient CMS G-Code Modifier : CK    SUBJECTIVE:   Ms. Alvarez Limb agreeable for therapy    Social/Functional Lives With: Spouse  Type of Home: House  Home Layout: One level  Home Access: Stairs to enter with rails  Entrance Stairs - Number of Steps: 4  Bathroom Shower/Tub: Walk-in shower  Bathroom Equipment: 3-in-1 commode  Home Equipment: Dulcie Sicks, standard  ADL Assistance: Needs assistance  Ambulation Assistance: Needs assistance  Transfer Assistance: Needs assistance  Active : Yes    OBJECTIVE:     PAIN: VITAL SIGNS: LINES/DRAINS:   Pre Treatment: sore         Post Treatment: sore Vitals        Oxygen IV    RESTRICTIONS/PRECAUTIONS:  Restrictions/Precautions: Fall Risk           Restrictions/Precautions: Fall Risk        HAND DOMINANCE:  Left []  Right [x]      UPPER EXTREMITY GROSS EVALUATION:  RIGHT UE   Within Functional Limits   Abnormal   Comments   Strength [] [x]  S/p surgery   Active Range of Motion [] [x]     Passive Range of Motion           [] [x]        LEFT UE Within Functional Limits   Abnormal   Comments   Strength [x] []  Generalized weakness   Active Range of Motion [x] []     Passive Range of Motion [x] []       LOWER EXTREMITY GROSS EVALUATION:     Within Functional Limits   Abnormal   Comments   Strength [x] [] Generalized weakness   Range of Motion [x] []        COGNITION/  PERCEPTION: Intact Impaired (Comments):   Orientation [x]     Vision [x]     Hearing []     Cognition  [x]       MOBILITY: I Mod I S SBA CGA Min Mod Max Total  NT x2 Comments:   Bed Mobility    Rolling [] [] [] [] [] [x] [] [] [] [] []    Supine to Sit [] [] [] [] [] [] [] [] [] [] []    Scooting [] [] [] [] [] [x] [] [] [] [] []    Sit to Supine [] [] [] [] [] [] [] [] [] [] []    Transfers    Sit to Stand [] [] [] [] [] [x] [] [] [] [] []    Bed to Chair [] [] [] [] [] [x] [] [] [] [] []    Stand to Sit [] [] [] [] [] [x] [] [] [] [] []    Stand Pivot [] [] [] [] [] [] [] [] [] [] []    Toilet [] [] [] [] [] [] [] [] [] [] []     [] [] [] [] [] [] [] [] [] [] []    I=Independent, Mod I=Modified Independent, S=Supervision, SBA=Standby Assistance, CGA=Contact Guard Assistance,   Min=Minimal Assistance, Mod=Moderate Assistance, Max=Maximal Assistance, Total=Total Assistance, NT=Not Tested    GAIT: I Mod I S SBA CGA Min Mod Max Total  NT x2 Comments:   Level of Assistance [] [] [] [] [] [x] [] [] [] [] []    Weightbearing Status       Distance  20 feet    Gait Quality Decreased burke , Decreased step clearance, Decreased step length, and Shuffling     DME Hand held assist      Stairs      I=Independent, Mod I=Modified Independent, S=Supervision, SBA=Standby Assistance, CGA=Contact Guard Assistance,   Min=Minimal Assistance, Mod=Moderate Assistance, Max=Maximal Assistance, Total=Total Assistance, NT=Not Tested    BALANCE: Good Fair+ Fair Fair- Poor NT Comments   Sitting Static [x] [] [] [] [] []    Sitting Dynamic [x] [x] [] [] [] []              Standing Static [] [] [x] [] [] []    Standing Dynamic [] [] [] [x] [] []      PLAN:   ACUTE PHYSICAL THERAPY GOALS:   (Developed with and agreed upon by patient and/or caregiver.)  GOALS (1-4 days):  (1.)  Patient will move from supine to sit and sit to supine  in bed with CONTACT GUARD ASSIST.    (2.)  Patient will transfer from bed to chair and chair to bed with CONTACT GUARD ASSIST using the least restrictive device. (3.)  Patient will ambulate with CONTACT GUARD ASSIST for 75 feet with the least restrictive device. (4.)  Patient will be independent with shoulder HEP to increase range of motion per MD orders. ________________________________________________________________________________________________      FREQUENCY AND DURATION: BID for duration of hospital stay or until stated goals are met, whichever comes first.    THERAPY PROGNOSIS: Good    PROBLEM LIST:   (Skilled intervention is medically necessary to address:)  Decreased ADL/Functional Activities  Decreased Activity Tolerance  Decreased AROM/PROM  Decreased Gait Ability  Decreased Safety Awareness  Decreased Strength  Decreased Transfer Abilities  Increased Pain   INTERVENTIONS PLANNED:   (Benefits and precautions of physical therapy have been discussed with the patient.)  Therapeutic Activity  Therapeutic Exercise/HEP  Neuromuscular Re-education  Gait Training  Manual Therapy  Education       TREATMENT:   EVALUATION: LOW COMPLEXITY: (Untimed Charge)    TREATMENT:   Therapeutic Activity (30 Minutes):  Therapeutic activity included Supine to Sit, Transfer Training, Ambulation on level ground, Sitting balance , and Standing balance to improve functional Activity tolerance, Balance, Mobility, Strength, and ROM. TREATMENT GRID:   Date:  8/19 Date:  8/20 Date:  8/21 Date:  8/22   ACTIVITY/EXERCISE: AM PM AM PM AM PM am   Gripping 10 12 12 15 15 20 20   Wrist Flexion/Extension 10 12 12 15 15 20 20   Wrist Ulnar/Radial Deviation 10 12 12 15 15 20 20   Pronation/Supination 10 12 12 15 15 20 20   Finger/thumb opposition twice 5 times 12 15 15 20 20   Elbow Flexion/Extension          Shoulder Flexion/Extension          Shoulder AB/ADduction          Shoulder IR/ER          Pulleys          Pendulums          Shrugs      5    ISOMETRIC:                           Flexion          Extension          ABduction          ADduction          Biceps/Triceps          B = bilateral; AA = active assistive; A = active; P = passive      EDUCATION:    EDUCATION:  [x]  Home Exercises  [x]  Sling Application []  Movement Precautions   []  Pulleys []  Use of Ice  []  Other:      AFTER TREATMENT PRECAUTIONS: Bed/Chair Locked, Call light within reach, and Chair    INTERDISCIPLINARY COLLABORATION:  RN/ PCT and PT/ PTA    COMPLIANCE WITH PROGRAM/EXERCISES: Will assess as treatment progresses. RECOMMENDATIONS/INTENT FOR NEXT TREATMENT SESSION: Treatment next visit will focus on increasing Ms. Ed Rachael independence with bed mobility, transfers, gait training, strength/ROM exercises, modalities for pain, and patient education.      TIME IN/OUT:  Time In: 0700  Time Out: 0730  Minutes: [address] Cole Hernandez, PTA

## 2022-08-22 NOTE — ANESTHESIA PRE PROCEDURE
Department of Anesthesiology  Preprocedure Note       Name:  Aliza Hickman   Age:  78 y.o.  :  1940                                          MRN:  [id number]         Date:  2022      Surgeon: Sonya Reyna):  Brynn Nguyễn MD    Procedure: Procedure(s):  EGD ESOPHAGOGASTRODUODENOSCOPY    Medications prior to admission:   Prior to Admission medications    Medication Sig Start Date End Date Taking? Authorizing Provider   citalopram (CELEXA) 20 MG tablet Take 20 mg by mouth daily    Historical Provider, MD   acetaminophen (TYLENOL) 500 MG tablet Take 1,000 mg by mouth every 6 hours as needed for Pain    Historical Provider, MD   oxyCODONE (ROXICODONE) 5 MG immediate release tablet Take 5 mg by mouth every 4 hours as needed for Pain. Historical Provider, MD   b complex vitamins capsule Take 1 capsule by mouth in the morning. Historical Provider, MD   cetirizine (ZYRTEC) 10 MG tablet Take 10 mg by mouth daily    Ar Automatic Reconciliation   fluticasone (FLONASE) 50 MCG/ACT nasal spray 2 sprays by Nasal route daily 21   Ar Automatic Reconciliation   losartan (COZAAR) 50 MG tablet Take 50 mg by mouth in the morning. Patient not taking: Reported on 2022 12/3/21   Ar Automatic Reconciliation   meloxicam (MOBIC) 15 MG tablet Take 15 mg by mouth daily 10/28/21   Ar Automatic Reconciliation   omeprazole (PRILOSEC) 20 MG delayed release capsule Take 20 mg by mouth 2 times daily as needed 21   Ar Automatic Reconciliation   traMADol (ULTRAM) 50 MG tablet Take 50 mg by mouth every 6 hours as needed. Ar Automatic Reconciliation       Current medications:    No current facility-administered medications for this visit. No current outpatient medications on file.      Facility-Administered Medications Ordered in Other Visits   Medication Dose Route Frequency Provider Last Rate Last Admin    0.9 % sodium chloride infusion   IntraVENous PRN Ainsley Herrera MD        polyethylene glycol 1,000 mg Oral Q6H PRN Dalila Salguero MD   1,000 mg at 08/21/22 1240       Allergies: Allergies   Allergen Reactions    Adhesive Tape Other (See Comments)     Blisters, pt denies allergy 7-3-19     Codeine Itching     Pt denies allergy 7-3-19        Problem List:    Patient Active Problem List   Diagnosis Code    PVD (peripheral vascular disease) ([facility] I73.9    Status post right hip replacement Z96.641    Shortness of breath R06.02    Palpitations R00.2    Chest discomfort R07.89    History of COVID-19 Z86.16    Spinal stenosis, lumbar M48.061    Bilateral carotid artery disease (HCC) I77.9    Palpitation R00.2    Hypothyroidism E03.9    Vazquez's esophagus K22.70    Tricuspid valve disorder I07.9    Coronary atherosclerosis of native coronary vessel I25.10    Hypertension, benign I10    Hyperlipidemia E78.5    Pain due to right shoulder joint prosthesis ([facility] T84.84XA, Z96.611    Traumatic closed fracture of greater tuberosity of humerus with minimal displacement, right, initial encounter S42.251A    Periprosthetic fracture around internal prosthetic right shoulder joint M97. 31XA    Presence of right artificial shoulder joint Z96.611    Periprosthetic fracture around internal prosthetic right shoulder joint, initial encounter M97. 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 Edwards Begun Chest discomfort     Coronary atherosclerosis of native coronary vessel     Degeneration of lumbar or lumbosacral intervertebral disc     Depression     medication     Gastric ulcer, unspecified as acute or chronic, without mention of hemorrhage or perforation 1989    History of blood transfusion     Hyperlipidemia     Hypothyroidism     no meds needed currently (low normal range per pt)    Infectious disease     lyme disease 2004    Nausea & vomiting     Palpitations     PVD (peripheral vascular disease) ([address]     Scoliosis (and kyphoscoliosis), idiopathic     Spinal stenosis, lumbar 12/7/2009    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    Thoracic or lumbosacral neuritis or radiculitis, unspecified     Tricuspid regurgitation     mild per echo dated 1-11-13    Tricuspid valve disorder        Past Surgical History:        Procedure Laterality Date    APPENDECTOMY  1987    BREAST BIOPSY Left 1980    ENLARGED MILK GLAND    BUNIONECTOMY Bilateral 2009    with hammertoe correction    CARPAL TUNNEL RELEASE Right 10/31/13    ESOPHAGOGASTRODUODENOSCOPY  07/27/2018 2016, 2018    HAMMER TOE SURGERY  2010    revision    HYSTERECTOMY (CERVIX STATUS UNKNOWN)  1988    LAMINECTOMY  04/05/2022    T10 OPEN TOTAL LAMINECTOMY    LUMBAR LAMINECTOMY  2009    L4-L5    ORTHOPEDIC SURGERY  2010    Right hand    REVERSE TOTAL SHOULDER ARTHROPLASTY Right 2017    SHOULDER ARTHROSCOPY  2013    left shoulder cyst    SHOULDER SURGERY Right 8/18/2022    right shoulder irrigation and debridement, right shoulder removal of manjit total shoulder arthroplasty implants, revision right reverse total shoulder arthroplasty with a delta xtend prosthesis and biceps tenodesis, possible remedy spacer. general/interscalene. 23hr observation. performed by Dominique Pepe MD at [facility] Left 2007    revision 3/2000    TOTAL HIP ARTHROPLASTY Left 2009    revision    TOTAL KNEE ARTHROPLASTY Right 2007       Social History:    Social History     Tobacco Use    Smoking status: Never    Smokeless tobacco: Never   Substance Use Topics    Alcohol use: Not Currently                                Counseling given: Not Answered      Vital Signs (Current):    There were no vitals filed for this visit. BP Readings from Last 3 Encounters:   08/22/22 (!) 104/48   08/12/22 115/78   07/29/22 115/61       NPO Status:                                                                                 BMI:   Wt Readings from Last 3 Encounters:   08/18/22 153 lb 3.2 oz (69.5 kg)   08/12/22 151 lb 8 oz (68.7 kg)   07/29/22 153 lb (69.4 kg)     There is no height or weight on file to calculate BMI.    CBC:   Lab Results   Component Value Date/Time    WBC 4.5 08/22/2022 04:05 AM    RBC 2.63 08/22/2022 04:05 AM    HGB 8.2 08/22/2022 10:41 AM    HCT 25.0 08/22/2022 10:41 AM    .1 08/22/2022 04:05 AM    RDW 14.8 08/22/2022 04:05 AM     08/22/2022 04:05 AM       CMP:   Lab Results   Component Value Date/Time     08/22/2022 04:05 AM    K 4.6 08/22/2022 04:05 AM     08/22/2022 04:05 AM    CO2 25 08/22/2022 04:05 AM    BUN 9 08/22/2022 04:05 AM    CREATININE 0.77 08/22/2022 04:05 AM    GFRAA >60 08/22/2022 04:05 AM    AGRATIO 1.0 11/30/2021 01:36 PM    LABGLOM >60 08/22/2022 04:05 AM    GLUCOSE 105 08/22/2022 04:05 AM    PROT 5.5 08/19/2022 07:51 PM    CALCIUM 7.9 08/22/2022 04:05 AM    BILITOT 0.5 08/19/2022 07:51 PM    ALKPHOS 63 08/19/2022 07:51 PM    ALKPHOS 74 11/30/2021 01:36 PM    AST 33 08/19/2022 07:51 PM    ALT 20 08/19/2022 07:51 PM       POC Tests: No results for input(s): POCGLU, POCNA, POCK, POCCL, POCBUN, POCHEMO, POCHCT in the last 72 hours.     Coags:   Lab Results   Component Value Date/Time    PROTIME 13.5 08/12/2022 11:01 AM    INR 1.0 08/12/2022 11:01 AM    APTT 35.3 08/12/2022 11:01 AM       HCG (If Applicable): No results found for: PREGTESTUR, PREGSERUM, HCG, HCGQUANT     ABGs: No results found for: PHART, PO2ART, UHM2JHP, QVR7KQA, BEART, U5CMIVGB     Type & Screen (If Applicable):  No results found for: LABABO, LABRH    Drug/Infectious Status (If Applicable):  No results found for: HIV, HEPCAB    COVID-19 Screening (If Applicable): No results found for: COVID19        Anesthesia Evaluation  Patient summary reviewed and Nursing notes reviewed no history of anesthetic complications:   Airway: Mallampati: II  TM distance: >3 FB   Neck ROM: full  Mouth opening: > = 3 FB   Dental:      Comment: Crowns, fillings    Pulmonary:Negative Pulmonary ROS breath sounds clear to auscultation                             Cardiovascular:  Exercise tolerance: good (>4 METS),   (+) hypertension:, CAD (Denies MI and chest pain):, dysrhythmias: PAC, pulmonary hypertension (RVSP 43): mild,         Rhythm: regular  Rate: normal                 ROS comment: Echo 2021 -   Left Ventricle: Left ventricle size is normal. Normal wall thickness. Normal wall motion. Normal left ventricular systolic function with a visually estimated EF of 60 - 65%. Normal diastolic function.   Tricuspid Valve: RVSP is 43 mmHg. Neuro/Psych:   (+) depression/anxiety             GI/Hepatic/Renal:   (+) GERD ([place]): well controlled,          ROS comment: Anemia, concern for GI bleed. Hgb 8.2. Endo/Other:    (+) : arthritis: OA., .                 Abdominal:             Vascular:   + PVD, aortic or cerebral, . ROS comment: Bilateral carotid artery disease . Other Findings:             Anesthesia Plan      TIVA     ASA 3 - emergent     (Recent shoulder revision on 8/18/22, with hgb drop )  Induction: intravenous. Anesthetic plan and risks discussed with patient. Use of blood products discussed with patient whom consented to blood products. Plan discussed with CRNA.                     Chris Owen MD   8/22/2022

## 2022-08-22 NOTE — H&P
GI H&P Update Note    See the consult note by Dr. Shirley Martines on 8/21/22 for full details. Patient is here for an EGD for evaluation of melena and transfused acute blood loss anemia. The anemia could have been post op related from her recent right shoulder surgery. Hg today is 8.2. Endoscopy and risks explained to the patient. Risks including reaction to sedation, cardiopulmonary events, infection, bleeding, perforation, requirement for surgery if complications should occur, death were explained to patient who expressed understanding and agreed to proceed with endoscopy.     Mel Rodriguez MD   [facility]

## 2022-08-22 NOTE — ANESTHESIA POSTPROCEDURE EVALUATION
Department of Anesthesiology  Postprocedure Note    Patient: Dilia Cole  MRN: [id number]  YOB: 1940  Date of evaluation: 8/22/2022      Procedure Summary     Date: 08/22/22 Room / Location: [facility] ENDO 01 / [facility] ENDOSCOPY    Anesthesia Start: 1232 Anesthesia Stop: 4316    Procedure: EGD BIOPSY (Upper GI Region) Diagnosis:       Anemia, unspecified type      Melena      (Anemia, unspecified type [D64.9])      (Melena [K92.1])    Providers: Denise Maynard MD Responsible Provider: Luz Chatman MD    Anesthesia Type: TIVA ASA Status: 3 - Emergent          Anesthesia Type: No value filed.     Bryce Phase I: Bryce Score: 8    Bryce Phase II: Bryce Score: 9      Anesthesia Post Evaluation    Patient location during evaluation: PACU  Patient participation: complete - patient participated  Level of consciousness: awake and alert  Pain score: 0  Airway patency: patent  Nausea & Vomiting: no nausea and no vomiting  Complications: no  Cardiovascular status: hemodynamically stable  Respiratory status: acceptable, nonlabored ventilation and spontaneous ventilation  Hydration status: euvolemic  Comments: BP (!) 115/58   Pulse 62   Temp 98.6 °F (37 °C) (Temporal)   Resp 16   Ht 5' 2\" (1.575 m)   Wt 153 lb 3.2 oz (69.5 kg)   SpO2 99%   BMI 28.02 kg/m²     Multimodal analgesia pain management approach

## 2022-08-22 NOTE — PROGRESS NOTES
PHYSICAL THERAPY: SHOULDER Daily Note and PM  (Link to Caseload Tracking: PT Visit Days : 4  Acknowledge Orders Time In/Out  PT Charge Capture  Rehab Caseload Tracker    Baylee Shelton is a 78 y.o. female   PRIMARY DIAGNOSIS: Periprosthetic fracture around internal prosthetic right shoulder joint  Pain due to right shoulder joint prosthesis ([address] [T84.84XA, Z96.611]  Traumatic closed fracture of greater tuberosity of humerus with minimal displacement, right, initial encounter [S42.251A]  Periprosthetic fracture around internal prosthetic right shoulder joint, initial encounter [M97.31XA]  Presence of right artificial shoulder joint [Z96.611]  Procedure(s) (LRB):  EGD BIOPSY (N/A)  Day of Surgery  Reason for Referral: Pain in Right Shoulder (M25.511)  Stiffness of Right Shoulder, Not elsewhere classified (M25.611)  Difficulty in walking, Not elsewhere classified (R26.2)  Inpatient: Payor: MEDICARE / Plan: MEDICARE PART A AND B / Product Type: *No Product type* /     MOVEMENT PRECAUTIONS   TWICE A DAY   Wrist and  hand, gentle motion right arm   NO OTHER MOTION. Nwb right upper extremity  Sling right shoulder     REHAB RECOMMENDATIONS:   Recommendation to date pending progress:  Setting:  Short-term Rehab    Equipment:    To Be Determined     ASSESSMENT:   ASSESSMENT:  Ms. Solange Kendrick is admitted with above diagnosis and s/p above surgery. Pt presents with decreased strength and range of motion right upper extremity and decreased post op mobility. Pt will benefit from skilled PT interventions to maximize independence with mobility and HEP. Pt lives at home with spouse. She has a history of multiple orthopedic surgeries and has gone to a SNF afterwards for rehab and she would like to pursue this plan. She is right handed. Today worked on getting out of bed, HEP and getting to recliner chair. Hope to progress this afternoon. 822 supine upon arrival.  Performs R shoulder exercises with help.   Pt kept falling a sleep during therapy. Work on bed mobility as follows:supine>eob with Min A. Sat on eob few min before transferring in the chair. Sit>stand with walker in front and Min A. Ambulated 20 ft to the chair with Min A. Remain in the chair with needs in reach, ice to R shoulder and instructed to call for assist, before getting up.  8/22 pm sitting in the chair upon arrival.  Work on sit>stand with Min A. Ambulated 20 ft to the bed with Min A. Return to supine with Min A for B LE. Therapist got pt comfortable in the bed. Then  performs R shoulder exercises with help from the therapist.  Eric Sherborn in the bed with needs in reach and instructed to call for assist, before getting up.      [employer] [facility] 6 Clicks Basic Mobility Inpatient Short Form  AM-PAC Mobility Inpatient   How much difficulty turning over in bed?: A Little  How much difficulty sitting down on / standing up from a chair with arms?: A Little  How much difficulty moving from lying on back to sitting on side of bed?: A Little  How much help from another person moving to and from a bed to a chair?: A Little  How much help from another person needed to walk in hospital room?: A Little  How much help from another person for climbing 3-5 steps with a railing?: A Little  [facility] Inpatient Mobility Raw Score : 18  AM-PAC Inpatient T-Scale Score : 43.63  Mobility Inpatient CMS 0-100% Score: 46.58  Mobility Inpatient CMS G-Code Modifier : CK    SUBJECTIVE:   Ms. West Lack agreeable for therapy    Social/Functional Lives With: Spouse  Type of Home: House  Home Layout: One level  Home Access: Stairs to enter with rails  Entrance Stairs - Number of Steps: 4  Bathroom Shower/Tub: Walk-in shower  Bathroom Equipment: 3-in-1 commode  Home Equipment: Tamara Pompa, zachary  ADL Assistance: Needs assistance  Ambulation Assistance: Needs assistance  Transfer Assistance: Needs assistance  Active : Yes    OBJECTIVE:     PAIN: VITAL SIGNS: LINES/DRAINS:   Pre Treatment: none         Post Treatment: about the same Vitals        Oxygen    IV    RESTRICTIONS/PRECAUTIONS:  Restrictions/Precautions: Fall Risk           Restrictions/Precautions: Fall Risk        HAND DOMINANCE:  Left []  Right [x]      UPPER EXTREMITY GROSS EVALUATION:  RIGHT UE   Within Functional Limits   Abnormal   Comments   Strength [] [x]  S/p surgery   Active Range of Motion [] [x]     Passive Range of Motion           [] [x]        LEFT UE Within Functional Limits   Abnormal   Comments   Strength [x] []  Generalized weakness   Active Range of Motion [x] []     Passive Range of Motion [x] []       LOWER EXTREMITY GROSS EVALUATION:     Within Functional Limits   Abnormal   Comments   Strength [x] [] Generalized weakness   Range of Motion [x] []        COGNITION/  PERCEPTION: Intact Impaired (Comments):   Orientation [x]     Vision [x]     Hearing []     Cognition  [x]       MOBILITY: I Mod I S SBA CGA Min Mod Max Total  NT x2 Comments:   Bed Mobility    Rolling [] [] [] [] [] [x] [] [] [] [] []    Supine to Sit [] [] [] [] [] [x] [] [] [] [] []    Scooting [] [] [] [] [] [x] [] [] [] [] []    Sit to Supine [] [] [] [] [] [] [] [] [] [] []    Transfers    Sit to Stand [] [] [] [] [] [x] [] [] [] [] []    Bed to Chair [] [] [] [] [] [x] [] [] [] [] []    Stand to Sit [] [] [] [] [] [x] [] [] [] [] []    Stand Pivot [] [] [] [] [] [x] [] [] [] [] []    Toilet [] [] [] [] [] [] [] [] [] [] []     [] [] [] [] [] [] [] [] [] [] []    I=Independent, Mod I=Modified Independent, S=Supervision, SBA=Standby Assistance, CGA=Contact Guard Assistance,   Min=Minimal Assistance, Mod=Moderate Assistance, Max=Maximal Assistance, Total=Total Assistance, NT=Not Tested    GAIT: I Mod I S SBA CGA Min Mod Max Total  NT x2 Comments:   Level of Assistance [] [] [] [] [] [x] [] [] [] [] []    Weightbearing Status       Distance  20 feet    Gait Quality Decreased burke , Decreased step clearance, Decreased step length, and Shuffling     DME Hand held assist      Stairs      I=Independent, Mod I=Modified Independent, S=Supervision, SBA=Standby Assistance, CGA=Contact Guard Assistance,   Min=Minimal Assistance, Mod=Moderate Assistance, Max=Maximal Assistance, Total=Total Assistance, NT=Not Tested    BALANCE: Good Fair+ Fair Fair- Poor NT Comments   Sitting Static [x] [] [] [] [] []    Sitting Dynamic [x] [x] [] [] [] []              Standing Static [] [] [x] [] [] []    Standing Dynamic [] [] [] [x] [] []      PLAN:   ACUTE PHYSICAL THERAPY GOALS:   (Developed with and agreed upon by patient and/or caregiver.)  GOALS (1-4 days):  (1.)  Patient will move from supine to sit and sit to supine  in bed with CONTACT GUARD ASSIST.    (2.)  Patient will transfer from bed to chair and chair to bed with CONTACT GUARD ASSIST using the least restrictive device. (3.)  Patient will ambulate with CONTACT GUARD ASSIST for 75 feet with the least restrictive device. (4.)  Patient will be independent with shoulder HEP to increase range of motion per MD orders. ________________________________________________________________________________________________      FREQUENCY AND DURATION: BID for duration of hospital stay or until stated goals are met, whichever comes first.    THERAPY PROGNOSIS: Good    PROBLEM LIST:   (Skilled intervention is medically necessary to address:)  Decreased ADL/Functional Activities  Decreased Activity Tolerance  Decreased AROM/PROM  Decreased Gait Ability  Decreased Safety Awareness  Decreased Strength  Decreased Transfer Abilities  Increased Pain   INTERVENTIONS PLANNED:   (Benefits and precautions of physical therapy have been discussed with the patient.)  Therapeutic Activity  Therapeutic Exercise/HEP  Neuromuscular Re-education  Gait Training  Manual Therapy  Education       TREATMENT:   EVALUATION: LOW COMPLEXITY: (Untimed Charge)    TREATMENT:   Therapeutic Activity (30 Minutes):  Therapeutic activity included Supine to Sit, Transfer Training, Ambulation on level ground, Sitting balance , and Standing balance to improve functional Activity tolerance, Balance, Mobility, Strength, and ROM. TREATMENT GRID:   Date:  8/19 Date:  8/20 Date:  8/21 Date:  8/22 Date:   8/22   ACTIVITY/EXERCISE: AM PM AM PM AM PM am pm   Gripping 10 12 12 15 15 20 20 20   Wrist Flexion/Extension 10 12 12 15 15 20 20 20   Wrist Ulnar/Radial Deviation 10 12 12 15 15 20 20 20   Pronation/Supination 10 12 12 15 15 20 20 20   Finger/thumb opposition twice 5 times 12 15 15 20 20 20   Elbow Flexion/Extension           Shoulder Flexion/Extension           Shoulder AB/ADduction           Shoulder IR/ER           Pulleys           Pendulums           Shrugs      5     ISOMETRIC:                            Flexion           Extension           ABduction           ADduction           Biceps/Triceps           B = bilateral; AA = active assistive; A = active; P = passive      EDUCATION:    EDUCATION:  [x]  Home Exercises  [x]  Sling Application []  Movement Precautions   []  Pulleys []  Use of Ice  []  Other:      AFTER TREATMENT PRECAUTIONS: Bed/Chair Locked, Call light within reach, and Chair    INTERDISCIPLINARY COLLABORATION:  RN/ PCT and PT/ PTA    COMPLIANCE WITH PROGRAM/EXERCISES: Will assess as treatment progresses. RECOMMENDATIONS/INTENT FOR NEXT TREATMENT SESSION: Treatment next visit will focus on increasing Ms. Bozena Aid independence with bed mobility, transfers, gait training, strength/ROM exercises, modalities for pain, and patient education.      TIME IN/OUT:  Time In: 1130  Time Out: 1200  Minutes: [address], PTA

## 2022-08-22 NOTE — PROGRESS NOTES
Hospitalist Progress Note   Admit Date:  2022 11:25 AM   Name:  Darci Hua   Age:  78 y.o. Sex:  female  :  1940   MRN:  [id number]   Room:  [facility]/    Presenting Complaint: No chief complaint on file. Reason(s) for Admission: Pain due to right shoulder joint prosthesis ([address] [M54.22PP, Z96.611]  Traumatic closed fracture of greater tuberosity of humerus with minimal displacement, right, initial encounter [S42.251A]  Periprosthetic fracture around internal prosthetic right shoulder joint, initial encounter [M97.31XA]  Presence of right artificial shoulder joint [Z96.611]     Hospital Course & Interval History:     Ms. Alvino Gay is a 77 yo female with PMH of HTN- no longer taking losartan, OA, depression, seen postop right shoulder replacement. Did have some post operative anemia that has remained stable post 1U PRBC (Hg 6.8 ~> 8.8 ~> 8.2) with hypotension that improved with bolus and holding of antiHTN meds. She also developed R rib pain since admission with normal CXR and LFTs. Is s/p EGD today with findings of ulcerative esophagitis. CTAP showing:  Impression       1. No CT evidence of large volume intraperitoneal hemorrhage. 2. There is asymmetric fat stranding along the right flank, which may be   asymmetric anasarca or a contusion. There is no evidence of a well-formed   subcutaneous hematoma. 3. Small bilateral pleural effusions with bibasilar atelectasis. 4.  The blood pool is hypoattenuating in comparison to the myocardium. Correlation for anemia recommended. 5.  Chronic findings otherwise as above. Subjective/24hr Events (22): Seen at bedside today. No pain at this time. Denies a hx of afib but sounds to be in afib on exam today - she states she has been having \"skipping beats\" for a while not but states she was still cleared to have surgery.       Assessment & Plan:       Principal Problem:    Periprosthetic fracture around internal prosthetic right shoulder joint, initial encounter  Plan:   Defer to orthopedics   On vancomycin, doxycycline    Irregular heart rate identified by ascultation  - EKG, monitor on tele        Acute blood loss anemia  Stable, monitor H&H daily    Ulcerative Esophagitis  - GI following, appreciate assistance  - protonix, carafate  - repeat EGD in 8-12 weeks  - avoid NSAIDs       Rib pain  Negative rib films and CXR  LFTS normal  Not reproducible on exam  CTAP ordered          Hypotension (resolved)    Hypertension, benign  Will stop IVF given tolerating PO  Consider restarting antiHTN meds when appropriate      Increased creatinine, resolved  Will stop IVF given pt is taking PO    Dyspnea  Chest pressure:  Troponin unremarkable        Discharge Planning:      pending clinical course, likely need SNF     Diet:  ADULT DIET; Regular  DVT PPx: SCD  Code status: Full Code    Hospital Problems:  Principal Problem:    Periprosthetic fracture around internal prosthetic right shoulder joint  Active Problems:    Periprosthetic fracture around internal prosthetic right shoulder joint, initial encounter    Acute blood loss anemia    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  *      Objective:   Patient Vitals for the past 24 hrs:   Temp Pulse Resp BP SpO2   08/22/22 1411 98.6 °F (37 °C) 65 16 (!) 126/47 97 %   08/22/22 1326 -- 60 16 (!) 105/58 98 %   08/22/22 1315 -- 62 16 (!) 115/58 99 %   08/22/22 1309 -- 60 18 (!) 125/55 100 %   08/22/22 1259 -- 77 16 123/60 99 %   08/22/22 1256 98.6 °F (37 °C) 77 16 (!) 128/58 100 %   08/22/22 0721 99.3 °F (37.4 °C) 81 16 (!) 104/48 97 %   08/22/22 0625 -- -- 15 -- --   08/22/22 0555 -- -- 15 -- --   08/22/22 0324 98.8 °F (37.1 °C) 66 16 (!) 105/56 98 %   08/21/22 2309 99.3 °F (37.4 °C) 92 18 (!) 113/52 90 %   08/21/22 2208 -- -- 15 -- --   08/21/22 2138 -- -- 15 -- --   08/21/22 1951 98.2 °F (36.8 °C) 88 18 (!) 111/50 97 %       Oxygen Therapy  SpO2: 97 %  Pulse Oximetry Type: Continuous  Pulse via Oximetry: 60 beats per minute  Pulse Oximeter Device Mode: Continuous  Pulse Oximeter Device Location: Left, Hand  O2 Device: None (Room air)  Skin Assessment: Clean, dry, & intact  FiO2 : 45 %  O2 Flow Rate (L/min): 2 L/min  Oxygen Therapy: Supplemental oxygen    Estimated body mass index is 28.02 kg/m² as calculated from the following:    Height as of this encounter: 5' 2\" (1.575 m). Weight as of this encounter: 153 lb 3.2 oz (69.5 kg). Intake/Output Summary (Last 24 hours) at 8/22/2022 1710  Last data filed at 8/22/2022 1246  Gross per 24 hour   Intake 100 ml   Output 500 ml   Net -400 ml         Physical Exam:     Blood pressure (!) 126/47, pulse 65, temperature 98.6 °F (37 °C), temperature source Oral, resp. rate 16, height 5' 2\" (1.575 m), weight 153 lb 3.2 oz (69.5 kg), SpO2 97 %. General:    Well nourished. Alert, elderly, no distress   Head:  Normocephalic, atraumatic  Eyes:  Sclerae appear normal.  Pupils equally round. ENT:  Nares appear normal, no drainage. Moist oral mucosa  Neck:  No restricted ROM. Trachea midline   CV:   Irregular. No m/r/g. No jugular venous distension. no edema   Lungs:   CTAB. No wheezing, rhonchi, or rales. Symmetric expansion. anterior   Abdomen: Bowel sounds present. Soft, nontender, nondistended. tender RUQ    Extremities: No cyanosis or clubbing. No edema  Skin:     No rashes and normal coloration. Warm and dry. Neuro:  grossly intact. Psych:  Normal mood and affect.       I have personally reviewed labs and tests showing:  Recent Labs:  Recent Results (from the past 48 hour(s))   Hemoglobin and Hematocrit Collection Time: 08/20/22  8:00 PM   Result Value Ref Range    Hemoglobin 7.2 (L) 11.7 - 15.4 g/dL    Hematocrit 22.1 (L) 35.8 - 46.3 %   Basic Metabolic Panel    Collection Time: 08/21/22  3:44 AM   Result Value Ref Range    Sodium 137 136 - 145 mmol/L    Potassium 3.7 3.5 - 5.1 mmol/L    Chloride 107 98 - 107 mmol/L    CO2 26 21 - 32 mmol/L    Anion Gap 4 (L) 7 - 16 mmol/L    Glucose 126 (H) 65 - 100 mg/dL    BUN 12 8 - 23 MG/DL    Creatinine 0.84 0.6 - 1.0 MG/DL    GFR African American >60 >60 ml/min/1.73m2    GFR Non- >60 >60 ml/min/1.73m2    Calcium 7.8 (L) 8.3 - 10.4 MG/DL   Magnesium    Collection Time: 08/21/22  3:44 AM   Result Value Ref Range    Magnesium 1.9 1.8 - 2.4 mg/dL   CBC    Collection Time: 08/21/22  3:44 AM   Result Value Ref Range    WBC 4.5 4.3 - 11.1 K/uL    RBC 2.14 (L) 4.05 - 5.2 M/uL    Hemoglobin 6.8 (LL) 11.7 - 15.4 g/dL    Hematocrit 21.2 (L) 35.8 - 46.3 %    MCV 99.1 (H) 79.6 - 97.8 FL    MCH 31.8 26.1 - 32.9 PG    MCHC 32.1 31.4 - 35.0 g/dL    RDW 14.2 11.9 - 14.6 %    Platelets 204 444 - 571 K/uL    MPV 11.2 9.4 - 12.3 FL    nRBC 0.00 0.0 - 0.2 K/uL   PREPARE RBC (CROSSMATCH), 1 Units    Collection Time: 08/21/22  5:15 AM   Result Value Ref Range    History Check Historical check performed    TYPE AND SCREEN    Collection Time: 08/21/22  5:59 AM   Result Value Ref Range    Crossmatch expiration date 08/24/2022,9347     ABO/Rh A POSITIVE     Antibody Screen NEG     Unit Number [id number]     Product Code Blood Bank RC LR     Unit Divison 00     Dispense Status Blood Bank TRANSFUSED     Crossmatch Result Compatible    EKG 12 Lead    Collection Time: 08/21/22 11:38 AM   Result Value Ref Range    Ventricular Rate 83 BPM    Atrial Rate 83 BPM    P-R Interval 154 ms    QRS Duration 88 ms    Q-T Interval 360 ms    QTc Calculation (Bazett) 423 ms    P Axis 26 degrees    R Axis -19 degrees    T Axis 24 degrees    Diagnosis Normal sinus rhythm    Hemoglobin and Hematocrit Collection Time: 08/21/22 11:47 AM   Result Value Ref Range    Hemoglobin 8.3 (L) 11.7 - 15.4 g/dL    Hematocrit 25.1 (L) 35.8 - 46.3 %   PLEASE READ & DOCUMENT PPD TEST IN 72 HRS    Collection Time: 08/21/22  5:30 PM   Result Value Ref Range    PPD, (POC) Negative Negative    mm Induration 0 0 - 5 mm   Hemoglobin and Hematocrit    Collection Time: 08/21/22  7:07 PM   Result Value Ref Range    Hemoglobin 8.8 (L) 11.7 - 15.4 g/dL    Hematocrit 27.4 (L) 35.8 - 46.3 %   Troponin    Collection Time: 08/21/22  7:07 PM   Result Value Ref Range    Troponin, High Sensitivity 12.2 0 - 14 pg/mL   Troponin    Collection Time: 08/22/22  2:48 AM   Result Value Ref Range    Troponin, High Sensitivity 11.2 0 - 14 pg/mL   Basic Metabolic Panel    Collection Time: 08/22/22  4:05 AM   Result Value Ref Range    Sodium 135 (L) 136 - 145 mmol/L    Potassium 4.6 3.5 - 5.1 mmol/L    Chloride 108 (H) 98 - 107 mmol/L    CO2 25 21 - 32 mmol/L    Anion Gap 2 (L) 7 - 16 mmol/L    Glucose 105 (H) 65 - 100 mg/dL    BUN 9 8 - 23 MG/DL    Creatinine 0.77 0.6 - 1.0 MG/DL    GFR African American >60 >60 ml/min/1.73m2    GFR Non- >60 >60 ml/min/1.73m2    Calcium 7.9 (L) 8.3 - 10.4 MG/DL   Magnesium    Collection Time: 08/22/22  4:05 AM   Result Value Ref Range    Magnesium 2.0 1.8 - 2.4 mg/dL   CBC    Collection Time: 08/22/22  4:05 AM   Result Value Ref Range    WBC 4.5 4.3 - 11.1 K/uL    RBC 2.63 (L) 4.05 - 5.2 M/uL    Hemoglobin 8.5 (L) 11.7 - 15.4 g/dL    Hematocrit 26.6 (L) 35.8 - 46.3 %    .1 (H) 79.6 - 97.8 FL    MCH 32.3 26.1 - 32.9 PG    MCHC 32.0 31.4 - 35.0 g/dL    RDW 14.8 (H) 11.9 - 14.6 %    Platelets 203 431 - 998 K/uL    MPV 10.9 9.4 - 12.3 FL    nRBC 0.00 0.0 - 0.2 K/uL   Troponin    Collection Time: 08/22/22 10:41 AM   Result Value Ref Range    Troponin, High Sensitivity 10.7 0 - 14 pg/mL   Hemoglobin and Hematocrit    Collection Time: 08/22/22 10:41 AM   Result Value Ref Range    Hemoglobin 8.2 (L) 11.7 - 15.4 g/dL    Hematocrit 25.0 (L) 35.8 - 46.3 %       I have personally reviewed imaging studies showing: Other Studies:  XR SHOULDER RIGHT (MIN 2 VIEWS)   Final Result      1. Right shoulder prosthesis in place. CT ABDOMEN PELVIS WO CONTRAST Additional Contrast? Oral   Final Result      1. No CT evidence of large volume intraperitoneal hemorrhage. 2. There is asymmetric fat stranding along the right flank, which may be   asymmetric anasarca or a contusion. There is no evidence of a well-formed   subcutaneous hematoma. 3. Small bilateral pleural effusions with bibasilar atelectasis. 4.  The blood pool is hypoattenuating in comparison to the myocardium. Correlation for anemia recommended. 5.  Chronic findings otherwise as above. XR RIBS RIGHT INCLUDE CHEST (MIN 3 VIEWS)   Final Result   No right-sided rib pathology appreciated. XR SHOULDER RIGHT (MIN 2 VIEWS)   Final Result   1. Postoperative changes of the right shoulder as described above.           Current Meds:  Current Facility-Administered Medications   Medication Dose Route Frequency    sucralfate (CARAFATE) tablet 1 g  1 g Oral 4 times per day    0.9 % sodium chloride infusion   IntraVENous PRN    polyethylene glycol (GLYCOLAX) packet 17 g  17 g Oral Daily PRN    pantoprazole (PROTONIX) 40 mg in sodium chloride (PF) 10 mL injection  40 mg IntraVENous Q12H    diatrizoate meglumine-sodium (GASTROGRAFIN) 66-10 % solution 15 mL  15 mL Oral ONCE PRN    oxyCODONE (ROXICODONE) immediate release tablet 5 mg  5 mg Oral Q4H PRN    Or    oxyCODONE (ROXICODONE) immediate release tablet 10 mg  10 mg Oral Q4H PRN    doxycycline hyclate (VIBRAMYCIN) capsule 100 mg  100 mg Oral 2 times per day    citalopram (CELEXA) tablet 20 mg  20 mg Oral QAM AC    0.9 % sodium chloride infusion   IntraVENous PRN    vancomycin (VANCOCIN) 1,000 mg in sodium chloride 0.9 % 250 mL IVPB ([id number])  1,000 mg IntraVENous Once    HYDROmorphone (DILAUDID) injection 1 mg  1 mg IntraVENous Q1H PRN    docusate sodium (COLACE) capsule 100 mg  100 mg Oral BID    ferrous sulfate (IRON 325) tablet 325 mg  325 mg Oral BID WC    promethazine (PHENERGAN) tablet 25 mg  25 mg Oral Q4H PRN    ondansetron (ZOFRAN-ODT) disintegrating tablet 4 mg  4 mg Oral Q8H PRN    sodium chloride flush 0.9 % injection 5-40 mL  5-40 mL IntraVENous 2 times per day    sodium chloride flush 0.9 % injection 5-40 mL  5-40 mL IntraVENous PRN    0.9 % sodium chloride infusion   IntraVENous PRN    acetaminophen (TYLENOL) tablet 1,000 mg  1,000 mg Oral Q6H PRN       Signed:  DAYDAY Andrade

## 2022-08-22 NOTE — PROGRESS NOTES
TRANSFER - OUT REPORT:    Verbal report given to primary RN on Vj Forts  being transferred to [facility] [phone] for routine progression of patient care       Report consisted of patient's Situation, Background, Assessment and   Recommendations(SBAR). Information from the following report(s) Nurse Handoff Report was reviewed with the receiving nurse. Lines:   Peripheral IV 08/21/22 Left;Posterior Hand (Active)   Site Assessment Clean, dry & intact 08/22/22 0815   Line Status Capped;Flushed 08/22/22 0815   Line Care Cap changed; Connections checked and tightened 08/21/22 1951   Phlebitis Assessment No symptoms 08/21/22 1951   Infiltration Assessment 0 08/21/22 1951   Alcohol Cap Used Yes 08/21/22 1951   Dressing Status Clean;Dry; Intact 08/22/22 0815   Dressing Type Transparent 08/21/22 1951        Opportunity for questions and clarification was provided.       Patient transported with:  O2 @ 2lpm

## 2022-08-22 NOTE — PROGRESS NOTES
TRANSFER - OUT REPORT:    Verbal report given to [facility] on Antonella Orona  being transferred to Pre-Op for ordered procedure       Report consisted of patient's Situation, Background, Assessment and   Recommendations(SBAR). Information from the following report(s) Adult Overview, Surgery Report, Intake/Output, MAR, and Recent Results was reviewed with the receiving nurse. Lines:   Peripheral IV 08/21/22 Left;Posterior Hand (Active)   Site Assessment Clean, dry & intact 08/22/22 0815   Line Status Capped;Flushed 08/22/22 0815   Line Care Cap changed; Connections checked and tightened 08/21/22 1951   Phlebitis Assessment No symptoms 08/21/22 1951   Infiltration Assessment 0 08/21/22 1951   Alcohol Cap Used Yes 08/21/22 1951   Dressing Status Clean;Dry; Intact 08/22/22 0815   Dressing Type Transparent 08/21/22 1951        Opportunity for questions and clarification was provided.

## 2022-08-23 VITALS
RESPIRATION RATE: 20 BRPM | BODY MASS INDEX: 28.19 KG/M2 | OXYGEN SATURATION: 92 % | HEIGHT: 62 IN | SYSTOLIC BLOOD PRESSURE: 107 MMHG | WEIGHT: 153.2 LBS | HEART RATE: 72 BPM | DIASTOLIC BLOOD PRESSURE: 70 MMHG | TEMPERATURE: 98 F

## 2022-08-23 LAB
ANION GAP SERPL CALC-SCNC: 2 MMOL/L (ref 7–16)
BUN SERPL-MCNC: 8 MG/DL (ref 8–23)
CALCIUM SERPL-MCNC: 8.7 MG/DL (ref 8.3–10.4)
CHLORIDE SERPL-SCNC: 105 MMOL/L (ref 98–107)
CO2 SERPL-SCNC: 28 MMOL/L (ref 21–32)
CREAT SERPL-MCNC: 0.78 MG/DL (ref 0.6–1)
ERYTHROCYTE [DISTWIDTH] IN BLOOD BY AUTOMATED COUNT: 14.2 % (ref 11.9–14.6)
GLUCOSE SERPL-MCNC: 96 MG/DL (ref 65–100)
HCT VFR BLD AUTO: 24.3 % (ref 35.8–46.3)
HGB BLD-MCNC: 7.9 G/DL (ref 11.7–15.4)
MAGNESIUM SERPL-MCNC: 1.9 MG/DL (ref 1.8–2.4)
MCH RBC QN AUTO: 32 PG (ref 26.1–32.9)
MCHC RBC AUTO-ENTMCNC: 32.5 G/DL (ref 31.4–35)
MCV RBC AUTO: 98.4 FL (ref 79.6–97.8)
NRBC # BLD: 0 K/UL (ref 0–0.2)
PLATELET # BLD AUTO: 173 K/UL (ref 150–450)
PMV BLD AUTO: 11.2 FL (ref 9.4–12.3)
POTASSIUM SERPL-SCNC: 3.9 MMOL/L (ref 3.5–5.1)
RBC # BLD AUTO: 2.47 M/UL (ref 4.05–5.2)
SARS-COV-2 RDRP RESP QL NAA+PROBE: NOT DETECTED
SODIUM SERPL-SCNC: 135 MMOL/L (ref 136–145)
SOURCE: NORMAL
WBC # BLD AUTO: 3.9 K/UL (ref 4.3–11.1)

## 2022-08-23 PROCEDURE — 85027 COMPLETE CBC AUTOMATED: CPT

## 2022-08-23 PROCEDURE — 97530 THERAPEUTIC ACTIVITIES: CPT

## 2022-08-23 PROCEDURE — 6360000002 HC RX W HCPCS: Performed by: INTERNAL MEDICINE

## 2022-08-23 PROCEDURE — 2580000003 HC RX 258: Performed by: INTERNAL MEDICINE

## 2022-08-23 PROCEDURE — 87635 SARS-COV-2 COVID-19 AMP PRB: CPT

## 2022-08-23 PROCEDURE — 6370000000 HC RX 637 (ALT 250 FOR IP): Performed by: NURSE PRACTITIONER

## 2022-08-23 PROCEDURE — 80048 BASIC METABOLIC PNL TOTAL CA: CPT

## 2022-08-23 PROCEDURE — 2580000003 HC RX 258: Performed by: ORTHOPAEDIC SURGERY

## 2022-08-23 PROCEDURE — 6370000000 HC RX 637 (ALT 250 FOR IP): Performed by: PHYSICIAN ASSISTANT

## 2022-08-23 PROCEDURE — A4216 STERILE WATER/SALINE, 10 ML: HCPCS | Performed by: INTERNAL MEDICINE

## 2022-08-23 PROCEDURE — 36415 COLL VENOUS BLD VENIPUNCTURE: CPT

## 2022-08-23 PROCEDURE — 83735 ASSAY OF MAGNESIUM: CPT

## 2022-08-23 PROCEDURE — C9113 INJ PANTOPRAZOLE SODIUM, VIA: HCPCS | Performed by: INTERNAL MEDICINE

## 2022-08-23 PROCEDURE — 6370000000 HC RX 637 (ALT 250 FOR IP): Performed by: ORTHOPAEDIC SURGERY

## 2022-08-23 PROCEDURE — 6370000000 HC RX 637 (ALT 250 FOR IP): Performed by: INTERNAL MEDICINE

## 2022-08-23 RX ORDER — PANTOPRAZOLE SODIUM 40 MG/1
40 TABLET, DELAYED RELEASE ORAL
Status: DISCONTINUED | OUTPATIENT
Start: 2022-08-23 | End: 2022-08-23 | Stop reason: HOSPADM

## 2022-08-23 RX ORDER — DOXYCYCLINE HYCLATE 100 MG/1
100 CAPSULE ORAL EVERY 12 HOURS SCHEDULED
Status: DISCONTINUED | OUTPATIENT
Start: 2022-08-23 | End: 2022-08-23 | Stop reason: HOSPADM

## 2022-08-23 RX ADMIN — SUCRALFATE 1 G: 1 TABLET ORAL at 00:02

## 2022-08-23 RX ADMIN — PANTOPRAZOLE SODIUM 40 MG: 40 TABLET, DELAYED RELEASE ORAL at 12:01

## 2022-08-23 RX ADMIN — SUCRALFATE 1 G: 1 TABLET ORAL at 12:01

## 2022-08-23 RX ADMIN — SUCRALFATE 1 G: 1 TABLET ORAL at 05:51

## 2022-08-23 RX ADMIN — DOXYCYCLINE HYCLATE 100 MG: 100 CAPSULE ORAL at 05:51

## 2022-08-23 RX ADMIN — SODIUM CHLORIDE, PRESERVATIVE FREE 40 MG: 5 INJECTION INTRAVENOUS at 00:02

## 2022-08-23 RX ADMIN — SODIUM CHLORIDE, PRESERVATIVE FREE 10 ML: 5 INJECTION INTRAVENOUS at 08:57

## 2022-08-23 RX ADMIN — CITALOPRAM HYDROBROMIDE 20 MG: 20 TABLET ORAL at 05:51

## 2022-08-23 RX ADMIN — OXYCODONE HYDROCHLORIDE 10 MG: 5 TABLET ORAL at 04:00

## 2022-08-23 ASSESSMENT — PAIN SCALES - GENERAL
PAINLEVEL_OUTOF10: 7
PAINLEVEL_OUTOF10: 2

## 2022-08-23 ASSESSMENT — PAIN DESCRIPTION - LOCATION: LOCATION: SHOULDER

## 2022-08-23 ASSESSMENT — PAIN DESCRIPTION - DESCRIPTORS: DESCRIPTORS: ACHING;SORE

## 2022-08-23 ASSESSMENT — PAIN DESCRIPTION - ORIENTATION: ORIENTATION: RIGHT

## 2022-08-23 ASSESSMENT — PAIN - FUNCTIONAL ASSESSMENT: PAIN_FUNCTIONAL_ASSESSMENT: ACTIVITIES ARE NOT PREVENTED

## 2022-08-23 NOTE — PROGRESS NOTES
[address] ambulance arrived to transport patient to [facility].  Pt transported in stable condition

## 2022-08-23 NOTE — PROGRESS NOTES
intraperitoneal hemorrhage. Shows small bilateral pleural effusions. Hypotension (resolved)    Hypertension, benign  8/23 continue to hold anti-hypertensives for now as BP is borderline      Increased creatinine, resolved  8/23 resolved     Dyspnea  Chest pressure:  8/23  resolved  Troponin WNL        Discharge Planning:     SNF today if tolerating bland diet     Diet:  ADULT DIET; Regular, GI bland  DVT PPx: SCD    Hospital Problems:  Principal Problem:    Periprosthetic fracture around internal prosthetic right shoulder joint  Active Problems:    Periprosthetic fracture around internal prosthetic right shoulder joint, initial encounter    Acute blood loss anemia    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  *      Objective:   Patient Vitals for the past 24 hrs:   Temp Pulse Resp BP SpO2   08/23/22 1143 98 °F (36.7 °C) 72 20 107/70 92 %   08/23/22 0711 98.3 °F (36.8 °C) 63 18 110/67 90 %   08/23/22 0430 -- -- 17 -- --   08/23/22 0400 -- -- 17 -- --   08/23/22 0325 98 °F (36.7 °C) 86 18 111/66 90 %   08/22/22 2313 97.4 °F (36.3 °C) 86 18 (!) 102/58 90 %   08/22/22 2133 -- -- 17 -- --   08/22/22 2103 -- -- 17 -- --   08/22/22 1958 99.1 °F (37.3 °C) 87 16 (!) 145/73 94 %   08/22/22 1411 98.6 °F (37 °C) 65 16 (!) 126/47 97 %   08/22/22 1326 -- 60 16 (!) 105/58 98 %   08/22/22 1315 -- 62 16 (!) 115/58 99 %   08/22/22 1309 -- 60 18 (!) 125/55 100 %   08/22/22 1259 -- 77 16 123/60 99 %   08/22/22 1256 98.6 °F (37 °C) 77 16 (!) 128/58 100 %       Oxygen Therapy  SpO2: 92 %  Pulse Oximetry Type: Continuous  Pulse via Oximetry: 60 beats per minute  Pulse Oximeter Device Mode: Continuous  Pulse Oximeter Device Location: Left, Hand  O2 Device: None (Room air)  Skin Assessment: Clean, dry, & intact  FiO2 : 45 %  O2 Flow Rate (L/min): 2 L/min  Oxygen Therapy: Supplemental oxygen    Estimated body mass index is 28.02 kg/m² as calculated from the following:    Height as of this encounter: 5' 2\" (1.575 m). Weight as of this encounter: 153 lb 3.2 oz (69.5 kg). Intake/Output Summary (Last 24 hours) at 8/23/2022 1235  Last data filed at 8/22/2022 1246  Gross per 24 hour   Intake 100 ml   Output 0 ml   Net 100 ml         Physical Exam:     Blood pressure 107/70, pulse 72, temperature 98 °F (36.7 °C), resp. rate 20, height 5' 2\" (1.575 m), weight 153 lb 3.2 oz (69.5 kg), SpO2 92 %. General:          Well nourished. Alert, elderly, no distress   Head:               Normocephalic, atraumatic  Eyes:               Sclerae appear normal.  Pupils equally round. ENT:                Nares appear normal, no drainage. Moist oral mucosa  Neck:               No restricted ROM. Trachea midline   CV:                  RRR. No m/r/g. No jugular venous distension. no edema   Lungs:             CTAB. No wheezing, rhonchi, or rales. Symmetric expansion. anterior   Abdomen: Bowel sounds present. Soft, nontender, nondistended. tender RUQ    Extremities:     No cyanosis or clubbing. No edema  Skin:                No rashes. Right upper ext with yellow ecchymosis   Warm and dry. Neuro:             grossly intact. Psych:             Normal mood and affect.       I have personally reviewed labs and tests showing:  Recent Labs:  Recent Results (from the past 48 hour(s))   PLEASE READ & DOCUMENT PPD TEST IN 72 HRS    Collection Time: 08/21/22  5:30 PM   Result Value Ref Range    PPD, (POC) Negative Negative    mm Induration 0 0 - 5 mm   Hemoglobin and Hematocrit    Collection Time: 08/21/22  7:07 PM   Result Value Ref Range    Hemoglobin 8.8 (L) 11.7 - 15.4 g/dL    Hematocrit 27.4 (L) 35.8 - 46.3 % Troponin    Collection Time: 08/21/22  7:07 PM   Result Value Ref Range    Troponin, High Sensitivity 12.2 0 - 14 pg/mL   Troponin    Collection Time: 08/22/22  2:48 AM   Result Value Ref Range    Troponin, High Sensitivity 11.2 0 - 14 pg/mL   Basic Metabolic Panel    Collection Time: 08/22/22  4:05 AM   Result Value Ref Range    Sodium 135 (L) 136 - 145 mmol/L    Potassium 4.6 3.5 - 5.1 mmol/L    Chloride 108 (H) 98 - 107 mmol/L    CO2 25 21 - 32 mmol/L    Anion Gap 2 (L) 7 - 16 mmol/L    Glucose 105 (H) 65 - 100 mg/dL    BUN 9 8 - 23 MG/DL    Creatinine 0.77 0.6 - 1.0 MG/DL    GFR African American >60 >60 ml/min/1.73m2    GFR Non- >60 >60 ml/min/1.73m2    Calcium 7.9 (L) 8.3 - 10.4 MG/DL   Magnesium    Collection Time: 08/22/22  4:05 AM   Result Value Ref Range    Magnesium 2.0 1.8 - 2.4 mg/dL   CBC    Collection Time: 08/22/22  4:05 AM   Result Value Ref Range    WBC 4.5 4.3 - 11.1 K/uL    RBC 2.63 (L) 4.05 - 5.2 M/uL    Hemoglobin 8.5 (L) 11.7 - 15.4 g/dL    Hematocrit 26.6 (L) 35.8 - 46.3 %    .1 (H) 79.6 - 97.8 FL    MCH 32.3 26.1 - 32.9 PG    MCHC 32.0 31.4 - 35.0 g/dL    RDW 14.8 (H) 11.9 - 14.6 %    Platelets 491 281 - 281 K/uL    MPV 10.9 9.4 - 12.3 FL    nRBC 0.00 0.0 - 0.2 K/uL   Troponin    Collection Time: 08/22/22 10:41 AM   Result Value Ref Range    Troponin, High Sensitivity 10.7 0 - 14 pg/mL   Hemoglobin and Hematocrit    Collection Time: 08/22/22 10:41 AM   Result Value Ref Range    Hemoglobin 8.2 (L) 11.7 - 15.4 g/dL    Hematocrit 25.0 (L) 35.8 - 46.3 %   Basic Metabolic Panel    Collection Time: 08/23/22  4:44 AM   Result Value Ref Range    Sodium 135 (L) 136 - 145 mmol/L    Potassium 3.9 3.5 - 5.1 mmol/L    Chloride 105 98 - 107 mmol/L    CO2 28 21 - 32 mmol/L    Anion Gap 2 (L) 7 - 16 mmol/L    Glucose 96 65 - 100 mg/dL    BUN 8 8 - 23 MG/DL    Creatinine 0.78 0.6 - 1.0 MG/DL    GFR African American >60 >60 ml/min/1.73m2    GFR Non- >60 >60 ml/min/1.73m2    Calcium 8.7 8.3 - 10.4 MG/DL   Magnesium    Collection Time: 08/23/22  4:44 AM   Result Value Ref Range    Magnesium 1.9 1.8 - 2.4 mg/dL   CBC    Collection Time: 08/23/22  4:44 AM   Result Value Ref Range    WBC 3.9 (L) 4.3 - 11.1 K/uL    RBC 2.47 (L) 4.05 - 5.2 M/uL    Hemoglobin 7.9 (L) 11.7 - 15.4 g/dL    Hematocrit 24.3 (L) 35.8 - 46.3 %    MCV 98.4 (H) 79.6 - 97.8 FL    MCH 32.0 26.1 - 32.9 PG    MCHC 32.5 31.4 - 35.0 g/dL    RDW 14.2 11.9 - 14.6 %    Platelets 180 398 - 537 K/uL    MPV 11.2 9.4 - 12.3 FL    nRBC 0.00 0.0 - 0.2 K/uL   COVID-19, Rapid    Collection Time: 08/23/22  8:46 AM    Specimen: Nasopharyngeal   Result Value Ref Range    Source NASAL SWAB      SARS-CoV-2, Rapid Not detected NOTD         I have personally reviewed imaging studies showing: Other Studies:  XR SHOULDER RIGHT (MIN 2 VIEWS)   Final Result      1. Right shoulder prosthesis in place. CT ABDOMEN PELVIS WO CONTRAST Additional Contrast? Oral   Final Result      1. No CT evidence of large volume intraperitoneal hemorrhage. 2. There is asymmetric fat stranding along the right flank, which may be   asymmetric anasarca or a contusion. There is no evidence of a well-formed   subcutaneous hematoma. 3. Small bilateral pleural effusions with bibasilar atelectasis. 4.  The blood pool is hypoattenuating in comparison to the myocardium. Correlation for anemia recommended. 5.  Chronic findings otherwise as above. XR RIBS RIGHT INCLUDE CHEST (MIN 3 VIEWS)   Final Result   No right-sided rib pathology appreciated. XR SHOULDER RIGHT (MIN 2 VIEWS)   Final Result   1. Postoperative changes of the right shoulder as described above.           Current Meds:  Current Facility-Administered Medications   Medication Dose Route Frequency    doxycycline hyclate (VIBRAMYCIN) capsule 100 mg  100 mg Oral 2 times per day    pantoprazole (PROTONIX) tablet 40 mg  40 mg Oral QAM AC    sucralfate (CARAFATE) tablet 1 g  1 g Oral 4 times per day    0.9 % sodium chloride infusion   IntraVENous PRN    polyethylene glycol (GLYCOLAX) packet 17 g  17 g Oral Daily PRN    diatrizoate meglumine-sodium (GASTROGRAFIN) 66-10 % solution 15 mL  15 mL Oral ONCE PRN    oxyCODONE (ROXICODONE) immediate release tablet 5 mg  5 mg Oral Q4H PRN    Or    oxyCODONE (ROXICODONE) immediate release tablet 10 mg  10 mg Oral Q4H PRN    citalopram (CELEXA) tablet 20 mg  20 mg Oral QAM AC    0.9 % sodium chloride infusion   IntraVENous PRN    vancomycin (VANCOCIN) 1,000 mg in sodium chloride 0.9 % 250 mL IVPB ([id number])  1,000 mg IntraVENous Once    HYDROmorphone (DILAUDID) injection 1 mg  1 mg IntraVENous Q1H PRN    promethazine (PHENERGAN) tablet 25 mg  25 mg Oral Q4H PRN    ondansetron (ZOFRAN-ODT) disintegrating tablet 4 mg  4 mg Oral Q8H PRN    sodium chloride flush 0.9 % injection 5-40 mL  5-40 mL IntraVENous 2 times per day    sodium chloride flush 0.9 % injection 5-40 mL  5-40 mL IntraVENous PRN    0.9 % sodium chloride infusion   IntraVENous PRN    acetaminophen (TYLENOL) tablet 1,000 mg  1,000 mg Oral Q6H PRN       Signed:  Rosana Ellis, APRN - CNP    Notes, labs, VS, diagnostic testing reviewed  Case discussed with pt, care team, Dr. Adelaida Jacobs

## 2022-08-23 NOTE — PROGRESS NOTES
PHYSICAL THERAPY: SHOULDER Daily Note and AM  (Link to Caseload Tracking: PT Visit Days : 5  Acknowledge Orders Time In/Out  PT Charge Capture  Rehab Caseload Tracker    Dontae Hernandez is a 78 y.o. female   PRIMARY DIAGNOSIS: Periprosthetic fracture around internal prosthetic right shoulder joint  Pain due to right shoulder joint prosthesis ([facility] [address] [T84.84XA, Z96.611]  Traumatic closed fracture of greater tuberosity of humerus with minimal displacement, right, initial encounter [S42.251A]  Periprosthetic fracture around internal prosthetic right shoulder joint, initial encounter [M97.31XA]  Presence of right artificial shoulder joint [Z96.611]  Procedure(s) (LRB):  EGD BIOPSY (N/A)  1 Day Post-Op  Reason for Referral: Pain in Right Shoulder (M25.511)  Stiffness of Right Shoulder, Not elsewhere classified (M25.611)  Difficulty in walking, Not elsewhere classified (R26.2)  Inpatient: Payor: MEDICARE / Plan: MEDICARE PART A AND B / Product Type: *No Product type* /     MOVEMENT PRECAUTIONS   TWICE A DAY   Wrist and  hand, gentle motion right arm   NO OTHER MOTION. Nwb right upper extremity  Sling right shoulder     REHAB RECOMMENDATIONS:   Recommendation to date pending progress:  Setting:  Short-term Rehab    Equipment:    To Be Determined     ASSESSMENT:   ASSESSMENT:  Ms. Dennys Lamas is admitted with above diagnosis and s/p above surgery. Pt presents with decreased strength and range of motion right upper extremity and decreased post op mobility. Pt will benefit from skilled PT interventions to maximize independence with mobility and HEP. Pt lives at home with spouse. She has a history of multiple orthopedic surgeries and has gone to a SNF afterwards for rehab and she would like to pursue this plan. She is right handed. Today worked on getting out of bed, HEP and getting to recliner chair. Hope to progress this afternoon. 8/23 supine upon arrival.  Performs and review R shoulder HEP.   Therapist noticed R hand swollen more today. Supine>eob with Min A with verbal cues extra timeSit>stand x 2 with Min A. Ambulated 20 ft to the chair with Min A. Remain in the chair with needs in reach and instructed to call for assist, before getting up. Maybe going to rehab today.      [facility] [address] [id number] AM-PAC 6 Clicks Basic Mobility Inpatient Short Form  AM-PAC Mobility Inpatient   How much difficulty turning over in bed?: A Little  How much difficulty sitting down on / standing up from a chair with arms?: A Little  How much difficulty moving from lying on back to sitting on side of bed?: A Little  How much help from another person moving to and from a bed to a chair?: A Little  How much help from another person needed to walk in hospital room?: A Little  How much help from another person for climbing 3-5 steps with a railing?: A Little  [facility] Inpatient Mobility Raw Score : 18  AM-PAC Inpatient T-Scale Score : 43.63  Mobility Inpatient CMS 0-100% Score: 46.58  Mobility Inpatient CMS G-Code Modifier : CK    SUBJECTIVE:   Ms. Miky Schmitt agreeable     Social/Functional Lives With: Spouse  Type of Home: House  Home Layout: One level  Home Access: Stairs to enter with rails  Entrance Stairs - Number of Steps: 4  Bathroom Shower/Tub: Walk-in shower  Bathroom Equipment: 3-in-1 commode  Home Equipment: Elen Rota, standard  ADL Assistance: Needs assistance  Ambulation Assistance: Needs assistance  Transfer Assistance: Needs assistance  Active : Yes    OBJECTIVE:     PAIN: VITAL SIGNS: LINES/DRAINS:   Pre Treatment: 3/10         Post Treatment: sore Vitals        Oxygen    IV    RESTRICTIONS/PRECAUTIONS:  Restrictions/Precautions: Fall Risk           Restrictions/Precautions: Fall Risk        HAND DOMINANCE:  Left []  Right [x]      UPPER EXTREMITY GROSS EVALUATION:  RIGHT UE   Within Functional Limits   Abnormal   Comments   Strength [] [x]  S/p surgery   Active Range of Motion [] [x]     Passive Range of Motion           [] [x] Standing Dynamic [] [] [] [x] [] []      PLAN:   ACUTE PHYSICAL THERAPY GOALS:   (Developed with and agreed upon by patient and/or caregiver.)  GOALS (1-4 days):  (1.)  Patient will move from supine to sit and sit to supine  in bed with CONTACT GUARD ASSIST.    (2.)  Patient will transfer from bed to chair and chair to bed with CONTACT GUARD ASSIST using the least restrictive device. (3.)  Patient will ambulate with CONTACT GUARD ASSIST for 75 feet with the least restrictive device. (4.)  Patient will be independent with shoulder HEP to increase range of motion per MD orders. ________________________________________________________________________________________________      FREQUENCY AND DURATION: BID for duration of hospital stay or until stated goals are met, whichever comes first.    THERAPY PROGNOSIS: Good    PROBLEM LIST:   (Skilled intervention is medically necessary to address:)  Decreased ADL/Functional Activities  Decreased Activity Tolerance  Decreased AROM/PROM  Decreased Gait Ability  Decreased Safety Awareness  Decreased Strength  Decreased Transfer Abilities  Increased Pain   INTERVENTIONS PLANNED:   (Benefits and precautions of physical therapy have been discussed with the patient.)  Therapeutic Activity  Therapeutic Exercise/HEP  Neuromuscular Re-education  Gait Training  Manual Therapy  Education       TREATMENT:   EVALUATION: LOW COMPLEXITY: (Untimed Charge)    TREATMENT:   Therapeutic Activity (30 Minutes): Therapeutic activity included Supine to Sit, Transfer Training, Ambulation on level ground, Sitting balance , and Standing balance to improve functional Activity tolerance, Balance, Mobility, Strength, and ROM.     TREATMENT GRID:   Date:  8/19 Date:  8/20 Date:  8/21 Date:  8/22 Date:   8/22 Date:  8/23   ACTIVITY/EXERCISE: AM PM AM PM AM PM am pm am   Gripping 10 12 12 15 15 20 20 20 20   Wrist Flexion/Extension 10 12 12 15 15 20 20 20 20   Wrist Ulnar/Radial Deviation 10 12 12 15 15 20 20 20 20   Pronation/Supination 10 12 12 15 15 20 20 20 20   Finger/thumb opposition twice 5 times 12 15 15 20 20 20 20   Elbow Flexion/Extension            Shoulder Flexion/Extension            Shoulder AB/ADduction            Shoulder IR/ER            Pulleys            Pendulums            Shrugs      5      ISOMETRIC:                             Flexion            Extension            ABduction            ADduction            Biceps/Triceps            B = bilateral; AA = active assistive; A = active; P = passive      EDUCATION:    EDUCATION:  [x]  Home Exercises  [x]  Sling Application []  Movement Precautions   []  Pulleys []  Use of Ice  []  Other:      AFTER TREATMENT PRECAUTIONS: Bed/Chair Locked, Call light within reach, Chair, Needs within reach, and RN notified    INTERDISCIPLINARY COLLABORATION:  RN/ PCT and PT/ PTA    COMPLIANCE WITH PROGRAM/EXERCISES: Will assess as treatment progresses. RECOMMENDATIONS/INTENT FOR NEXT TREATMENT SESSION: Treatment next visit will focus on increasing Ms. Pito Mcconnell independence with bed mobility, transfers, gait training, strength/ROM exercises, modalities for pain, and patient education.      TIME IN/OUT:  Time In: 0815  Time Out: 0845  Minutes: [address] Cole Hernandez, PTA

## 2022-08-23 NOTE — CARE COORDINATION
08/23/22 0914   Service Assessment   Patient Orientation Alert and Oriented;Person;Place; Self   Cognition Alert   History Provided By Patient   Primary Caregiver Self   Support Systems Spouse/Significant Other   Prior Functional Level Independent in ADLs/IADLs   Current Functional Level Assistance with the following:;Dressing; Toileting;Mobility   Can patient return to prior living arrangement No   Ability to make needs known: Good   Family able to assist with home care needs: No   Social/Functional History   Lives With Spouse   Type of [address] One level   Active  Yes   Discharge Planning   Type of Residence Trailer/Mobile Home   Living Arrangements Spouse/Significant Other   Current Services Prior To Admission None   Potential [address]   DME Ordered? No   Potential Assistance Purchasing Medications Yes   Type of Home Care Services OT;PT   Patient expects to be discharged to: [facility] Discharge   Services At/After Discharge [facility] ([facility])   Mode of Transport at Discharge BLS   Confirm Follow Up Transport Family   Condition of Participation: Discharge Planning   The Plan for Transition of Care is related to the following treatment goals: Plan to go to [facility] for rehab then home   The Patient and/or Patient Representative was provided with a Choice of Provider? Patient   The Patient and/Or Patient Representative agree with the Discharge Plan? Yes   Freedom of Choice list was provided with basic dialogue that supports the patient's individualized plan of care/goals, treatment preferences, and shares the quality data associated with the providers? Yes   CM met with patient and son for d/c plan. Patient has bed offer with [facility] and is in agreement with bed offer as this was her number 1 choice. Patient request ambulance transport set up for 11:30 with [facility] ambulance.  Patient to go to room 210 B at [facility]. SNF packet to include [facility], SNF orders, d/c summary, script for oxycodone , PPD, COVID results and H/P sent with ambulance staff. Patient to d/c to [address] and patient is in agreement/ [facility] no concerns or needs for d/c.

## 2022-08-23 NOTE — PROGRESS NOTES
[facility] Progress Note         Admit Date:  8/18/2022    Today's Date:  8/23/2022    CC:  Melena    Subjective:     Patient is up in a chair. She is being discharged to [facility] today. Denies any abdominal pain, nausea, or vomiting this morning. Reports having 2 BM  yesterday. She is not sure of the color of these. EGD 8/22/2022:  FINDINGS:  OROPHARYNX: Cords and pyriform recesses normal.  ESOPHAGUS: There is ulcerative esophagitis in the distal 2-3 cm. This is non-bleeding. The GEJ is irregular with tiny islands of salmon colored mucosa. No nodules were seen. 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 STOMACH: The fundus on antegrade and retroflex views is normal. The body, antrum and pylorus is normal.  DUODENUM: The bulb and second portions are normal.     There is no old or new blood anywhere on this exam.     ASSESSMENT:  1. Ulcerative Esophagitis  2.  Irregular Z line            Yue Carbajal MD  [facility]      Medications:   Current Facility-Administered Medications   Medication Dose Route Frequency    doxycycline hyclate (VIBRAMYCIN) capsule 100 mg  100 mg Oral 2 times per day    sucralfate (CARAFATE) tablet 1 g  1 g Oral 4 times per day    0.9 % sodium chloride infusion   IntraVENous PRN    polyethylene glycol (GLYCOLAX) packet 17 g  17 g Oral Daily PRN    pantoprazole (PROTONIX) 40 mg in sodium chloride (PF) 10 mL injection  40 mg IntraVENous Q12H    diatrizoate meglumine-sodium (GASTROGRAFIN) 66-10 % solution 15 mL  15 mL Oral ONCE PRN    oxyCODONE (ROXICODONE) immediate release tablet 5 mg  5 mg Oral Q4H PRN    Or    oxyCODONE (ROXICODONE) immediate release tablet 10 mg  10 mg Oral Q4H PRN    citalopram (CELEXA) tablet 20 mg  20 mg Oral QAM AC    0.9 % sodium chloride infusion   IntraVENous PRN    vancomycin (VANCOCIN) 1,000 mg in sodium chloride 0.9 % 250 mL IVPB ([id number])  1,000 mg IntraVENous Once    HYDROmorphone (DILAUDID) injection 1 mg  1 mg IntraVENous Q1H PRN    promethazine (PHENERGAN) tablet 25 mg  25 mg Oral Q4H PRN    ondansetron (ZOFRAN-ODT) disintegrating tablet 4 mg  4 mg Oral Q8H PRN    sodium chloride flush 0.9 % injection 5-40 mL  5-40 mL IntraVENous 2 times per day    sodium chloride flush 0.9 % injection 5-40 mL  5-40 mL IntraVENous PRN    0.9 % sodium chloride infusion   IntraVENous PRN    acetaminophen (TYLENOL) tablet 1,000 mg  1,000 mg Oral Q6H PRN       Review of Systems:  ROS was obtained, with pertinent positives as listed above. No chest pain or SOB. Diet:      Objective:   Vitals:  /67   Pulse 63   Temp 98.3 °F (36.8 °C) (Oral)   Resp 18   Ht 5' 2\" (1.575 m)   Wt 153 lb 3.2 oz (69.5 kg)   SpO2 90%   BMI 28.02 kg/m²   Intake/Output:  No intake/output data recorded. 08/21 1901 - 08/23 0700  In: 100 [I.V.:100]  Out: 0   Exam:  General appearance: alert, cooperative, no distress  Lungs: clear to auscultation bilaterally anteriorly  Heart: regular rate and rhythm  Abdomen: soft, non-tender.  Bowel sounds normal. No masses, no organomegaly  Extremities: extremities normal, atraumatic, no cyanosis or edema  Neuro:  alert and oriented    Data Review (Labs):    Recent Labs     08/20/22 2000 08/21/22  0344 08/21/22  1147 08/21/22  1907 08/22/22  0405 08/22/22  1041 08/23/22  0444   WBC  --  4.5  --   --  4.5  --  3.9*   HGB 7.2* 6.8* 8.3* 8.8* 8.5* 8.2* 7.9*   HCT 22.1* 21.2* 25.1* 27.4* 26.6* 25.0* 24.3*   PLT  --  154  --   --  161  --  173   MCV  --  99.1*  --   --  101.1*  --  98.4*   NA  --  137  --   --  135*  --  135*   K  --  3.7  --   --  4.6  --  3.9   CL  --  107  --   --  108*  --  105   CO2  --  26  --   --  25  --  28   BUN  --  12  --   --  9  --  8   MG  --  1.9  --   --  2.0  --  1.9       Assessment:     Principal Problem:    Periprosthetic fracture around internal prosthetic right shoulder joint  Active Problems:    Periprosthetic fracture around internal prosthetic right shoulder joint, initial encounter    Acute blood loss anemia    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 *    77 y/o F with MMPs outlined above here with recent postop right shoulder surgery 8/18, now with post op anemia, Hgb down to 6.8 from 12.4 on 8/12/22. Underwent EGD 8/22/2022. EGD 8/22/2022:   ASSESSMENT:  1. Ulcerative Esophagitis  2. Irregular Z line    8/23: HGB 7.9, MCV 98  Plan:       1. Follow up pathology  2. Avoid NSAIDs  3. Protonix 40 mg BID  4. Carafate 1 g QID  5. Repeat EGD in 8-12 weeks to confirm healing of the esophagitis  6. Monitor H/H. Transfuse for Hg < 7. I have called our office to arrange follow-up and EGD. SHAVON Dotson    Patient is seen and examined in collaboration with Dr. Mason Montana. .  Assessment and plan as per Dr. Mason Montana. Zachary Pham

## 2022-08-23 NOTE — PROGRESS NOTES
Orthopedic [facility] Progress Note    2022  Admit Date: 2022  Admit Diagnosis: Pain due to right shoulder joint prosthesis ([address] [J84.86NZ, Z96.611]  Traumatic closed fracture of greater tuberosity of humerus with minimal displacement, right, initial encounter [S42.251A]  Periprosthetic fracture around internal prosthetic right shoulder joint, initial encounter [M97.31XA]  Presence of right artificial shoulder joint [Z96.611]    1 Day Post-Op    Subjective: feels tired otherwise no complaints     Wing Sermons     Review of Systems: pertinent items are noted in HPI    Objective:     PT/OT:     PATIENT MOBILITY                           Vital Signs:    Blood pressure 111/66, pulse 86, temperature 98 °F (36.7 °C), temperature source Oral, resp. rate 17, height 5' 2\" (1.575 m), weight 153 lb 3.2 oz (69.5 kg), SpO2 90 %.   Temp (24hrs), Av.5 °F (36.9 °C), Min:97.4 °F (36.3 °C), Max:99.3 °F (37.4 °C)      Pain Control:        Meds:  Current Facility-Administered Medications   Medication Dose Route Frequency    doxycycline hyclate (VIBRAMYCIN) capsule 100 mg  100 mg Oral 2 times per day    sucralfate (CARAFATE) tablet 1 g  1 g Oral 4 times per day    0.9 % sodium chloride infusion   IntraVENous PRN    polyethylene glycol (GLYCOLAX) packet 17 g  17 g Oral Daily PRN    pantoprazole (PROTONIX) 40 mg in sodium chloride (PF) 10 mL injection  40 mg IntraVENous Q12H    diatrizoate meglumine-sodium (GASTROGRAFIN) 66-10 % solution 15 mL  15 mL Oral ONCE PRN    oxyCODONE (ROXICODONE) immediate release tablet 5 mg  5 mg Oral Q4H PRN    Or    oxyCODONE (ROXICODONE) immediate release tablet 10 mg  10 mg Oral Q4H PRN    citalopram (CELEXA) tablet 20 mg  20 mg Oral QAM AC    0.9 % sodium chloride infusion   IntraVENous PRN    vancomycin (VANCOCIN) 1,000 mg in sodium chloride 0.9 % 250 mL IVPB ([id number])  1,000 mg IntraVENous Once    HYDROmorphone (DILAUDID) injection 1 mg  1 mg IntraVENous Q1H PRN    docusate sodium (COLACE) capsule 100 mg  100 mg Oral BID    ferrous sulfate (IRON 325) tablet 325 mg  325 mg Oral BID WC    promethazine (PHENERGAN) tablet 25 mg  25 mg Oral Q4H PRN    ondansetron (ZOFRAN-ODT) disintegrating tablet 4 mg  4 mg Oral Q8H PRN    sodium chloride flush 0.9 % injection 5-40 mL  5-40 mL IntraVENous 2 times per day    sodium chloride flush 0.9 % injection 5-40 mL  5-40 mL IntraVENous PRN    0.9 % sodium chloride infusion   IntraVENous PRN    acetaminophen (TYLENOL) tablet 1,000 mg  1,000 mg Oral Q6H PRN        LAB:    Lab Results   Component Value Date/Time    INR 1.0 08/12/2022 11:01 AM     Lab Results   Component Value Date/Time    HGB 8.2 08/22/2022 10:41 AM    HGB 8.5 08/22/2022 04:05 AM    HGB 8.8 08/21/2022 07:07 PM              Physical Exam:  No significant changes    Assessment:      Principal Problem:    Periprosthetic fracture around internal prosthetic right shoulder joint  Active Problems:    Periprosthetic fracture around internal prosthetic right shoulder joint, initial encounter    Acute blood loss anemia    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 *       Plan:     Continue PT/OT/Rehab  Hemoglobin 7.9  Xray right shoulder 8/22/22 prosthesis in place with good alignment  Orthopaedically stable  Observe  Continue care as per medical team  Transfer to rehab when medically stable         Rosalie East MD    I have reviewed the patients controlled substance prescription history, as maintained in the [place] prescription monitoring program, so that the prescription(s) for a  controlled substance can be given.

## 2022-08-23 NOTE — PROGRESS NOTES
TRANSFER - OUT REPORT:    Verbal report given to Neha on Laura Mi  being transferred to [facility] for routine progression of patient care       Report consisted of patient's Situation, Background, Assessment and   Recommendations(SBAR). Information from the following report(s) Nurse Handoff Report, Adult Overview, Surgery Report, Intake/Output, and MAR was reviewed with the receiving nurse. Lines:       Opportunity for questions and clarification was provided.       Patient transported with:  Hearing aids, duffel bag with clothing, glasses

## 2022-08-24 ENCOUNTER — CARE COORDINATION (OUTPATIENT)
Dept: OTHER | Facility: CLINIC | Age: 82
End: 2022-08-24

## 2022-08-24 NOTE — CARE COORDINATION
[facility]    Call within 2 business days of discharge: N/A, Patient discharged to SNF    Patient: Vincenzo Vu Patient : 1940   MRN: [id number]  Reason for Admission: Pain due to right shoulder joint prosthesis  Discharge Date: 22 RARS: Readmission Risk Score: 11.8    Last Discharge [facility]       Date Complaint Diagnosis Description Type Department Provider    22  Pain due to right shoulder joint prosthesis ([facility] [address] . .. Admission (Discharged) Adria Loya MD          Follow Up  Future Appointments   Date Time Provider Tremayne Oneal   2022  1:30 PM Bess Hines [facility] AMB   2022  1:30 PM Mitesh Evans DO [facility] AMB   2022  1:00 PM Tori Ricks DO [facility] AMB   Transition of care outreach postponed due to patient's discharge to SNF. Patient D/C to [facility] for [address]. CTN to follow up after SNF d/c for [facility] call.     Margarito Singh, RN

## 2022-08-29 ENCOUNTER — TELEPHONE (OUTPATIENT)
Dept: CARDIOLOGY CLINIC | Age: 82
End: 2022-08-29

## 2022-08-29 NOTE — TELEPHONE ENCOUNTER
Pt calling and states she is short of breath, has gained 17 lbs. Feeling pressure in her chest like she needs to be back in the hospital. Pt states she is in a rehab facility. Triage advised pt if she is feeling chest pressure and worsening SOB, she needs to tell her nurse at her rehab facility and tell them she needs to go the ER. Pt verbalizes understanding and agrees to plan.

## 2022-08-30 ENCOUNTER — TELEPHONE (OUTPATIENT)
Dept: ORTHOPEDIC SURGERY | Age: 82
End: 2022-08-30

## 2022-08-30 NOTE — DISCHARGE SUMMARY
[facility] SUMMARY    Name:  Elsy Deleon  MR#:  [id number]  :  1940  ACCOUNT #:  [de-identified]  ADMIT DATE:  2022  DISCHARGE DATE:  2022    ADMISSION DIAGNOSES:  1. Periprosthetic fracture status post reverse right total shoulder arthroplasty. 2.  Glenoid loosening. 3.  Postoperative anemia. 4.  GI bleed. DISCHARGE DIAGNOSES:  1. Periprosthetic fracture status post reverse right total shoulder arthroplasty. 2.  Glenoid loosening. 3.  Postoperative anemia. 4.  GI bleed. Please see H and P, operative summary and consult for details. HOSPITAL COURSE:  The patient is an 78-year-old female who was admitted on 2022, underwent an uncomplicated I and D, removal of implant, right shoulder, Theodore; reverse right total shoulder arthroplasty with a proximal humeral osteotomy and revision reverse right total shoulder arthroplasty with a long stem Delta Xtend prosthesis, biceps tenodesis, latissimus dorsi and teres major tendon transfer. Immediately, perioperatively, her hemoglobin was 26.5. On postoperative day #1, her hemoglobin was 24.5, potassium was 4.5, magnesium was 2.0. She was continued on vancomycin. On postoperative day #2, hemoglobin was down to 7.8. She is still having significant discomfort. On postoperative day #3, her hemoglobin went down to 6.8. She was transfused. Apparently, a GI consult was obtained. The patient admitted that she had been having dark stools for quite some time. On 2022, the patient's hemoglobin was 8.5, all labs were within normal limits. She did undergo an EGD which did not show any active bleeding. On 2022, her hemoglobin was stable at 7.9, all labs were within normal limits. She was discharged. She was transferred to rehab. She will follow up in my office in 1 week. MD ABBY [id number]  D:  2022 11:18  T:  2022 5:41  JOB #:  [id number]  CC:   Mara Graham Alessandra Chavez MD

## 2022-08-30 NOTE — TELEPHONE ENCOUNTER
Wants to talk with someone about the rehab she is getting. States they are doing nothing for her.  Please call [phone]

## 2022-08-31 ENCOUNTER — TELEPHONE (OUTPATIENT)
Dept: ORTHOPEDIC SURGERY | Age: 82
End: 2022-08-31

## 2022-08-31 NOTE — TELEPHONE ENCOUNTER
Called and spoke to pt who is very concerned about her hemoglobin level. I advised pt that Trevon Canales spoke with the facility about this and Dr. Chowdary Left is aware. Pt would like Dr. Marquez Clock opinion on how to handle this. Will call pt back tomorrow.

## 2022-08-31 NOTE — TELEPHONE ENCOUNTER
Meena Emerita, pt is having a bad time at rehab, they are not  Doing anything for her, she wants to talk to you  [phone] Patient has no objection to blood transfusions.

## 2022-08-31 NOTE — TELEPHONE ENCOUNTER
Tiffany Thapa called and spoke to facility about PT and post op care. Post op appt made and facility will notify pt.

## 2022-09-01 ENCOUNTER — TELEPHONE (OUTPATIENT)
Dept: ORTHOPEDIC SURGERY | Age: 82
End: 2022-09-01

## 2022-09-01 NOTE — TELEPHONE ENCOUNTER
Called and spoke to pt and informed her that Dr. Sandro Cardenas reviewed her hemoglobin and advised her that it is normal for hemoglobin to be lower after surgery and that she should continue to take the iron supplements. Pt agreed and will come to follow up next week.

## 2022-09-06 ENCOUNTER — OFFICE VISIT (OUTPATIENT)
Dept: ORTHOPEDIC SURGERY | Age: 82
End: 2022-09-06

## 2022-09-06 DIAGNOSIS — Z96.611 PRESENCE OF RIGHT ARTIFICIAL SHOULDER JOINT: Primary | ICD-10-CM

## 2022-09-06 DIAGNOSIS — Z47.1 AFTERCARE FOLLOWING RIGHT SHOULDER JOINT REPLACEMENT SURGERY: ICD-10-CM

## 2022-09-06 DIAGNOSIS — Z96.611 AFTERCARE FOLLOWING RIGHT SHOULDER JOINT REPLACEMENT SURGERY: ICD-10-CM

## 2022-09-06 PROCEDURE — 99024 POSTOP FOLLOW-UP VISIT: CPT | Performed by: ORTHOPAEDIC SURGERY

## 2022-09-07 ENCOUNTER — CARE COORDINATION (OUTPATIENT)
Dept: OTHER | Facility: CLINIC | Age: 82
End: 2022-09-07

## 2022-09-07 NOTE — CARE COORDINATION
No transitions of care indicated at this time. Patient remains at [facility] and is receiving rehab services. Patient plans to discharge back home with home health services. CTN will continue to monitor for transitions of care outreach pending discharge disposition.

## 2022-09-09 ENCOUNTER — HOME HEALTH ADMISSION (OUTPATIENT)
Dept: HOME HEALTH SERVICES | Facility: HOME HEALTH | Age: 82
End: 2022-09-09
Payer: MEDICARE

## 2022-09-09 ENCOUNTER — TELEPHONE (OUTPATIENT)
Dept: ORTHOPEDIC SURGERY | Age: 82
End: 2022-09-09

## 2022-09-09 DIAGNOSIS — Z96.611 PRESENCE OF RIGHT ARTIFICIAL SHOULDER JOINT: Primary | ICD-10-CM

## 2022-09-09 NOTE — TELEPHONE ENCOUNTER
Patient left a msg asking if Posta can send a order for [facility] w/STF for home therapy?  He is coming out doing therapy with her  and said he could do both of them if the order is put in

## 2022-09-11 ENCOUNTER — HOME CARE VISIT (OUTPATIENT)
Dept: SCHEDULING | Facility: HOME HEALTH | Age: 82
End: 2022-09-11
Payer: MEDICARE

## 2022-09-11 VITALS
DIASTOLIC BLOOD PRESSURE: 80 MMHG | RESPIRATION RATE: 16 BRPM | OXYGEN SATURATION: 95 % | TEMPERATURE: 97.5 F | SYSTOLIC BLOOD PRESSURE: 136 MMHG | HEART RATE: 86 BPM

## 2022-09-11 PROCEDURE — 400013 HH SOC

## 2022-09-11 PROCEDURE — 1090000002 HH PPS REVENUE DEBIT

## 2022-09-11 PROCEDURE — 1090000001 HH PPS REVENUE CREDIT

## 2022-09-11 PROCEDURE — 0221000100 HH NO PAY CLAIM PROCEDURE

## 2022-09-11 PROCEDURE — G0151 HHCP-SERV OF PT,EA 15 MIN: HCPCS

## 2022-09-11 ASSESSMENT — ENCOUNTER SYMPTOMS
PAIN LOCATION - PAIN QUALITY: ACHES, SORE
DYSPNEA ACTIVITY LEVEL: AFTER AMBULATING 10 - 20 FT

## 2022-09-11 NOTE — Clinical Note
Medication review revealed potential major interactions between citalopram and meloxicam and between tramadol and citalopram. Please advise if any changes are to be made.

## 2022-09-11 NOTE — Clinical Note
SITUATION. 78year old female patient s/p R reverse TSA revision 8/22/22 per Dr Johanne Sanches. Transferred to rehab facility and discharged home on 9/9/22  History of OA, Vazquez's esophagus, B carotid artery disease, coronary atherosclerosis of native coronary vessel, degeneration of lumbar or lumbosacral intervertebral disc, depression, gastric ulcer, hyperlipidemia, hypothyroidism, PVD, scoliosis, lumbar spinal stenosis, thoracic or lumbosacral neuritis or radiculitis, tricuspid valve regurgitation   BACKGROUND. .Lives with . Home is single level with several steps to enter. ASSESSMENT. Yoly Fankaren Morse Patient alert and oriented with good participation during visit. Pt presents with R reverse TSA revision with incision SANJEEV with no drainage and no signs or symptoms of infection. Treatment: Instructed patient in infection control, taking medication as directed, use and application of CP, and signs and symptoms of DVT. Instructed reverse TSA HEP protocol, proper sling positioning and frequency sling must be worn and patient verbalized understanding  Frequency: 3w1, 2w1  Follow up: gentle right shoulder pendulums. Right elbow range of motion. Right hand/wrist range of motion.  VERY GENTLE ON ALL EXERCISES

## 2022-09-11 NOTE — HOME HEALTH
SITUATION. 78year old female patient s/p R reverse TSA revision 8/22/22 per Dr Jermain Smith. Transferred to rehab facility and discharged home on 9/9/22  History of OA, Vazquez's esophagus, B carotid artery disease, coronary atherosclerosis of native coronary vessel, degeneration of lumbar or lumbosacral intervertebral disc, depression, gastric ulcer, hyperlipidemia, hypothyroidism, PVD, scoliosis, lumbar spinal stenosis, thoracic or lumbosacral neuritis or radiculitis, tricuspid valve regurgitation   BACKGROUND. .Lives with . Home is single level with several steps to enter. ASSESSMENT. Fatimah Mckeon Patient alert and oriented with good participation during visit. Pt presents with R reverse TSA revision with incision SANJEEV with no drainage and no signs or symptoms of infection. Treatment: Instructed patient in infection control, taking medication as directed, use and application of CP, and signs and symptoms of DVT. Instructed reverse TSA HEP protocol, proper sling positioning and frequency sling must be worn and patient verbalized understanding  Frequency: 3w1, 2w1  Follow up: gentle right shoulder pendulums. Right elbow range of motion. Right hand/wrist range of motion.  VERY GENTLE ON ALL EXERCISES

## 2022-09-12 ENCOUNTER — CARE COORDINATION (OUTPATIENT)
Dept: OTHER | Facility: CLINIC | Age: 82
End: 2022-09-12

## 2022-09-12 LAB
BACTERIA SPEC CULT: NORMAL
SERVICE CMNT-IMP: NORMAL

## 2022-09-12 PROCEDURE — 1090000001 HH PPS REVENUE CREDIT

## 2022-09-12 PROCEDURE — 1090000002 HH PPS REVENUE DEBIT

## 2022-09-12 NOTE — CARE COORDINATION
Care Transitions Post-Acute Facility Discharge Call    2022    Patient: Arnulfo Hernandez Patient : 1940   MRN: [id number]  Reason for Admission: Periprosthetic fracture around internal prosthetic right shoulder joint  Discharge Date: 22 RARS: Readmission Risk Score: 11.8         Discharge Facility: [facility] on 22  Transitions of Care Initial Call    Was this an external facility discharge? Yes, 2022  Discharge Facility: [facility] to be reviewed by the provider   Additional needs identified to be addressed with provider: No  none             Method of communication with provider : none    Advance Care Planning:   Does patient have an Advance Directive: not on file. Care Transition Nurse contacted the patient by telephone to perform post hospital discharge assessment. Verified name and  with patient as identifiers. Provided introduction to self, and explanation of the CTN role. CTN reviewed discharge instructions, medical action plan and red flags with patient who verbalized understanding. Patient given an opportunity to ask questions and does not have any further questions or concerns at this time. Were discharge instructions available to patient? Yes. Reviewed appropriate site of care based on symptoms and resources available to patient including: PCP  Specialist  Home health  When to call 911  Condition related references. The patient agrees to contact the PCP office for questions related to their healthcare. Medication reconciliation was performed with patient, who verbalizes understanding of administration of home medications. Patient's PCP is with [facility] and she prefers to rescheduled her follow up. Patient states she is doing well since discharged. Patient declines CTN's assistance to scheduled follow up with her PCP at this time. Patient is currently engaged with [facility].  Patient states she assists her spouse who was severity of symptoms and risk factors. Plan for next call: self management-of medical conditions, post op care, medication management, and follow up.     Rolan Malik RN

## 2022-09-13 PROCEDURE — 1090000002 HH PPS REVENUE DEBIT

## 2022-09-13 PROCEDURE — 1090000001 HH PPS REVENUE CREDIT

## 2022-09-14 ENCOUNTER — HOME CARE VISIT (OUTPATIENT)
Dept: SCHEDULING | Facility: HOME HEALTH | Age: 82
End: 2022-09-14
Payer: MEDICARE

## 2022-09-14 VITALS
RESPIRATION RATE: 17 BRPM | OXYGEN SATURATION: 97 % | TEMPERATURE: 97.7 F | HEART RATE: 68 BPM | SYSTOLIC BLOOD PRESSURE: 118 MMHG | DIASTOLIC BLOOD PRESSURE: 76 MMHG

## 2022-09-14 PROCEDURE — 1090000001 HH PPS REVENUE CREDIT

## 2022-09-14 PROCEDURE — 1090000002 HH PPS REVENUE DEBIT

## 2022-09-14 PROCEDURE — G0157 HHC PT ASSISTANT EA 15: HCPCS

## 2022-09-14 ASSESSMENT — ENCOUNTER SYMPTOMS: PAIN LOCATION - PAIN QUALITY: SORE

## 2022-09-15 PROCEDURE — 1090000001 HH PPS REVENUE CREDIT

## 2022-09-15 PROCEDURE — 1090000002 HH PPS REVENUE DEBIT

## 2022-09-16 ENCOUNTER — HOME CARE VISIT (OUTPATIENT)
Dept: SCHEDULING | Facility: HOME HEALTH | Age: 82
End: 2022-09-16
Payer: MEDICARE

## 2022-09-16 VITALS
DIASTOLIC BLOOD PRESSURE: 74 MMHG | HEART RATE: 82 BPM | SYSTOLIC BLOOD PRESSURE: 120 MMHG | OXYGEN SATURATION: 98 % | RESPIRATION RATE: 17 BRPM | TEMPERATURE: 97.2 F

## 2022-09-16 PROCEDURE — 1090000001 HH PPS REVENUE CREDIT

## 2022-09-16 PROCEDURE — 1090000002 HH PPS REVENUE DEBIT

## 2022-09-16 PROCEDURE — G0157 HHC PT ASSISTANT EA 15: HCPCS

## 2022-09-16 ASSESSMENT — ENCOUNTER SYMPTOMS: PAIN LOCATION - PAIN QUALITY: ACHING, THROBBING

## 2022-09-17 PROCEDURE — 1090000002 HH PPS REVENUE DEBIT

## 2022-09-17 PROCEDURE — 1090000001 HH PPS REVENUE CREDIT

## 2022-09-18 PROCEDURE — 1090000001 HH PPS REVENUE CREDIT

## 2022-09-18 PROCEDURE — 1090000002 HH PPS REVENUE DEBIT

## 2022-09-19 ENCOUNTER — HOME CARE VISIT (OUTPATIENT)
Dept: SCHEDULING | Facility: HOME HEALTH | Age: 82
End: 2022-09-19
Payer: MEDICARE

## 2022-09-19 VITALS
TEMPERATURE: 97.7 F | OXYGEN SATURATION: 98 % | SYSTOLIC BLOOD PRESSURE: 116 MMHG | RESPIRATION RATE: 17 BRPM | DIASTOLIC BLOOD PRESSURE: 62 MMHG | HEART RATE: 82 BPM

## 2022-09-19 PROCEDURE — G0157 HHC PT ASSISTANT EA 15: HCPCS

## 2022-09-19 PROCEDURE — 1090000001 HH PPS REVENUE CREDIT

## 2022-09-19 PROCEDURE — 1090000002 HH PPS REVENUE DEBIT

## 2022-09-19 ASSESSMENT — ENCOUNTER SYMPTOMS: PAIN LOCATION - PAIN QUALITY: ACHING

## 2022-09-20 ENCOUNTER — CARE COORDINATION (OUTPATIENT)
Dept: OTHER | Facility: CLINIC | Age: 82
End: 2022-09-20

## 2022-09-20 ENCOUNTER — TELEPHONE (OUTPATIENT)
Dept: ORTHOPEDIC SURGERY | Age: 82
End: 2022-09-20

## 2022-09-20 PROCEDURE — 1090000002 HH PPS REVENUE DEBIT

## 2022-09-20 PROCEDURE — 1090000001 HH PPS REVENUE CREDIT

## 2022-09-20 NOTE — CARE COORDINATION
Patient has graduated from the Care Transitions program on 9/20/2022. Patient/family has the ability to self-manage at this time. Patient has no further care management needs, no referral to the [facility] team for further management. Goals Addressed                   This Visit's Progress     Conditions and Symptoms   On track     I will schedule office visits, as directed by my provider. I will keep my appointment or reschedule if I have to cancel. I will follow my Zone Management tool to seek urgent or emergent care. I will notify my provider of any symptoms that indicate a worsening of my condition. Barriers: none  Plan for overcoming my barriers: N/A  Confidence: 8/10  Anticipated Goal Completion Date: 9/16/22               Patients upcoming visits:    Future Appointments   Date Time Provider Tremayne Oneal   9/21/2022 To Be Determined Ajay Mohr, JANETH Estes   9/22/2022  1:00 PM Elham Perry DO [facility] AMB   9/27/2022  2:30 PM MD TEETEE Rubio [facility] AMB   10/27/2022  1:45 PM [facility]     Patient has Care Transition Nurse's contact information for any further questions, concerns, or needs.

## 2022-09-21 ENCOUNTER — HOME CARE VISIT (OUTPATIENT)
Dept: SCHEDULING | Facility: HOME HEALTH | Age: 82
End: 2022-09-21
Payer: MEDICARE

## 2022-09-21 ENCOUNTER — TELEPHONE (OUTPATIENT)
Dept: ORTHOPEDIC SURGERY | Age: 82
End: 2022-09-21

## 2022-09-21 VITALS
TEMPERATURE: 98.4 F | HEART RATE: 80 BPM | SYSTOLIC BLOOD PRESSURE: 130 MMHG | OXYGEN SATURATION: 96 % | RESPIRATION RATE: 16 BRPM | DIASTOLIC BLOOD PRESSURE: 60 MMHG

## 2022-09-21 PROCEDURE — 1090000001 HH PPS REVENUE CREDIT

## 2022-09-21 PROCEDURE — 1090000002 HH PPS REVENUE DEBIT

## 2022-09-21 PROCEDURE — G0151 HHCP-SERV OF PT,EA 15 MIN: HCPCS

## 2022-09-21 ASSESSMENT — ENCOUNTER SYMPTOMS: PAIN LOCATION - PAIN QUALITY: SORE

## 2022-09-21 NOTE — TELEPHONE ENCOUNTER
Called and spoke to pt who was returning an old voicemail. I have not attempted to contact the patient recently.

## 2022-09-21 NOTE — TELEPHONE ENCOUNTER
Perfect served [employer] with home health, per Dr. Anup Ridley, patient is not doing outpatient physical therapy, she was a big right shoulder revision, she is to only do right elbow wrist and hand motion only.

## 2022-09-22 PROCEDURE — 1090000001 HH PPS REVENUE CREDIT

## 2022-09-22 PROCEDURE — 1090000002 HH PPS REVENUE DEBIT

## 2022-09-23 PROCEDURE — 1090000001 HH PPS REVENUE CREDIT

## 2022-09-23 PROCEDURE — 1090000002 HH PPS REVENUE DEBIT

## 2022-09-24 PROCEDURE — 1090000001 HH PPS REVENUE CREDIT

## 2022-09-24 PROCEDURE — 1090000002 HH PPS REVENUE DEBIT

## 2022-09-25 PROCEDURE — 1090000001 HH PPS REVENUE CREDIT

## 2022-09-25 PROCEDURE — 1090000002 HH PPS REVENUE DEBIT

## 2022-09-26 ENCOUNTER — HOME CARE VISIT (OUTPATIENT)
Dept: SCHEDULING | Facility: HOME HEALTH | Age: 82
End: 2022-09-26
Payer: MEDICARE

## 2022-09-26 VITALS
SYSTOLIC BLOOD PRESSURE: 118 MMHG | TEMPERATURE: 97.8 F | DIASTOLIC BLOOD PRESSURE: 64 MMHG | RESPIRATION RATE: 14 BRPM | HEART RATE: 82 BPM | OXYGEN SATURATION: 98 %

## 2022-09-26 PROCEDURE — 1090000001 HH PPS REVENUE CREDIT

## 2022-09-26 PROCEDURE — G0151 HHCP-SERV OF PT,EA 15 MIN: HCPCS

## 2022-09-26 PROCEDURE — 1090000002 HH PPS REVENUE DEBIT

## 2022-09-26 NOTE — HOME HEALTH
patient performed AROM x20:  open/close fist  wrist flex/ext  wrist ulnar/radial deviation  forearm supination/pronation    patient requiring verbal, visual and tactile cues for proper form, count and assistance level with patient demonstrating partial understanding

## 2022-09-27 ENCOUNTER — OFFICE VISIT (OUTPATIENT)
Dept: ORTHOPEDIC SURGERY | Age: 82
End: 2022-09-27

## 2022-09-27 DIAGNOSIS — Z96.611 AFTERCARE FOLLOWING RIGHT SHOULDER JOINT REPLACEMENT SURGERY: ICD-10-CM

## 2022-09-27 DIAGNOSIS — Z96.611 PRESENCE OF RIGHT ARTIFICIAL SHOULDER JOINT: Primary | ICD-10-CM

## 2022-09-27 DIAGNOSIS — Z47.1 AFTERCARE FOLLOWING RIGHT SHOULDER JOINT REPLACEMENT SURGERY: ICD-10-CM

## 2022-09-27 PROCEDURE — 1090000002 HH PPS REVENUE DEBIT

## 2022-09-27 PROCEDURE — 1090000001 HH PPS REVENUE CREDIT

## 2022-09-27 PROCEDURE — 99024 POSTOP FOLLOW-UP VISIT: CPT | Performed by: ORTHOPAEDIC SURGERY

## 2022-09-27 RX ORDER — TRAMADOL HYDROCHLORIDE 50 MG/1
50 TABLET ORAL EVERY 6 HOURS PRN
Qty: 40 TABLET | Refills: 0 | Status: SHIPPED | OUTPATIENT
Start: 2022-09-27 | End: 2022-10-07

## 2022-09-27 NOTE — PROGRESS NOTES
[facility]             Progress Notes by Hiren Leija MD at 03/22/22 [id number] [phone] [id number]                    Author: Hiren Leija MD  Service: --  Author Type: Physician       Filed: 03/22/22 1444  Encounter Date: 3/22/2022  Status: Signed          : Hiren Leija MD (Physician)                            Name: Laura Mi   YOB: 1940   Gender: female   MRN: [id number]                    HPI: Laura Mi is a 78  y.o. right-hand-dominant female 6 weeks status post I&D removal of implant right shoulder [place] reverse right total shoulder arthroplasty with a proximal humeral osteotomy and revision reverse right total shoulder arthroplasty with a longstem delta xtend prosthesis biceps tenodesis latissimus dorsi and teres major tendon transfer. She returns noting she is doing well. ROS/Meds/PSH/PMH/FH/SH: A ten system review of systems was performed and is negative other than what is in the HPI. Tobacco:  reports that she has never smoked. She has never used smokeless tobacco.   There were no vitals taken for this visit. Physical Examination:   She is awake alert female ambulating without difficulty   She is guarding her right arm at her side      Her right shoulder has well-healed deltopectoral incision  Active forward elevation is 0-40  Passively goes 0-90  Deltoid does fire  She appears neurovascularly intact      Data Reviewed:       XR: AP Y axillary views right shoulder   Clinical Indication    ICD-10-CM    1. Presence of right artificial shoulder joint  Z96.611 XR SHOULDER RIGHT (MIN 2 VIEWS)     Amb External Referral To Physical Therapy      2.  Aftercare following right shoulder joint replacement surgery  Z47.1 XR SHOULDER RIGHT (MIN 2 VIEWS)    Z96.611 Amb External Referral To Physical Therapy     CBC with Auto Differential     C-Reactive Protein     Sedimentation Rate         Report: AP Y axillary views right shoulder demonstrate a longstem reverse right total shoulder arthroplasty in excellent position. Tuberosities are seated    Impression: Status post reverse longstem right total shoulder arthroplasty   Dinorah Lee MD      Operative cultures are no growth to date final      Impression:      1. Presence of right artificial shoulder joint    2. Aftercare following right shoulder joint replacement surgery             status post I&D removal of implant right shoulder [place] reverse right total shoulder arthroplasty with a proximal humeral osteotomy and revision reverse right total shoulder arthroplasty with a longstem delta xtend prosthesis biceps tenodesis latissimus dorsi and teres major tendon transfer 6 weeks  Painful reverse right total shoulder arthroplasty with glenoid bone loss and medialization   Rule out periprosthetic infection status post reverse right total shoulder arthroplasty   Rule out periprosthetic loosening   Status post reverse right total shoulder arthroplasty by Dr. Quinton Jacinto for years   Rotator cuff tear arthropathy left shoulder   2 previous surgical procedures by Dr. Stacey Christine on the left shoulder   Sternoclavicular osteoarthritis both shoulders   Source bolus multiple levels   Status post primary right total hip arthroplasty   Status post revision left total hip arthroplasty   Status post primary left total hip arthroplasty   Status post right total knee arthroplasty   History of low back surgery   Chronic low back pain   Hypercholesterolemia   Coronary disease on aspirin   Systemic osteoarthritis   Status post previous hand surgery with osteoarthritis      Plan:    I discussed the plan with the patient. I refilled her tramadol. We will now transition her to outpatient physical therapy working on gentle progressive range of motion and strength. I will recheck her progress back in 1 month. Prior to next visit we will measure a CBC BMP and mag.   I will recheck her back in 1 month with new AP, Y and axillary views right shoulder   Follow up: Paige Thomas MD

## 2022-09-28 PROCEDURE — 1090000001 HH PPS REVENUE CREDIT

## 2022-09-28 PROCEDURE — 1090000002 HH PPS REVENUE DEBIT

## 2022-09-29 ENCOUNTER — HOME CARE VISIT (OUTPATIENT)
Dept: SCHEDULING | Facility: HOME HEALTH | Age: 82
End: 2022-09-29
Payer: MEDICARE

## 2022-09-29 VITALS
DIASTOLIC BLOOD PRESSURE: 70 MMHG | RESPIRATION RATE: 14 BRPM | SYSTOLIC BLOOD PRESSURE: 130 MMHG | HEART RATE: 80 BPM | OXYGEN SATURATION: 95 % | TEMPERATURE: 97.8 F

## 2022-09-29 PROCEDURE — 1090000002 HH PPS REVENUE DEBIT

## 2022-09-29 PROCEDURE — G0151 HHCP-SERV OF PT,EA 15 MIN: HCPCS

## 2022-09-29 PROCEDURE — 1090000001 HH PPS REVENUE CREDIT

## 2022-09-29 ASSESSMENT — ENCOUNTER SYMPTOMS: PAIN LOCATION - PAIN QUALITY: ACHES

## 2022-10-05 ENCOUNTER — TELEPHONE (OUTPATIENT)
Dept: ORTHOPEDIC SURGERY | Age: 82
End: 2022-10-05

## 2022-10-07 ENCOUNTER — TELEPHONE (OUTPATIENT)
Dept: ORTHOPEDIC SURGERY | Age: 82
End: 2022-10-07

## 2022-10-07 NOTE — TELEPHONE ENCOUNTER
I called and spoke with pt who states that her BP was low when she took it recently. Pt states she woke up feeling light headed and dizzy, but went to physical therapy anyway. Pt states that currently she is feeling lightheaded and that she would have trouble standing up. I advised pt that she should call 911 so that she can be evaluated by someone as soon as possible. Pt states that her daughter is with her and that she may take her to the ER. I advised pt that either way she should go to be evaluated. Pt agreed and said she would give us an update if anything changes.

## 2022-10-11 ENCOUNTER — NURSE ONLY (OUTPATIENT)
Dept: INTERNAL MEDICINE CLINIC | Facility: CLINIC | Age: 82
End: 2022-10-11

## 2022-10-11 DIAGNOSIS — Z96.611 AFTERCARE FOLLOWING RIGHT SHOULDER JOINT REPLACEMENT SURGERY: ICD-10-CM

## 2022-10-11 DIAGNOSIS — Z47.1 AFTERCARE FOLLOWING RIGHT SHOULDER JOINT REPLACEMENT SURGERY: ICD-10-CM

## 2022-10-11 LAB
BASOPHILS # BLD: 0.1 K/UL (ref 0–0.2)
BASOPHILS NFR BLD: 1 % (ref 0–2)
DIFFERENTIAL METHOD BLD: ABNORMAL
EOSINOPHIL # BLD: 0.2 K/UL (ref 0–0.8)
EOSINOPHIL NFR BLD: 3 % (ref 0.5–7.8)
ERYTHROCYTE [DISTWIDTH] IN BLOOD BY AUTOMATED COUNT: 14.4 % (ref 11.9–14.6)
ERYTHROCYTE [SEDIMENTATION RATE] IN BLOOD: 17 MM/HR (ref 0–30)
HCT VFR BLD AUTO: 39.2 % (ref 35.8–46.3)
HGB BLD-MCNC: 12.4 G/DL (ref 11.7–15.4)
IMM GRANULOCYTES # BLD AUTO: 0 K/UL (ref 0–0.5)
IMM GRANULOCYTES NFR BLD AUTO: 0 % (ref 0–5)
LYMPHOCYTES # BLD: 1.3 K/UL (ref 0.5–4.6)
LYMPHOCYTES NFR BLD: 23 % (ref 13–44)
MCH RBC QN AUTO: 32.5 PG (ref 26.1–32.9)
MCHC RBC AUTO-ENTMCNC: 31.6 G/DL (ref 31.4–35)
MCV RBC AUTO: 102.6 FL (ref 82–102)
MONOCYTES # BLD: 0.4 K/UL (ref 0.1–1.3)
MONOCYTES NFR BLD: 7 % (ref 4–12)
NEUTS SEG # BLD: 3.8 K/UL (ref 1.7–8.2)
NEUTS SEG NFR BLD: 66 % (ref 43–78)
NRBC # BLD: 0 K/UL (ref 0–0.2)
PLATELET # BLD AUTO: 237 K/UL (ref 150–450)
PMV BLD AUTO: 11.6 FL (ref 9.4–12.3)
RBC # BLD AUTO: 3.82 M/UL (ref 4.05–5.2)
WBC # BLD AUTO: 5.8 K/UL (ref 4.3–11.1)

## 2022-10-12 ENCOUNTER — TELEPHONE (OUTPATIENT)
Dept: ORTHOPEDIC SURGERY | Age: 82
End: 2022-10-12

## 2022-10-12 DIAGNOSIS — Z09 FOLLOW-UP EXAMINATION AFTER ORTHOPEDIC SURGERY: Primary | ICD-10-CM

## 2022-10-12 LAB — CRP SERPL-MCNC: 0.6 MG/DL (ref 0–0.9)

## 2022-10-12 NOTE — TELEPHONE ENCOUNTER
Called and spoke to pt and told her that per AGP her labs look good. Pt is in between PCP's right now and will refer to [facility] at her request. Advised her to call them tomorrow to get scheduled.

## 2022-10-19 ENCOUNTER — TELEPHONE (OUTPATIENT)
Dept: ORTHOPEDIC SURGERY | Age: 82
End: 2022-10-19

## 2022-10-19 NOTE — TELEPHONE ENCOUNTER
Called and spoke with patient. Her therapist believes she has a suture left in. Patient will come in tomorrow per Dr. Jarred Rice for wound check.

## 2022-10-20 ENCOUNTER — TELEPHONE (OUTPATIENT)
Dept: ORTHOPEDIC SURGERY | Age: 82
End: 2022-10-20

## 2022-10-20 NOTE — TELEPHONE ENCOUNTER
Called and spoke to pt who did not feel good this morning so she did not come into the office. I advised pt that she can come in this afternoon or Monday, but Dr. Stewart Hardin would like the suture to be taken out at our office. Advised pt that she could come at any time we are in the office, just asked her to call us prior. Pt will look at her schedule and call if there are any issues.

## 2022-10-24 ENCOUNTER — OFFICE VISIT (OUTPATIENT)
Dept: ORTHOPEDIC SURGERY | Age: 82
End: 2022-10-24

## 2022-10-24 DIAGNOSIS — Z48.89 ENCOUNTER FOR POSTOPERATIVE CARE: Primary | ICD-10-CM

## 2022-10-24 PROCEDURE — 99024 POSTOP FOLLOW-UP VISIT: CPT | Performed by: ORTHOPAEDIC SURGERY

## 2022-10-24 NOTE — PROGRESS NOTES
[facility]             Progress Notes by Mya Mahoney MD at 03/22/22 [id number]                    Author: Mya Mahoney MD  Service: --  Author Type: Physician       Filed: 03/22/22 1444  Encounter Date: 3/22/2022  Status: Signed          : Mya Mahoney MD (Physician)                            Name: Tra Baca   YOB: 1940   Gender: female   MRN: [id number]                    HPI: Tra Baca is a 78  y.o. right-hand-dominant female 2.5 months status post I&D removal of implant right shoulder [place] reverse right total shoulder arthroplasty with a proximal humeral osteotomy and revision reverse right total shoulder arthroplasty with a longstem delta xtend prosthesis biceps tenodesis latissimus dorsi and teres major tendon transfer. She returns noting she is doing well but 1 suture remains in her right shoulder      ROS/Meds/PSH/PMH/FH/SH: A ten system review of systems was performed and is negative other than what is in the HPI. Tobacco:  reports that she has never smoked. She has never used smokeless tobacco.   There were no vitals taken for this visit. Physical Examination:   She is awake alert female ambulating without difficulty   She is guarding her right arm at her side      Her right shoulder has well-healed deltopectoral incision  1 nylon suture remains and was removed  Active forward elevation is 0-40  Passively goes 0-90  Deltoid does fire  She appears neurovascularly intact      Data Reviewed: White count is 5.8, hemoglobin is 12.4, ESR 17, CRP is 0.6        Impression:      1.  Encounter for postoperative care             status post I&D removal of implant right shoulder Theodore reverse right total shoulder arthroplasty with a proximal humeral osteotomy and revision reverse right total shoulder arthroplasty with a longstem delta xtend prosthesis biceps tenodesis latissimus dorsi and teres major tendon transfer 2.5 months  Painful reverse right total

## 2022-11-01 ENCOUNTER — OFFICE VISIT (OUTPATIENT)
Dept: ORTHOPEDIC SURGERY | Age: 82
End: 2022-11-01
Payer: MEDICARE

## 2022-11-01 DIAGNOSIS — Z96.611 PRESENCE OF RIGHT ARTIFICIAL SHOULDER JOINT: ICD-10-CM

## 2022-11-01 DIAGNOSIS — Z96.611 AFTERCARE FOLLOWING RIGHT SHOULDER JOINT REPLACEMENT SURGERY: ICD-10-CM

## 2022-11-01 DIAGNOSIS — Z47.1 AFTERCARE FOLLOWING RIGHT SHOULDER JOINT REPLACEMENT SURGERY: ICD-10-CM

## 2022-11-01 DIAGNOSIS — M19.021 PRIMARY OSTEOARTHRITIS OF RIGHT ELBOW: Primary | ICD-10-CM

## 2022-11-01 PROCEDURE — G8417 CALC BMI ABV UP PARAM F/U: HCPCS | Performed by: ORTHOPAEDIC SURGERY

## 2022-11-01 PROCEDURE — G8484 FLU IMMUNIZE NO ADMIN: HCPCS | Performed by: ORTHOPAEDIC SURGERY

## 2022-11-01 PROCEDURE — 99214 OFFICE O/P EST MOD 30 MIN: CPT | Performed by: ORTHOPAEDIC SURGERY

## 2022-11-01 PROCEDURE — 1090F PRES/ABSN URINE INCON ASSESS: CPT | Performed by: ORTHOPAEDIC SURGERY

## 2022-11-01 PROCEDURE — 1123F ACP DISCUSS/DSCN MKR DOCD: CPT | Performed by: ORTHOPAEDIC SURGERY

## 2022-11-01 PROCEDURE — G8427 DOCREV CUR MEDS BY ELIG CLIN: HCPCS | Performed by: ORTHOPAEDIC SURGERY

## 2022-11-01 PROCEDURE — 1036F TOBACCO NON-USER: CPT | Performed by: ORTHOPAEDIC SURGERY

## 2022-11-01 PROCEDURE — G8400 PT W/DXA NO RESULTS DOC: HCPCS | Performed by: ORTHOPAEDIC SURGERY

## 2022-11-01 NOTE — PROGRESS NOTES
[facility]             Progress Notes by Yani Fish MD at 03/22/22 [id number] [phone]                    Author: Yani Fish MD  Service: --  Author Type: Physician       Filed: 03/22/22 1444  Encounter Date: 3/22/2022  Status: Signed          : Yani Fish MD (Physician)                            Name: Johanna Kenny   YOB: 1940   Gender: female   MRN: [id number]                    HPI: Johanna Kenny is a 78  y.o. right-hand-dominant female seen for right elbow problems. She is 2.5 months status post I&D removal of implant right shoulder [place] reverse right total shoulder arthroplasty with a proximal humeral osteotomy and revision reverse right total shoulder arthroplasty with a longstem delta xtend prosthesis biceps tenodesis latissimus dorsi and teres major tendon transfer. She returns noting she is doing well but Her right elbow is sore      ROS/Meds/PSH/PMH/FH/SH: A ten system review of systems was performed and is negative other than what is in the HPI. Tobacco:  reports that she has never smoked. She has never used smokeless tobacco.   There were no vitals taken for this visit. Physical Examination:   She is awake alert female ambulating without difficulty   She is guarding her right arm at her side      The left elbow has a range of motion of 0 to 135 with 85 degrees of supination and 75 degrees of pronation. Patient is non-tender over the medial epicondyle  non-tender over the ulnar nerve with no evidence of any subluxation or dislocation. Negative tinel at the cubital tunnel  Negative flexor pronator stress test.  Patient is non-tender over the radial tunnel  non-tender over the lateral epicondyle with a negative wrist extensor stress test.   The patient is non-tender when shaking hands. Negative middle finger extension stress test.  The biceps tendon is intact.     Present hook test.  Negative reverse shila sign  The left elbow is stable at 0, 30, 70, 90, degrees. No swelling or erythema over the olecranon bursa. Patient has 2+ radial and ulnar pulses and neurovascularly is intact. Her right shoulder has a well-healed deltopectoral incision  Active forward elevation is 0-40  Passively goes 0-90  Deltoid does fire  She appears neurovascularly intact    The right elbow has a range of motion of 20 to 135 with 85 degrees of supination and 75 degrees of pronation. Patient is non-tender over the medial epicondyle  non-tender over the ulnar nerve with no evidence of any subluxation or dislocation. Negative tinel at the cubital tunnel  Negative flexor pronator stress test.  Patient is non-tender over the radial tunnel  non-tender over the lateral epicondyle with a negative wrist extensor stress test.   The patient is non-tender when shaking hands. Negative middle finger extension stress test.  The biceps tendon is intact. Present hook test.  Negative reverse shila sign  The right elbow is stable at 0, 30, 70, 90, degrees. No swelling or erythema over the olecranon bursa. Patient has 2+ radial and ulnar pulses and neurovascularly is intact. Global right elbow pain        Data Reviewed:       XR: AP Y axillary views right shoulder  AP and lateral views right elbow   Clinical Indication    ICD-10-CM    1. Primary osteoarthritis of right elbow  M19.021       2. Presence of right artificial shoulder joint  Z96.611 XR SHOULDER RIGHT (MIN 2 VIEWS)      3. Aftercare following right shoulder joint replacement surgery  Z47.1 XR SHOULDER RIGHT (MIN 2 VIEWS)    Z96.611          Report: AP and lateral views right elbow demonstrate osteoarthritis in the right elbow. No fracture. No dislocation. AP Y axillary views right shoulder demonstrate a reverse longstem right total shoulder arthroplasty in unchanged position    Impression: As above   Detsiny Tran MD            Impression:      1. Primary osteoarthritis of right elbow    2.  Presence of right artificial shoulder joint    3. Aftercare following right shoulder joint replacement surgery             status post I&D removal of implant right shoulder [place] reverse right total shoulder arthroplasty with a proximal humeral osteotomy and revision reverse right total shoulder arthroplasty with a longstem delta xtend prosthesis biceps tenodesis latissimus dorsi and teres major tendon transfer 2.5 months  Painful reverse right total shoulder arthroplasty with glenoid bone loss and medialization   Rule out periprosthetic infection status post reverse right total shoulder arthroplasty   Rule out periprosthetic loosening   Status post reverse right total shoulder arthroplasty by Dr. Leonardo Prajapati for years   Rotator cuff tear arthropathy left shoulder   2 previous surgical procedures by Dr. Mauricio Danielson on the left shoulder   Sternoclavicular osteoarthritis both shoulders   Source bolus multiple levels   Status post primary right total hip arthroplasty   Status post revision left total hip arthroplasty   Status post primary left total hip arthroplasty   Status post right total knee arthroplasty   History of low back surgery   Chronic low back pain   Hypercholesterolemia   Coronary disease on aspirin   Systemic osteoarthritis   Status post previous hand surgery with osteoarthritis      Plan:   I discussed the problem with the patient. I discussed nonoperative versus operative intervention including injections. Specifically we discussed potentially the need for right total elbow arthroplasty. We will defer that for now. We will defer any injections for now. We will advance her in physical therapy to full motion full-strength right shoulder. We will rehab the right elbow. I will recheck her back in 6 weeks new AP Y and axillary views right shoulder    4.   Chronic illness with exacerbation   Follow up:         Solitario Matthews MD

## 2022-11-09 ENCOUNTER — OFFICE VISIT (OUTPATIENT)
Dept: INTERNAL MEDICINE CLINIC | Facility: CLINIC | Age: 82
End: 2022-11-09
Payer: MEDICARE

## 2022-11-09 VITALS
SYSTOLIC BLOOD PRESSURE: 100 MMHG | HEART RATE: 85 BPM | WEIGHT: 149 LBS | BODY MASS INDEX: 27.42 KG/M2 | OXYGEN SATURATION: 98 % | DIASTOLIC BLOOD PRESSURE: 60 MMHG | HEIGHT: 62 IN

## 2022-11-09 DIAGNOSIS — E03.9 ACQUIRED HYPOTHYROIDISM: ICD-10-CM

## 2022-11-09 DIAGNOSIS — H66.011 NON-RECURRENT ACUTE SUPPURATIVE OTITIS MEDIA OF RIGHT EAR WITH SPONTANEOUS RUPTURE OF TYMPANIC MEMBRANE: Primary | ICD-10-CM

## 2022-11-09 DIAGNOSIS — R79.89 LOW VITAMIN D LEVEL: ICD-10-CM

## 2022-11-09 DIAGNOSIS — Z13.1 ENCOUNTER FOR SCREENING EXAMINATION FOR IMPAIRED GLUCOSE REGULATION AND DIABETES MELLITUS: ICD-10-CM

## 2022-11-09 DIAGNOSIS — E78.00 PURE HYPERCHOLESTEROLEMIA, UNSPECIFIED: ICD-10-CM

## 2022-11-09 DIAGNOSIS — H60.391 OTITIS, EXTERNA, INFECTIVE, RIGHT: ICD-10-CM

## 2022-11-09 PROCEDURE — 1090F PRES/ABSN URINE INCON ASSESS: CPT | Performed by: NURSE PRACTITIONER

## 2022-11-09 PROCEDURE — 4130F TOPICAL PREP RX AOE: CPT | Performed by: NURSE PRACTITIONER

## 2022-11-09 PROCEDURE — 99214 OFFICE O/P EST MOD 30 MIN: CPT | Performed by: NURSE PRACTITIONER

## 2022-11-09 PROCEDURE — 3078F DIAST BP <80 MM HG: CPT | Performed by: NURSE PRACTITIONER

## 2022-11-09 PROCEDURE — 1036F TOBACCO NON-USER: CPT | Performed by: NURSE PRACTITIONER

## 2022-11-09 PROCEDURE — G8417 CALC BMI ABV UP PARAM F/U: HCPCS | Performed by: NURSE PRACTITIONER

## 2022-11-09 PROCEDURE — 1123F ACP DISCUSS/DSCN MKR DOCD: CPT | Performed by: NURSE PRACTITIONER

## 2022-11-09 PROCEDURE — G8484 FLU IMMUNIZE NO ADMIN: HCPCS | Performed by: NURSE PRACTITIONER

## 2022-11-09 PROCEDURE — G8427 DOCREV CUR MEDS BY ELIG CLIN: HCPCS | Performed by: NURSE PRACTITIONER

## 2022-11-09 PROCEDURE — 3074F SYST BP LT 130 MM HG: CPT | Performed by: NURSE PRACTITIONER

## 2022-11-09 PROCEDURE — G8400 PT W/DXA NO RESULTS DOC: HCPCS | Performed by: NURSE PRACTITIONER

## 2022-11-09 RX ORDER — AMOXICILLIN AND CLAVULANATE POTASSIUM 875; 125 MG/1; MG/1
1 TABLET, FILM COATED ORAL 2 TIMES DAILY
Qty: 14 TABLET | Refills: 0 | Status: SHIPPED | OUTPATIENT
Start: 2022-11-09 | End: 2022-11-16

## 2022-11-09 RX ORDER — CIPROFLOXACIN AND DEXAMETHASONE 3; 1 MG/ML; MG/ML
4 SUSPENSION/ DROPS AURICULAR (OTIC) 2 TIMES DAILY
Qty: 7.5 ML | Refills: 0 | Status: SHIPPED | OUTPATIENT
Start: 2022-11-09 | End: 2022-11-16

## 2022-11-09 ASSESSMENT — PATIENT HEALTH QUESTIONNAIRE - PHQ9
1. LITTLE INTEREST OR PLEASURE IN DOING THINGS: 0
SUM OF ALL RESPONSES TO PHQ9 QUESTIONS 1 & 2: 0
SUM OF ALL RESPONSES TO PHQ QUESTIONS 1-9: 0
SUM OF ALL RESPONSES TO PHQ QUESTIONS 1-9: 0
2. FEELING DOWN, DEPRESSED OR HOPELESS: 0
SUM OF ALL RESPONSES TO PHQ QUESTIONS 1-9: 0
SUM OF ALL RESPONSES TO PHQ QUESTIONS 1-9: 0

## 2022-11-09 ASSESSMENT — ENCOUNTER SYMPTOMS
WHEEZING: 1
VOMITING: 1
COUGH: 1
NAUSEA: 1
SORE THROAT: 1
SINUS COMPLAINT: 1

## 2022-11-09 NOTE — PROGRESS NOTES
Gayla Calvo (:  1940) is a 78 y.o. female,Established patient, here for evaluation of the following chief complaint(s):  Sinus Problem and Ear Drainage (Stuff oozing out of it and having problems hearing out of the right ear. /)         ASSESSMENT/PLAN:  1. Non-recurrent acute suppurative otitis media of right ear with spontaneous rupture of tympanic membrane  2. Otitis, externa, infective, right  3. Pure hypercholesterolemia, unspecified  -     Lipid Panel; Future  -     Comprehensive Metabolic Panel; Future  -     CBC; Future  4. Acquired hypothyroidism  -     TSH; Future  -     Comprehensive Metabolic Panel; Future  -     CBC; Future  5. Encounter for screening examination for impaired glucose regulation and diabetes mellitus  -     Hemoglobin A1C; Future  6. Low vitamin D level  -     Vitamin D 25 Hydroxy; Future    No follow-ups on file. Right ear infection, suspect TM rupture  Pain improved but drainage and debris in canal  Start antibiotic and ear drops  Declines ENT as she no longer drives and feels she is declining, has been in PT, has had 2 major surgeries the past years. She went to another PCP but would like to re-establish here due to convenience to her home  Would like routine labs done - and would like to recheck her vitamin d      Subjective   SUBJECTIVE/OBJECTIVE:  Patient is here for ear drainage and loss of hearing right ear. The ear drainage started on Monday. She had called her asthma/allergist on Thursday and was given a z-pack for sore throat and headache. She stayed in all weekend but Monday she started to notice ear stopped up and she can't wear her right hearing aid. Sinus Problem  Associated symptoms include coughing and a sore throat. Pertinent negatives include no chills or diaphoresis. Ear Drainage   Associated symptoms include coughing, a sore throat and vomiting (yesterday vomited).      Review of Systems   Constitutional:  Negative for chills, diaphoresis and fever. HENT:  Positive for sore throat. Respiratory:  Positive for cough and wheezing (chronic since covid). Gastrointestinal:  Positive for nausea and vomiting (yesterday vomited). Objective   Physical Exam  Vitals reviewed. Constitutional:       General: She is not in acute distress. Appearance: Normal appearance. She is ill-appearing. HENT:      Head: Normocephalic. Right Ear: External ear normal. Decreased hearing noted. Drainage present. Tympanic membrane is erythematous. Left Ear: Tympanic membrane and external ear normal.  No middle ear effusion. No mastoid tenderness. Ears:      Comments: Right ear with debris and drainage in the ear canal, partially visualized TM with erythema and purulence  Eyes:      Extraocular Movements: Extraocular movements intact. Pupils: Pupils are equal, round, and reactive to light. Cardiovascular:      Rate and Rhythm: Normal rate and regular rhythm. Pulmonary:      Effort: Pulmonary effort is normal.      Breath sounds: Normal breath sounds. Musculoskeletal:      Cervical back: Neck supple. Neurological:      General: No focal deficit present. Mental Status: She is alert and oriented to person, place, and time. An electronic signature was used to authenticate this note.     --Shannan Aldana, SHAVON - CNP

## 2022-11-10 LAB
25(OH)D3 SERPL-MCNC: 46.4 NG/ML (ref 30–100)
ALBUMIN SERPL-MCNC: 3.6 G/DL (ref 3.2–4.6)
ALBUMIN/GLOB SERPL: 1.3 {RATIO} (ref 0.4–1.6)
ALP SERPL-CCNC: 85 U/L (ref 50–136)
ALT SERPL-CCNC: 17 U/L (ref 12–65)
ANION GAP SERPL CALC-SCNC: 7 MMOL/L (ref 2–11)
AST SERPL-CCNC: 16 U/L (ref 15–37)
BILIRUB SERPL-MCNC: 0.6 MG/DL (ref 0.2–1.1)
BUN SERPL-MCNC: 12 MG/DL (ref 8–23)
CALCIUM SERPL-MCNC: 9.3 MG/DL (ref 8.3–10.4)
CHLORIDE SERPL-SCNC: 103 MMOL/L (ref 101–110)
CHOLEST SERPL-MCNC: 200 MG/DL
CO2 SERPL-SCNC: 28 MMOL/L (ref 21–32)
CREAT SERPL-MCNC: 0.9 MG/DL (ref 0.6–1)
ERYTHROCYTE [DISTWIDTH] IN BLOOD BY AUTOMATED COUNT: 13.2 % (ref 11.9–14.6)
EST. AVERAGE GLUCOSE BLD GHB EST-MCNC: 111 MG/DL
GLOBULIN SER CALC-MCNC: 2.8 G/DL (ref 2.8–4.5)
GLUCOSE SERPL-MCNC: 105 MG/DL (ref 65–100)
HBA1C MFR BLD: 5.5 % (ref 4.8–5.6)
HCT VFR BLD AUTO: 37.4 % (ref 35.8–46.3)
HDLC SERPL-MCNC: 70 MG/DL (ref 40–60)
HDLC SERPL: 2.9 {RATIO}
HGB BLD-MCNC: 12 G/DL (ref 11.7–15.4)
LDLC SERPL CALC-MCNC: 115.4 MG/DL
MCH RBC QN AUTO: 32.5 PG (ref 26.1–32.9)
MCHC RBC AUTO-ENTMCNC: 32.1 G/DL (ref 31.4–35)
MCV RBC AUTO: 101.4 FL (ref 82–102)
NRBC # BLD: 0 K/UL (ref 0–0.2)
PLATELET # BLD AUTO: 250 K/UL (ref 150–450)
PMV BLD AUTO: 11.3 FL (ref 9.4–12.3)
POTASSIUM SERPL-SCNC: 4.2 MMOL/L (ref 3.5–5.1)
PROT SERPL-MCNC: 6.4 G/DL (ref 6.3–8.2)
RBC # BLD AUTO: 3.69 M/UL (ref 4.05–5.2)
SODIUM SERPL-SCNC: 138 MMOL/L (ref 133–143)
TRIGL SERPL-MCNC: 73 MG/DL (ref 35–150)
TSH, 3RD GENERATION: 1.58 UIU/ML (ref 0.36–3.74)
VLDLC SERPL CALC-MCNC: 14.6 MG/DL (ref 6–23)
WBC # BLD AUTO: 6.9 K/UL (ref 4.3–11.1)

## 2022-11-11 ENCOUNTER — TELEPHONE (OUTPATIENT)
Dept: INTERNAL MEDICINE CLINIC | Facility: CLINIC | Age: 82
End: 2022-11-11

## 2022-12-01 ENCOUNTER — OFFICE VISIT (OUTPATIENT)
Dept: ENT CLINIC | Age: 82
End: 2022-12-01
Payer: MEDICARE

## 2022-12-01 VITALS — RESPIRATION RATE: 16 BRPM | WEIGHT: 133 LBS | BODY MASS INDEX: 24.48 KG/M2 | HEIGHT: 62 IN

## 2022-12-01 DIAGNOSIS — R05.3 CHRONIC COUGH: ICD-10-CM

## 2022-12-01 DIAGNOSIS — J02.9 SORE THROAT: ICD-10-CM

## 2022-12-01 DIAGNOSIS — J31.0 CHRONIC RHINOSINUSITIS: Primary | ICD-10-CM

## 2022-12-01 DIAGNOSIS — J32.9 CHRONIC RHINOSINUSITIS: Primary | ICD-10-CM

## 2022-12-01 DIAGNOSIS — H65.92 MIDDLE EAR EFFUSION, LEFT: ICD-10-CM

## 2022-12-01 DIAGNOSIS — R49.0 DYSPHONIA: ICD-10-CM

## 2022-12-01 DIAGNOSIS — J35.01 TONSILLITIS, CHRONIC: ICD-10-CM

## 2022-12-01 PROCEDURE — G8420 CALC BMI NORM PARAMETERS: HCPCS | Performed by: STUDENT IN AN ORGANIZED HEALTH CARE EDUCATION/TRAINING PROGRAM

## 2022-12-01 PROCEDURE — 1123F ACP DISCUSS/DSCN MKR DOCD: CPT | Performed by: STUDENT IN AN ORGANIZED HEALTH CARE EDUCATION/TRAINING PROGRAM

## 2022-12-01 PROCEDURE — 1090F PRES/ABSN URINE INCON ASSESS: CPT | Performed by: STUDENT IN AN ORGANIZED HEALTH CARE EDUCATION/TRAINING PROGRAM

## 2022-12-01 PROCEDURE — 99204 OFFICE O/P NEW MOD 45 MIN: CPT | Performed by: STUDENT IN AN ORGANIZED HEALTH CARE EDUCATION/TRAINING PROGRAM

## 2022-12-01 PROCEDURE — G8484 FLU IMMUNIZE NO ADMIN: HCPCS | Performed by: STUDENT IN AN ORGANIZED HEALTH CARE EDUCATION/TRAINING PROGRAM

## 2022-12-01 PROCEDURE — G8427 DOCREV CUR MEDS BY ELIG CLIN: HCPCS | Performed by: STUDENT IN AN ORGANIZED HEALTH CARE EDUCATION/TRAINING PROGRAM

## 2022-12-01 PROCEDURE — G8400 PT W/DXA NO RESULTS DOC: HCPCS | Performed by: STUDENT IN AN ORGANIZED HEALTH CARE EDUCATION/TRAINING PROGRAM

## 2022-12-01 PROCEDURE — 31575 DIAGNOSTIC LARYNGOSCOPY: CPT | Performed by: STUDENT IN AN ORGANIZED HEALTH CARE EDUCATION/TRAINING PROGRAM

## 2022-12-01 PROCEDURE — 1036F TOBACCO NON-USER: CPT | Performed by: STUDENT IN AN ORGANIZED HEALTH CARE EDUCATION/TRAINING PROGRAM

## 2022-12-01 RX ORDER — PREDNISONE 20 MG/1
TABLET ORAL
Qty: 16 TABLET | Refills: 0 | Status: SHIPPED | OUTPATIENT
Start: 2022-12-01

## 2022-12-01 RX ORDER — IPRATROPIUM BROMIDE 42 UG/1
2 SPRAY, METERED NASAL 3 TIMES DAILY
COMMUNITY
Start: 2022-11-18

## 2022-12-01 RX ORDER — CLINDAMYCIN HYDROCHLORIDE 300 MG/1
300 CAPSULE ORAL 3 TIMES DAILY
Qty: 21 CAPSULE | Refills: 0 | Status: SHIPPED | OUTPATIENT
Start: 2022-12-01 | End: 2022-12-01

## 2022-12-01 RX ORDER — CLINDAMYCIN HYDROCHLORIDE 300 MG/1
300 CAPSULE ORAL 4 TIMES DAILY
Qty: 84 CAPSULE | Refills: 0 | Status: SHIPPED | OUTPATIENT
Start: 2022-12-01 | End: 2022-12-22

## 2022-12-01 ASSESSMENT — ENCOUNTER SYMPTOMS
SINUS PRESSURE: 1
SORE THROAT: 1
COUGH: 0
TROUBLE SWALLOWING: 1

## 2022-12-01 NOTE — PROGRESS NOTES
HPI:  Marian Caputo is a 78 y.o. female seen Established   Chief Complaint   Patient presents with    Follow-up     Patient presents today with c/o yellow mucus ( coughing it up ) , hoarse , and cough . Patient states that this has been presents for 7 weeks . 78-year-old female seen as a follow-up evaluation with complaints of ongoing several weeks of sinusitis, postnasal drip, severe sore throat and productive cough. This has been at least 6 to 8 weeks without resolution. She has been on 3 separate rounds of antibiotics for total of 20+ days. She has been given testing in the ER for flu and strep which was negative. She describes having initially a acute sinusitis event that then led to having relatively significant sore throat left greater than right with large left-sided tonsil. This is now led to severe sore throat which is only mildly improved and now with almost daily hoarseness of voice. Her cough is now ongoing for the last 3 weeks and productive of yellow phlegm. She is also having significant ear symptoms with clogged ear sensation diminished hearing from her baseline hearing loss and now with popping and fluid sensation to the left ear. Past Medical History, Past Surgical History, Family history, Social History, and Medications were all reviewed with the patient today and updated as necessary.      Allergies   Allergen Reactions    Adhesive Tape Other (See Comments)     Blisters, pt denies allergy 7-3-19     Codeine Itching     Pt denies allergy 7-3-19   Other reaction(s): Nausea and/or vomiting-Intolerance, Rash-Allergy       Patient Active Problem List   Diagnosis    PVD (peripheral vascular disease) ([facility])    Status post right hip replacement    Shortness of breath    Palpitations    Chest discomfort    History of COVID-19    Spinal stenosis, lumbar    Bilateral carotid artery disease ([facility])    Palpitation    Hypothyroidism    Vazquez's esophagus    Tricuspid valve disorder Coronary atherosclerosis of native coronary vessel    Hypertension, benign    Hyperlipidemia    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    Periprosthetic fracture around internal prosthetic right shoulder joint    Presence of right artificial shoulder joint    Periprosthetic fracture around internal prosthetic right shoulder joint, initial encounter    Acute blood loss anemia    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 Ascorbic Acid (VITAMIN C) 500 MG CAPS Take 1 capsule by mouth daily. citalopram (CELEXA) 20 MG tablet Take 20 mg by mouth daily    acetaminophen (TYLENOL) 500 MG tablet Take 1,000 mg by mouth every 6 hours as needed for Pain (breakthrough)    b complex vitamins capsule Take 1 capsule by mouth in the morning. cetirizine (ZYRTEC) 10 MG tablet Take 10 mg by mouth daily    fluticasone (FLONASE) 50 MCG/ACT nasal spray 2 sprays by Nasal route daily    meloxicam (MOBIC) 15 MG tablet Take 15 mg by mouth daily    omeprazole (PRILOSEC) 20 MG delayed release capsule Take 20 mg by mouth 2 times daily as needed (reflux)    traMADol (ULTRAM) 50 MG tablet Take 50 mg by mouth every 6 hours as needed for Pain (moderate to severe). B Complex Vitamins (VITAMIN-B COMPLEX PO) Take 1 capsule by mouth daily     No current facility-administered medications for this visit.        Past Medical History:   Diagnosis Date    Arthritis     osteo    Vazquez's esophagus     Managed with as needed meds    Bilateral carotid artery disease ([facility]     Followed by cardiology- Dr. Sharri Garcia    Chest discomfort     Coronary atherosclerosis of native coronary vessel     Degeneration of lumbar or lumbosacral intervertebral disc     Depression     medication     Gastric ulcer, unspecified as acute or chronic, without mention of hemorrhage or perforation 1989    History of blood transfusion     Hyperlipidemia     Hypothyroidism     no meds needed currently (low normal range per pt)    Infectious disease     lyme disease 2004    Nausea & vomiting     Palpitations     PVD (peripheral vascular disease) ([facility]     Scoliosis (and kyphoscoliosis), idiopathic     Spinal stenosis, lumbar 12/7/2009    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    Thoracic or lumbosacral neuritis or radiculitis, unspecified     Tricuspid regurgitation     mild per echo dated 1-11-13    Tricuspid valve disorder        Past Surgical History:   Procedure Laterality Date    APPENDECTOMY  1987    BREAST BIOPSY Left 1980    ENLARGED MILK GLAND    BUNIONECTOMY Bilateral 2009    with hammertoe correction    CARPAL TUNNEL RELEASE Right 10/31/13    ESOPHAGOGASTRODUODENOSCOPY  07/27/2018    2016, 2018    HAMMER TOE SURGERY  2010    revision    HYSTERECTOMY (CERVIX STATUS UNKNOWN)  1988    LAMINECTOMY  04/05/2022    T10 OPEN TOTAL LAMINECTOMY    LUMBAR LAMINECTOMY  2009    L4-L5    ORTHOPEDIC SURGERY  2010    Right hand    REVERSE TOTAL SHOULDER ARTHROPLASTY Right 2017    SHOULDER ARTHROSCOPY  2013    left shoulder cyst    SHOULDER SURGERY Right 8/18/2022    right shoulder irrigation and debridement, right shoulder removal of manjit total shoulder arthroplasty implants, revision right reverse total shoulder arthroplasty with a delta xtend prosthesis and biceps tenodesis, possible remedy spacer. general/interscalene. 23hr observation.  performed by Antoinette Cordero MD at [facility] TOTAL HIP ARTHROPLASTY Left 2007    revision 3/2000    TOTAL HIP ARTHROPLASTY Left 2009    revision    TOTAL KNEE ARTHROPLASTY Right 2007    UPPER GASTROINTESTINAL ENDOSCOPY N/A 8/22/2022    EGD BIOPSY performed by Alex Weaver MD at [address] History     Tobacco Use    Smoking status: Never    Smokeless tobacco: Never   Substance Use Topics    Alcohol use: Not Currently       Family History   Problem Relation Age of Onset    Stroke Other     Diabetes Other     Heart Disease Other     Asthma Other     Breast Cancer Neg Hx     Heart Disease Mother         CONGESTIVE HEART DISEASE    Stroke Father     Hypertension Other         ROS:    Review of Systems   Constitutional:  Negative for chills, fatigue and fever. HENT:  Positive for ear pain, sinus pressure, sore throat and trouble swallowing. Negative for hearing loss. Respiratory:  Negative for cough. Cardiovascular:  Negative for chest pain. Musculoskeletal:  Negative for myalgias. Skin:  Negative for rash. Neurological:  Negative for dizziness. PHYSICAL EXAM:    Resp 16   Ht 5' 2\" (1.575 m)   Wt 133 lb (60.3 kg)   BMI 24.33 kg/m²     Physical Exam  Vitals and nursing note reviewed. Constitutional:       Appearance: Normal appearance. HENT:      Head: Normocephalic and atraumatic. Right Ear: Tympanic membrane, ear canal and external ear normal. There is no impacted cerumen. Tympanic membrane is not scarred, perforated, erythematous or retracted. Left Ear: Ear canal and external ear normal. A middle ear effusion is present. There is no impacted cerumen. Tympanic membrane is retracted. Tympanic membrane is not scarred, perforated or erythematous. Ears:      Comments: Left-sided serous middle ear effusion otherwise TMs intact without perforation. Right-sided without obvious effusion. Nose: Nose normal. No congestion or rhinorrhea.       Mouth/Throat:      Mouth: Mucous membranes are moist. Pharynx: Oropharynx is clear. No oropharyngeal exudate or posterior oropharyngeal erythema. Comments: Left-sided mildly enlarged tonsil edema and erythema compared to the right. No active acute exudates or inflammatory changes. Uvula midline. No postnasal drip  Eyes:      General: No scleral icterus. Pulmonary:      Effort: Pulmonary effort is normal.   Musculoskeletal:      Cervical back: Normal range of motion and neck supple. No rigidity. Lymphadenopathy:      Cervical: No cervical adenopathy. Skin:     General: Skin is warm and dry. Neurological:      Mental Status: She is alert and oriented to person, place, and time. Psychiatric:         Mood and Affect: Mood normal.         Behavior: Behavior normal.        PROCEDURE: Flexible laryngoscopy  INDICATIONS: Chronic sinusitis, hoarseness of voice, cough  DESCRIPTION: After verbal consent was obtained and a timeout was performed, the flexible scope was advanced down one of the patient's nares into the nasopharynx. Right sided sphenoethmoid recess with mucopurulence. Dry mucous stranding bilaterally of the nasal cavities. Widely patent bilateral eustachian tube orifices without lesions or masses. The scope was then turned distally down towards the oropharynx. The BOT and vallecula were clear and the epiglottis was normal in appearance. Both aryepiglottic folds were clear and there was a normal appearing glottis w/ healthy TVCs. No nodules or polyps and the cords were fully mobile. Airway widely patent. No concerning lesions seen along post-cricoid region or within either piriform sinus. The posterior pharyngeal was clear as well. The scope was then carefully removed. The patient tolerated the procedure well and there were no complications. ASSESSMENT and PLAN        ICD-10-CM    1.  Chronic rhinosinusitis  J31.0 CT MAXILLOFACIAL WO CONTRAST    J32.9 CANCELED: CT MAXILLOFACIAL WO CONTRAST     CANCELED: NASAL ENDOSCOPY,DX      2. Middle ear effusion, left  H65.92       3. Chronic cough  R05.3       4. Tonsillitis, chronic  J35.01       5. Dysphonia  R49.0 LARYNGOSCOPY,FLEX FIBER,DIAGNOSTIC      6. Sore throat  J02.9 LARYNGOSCOPY,FLEX FIBER,DIAGNOSTIC          Patient with significant exam findings of left-sided serous middle ear effusion and nasal cavity endoscopy showing significant right-sided sphenoethmoid recess mucopurulence. Oral cavity shows erythema and mild edema to the left tonsil as compared to the right. We discussed that she likely has ongoing sinusitis now for the last 8+ weeks and likely indicative of a chronic sinusitis event in the setting of having multiple antibiotics. She also has findings of left-sided middle ear effusion although her new hearing loss seems to be worse on the right as compared to the left. Also with erythema and edema of the left tonsil indicative of a recurrent ongoing and resolving tonsillitis. For all stated findings I have recommended an additional round of antibiotics with clindamycin 3 times daily for 3 weeks and a prednisone taper. She should also reinitiate the use of Flonase and intranasal saline sprays daily. Given the long-term duration of her symptoms I have recommended a CT of the paranasal sinuses following her medical therapy management to assess for ongoing chronic sinusitis. We will see her back in the office in 3 to 4 weeks to review imaging and to assess for improvement. If there is still continuation of middle ear effusion we will plan for myringotomy and aspiration of fluid/possible PE tube.     Cielo Clark,   12/1/2022

## 2022-12-09 ENCOUNTER — OFFICE VISIT (OUTPATIENT)
Dept: CARDIOLOGY CLINIC | Age: 82
End: 2022-12-09
Payer: MEDICARE

## 2022-12-09 VITALS
HEART RATE: 72 BPM | HEIGHT: 62 IN | SYSTOLIC BLOOD PRESSURE: 158 MMHG | BODY MASS INDEX: 26.68 KG/M2 | WEIGHT: 145 LBS | DIASTOLIC BLOOD PRESSURE: 82 MMHG

## 2022-12-09 DIAGNOSIS — I73.9 PVD (PERIPHERAL VASCULAR DISEASE) (HCC): ICD-10-CM

## 2022-12-09 DIAGNOSIS — I25.10 ATHEROSCLEROSIS OF NATIVE CORONARY ARTERY OF NATIVE HEART WITHOUT ANGINA PECTORIS: Primary | ICD-10-CM

## 2022-12-09 DIAGNOSIS — R00.2 PALPITATIONS: ICD-10-CM

## 2022-12-09 DIAGNOSIS — I10 HYPERTENSION, BENIGN: ICD-10-CM

## 2022-12-09 DIAGNOSIS — I65.23 BILATERAL CAROTID ARTERY STENOSIS: ICD-10-CM

## 2022-12-09 PROCEDURE — 3074F SYST BP LT 130 MM HG: CPT | Performed by: INTERNAL MEDICINE

## 2022-12-09 PROCEDURE — G8428 CUR MEDS NOT DOCUMENT: HCPCS | Performed by: INTERNAL MEDICINE

## 2022-12-09 PROCEDURE — 99214 OFFICE O/P EST MOD 30 MIN: CPT | Performed by: INTERNAL MEDICINE

## 2022-12-09 PROCEDURE — G8484 FLU IMMUNIZE NO ADMIN: HCPCS | Performed by: INTERNAL MEDICINE

## 2022-12-09 PROCEDURE — 1090F PRES/ABSN URINE INCON ASSESS: CPT | Performed by: INTERNAL MEDICINE

## 2022-12-09 PROCEDURE — 1123F ACP DISCUSS/DSCN MKR DOCD: CPT | Performed by: INTERNAL MEDICINE

## 2022-12-09 PROCEDURE — G8417 CALC BMI ABV UP PARAM F/U: HCPCS | Performed by: INTERNAL MEDICINE

## 2022-12-09 PROCEDURE — 3078F DIAST BP <80 MM HG: CPT | Performed by: INTERNAL MEDICINE

## 2022-12-09 PROCEDURE — 1036F TOBACCO NON-USER: CPT | Performed by: INTERNAL MEDICINE

## 2022-12-09 PROCEDURE — G8400 PT W/DXA NO RESULTS DOC: HCPCS | Performed by: INTERNAL MEDICINE

## 2022-12-09 NOTE — PROGRESS NOTES
[address], [address] [facility], [address] [place] [address]  PHONE: [phone]     22    NAME:  Jessica Gramajo  : 1940  MRN: [id number]       SUBJECTIVE:   Jessica Gramajo is a 78 y.o. female seen for a follow up visit regarding the following:     Chief Complaint   Patient presents with    Irregular Heart Beat    Hypertension       HPI:   Here for HTN, Carotid Dz, CAD. Ca score 2017 was 83. Echo 2019: normal EF. Carotid US 2019 and : mild to mod LICA dz  NST :  low risk stress test.  Echo 2021: normal EF, mild pHTN     Off ARB since surgery due to low BP. She has Taj's esophagus. Stress worse at home. No new angina, SAINI, SOB. Some palp, not worsening. Patient denies recent history of orthopnea, PND, excessive dizziness and/or syncope. Past Medical History, Past Surgical History, Family history, Social History, and Medications were all reviewed with the patient today and updated as necessary. Current Outpatient Medications   Medication Sig Dispense Refill    ipratropium (ATROVENT) 0.06 % nasal spray 2 sprays by Nasal route 3 times daily      predniSONE (DELTASONE) 20 MG tablet 60 mg (3 tabs) PO once daily for 3 days; 40 mg (2 tabs) for 2 days; 20 mg (1 tab) for 2 days; 10 mg (1/2 tab) for 2 days 16 tablet 0    clindamycin (CLEOCIN) 300 MG capsule Take 1 capsule by mouth 4 times daily for 21 days 84 capsule 0    IRON, FERROUS SULFATE, PO Take 1 tablet by mouth daily. Ascorbic Acid (VITAMIN C) 500 MG CAPS Take 1 capsule by mouth daily.       citalopram (CELEXA) 20 MG tablet Take 20 mg by mouth daily      acetaminophen (TYLENOL) 500 MG tablet Take 1,000 mg by mouth every 6 hours as needed for Pain (breakthrough)      cetirizine (ZYRTEC) 10 MG tablet Take 10 mg by mouth daily      fluticasone (FLONASE) 50 MCG/ACT nasal spray 2 sprays by Nasal route daily      omeprazole (PRILOSEC) 20 MG delayed release capsule Take 20 mg by mouth 2 times daily as needed (reflux)      B Complex Vitamins (VITAMIN-B COMPLEX PO) Take 1 capsule by mouth daily (Patient not taking: Reported on 12/9/2022)      b complex vitamins capsule Take 1 capsule by mouth in the morning. meloxicam (MOBIC) 15 MG tablet Take 15 mg by mouth daily      traMADol (ULTRAM) 50 MG tablet Take 50 mg by mouth every 6 hours as needed for Pain (moderate to severe). No current facility-administered medications for this visit.         Allergies   Allergen Reactions    Adhesive Tape Other (See Comments)     Blisters, pt denies allergy 7-3-19     Codeine Itching     Pt denies allergy 7-3-19   Other reaction(s): Nausea and/or vomiting-Intolerance, Rash-Allergy     Patient Active Problem List    Diagnosis Date Noted    Spinal stenosis, lumbar 12/07/2009     Priority: High    Rib pain 08/19/2022     Priority: Medium    Hypotension 08/19/2022     Priority: Medium    Periprosthetic fracture around internal prosthetic right shoulder joint, initial encounter 08/18/2022     Priority: Medium    Acute blood loss anemia 08/18/2022     Priority: Medium    Mechanical loosening of prosthetic shoulder joint ([address] 08/18/2022     Priority: Medium    Pain due to right shoulder joint prosthesis ([address] 08/10/2022     Added automatically from request for surgery [id number]      Traumatic closed fracture of greater tuberosity of humerus with minimal displacement, right, initial encounter 08/10/2022     Added automatically from request for surgery [id number]      Periprosthetic fracture around internal prosthetic right shoulder joint 08/10/2022     Added automatically from request for surgery [id number]      Presence of right artificial shoulder joint 08/10/2022     Added automatically from request for surgery [id number]      History of COVID-19 01/28/2021    Palpitation 07/03/2019    PVD (peripheral vascular disease) ([address] 12/06/2018    Bilateral carotid artery disease ([address] 03/21/2018    Shortness of breath 01/12/2017 Status post right hip replacement 02/26/2016    Tricuspid valve disorder      Echo 2011: normal EF, mod TR, RVSP 40  Echo 2013: normal EF, mild TR        Coronary atherosclerosis of native coronary vessel      Pre Ca score 83  Normal stress test 2011, 2013, 2014        Palpitations     Chest discomfort     Hypothyroidism     Vazquez's esophagus     Hypertension, benign     Hyperlipidemia       Past Surgical History:   Procedure Laterality Date    APPENDECTOMY  1987    BREAST BIOPSY Left 1980    ENLARGED MILK GLAND    BUNIONECTOMY Bilateral 2009    with hammertoe correction    CARPAL TUNNEL RELEASE Right 10/31/13    ESOPHAGOGASTRODUODENOSCOPY  07/27/2018    2016, 2018    HAMMER TOE SURGERY  2010    revision    HYSTERECTOMY (CERVIX STATUS UNKNOWN)  1988    LAMINECTOMY  04/05/2022    T10 OPEN TOTAL LAMINECTOMY    LUMBAR LAMINECTOMY  2009    L4-L5    ORTHOPEDIC SURGERY  2010    Right hand    REVERSE TOTAL SHOULDER ARTHROPLASTY Right 2017    SHOULDER ARTHROSCOPY  2013    left shoulder cyst    SHOULDER SURGERY Right 8/18/2022    right shoulder irrigation and debridement, right shoulder removal of manjit total shoulder arthroplasty implants, revision right reverse total shoulder arthroplasty with a delta xtend prosthesis and biceps tenodesis, possible remedy spacer. general/interscalene. 23hr observation.  performed by Mitchell Mahoney MD at [place] [address] ARTHROPLASTY Left 2007    revision 3/2000    TOTAL HIP ARTHROPLASTY Left 2009    revision    TOTAL KNEE ARTHROPLASTY Right 2007    UPPER GASTROINTESTINAL ENDOSCOPY N/A 8/22/2022    EGD BIOPSY performed by Mike Cadena MD at [address] [place] [address]     Family History   Problem Relation Age of Onset    Stroke Other     Diabetes Other     Heart Disease Other     Asthma Other     Breast Cancer Neg Hx     Heart Disease Mother         CONGESTIVE HEART DISEASE    Stroke Father     Hypertension Other      Social History     Tobacco Use    Smoking status: Never    Smokeless tobacco: Never   Substance Use Topics    Alcohol use: Not Currently         ROS:    No obvious pertinent positives on review of systems except for what was outlined in the HPI today.       PHYSICAL EXAM:     BP (!) 158/82   Pulse 72   Ht 5' 2\" (1.575 m)   Wt 145 lb (65.8 kg)   BMI 26.52 kg/m²    General/Constitutional:   Alert and oriented x 3, no acute distress  HEENT:   normocephalic, atraumatic, moist mucous membranes  Neck:   No JVD or carotid bruits bilaterally  Cardiovascular:   regular rate and rhythm, no murmur/rub/gallop appreciated  Pulmonary:   clear to auscultation bilaterally, no respiratory distress  Abdomen:   soft, non-tender, non-distended  Ext:   No sig LE edema bilaterally  Skin:  warm and dry, no obvious rashes seen  Neuro:   no obvious sensory or motor deficits  Psychiatric:   normal mood and affect      Lab Results   Component Value Date/Time     11/09/2022 01:57 PM    K 4.2 11/09/2022 01:57 PM     11/09/2022 01:57 PM    CO2 28 11/09/2022 01:57 PM    BUN 12 11/09/2022 01:57 PM    CREATININE 0.90 11/09/2022 01:57 PM    GLUCOSE 105 11/09/2022 01:57 PM    CALCIUM 9.3 11/09/2022 01:57 PM        Lab Results   Component Value Date    WBC 6.9 11/09/2022    HGB 12.0 11/09/2022    HCT 37.4 11/09/2022    .4 11/09/2022     11/09/2022       No results found for: TSHFT4, TSH    Lab Results   Component Value Date    LABA1C 5.5 11/09/2022     Lab Results   Component Value Date     11/09/2022       Lab Results   Component Value Date    CHOL 200 (H) 11/09/2022     Lab Results   Component Value Date    TRIG 73 11/09/2022     Lab Results   Component Value Date    HDL 70 (H) 11/09/2022     Lab Results   Component Value Date    LDLCALC 115.4 (H) 11/09/2022     Lab Results   Component Value Date    LABVLDL 14.6 11/09/2022     Lab Results   Component Value Date    CHOLHDLRATIO 2.9 11/09/2022           I have Independently reviewed prior care notes, any ER records available, cardiac testing, labs and results with the patient and before seeing the patient today. Also independently reviewed outside records when available. ASSESSMENT:    Pavan Buckley was seen today for irregular heart beat and hypertension. Diagnoses and all orders for this visit:    Atherosclerosis of native coronary artery of native heart without angina pectoris    Hypertension, benign    PVD (peripheral vascular disease) (HCC)    Palpitations    Bilateral carotid artery stenosis        PLAN:     1. CAD: Past NST ok, EF normal.    Follow pHTN. Follow for more sx. 2. PVD/Carotid Dz:  Carotid mod, check in 12 mos. Needs back on ASA 81 daily. 3. HPL:  Being followed, no med needed. 4. Palp: rare now, LA size noted, follow for more sx. Follow for PAF, follow for more sx. 5. HTN: better at home, some white coat HTN. Follow for now. Patient has been instructed and agrees to call our office with any issues or other concerns related to their cardiac condition(s) and/or complaint(s). Return in about 3 months (around 3/9/2023).        LYDIA PEÑA,   12/9/2022

## 2022-12-15 ENCOUNTER — TELEPHONE (OUTPATIENT)
Dept: CARDIOLOGY CLINIC | Age: 82
End: 2022-12-15

## 2022-12-15 NOTE — TELEPHONE ENCOUNTER
Patient called because she would like to discuss having some tests done. She believes insurance would pay if he approves.  Please call

## 2022-12-27 PROCEDURE — 99283 EMERGENCY DEPT VISIT LOW MDM: CPT

## 2022-12-28 ENCOUNTER — HOSPITAL ENCOUNTER (EMERGENCY)
Dept: GENERAL RADIOLOGY | Age: 82
Discharge: HOME | End: 2022-12-31
Payer: MEDICARE

## 2022-12-28 ENCOUNTER — HOSPITAL ENCOUNTER (EMERGENCY)
Age: 82
Discharge: HOME | End: 2022-12-28
Attending: EMERGENCY MEDICINE
Payer: MEDICARE

## 2022-12-28 VITALS
SYSTOLIC BLOOD PRESSURE: 123 MMHG | BODY MASS INDEX: 27.6 KG/M2 | RESPIRATION RATE: 18 BRPM | DIASTOLIC BLOOD PRESSURE: 77 MMHG | TEMPERATURE: 99.1 F | OXYGEN SATURATION: 96 % | HEIGHT: 62 IN | HEART RATE: 91 BPM | WEIGHT: 150 LBS

## 2022-12-28 DIAGNOSIS — S22.060A COMPRESSION FRACTURE OF T7 VERTEBRA, INITIAL ENCOUNTER (HCC): ICD-10-CM
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
## 2022-12-28 DIAGNOSIS — S22.070A COMPRESSION FRACTURE OF T9 VERTEBRA, INITIAL ENCOUNTER (HCC): ICD-10-CM

## 2022-12-28 PROCEDURE — 73522 X-RAY EXAM HIPS BI 3-4 VIEWS: CPT

## 2022-12-28 PROCEDURE — 72100 X-RAY EXAM L-S SPINE 2/3 VWS: CPT

## 2022-12-28 PROCEDURE — 72040 X-RAY EXAM NECK SPINE 2-3 VW: CPT

## 2022-12-28 PROCEDURE — 72072 X-RAY EXAM THORAC SPINE 3VWS: CPT

## 2022-12-28 PROCEDURE — 73030 X-RAY EXAM OF SHOULDER: CPT

## 2022-12-28 ASSESSMENT — PAIN - FUNCTIONAL ASSESSMENT: PAIN_FUNCTIONAL_ASSESSMENT: 0-10

## 2022-12-28 ASSESSMENT — ENCOUNTER SYMPTOMS
SHORTNESS OF BREATH: 0
ABDOMINAL PAIN: 0
BACK PAIN: 1
VOMITING: 0
NAUSEA: 0

## 2022-12-28 ASSESSMENT — PAIN DESCRIPTION - LOCATION: LOCATION: BACK;SHOULDER

## 2022-12-28 ASSESSMENT — PAIN DESCRIPTION - DESCRIPTORS: DESCRIPTORS: ACHING

## 2022-12-28 ASSESSMENT — PAIN DESCRIPTION - ORIENTATION: ORIENTATION: LEFT;LOWER;UPPER

## 2022-12-28 ASSESSMENT — PAIN SCALES - GENERAL: PAINLEVEL_OUTOF10: 8

## 2022-12-28 NOTE — ED NOTES
I have reviewed discharge instructions with the patient. The patient verbalized understanding. Patient left ED via Discharge Method: wheelchair to Home with family. Opportunity for questions and clarification provided. Patient given 1 scripts. To continue your aftercare when you leave the hospital, you may receive an automated call from our care team to check in on how you are doing. This is a free service and part of our promise to provide the best care and service to meet your aftercare needs.  If you have questions, or wish to unsubscribe from this service please call [phone]. Thank you for Choosing our [facility] [employer] [facility].           Sangita Morrison RN  12/28/22 [id number]

## 2022-12-28 NOTE — ED PROVIDER NOTES
Emergency Department Provider Note                   PCP:                SHAVON Blanco CNP               Age: 78 y.o. Sex: female       ICD-10-CM    1. Fall from bed, initial encounter  W06. XXXA       2. Compression fracture of T7 vertebra, initial encounter ([facility]  S22.060A       3. Compression fracture of T9 vertebra, initial encounter ([facility]  S22.070A           DISPOSITION Decision To Discharge 12/28/2022 03:50:15 AM        MDM  Number of Diagnoses or Management Options  Compression fracture of T7 vertebra, initial encounter ([facility]  Compression fracture of T9 vertebra, initial encounter ([facility]  Fall from bed, initial encounter  Diagnosis management comments: Patient is a 78-year-old female who presents to the facility today after a slip and fall off the side of her bed onto her bottom on the floor. On exam patient well-appearing in no acute distress. Vital signs stable. Imaging was obtained here in the department. There was a noted mild compression of T7 and T9 acted to be chronic in nature. I have written a prescription for the patient for a TSLO brace and given them contact information for [facility] to coordinate getting a brace to help with pain. No CT of C-spine was obtained following radiographs due to the fact that the patient did not hit her head, suffer any sort of whiplash type injury, or any other acute injury to the neck to warrant concern and further imaging. I have discussed and reviewed this with both the patient and the patient's daughter who poor understanding and agreement with this. They are pleased with the information about obtaining a brace for the patient to wear. Contact information for follow-up was provided to them in their discharge paperwork. Strict return precautions to the ED were discussed before discharge. Patient discharged in stable condition.        Amount and/or Complexity of Data Reviewed  Tests in the radiology section of CPT®: ordered and reviewed  Review and summarize past medical records: yes  Discuss the patient with other providers: yes (Dr. Isabel Rosales, ED attending)  Independent visualization of images, tracings, or specimens: yes    Risk of Complications, Morbidity, and/or Mortality  Presenting problems: low  Diagnostic procedures: low  Management options: low  General comments: XR LUMBAR SPINE (2-3 VIEWS)   Final Result    1. No evidence of acute fracture or subluxation of the lumbar spine allowing for    limitations. No obvious, significant interval change. XR HIP 3-4 VW W PELVIS BILATERAL   Final Result    No evidence of acute fracture or dislocation of the bilateral hip prostheses. XR SHOULDER LEFT (MIN 2 VIEWS)   Final Result    No evidence of acute fracture or dislocation. Severe degenerative changes. Question rotator cuff injury. XR THORACIC SPINE (3 VIEWS)   Final Result    1. There appears to be mild compression of the T7 and T9 vertebral bodies. The    age is are unclear. XR CERVICAL SPINE (2-3 VIEWS)   Final Result    Very limited assessment of the cervical spine as described above. Further assessment with CT may be of benefit. Patient Progress  Patient progress: stable                              Orders Placed This Encounter   Procedures    XR LUMBAR SPINE (2-3 VIEWS)    XR HIP 3-4 VW W PELVIS BILATERAL    XR SHOULDER LEFT (MIN 2 VIEWS)    XR THORACIC SPINE (3 VIEWS)    XR CERVICAL SPINE (2-3 VIEWS)        Medications - No data to display    Discharge Medication List as of 12/28/2022  3:54 AM           Sarah Zavala is a 78 y.o. female who presents to the Emergency Department with chief complaint of    Chief Complaint   Patient presents with    Fall      Patient is an 78-year-old female presents office today today with complaint of back pain after a fall where she slid off the edge of her bed landing on the ground on her bottom.   Patient states she tried to call for help to her family when no one responded she tried to get up on the bed. She states she started to fall from the bed and was unable to stop her self and slid down onto the ground landing on her bottom. She denies hitting her head or losing consciousness. Denies taking blood thinners. States the pain is in her bilateral lower back as well has her mid back and her neck is sore as well. Daughter is in the room as well and states that she has been doing with chronic neck pain for a while now and that was there before the fall. She states she has pain medicine for chronic pain as well and takes tramadol. She reports she took a tramadol prior to coming with EMS. She states she is unsure whether or not it helped. No loss of bowel or bladder control since it occurred. No saddle paresthesias. No other symptoms noted. Patient sitting comfortably in wheelchair at time of exam.    The history is provided by the patient. Review of Systems   Constitutional:  Negative for chills and fever. Respiratory:  Negative for shortness of breath. Cardiovascular:  Negative for chest pain. Gastrointestinal:  Negative for abdominal pain, nausea and vomiting. Genitourinary:  Negative for dysuria, frequency and urgency. Musculoskeletal:  Positive for arthralgias, back pain and neck pain. Negative for gait problem, joint swelling and neck stiffness. Skin:  Negative for rash and wound. Neurological:  Negative for dizziness, syncope and headaches. Psychiatric/Behavioral:  Negative for agitation and behavioral problems. All other systems reviewed and are negative.     Past Medical History:   Diagnosis Date    Arthritis     osteo    Vazquez's esophagus     Managed with as needed meds    Bilateral carotid artery disease ([facility]     Followed by cardiology- Dr. Vy Maldonado    Chest discomfort     Coronary atherosclerosis of native coronary vessel     Degeneration of lumbar or lumbosacral intervertebral disc     Depression medication     Gastric ulcer, unspecified as acute or chronic, without mention of hemorrhage or perforation 1989    History of blood transfusion     Hyperlipidemia     Hypothyroidism     no meds needed currently (low normal range per pt)    Infectious disease     lyme disease 2004    Nausea & vomiting     Palpitations     PVD (peripheral vascular disease) (HCC)     Scoliosis (and kyphoscoliosis), idiopathic     Spinal stenosis, lumbar 12/7/2009    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    Thoracic or lumbosacral neuritis or radiculitis, unspecified     Tricuspid regurgitation     mild per echo dated 1-11-13    Tricuspid valve disorder         Past Surgical History:   Procedure Laterality Date    APPENDECTOMY  1987    BREAST BIOPSY Left 1980    ENLARGED MILK GLAND    BUNIONECTOMY Bilateral 2009    with hammertoe correction    CARPAL TUNNEL RELEASE Right 10/31/13    ESOPHAGOGASTRODUODENOSCOPY  07/27/2018    2016, 2018    HAMMER TOE SURGERY  2010    revision    HYSTERECTOMY (CERVIX STATUS UNKNOWN)  1988    LAMINECTOMY  04/05/2022    T10 OPEN TOTAL LAMINECTOMY    LUMBAR LAMINECTOMY  2009    L4-L5    ORTHOPEDIC SURGERY  2010    Right hand    REVERSE TOTAL SHOULDER ARTHROPLASTY Right 2017    SHOULDER ARTHROSCOPY  2013    left shoulder cyst    SHOULDER SURGERY Right 8/18/2022    right shoulder irrigation and debridement, right shoulder removal of manjit total shoulder arthroplasty implants, revision right reverse total shoulder arthroplasty with a delta xtend prosthesis and biceps tenodesis, possible remedy spacer. general/interscalene. 23hr observation.  performed by Tiarra Colón MD at [facility] Left 2007    revision 3/2000    TOTAL HIP ARTHROPLASTY Left 2009    revision    TOTAL KNEE ARTHROPLASTY Right 2007    UPPER GASTROINTESTINAL ENDOSCOPY N/A 8/22/2022    EGD BIOPSY performed by Kevin Roper MD at [address] [place] [address]        Family History   Problem Relation Age of Onset    Stroke Other     Diabetes Other     Heart Disease Other     Asthma Other     Breast Cancer Neg Hx     Heart Disease Mother         CONGESTIVE HEART DISEASE    Stroke Father     Hypertension Other         Social History     Socioeconomic History    Marital status:    Tobacco Use    Smoking status: Never    Smokeless tobacco: Never   Vaping Use    Vaping Use: Never used   Substance and Sexual Activity    Alcohol use: Not Currently    Drug use: No         Adhesive tape and Codeine     Discharge Medication List as of 12/28/2022  3:54 AM        CONTINUE these medications which have NOT CHANGED    Details   ipratropium (ATROVENT) 0.06 % nasal spray 2 sprays by Nasal route 3 times dailyHistorical Med      B Complex Vitamins (VITAMIN-B COMPLEX PO) Take 1 capsule by mouth dailyHistorical Med      predniSONE (DELTASONE) 20 MG tablet 60 mg (3 tabs) PO once daily for 3 days; 40 mg (2 tabs) for 2 days; 20 mg (1 tab) for 2 days; 10 mg (1/2 tab) for 2 days, Disp-16 tablet, R-0Normal      IRON, FERROUS SULFATE, PO Take 1 tablet by mouth daily. Historical Med      Ascorbic Acid (VITAMIN C) 500 MG CAPS Take 1 capsule by mouth daily. Historical Med      citalopram (CELEXA) 20 MG tablet Take 20 mg by mouth dailyHistorical Med      acetaminophen (TYLENOL) 500 MG tablet Take 1,000 mg by mouth every 6 hours as needed for Pain (breakthrough)Historical Med      b complex vitamins capsule Take 1 capsule by mouth in the morning. Historical Med      cetirizine (ZYRTEC) 10 MG tablet Take 10 mg by mouth dailyHistorical Med      fluticasone (FLONASE) 50 MCG/ACT nasal spray 2 sprays by Nasal route dailyHistorical Med      meloxicam (MOBIC) 15 MG tablet Take 15 mg by mouth dailyHistorical Med      omeprazole (PRILOSEC) 20 MG delayed release capsule Take 20 mg by mouth 2 times daily as needed (reflux)Historical Med      traMADol (ULTRAM) 50 MG tablet Take 50 mg by mouth every 6 hours as needed for Pain (moderate to severe). Historical Med Vitals signs and nursing note reviewed. Patient Vitals for the past 4 hrs:   Pulse Resp BP SpO2   12/28/22 0305 91 18 123/77 96 %          Physical Exam  Vitals and nursing note reviewed. Constitutional:       General: She is not in acute distress. Appearance: Normal appearance. She is not ill-appearing. HENT:      Head: Normocephalic and atraumatic. Right Ear: External ear normal.      Left Ear: External ear normal.   Eyes:      Extraocular Movements: Extraocular movements intact. Conjunctiva/sclera: Conjunctivae normal.   Cardiovascular:      Rate and Rhythm: Normal rate and regular rhythm. Pulses: Normal pulses. Heart sounds: Normal heart sounds. Pulmonary:      Effort: Pulmonary effort is normal.      Breath sounds: Normal breath sounds. Abdominal:      General: Abdomen is flat. Musculoskeletal:         General: Normal range of motion. Left shoulder: Bony tenderness present. No swelling, deformity or tenderness. Normal range of motion. Normal strength. Cervical back: Normal range of motion. Tenderness present. No swelling, edema, rigidity, torticollis or bony tenderness. Thoracic back: Tenderness present. No bony tenderness. Lumbar back: Tenderness present. No swelling, edema, spasms or bony tenderness. Normal range of motion. Back:    Skin:     General: Skin is warm and dry. Capillary Refill: Capillary refill takes less than 2 seconds. Neurological:      General: No focal deficit present. Mental Status: She is alert and oriented to person, place, and time. Psychiatric:         Mood and Affect: Mood normal.         Behavior: Behavior normal.        Procedures    Results for orders placed or performed during the hospital encounter of 12/28/22   XR LUMBAR SPINE (2-3 VIEWS)    Narrative    EXAMINATION: XR LUMBAR SPINE (2-3 VIEWS)    HISTORY: Low back pain after a fall.       TECHNIQUE: Frontal, lateral, and coned-down L5-S1 views of the lumbar spine. COMPARISON: 7/3/2018    FINDINGS:   Significant dextroscoliosis. As seen in the vertebral bodies appear to be well aligned. There is significant narrowing of the intervertebral disc space at all levels. Osteophytes are noted throughout the lumbar spine. There may be mild wedging of the left side of L3 on the right side of L5  secondary to scoliosis. No appreciable soft tissue abnormalities. Impression    1. No evidence of acute fracture or subluxation of the lumbar spine allowing for  limitations. No obvious, significant interval change. XR HIP 3-4 VW W PELVIS BILATERAL    Narrative    EXAMINATION: Bilateral Hip    HISTORY: fall onto bottom from bed. TECHNIQUE: Single frontal view of the pelvis. AP and lateral views of the  bilateral hip. COMPARISON: 3/25/2021    FINDINGS:   No evidence of acute fracture or dislocation of the pelvis. Bilateral hip joints  are intact. Bilateral total hip arthroplasty. No evidence of dislocation, acute fracture, or obvious loosening of the hip  prostheses. Soft tissues are unremarkable. Impression    No evidence of acute fracture or dislocation of the bilateral hip prostheses. XR SHOULDER LEFT (MIN 2 VIEWS)    Narrative    EXAMINATION: XR SHOULDER LEFT (MIN 2 VIEWS)    HISTORY: Shoulder pain. TECHNIQUE: Internal rotation, external rotation, and scapula Y views of the left  shoulder. COMPARISON: Chest x-ray dated 8/19/2022    FINDINGS:   There is no evidence of acute fracture or dislocation. Severe sclerotic degenerative changes are noted at the shoulder and there is  superior subluxation suggesting rotator cuff injury. The appearance was similar  on the comparison examination. The left clavicle is shortened. This is likely postoperative. Visualized ribs are within normal limits. Soft tissues and upper lung field are unremarkable. Impression    No evidence of acute fracture or dislocation.  Severe degenerative changes. Question rotator cuff injury. XR THORACIC SPINE (3 VIEWS)    Narrative    EXAMINATION: XR THORACIC SPINE (3 VIEWS)    HISTORY: Pain after a fall. TECHNIQUE: Frontal and lateral views of the thoracic spine were submitted. COMPARISON: None available. FINDINGS:   Thoracic alignment is maintained. There appears to be mild compression of the T7 and T9 vertebral bodies. The age  is are unclear. The remaining body heights are within normal limits. Intervertebral disc space narrowing is seen at multiple levels. There appears to  be fusion of C4, 5, and 6. Impression    1. There appears to be mild compression of the T7 and T9 vertebral bodies. The  age is are unclear. XR CERVICAL SPINE (2-3 VIEWS)    Narrative    EXAMINATION: XR CERVICAL SPINE (2-3 VIEWS)    HISTORY: neck soreness after fall. TECHNIQUE: Frontal, lateral, swimmer's, and open mouth odontoid views of the  cervical spine. COMPARISON: None available. FINDINGS:   C2-6 are visualized. Mild grade 1 anterolisthesis of C3 on C4. Mild retrolisthesis of C5 on C6. There is significant narrowing of the C4-5 and 5-6 intervertebral disc spaces. The open-mouth view appears to show the lateral masses of C1 aligned up with C2. The odontoid is suboptimally assessed and a fracture cannot be excluded    No prevertebral edema or gas. Impression    Very limited assessment of the cervical spine as described above. Further assessment with CT may be of benefit. XR LUMBAR SPINE (2-3 VIEWS)   Final Result   1. No evidence of acute fracture or subluxation of the lumbar spine allowing for   limitations. No obvious, significant interval change. XR HIP 3-4 VW W PELVIS BILATERAL   Final Result   No evidence of acute fracture or dislocation of the bilateral hip prostheses. XR SHOULDER LEFT (MIN 2 VIEWS)   Final Result   No evidence of acute fracture or dislocation.  Severe degenerative changes. Question rotator cuff injury. XR THORACIC SPINE (3 VIEWS)   Final Result   1. There appears to be mild compression of the T7 and T9 vertebral bodies. The   age is are unclear. XR CERVICAL SPINE (2-3 VIEWS)   Final Result   Very limited assessment of the cervical spine as described above. Further assessment with CT may be of benefit. Voice dictation software was used during the making of this note. This software is not perfect and grammatical and other typographical errors may be present. This note has not been completely proofread for errors.      Mirtha Goldman PA-C  12/28/22 [id number]

## 2022-12-28 NOTE — ED TRIAGE NOTES
Patient presents in wheelchair to triage in no apparent distress, accompanied by daughter. Pt sts tonight she fell out of bed onto her bottom. She now expresses pain to lower and upper back, neck and left shoulder. Pt denies loss of consciousness, use of blood thinners, dizziness. Pt sts she took a Toradol at home just before EMS arrival, sts she cannot tell if it has help yet.     PT seen by DAYDAY Palencia in triage

## 2022-12-28 NOTE — ED TRIAGE NOTES
EMS: Patient arriving from home via [address] [place] [address] 20. Pt went to get up tonight and fell onto her back side. C/O lower back pain and left shoulder pain. Pt sts she has chronic left foot numbness. No LOC, no blood thinners, no dizziness. 116/73, 85HR, 99% room air. No interventions en route.

## 2022-12-28 NOTE — DISCHARGE INSTRUCTIONS
I have given you a prescription for a 1 TSLO brace to take to [facility] whose information I have given you above with an address and phone number. Hopefully they will be able to call and come out to you to get you set up otherwise take the prescription to the location listed and see how they can help you get a TSLO brace fitted for relief of symptoms. Follow-up with family doctor. Return to ED as needed.

## 2022-12-30 NOTE — PROGRESS NOTES
[address], [address] [facility], [address] [place] [address]  PHONE: [phone]     22    NAME:  Robson Mojica  : 1940  MRN: [id number]       SUBJECTIVE:   Robson Mojica is a 78 y.o. female seen for a follow up visit regarding the following:     Chief Complaint   Patient presents with    Hypertension    Coronary Artery Disease    Hyperlipidemia       HPI: Here for HTN, Carotid Dz, CAD. Ca score 2017 was 83. Echo 2019: normal EF. Carotid US 2019 and 7543: mild to mod LICA dz  NST 7832:  low risk stress test.  Echo 2021: normal EF, mild pHTN     Did well since back surgery . Off ARB since surgery due to low BP. She has Taj's esophagus. Stress worse at home. No new angina, SAINI, SOB. Patient denies recent history of orthopnea, PND, excessive dizziness and/or syncope.          Past Medical History, Past Surgical History, Family history, Social History, and Medications were all reviewed with the patient today and updated as necessary. Current Outpatient Medications   Medication Sig Dispense Refill    cetirizine (ZYRTEC) 10 MG tablet Take 10 mg by mouth daily      fluticasone (FLONASE) 50 MCG/ACT nasal spray 2 sprays by Nasal route daily      meloxicam (MOBIC) 15 MG tablet Take 15 mg by mouth daily      omeprazole (PRILOSEC) 20 MG delayed release capsule Take 20 mg by mouth 2 times daily      traMADol (ULTRAM) 50 MG tablet Take 50 mg by mouth every 6 hours as needed.  losartan (COZAAR) 50 MG tablet Take 50 mg by mouth daily (Patient not taking: Reported on 2022)       No current facility-administered medications for this visit.         Allergies   Allergen Reactions    Adhesive Tape Other (See Comments)     Blisters, pt denies allergy 7-3-19     Codeine Itching     Pt denies allergy 7-3-19      Patient Active Problem List    Diagnosis Date Noted    Spinal stenosis, lumbar 2009     Priority: High    History of COVID-19 01/28/2021    Palpitation 07/03/2019    PVD (peripheral vascular disease) ([facility] 12/06/2018    Bilateral carotid artery disease ([facility] 03/21/2018    Shortness of breath 01/12/2017    Status post right hip replacement 02/26/2016    Tricuspid valve disorder      Echo 2011: normal EF, mod TR, RVSP 40  Echo 2013: normal EF, mild TR        Coronary atherosclerosis of native coronary vessel      Pre Ca score 83  Normal stress test 2011, 2013, 2014        Palpitations     Chest discomfort     Hypothyroidism     Vazquez's esophagus     Hypertension, benign     Hyperlipidemia       Past Surgical History:   Procedure Laterality Date    APPENDECTOMY  1987    BREAST BIOPSY Left 1980    ENLARGED MILK GLAND    BUNIONECTOMY Bilateral 2009    with hammertoe correction    CARPAL TUNNEL RELEASE Right 10/31/13    HAMMER TOE SURGERY  2010    revision    HYSTERECTOMY (CERVIX STATUS UNKNOWN)  1988    LUMBAR LAMINECTOMY  2009    L4-L5    ORTHOPEDIC SURGERY  2010    Right hand    SHOULDER ARTHROSCOPY  2013    left shoulder cyst    TOTAL HIP ARTHROPLASTY Left 2007    revision 3/2000    TOTAL HIP ARTHROPLASTY Left 2009    revision    TOTAL KNEE ARTHROPLASTY Right 2007     Family History   Problem Relation Age of Onset    Stroke Other     Diabetes Other     Heart Disease Other     Asthma Other     Breast Cancer Neg Hx     Heart Disease Mother         CONGESTIVE HEART DISEASE    Stroke Father     Hypertension Other      Social History     Tobacco Use    Smoking status: Never Smoker    Smokeless tobacco: Never Used   Substance Use Topics    Alcohol use: Not Currently         ROS:    No obvious pertinent positives on review of systems except for what was outlined in the HPI today.       PHYSICAL EXAM:     /60   Pulse 68   Ht 5' 4\" (1.626 m)   Wt 153 lb 12.8 oz (69.8 kg)   BMI 26.40 kg/m²    General/Constitutional:   Alert and oriented x 3, no acute distress  HEENT:   normocephalic, atraumatic, moist mucous membranes  Neck:   No JVD or carotid bruits bilaterally  Cardiovascular:   regular rate and rhythm, no murmur/rub/gallop appreciated  Pulmonary:   clear to auscultation bilaterally, no respiratory distress  Abdomen:   soft, non-tender, non-distended  Ext:   No sig LE edema bilaterally  Skin:  warm and dry, no obvious rashes seen  Neuro:   no obvious sensory or motor deficits  Psychiatric:   normal mood and affect      Lab Results   Component Value Date     11/30/2021    K 4.7 11/30/2021     11/30/2021    CO2 27 11/30/2021    BUN 20 11/30/2021    CREATININE 0.88 02/04/2022    CREATININE 0.86 11/30/2021    GLUCOSE 98 11/30/2021    CALCIUM 9.4 11/30/2021        Lab Results   Component Value Date    WBC 6.0 01/26/2022    HGB 12.3 01/26/2022    HCT 37.9 01/26/2022    MCV 97.9 (H) 01/26/2022     01/26/2022       No results found for: TSHFT4, TSH    No results found for: LABA1C  No results found for: EAG    No results found for: CHOL  No results found for: TRIG  No results found for: HDL  No results found for: LDLCHOLESTEROL, LDLCALC  No results found for: LABVLDL, VLDL  No results found for: CHOLHDLRATIO        I have Independently reviewed prior care notes, any ER records available, cardiac testing, labs and results with the patient and before seeing the patient today. Also independently reviewed outside records when available. ASSESSMENT:    Mar Dow was seen today for hypertension, coronary artery disease and hyperlipidemia. Diagnoses and all orders for this visit:    PVD (peripheral vascular disease) ([facility]    Bilateral carotid artery stenosis    Atherosclerosis of native coronary artery of native heart without angina pectoris    Hypertension, benign          PLAN:   1. CAD: Past NST ok, EF normal.    Follow pHTN. Follow for more sx.      2. PVD/Carotid Dz:  Carotid mod, check in 12 mos. Needs back on ASA 81 daily.      3. HPL:  Being followed, needs better diet. Follow.    Working on such.        4. Palp: rare now, LA size noted, follow for more sx.       5. HTN: lower now, need to follow. Some more salt in diet. Off the ARB. Needs good diet.           Patient has been instructed and agrees to call our office with any issues or other concerns related to their cardiac condition(s) and/or complaint(s). Return in about 3 months (around 9/20/2022).        LYDIA PEÑA, DO  6/20/2022 There are no Wet Read(s) to document. There is 1 Wet Read(s) to document.

## 2023-01-16 ENCOUNTER — HOSPITAL ENCOUNTER (OUTPATIENT)
Dept: CT IMAGING | Age: 83
Discharge: HOME | End: 2023-01-19
Payer: MEDICARE

## 2023-01-16 DIAGNOSIS — J31.0 CHRONIC RHINOSINUSITIS: ICD-10-CM

## 2023-01-16 DIAGNOSIS — J32.9 CHRONIC RHINOSINUSITIS: ICD-10-CM

## 2023-01-16 PROCEDURE — 70486 CT MAXILLOFACIAL W/O DYE: CPT

## 2023-01-20 ENCOUNTER — TELEPHONE (OUTPATIENT)
Dept: ORTHOPEDIC SURGERY | Age: 83
End: 2023-01-20

## 2023-01-20 NOTE — TELEPHONE ENCOUNTER
She is asking for a refill on Tramadol. The last refill she got was in Sept. She does use [facility] in [place].

## 2023-01-20 NOTE — TELEPHONE ENCOUNTER
Spoke with patient and told her that Dr. Anibal Runner was not in the office and we would need to ask him about her tramadol rx on Monday.

## 2023-01-23 ENCOUNTER — OFFICE VISIT (OUTPATIENT)
Dept: ENT CLINIC | Age: 83
End: 2023-01-23
Payer: MEDICARE

## 2023-01-23 VITALS — BODY MASS INDEX: 27.6 KG/M2 | WEIGHT: 150 LBS | HEIGHT: 62 IN | RESPIRATION RATE: 16 BRPM

## 2023-01-23 DIAGNOSIS — H65.91 MIDDLE EAR EFFUSION, RIGHT: ICD-10-CM

## 2023-01-23 DIAGNOSIS — J35.8 TONSILLAR MASS: Primary | ICD-10-CM

## 2023-01-23 DIAGNOSIS — J02.9 SORE THROAT: ICD-10-CM

## 2023-01-23 PROCEDURE — 1123F ACP DISCUSS/DSCN MKR DOCD: CPT | Performed by: STUDENT IN AN ORGANIZED HEALTH CARE EDUCATION/TRAINING PROGRAM

## 2023-01-23 PROCEDURE — 69433 CREATE EARDRUM OPENING: CPT | Performed by: STUDENT IN AN ORGANIZED HEALTH CARE EDUCATION/TRAINING PROGRAM

## 2023-01-23 PROCEDURE — 99214 OFFICE O/P EST MOD 30 MIN: CPT | Performed by: STUDENT IN AN ORGANIZED HEALTH CARE EDUCATION/TRAINING PROGRAM

## 2023-01-23 PROCEDURE — G8400 PT W/DXA NO RESULTS DOC: HCPCS | Performed by: STUDENT IN AN ORGANIZED HEALTH CARE EDUCATION/TRAINING PROGRAM

## 2023-01-23 PROCEDURE — 1090F PRES/ABSN URINE INCON ASSESS: CPT | Performed by: STUDENT IN AN ORGANIZED HEALTH CARE EDUCATION/TRAINING PROGRAM

## 2023-01-23 PROCEDURE — G8417 CALC BMI ABV UP PARAM F/U: HCPCS | Performed by: STUDENT IN AN ORGANIZED HEALTH CARE EDUCATION/TRAINING PROGRAM

## 2023-01-23 PROCEDURE — G8427 DOCREV CUR MEDS BY ELIG CLIN: HCPCS | Performed by: STUDENT IN AN ORGANIZED HEALTH CARE EDUCATION/TRAINING PROGRAM

## 2023-01-23 PROCEDURE — G8484 FLU IMMUNIZE NO ADMIN: HCPCS | Performed by: STUDENT IN AN ORGANIZED HEALTH CARE EDUCATION/TRAINING PROGRAM

## 2023-01-23 PROCEDURE — 1036F TOBACCO NON-USER: CPT | Performed by: STUDENT IN AN ORGANIZED HEALTH CARE EDUCATION/TRAINING PROGRAM

## 2023-01-23 RX ORDER — OFLOXACIN 3 MG/ML
5 SOLUTION AURICULAR (OTIC) 2 TIMES DAILY
Qty: 10 ML | Refills: 0 | Status: SHIPPED | OUTPATIENT
Start: 2023-01-23 | End: 2023-01-28

## 2023-01-23 ASSESSMENT — ENCOUNTER SYMPTOMS
ABDOMINAL DISTENTION: 0
COLOR CHANGE: 0
SINUS PAIN: 1
EYE DISCHARGE: 0
APNEA: 0

## 2023-01-23 NOTE — PROGRESS NOTES
HPI:  Bria Espino is a 78 y.o. female seen Established   Chief Complaint   Patient presents with    Follow-up     Patient presents today for CT scan removal . Patient states that she did have some improvement from medication therapy initially but feels as if symptoms have returned. 47-year-old female seen as a follow-up evaluation having had a history of eustachian tube dysfunction last evaluated at the beginning of December 2022 for similar complaints. She was found at the time to have bilateral type C tympanograms without obvious middle ear effusions with significant right greater than left new hearing loss in the setting of chronic hearing loss with the use of hearing aids. Her symptoms are also associated with a significant sinusitis event which have been lingering for a couple weeks following having had a left-sided tonsillitis that she described as the worst pain of her life and a possible tonsil abscess. She was given recommendations for treatment of eustachian tube dysfunction for which she is continue to use intranasal steroid allergy sprays, was given clindamycin 3 times daily for 3 weeks and a prednisone taper. She believes that the prednisone helped her the most but symptoms did rebound pretty soon after and she now again has significant right-sided hearing loss. She has been evaluated by her audiologist several times to have her hearing aid inspected and he has not had any concerns to the ear exam or the hearing aid. She continues to be concerned of her left-sided tonsil as it has had some discoloration changes ongoing since her tonsil infection/abscess 2 months ago. Past Medical History, Past Surgical History, Family history, Social History, and Medications were all reviewed with the patient today and updated as necessary.      Allergies   Allergen Reactions    Codeine Itching     Other reaction(s): Nausea and/or vomiting-Intolerance, Rash-Allergy    Adhesive Tape Rash and Other (See Comments)     Blisters       Patient Active Problem List   Diagnosis    PVD (peripheral vascular disease) ([facility] [address]    Status post right hip replacement    Shortness of breath    Palpitations    Chest discomfort    History of COVID-19    Spinal stenosis, lumbar    Bilateral carotid artery disease (HCC)    Palpitation    Hypothyroidism    Vazquez's esophagus    Tricuspid valve disorder    Coronary atherosclerosis of native coronary vessel    Hypertension, benign    Hyperlipidemia    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    Periprosthetic fracture around internal prosthetic right shoulder joint    Presence of right artificial shoulder joint    Periprosthetic fracture around internal prosthetic right shoulder joint, initial encounter    Acute blood loss anemia    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)    Rib pain    Hypotension    Tonsillar mass       Current Outpatient Medications   Medication Sig    ofloxacin (FLOXIN) 0.3 % otic solution Place 5 drops into the right ear 2 times daily for 5 days    ipratropium (ATROVENT) 0.06 % nasal spray 2 sprays by Nasal route 3 times daily (Patient not taking: Reported on 1/24/2023)    B Complex Vitamins (VITAMIN-B COMPLEX PO) Take 1 capsule by mouth daily    predniSONE (DELTASONE) 20 MG tablet 60 mg (3 tabs) PO once daily for 3 days; 40 mg (2 tabs) for 2 days; 20 mg (1 tab) for 2 days; 10 mg (1/2 tab) for 2 days (Patient not taking: Reported on 1/24/2023)    IRON, FERROUS SULFATE, PO Take 1 tablet by mouth daily. Ascorbic Acid (VITAMIN C) 500 MG CAPS Take 1 capsule by mouth daily.     citalopram (CELEXA) 20 MG tablet Take 20 mg by mouth daily    acetaminophen (TYLENOL) 500 MG tablet Take 1,000 mg by mouth every 6 hours as needed for Pain (breakthrough)    cetirizine (ZYRTEC) 10 MG tablet Take 10 mg by mouth daily    fluticasone (FLONASE) 50 MCG/ACT nasal spray 2 sprays by Nasal route in the morning and at bedtime    meloxicam (MOBIC) 15 MG tablet Take 15 mg by mouth at bedtime    omeprazole (PRILOSEC) 20 MG delayed release capsule Take 20 mg by mouth 2 times daily as needed (reflux)    traMADol (ULTRAM) 50 MG tablet Take 50 mg by mouth every 6 hours as needed for Pain (moderate to severe). aspirin 81 MG EC tablet Take 81 mg by mouth daily     No current facility-administered medications for this visit.        Past Medical History:   Diagnosis Date    Arthritis     osteo    Vazquez's esophagus     Managed with as needed meds    Bilateral carotid artery disease ([facility] [address]     Followed by cardiology- Dr. Galvin [place]    Chest discomfort     Coronary atherosclerosis of native coronary vessel     Degeneration of lumbar or lumbosacral intervertebral disc     Depression     medication     Gastric ulcer, unspecified as acute or chronic, without mention of hemorrhage or perforation 1989    History of blood transfusion     last in 09/2022 after shoulder surgery    Hyperlipidemia     Hypothyroidism     no meds needed currently (low normal range per pt)    Infectious disease     lyme disease 2004    Nausea & vomiting     Palpitations     PVD (peripheral vascular disease) ([facility] [address]     Scoliosis (and kyphoscoliosis), idiopathic     Spinal stenosis, lumbar 12/7/2009    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    Thoracic or lumbosacral neuritis or radiculitis, unspecified     Tricuspid regurgitation     mild per echo dated 1-11-13    Tricuspid valve disorder        Past Surgical History:   Procedure Laterality Date    APPENDECTOMY  1987    BREAST BIOPSY Left 1980    ENLARGED MILK GLAND    BUNIONECTOMY Bilateral 2009    with hammertoe correction    CARPAL TUNNEL RELEASE Right 10/31/13    ESOPHAGOGASTRODUODENOSCOPY  07/27/2018    2016, 2018    HAMMER TOE SURGERY  2010    revision    HYSTERECTOMY (CERVIX STATUS UNKNOWN)  1988    LAMINECTOMY  04/05/2022    T10 OPEN TOTAL LAMINECTOMY    LUMBAR LAMINECTOMY  2009    L4-L5    ORTHOPEDIC SURGERY  2010    Right hand    REVERSE TOTAL SHOULDER ARTHROPLASTY Right 2017    SHOULDER ARTHROSCOPY  2013    left shoulder cyst    SHOULDER SURGERY Right 8/18/2022    right shoulder irrigation and debridement, right shoulder removal of manjit total shoulder arthroplasty implants, revision right reverse total shoulder arthroplasty with a delta xtend prosthesis and biceps tenodesis, possible remedy spacer. general/interscalene. 23hr observation. performed by Ben Oates MD at [place] [id number] ARTHROPLASTY Left 2007    revision 3/2000    TOTAL HIP ARTHROPLASTY Left 2009    revision    TOTAL KNEE ARTHROPLASTY Right 2007    UPPER GASTROINTESTINAL ENDOSCOPY N/A 8/22/2022    EGD BIOPSY performed by Verner Cleaves, MD at [address] History     Tobacco Use    Smoking status: Never    Smokeless tobacco: Never   Substance Use Topics    Alcohol use: Yes     Comment: occ       Family History   Problem Relation Age of Onset    Stroke Other     Diabetes Other     Heart Disease Other     Asthma Other     Breast Cancer Neg Hx     Heart Disease Mother         CONGESTIVE HEART DISEASE    Stroke Father     Hypertension Other         ROS:    Review of Systems   Constitutional:  Negative for activity change. HENT:  Positive for ear pain and sinus pain. Negative for congestion. Eyes:  Negative for discharge. Respiratory:  Negative for apnea. Cardiovascular:  Negative for chest pain. Gastrointestinal:  Negative for abdominal distention. Endocrine: Negative for cold intolerance. Genitourinary:  Negative for difficulty urinating. Musculoskeletal:  Negative for arthralgias. Skin:  Negative for color change. Allergic/Immunologic: Negative for environmental allergies. Neurological:  Negative for dizziness. Hematological:  Negative for adenopathy. Psychiatric/Behavioral:  Negative for agitation.           PHYSICAL EXAM:    Resp 16   Ht 5' 2\" (1.575 m)   Wt 150 lb (68 kg)   BMI 27.44 kg/m²     Physical Exam  Vitals and nursing note reviewed. Constitutional:       Appearance: Normal appearance. HENT:      Head: Normocephalic and atraumatic. Right Ear: Ear canal and external ear normal. A middle ear effusion is present. There is no impacted cerumen. Tympanic membrane is retracted. Tympanic membrane is not scarred, perforated or erythematous. Left Ear: Tympanic membrane, ear canal and external ear normal.  No middle ear effusion. There is no impacted cerumen. Tympanic membrane is not scarred, perforated, erythematous or retracted. Ears:      Comments: Right-sided TM retracted with obvious middle ear effusion. Left-sided TM intact with no perforation retractions or middle ear effusion. Nose: Nose normal. No congestion or rhinorrhea. Mouth/Throat:      Mouth: Mucous membranes are moist.      Pharynx: Oropharynx is clear. No oropharyngeal exudate or posterior oropharyngeal erythema. Comments: Bilateral 2+ symmetrical palatine tonsils. Mild erythema to the left anterior pillar. Palpation of left tonsil with very hard firm masslike lesion to the inferior pole posterior to the anterior pillar. Right-sided tonsil soft to palpation. Eyes:      General: No scleral icterus. Pulmonary:      Effort: Pulmonary effort is normal.   Musculoskeletal:      Cervical back: Normal range of motion and neck supple. No rigidity. Lymphadenopathy:      Cervical: No cervical adenopathy. Skin:     General: Skin is warm and dry. Neurological:      Mental Status: She is alert and oriented to person, place, and time. Psychiatric:         Mood and Affect: Mood normal.         Behavior: Behavior normal.      CT Result (most recent):  CT SINUS FOR IMAGE GUIDANCE 01/16/2023    Narrative  CT paranasal sinuses without contrast    HISTORY: Right ear pain, sinus infections.     TECHNIQUE: Helically acquired images were obtained through the paranasal sinuses  reconstructed at 0.63 mm thickness. Imaging was performed for localization  purposes. Coronal reformatted images were submitted. Radiation dose reduction  techniques were used for this study:  Our CT scanners use one or all of the  following: Automated exposure control, adjustment of the mA and/or kVp according  to patient's size, iterative reconstruction. COMPARISON: 11/01/2012. FINDINGS: There is mild right maxillary sinus mucosal thickening. The nasal  septum is midline. The fovea ethmoidalis and cribriform plates are intact. There is a relatively extensive right and moderate left mastoid effusion. There  are partially imaged degenerative changes at C1-2. Impression  1. Relatively mild right maxillary sinus mucosal thickening. 2. Bilateral mastoid effusions, right greater than left. PROCEDURE: Right-sided myringotomy w/ placement of tympanostomy tubes  INDICATIONS: Right-sided chronic otitis media with effusion  DESCRIPTION: Verbal consent obtained. Timeout performed. After cleaning out both EACs, I anesthetized right-sided TMs w/ topical Phenol. Next, I used a myringotomy blade to make a radial incision along the anteroinferior quadrant of right-sided TM and after evacuation of copious serous middle ear effusion a right-sided router bobbin tube was placed without difficulty. There was minimal bleeding and the patient tolerated the procedure well w/ no complications. Ciprodex drops were applied. ASSESSMENT and PLAN        ICD-10-CM    1. Tonsillar mass  J35.8       2. Middle ear effusion, right  H65.91 CREATE EARDRUM OPENING,LOCAL ANESTH      3. Sore throat  J02.9           Patient's ear exam today did show right-sided middle ear effusion. Given continued right-sided chronic otitis media with effusion in the setting of optimize medical therapy now for the last several weeks I recommended placement of right-sided myringotomy with PE tube.   She underwent a myringotomy with PE tube as above without complication with copious serous middle ear effusion evacuated. She had a rather significant improvement in her hearing following the tube placement. Ofloxacin eardrops to the right ear for 5 days and discontinue use of her hearing aid until she completes her eardrops. She had a very firm left-sided inferior pole tonsil mass on today's exam which seems to be somewhat more firm than it was at prior evaluation in December. Given this is mild and ongoing ever since a significant tonsillitis/tonsil abscess event in November despite having had multiple rounds of antibiotics I have advised her to consider a direct suspension laryngoscopy with tonsil biopsy. After review of all risk benefits and alternatives she would like to go forward with biopsy as advised. I discussed all the risks of laryngoscopy including bleeding, pharyngeal perforation, hoarseness, damage to teeth/lips/gums, and need for further procedures and she would like to proceed. We have reviewed together her recent CT of the sinuses for complaint of ongoing nasal sinus congestion and recurrent sinusitis. Thankfully there is already minimal findings to the paranasal sinuses and there is no obvious evidence of chronic sinusitis. There is some residual mastoid fluid to the right mastoid cavity which helped prompted her decision for right-sided myringotomy and tube placement at today's visit. No concerns to the limited noncontrasted images of the oropharynx and tonsillar region.     Sanjay Cuevas, DO  1/25/2023

## 2023-01-24 ENCOUNTER — PREP FOR PROCEDURE (OUTPATIENT)
Dept: ENT CLINIC | Age: 83
End: 2023-01-24

## 2023-01-24 DIAGNOSIS — H65.91 MIDDLE EAR EFFUSION, RIGHT: Primary | ICD-10-CM

## 2023-01-24 DIAGNOSIS — J35.8 TONSILLAR MASS: Primary | ICD-10-CM

## 2023-01-24 DIAGNOSIS — H65.92 MIDDLE EAR EFFUSION, LEFT: ICD-10-CM

## 2023-01-24 PROCEDURE — 92567 TYMPANOMETRY: CPT | Performed by: STUDENT IN AN ORGANIZED HEALTH CARE EDUCATION/TRAINING PROGRAM

## 2023-01-24 NOTE — PERIOP NOTE
Patient was found to have a recent positive Covid history, the below timetable is used for the earliest OR date for their elective procedure. Patient states had respiratory symptoms including cough and/or dyspnea, was NOT hospitalized and NOT in the ICU. The date of the test was 12/26/22. The date of hospital discharge was N/A. The earliest OR date is 2/6/23. Respiratory Symptoms Hospitalized Need for ICU Earliest OR Date   No No No 4 weeks from test date   Yes No No 6 weeks from test date   Yes Yes No 8 weeks from date of discharge   Yes Yes Yes 12 weeks from date of discharge       The surgeon office will be notified with the above recommendation.

## 2023-01-25 ENCOUNTER — OFFICE VISIT (OUTPATIENT)
Dept: ORTHOPEDIC SURGERY | Age: 83
End: 2023-01-25

## 2023-01-25 DIAGNOSIS — Z09 FOLLOW-UP EXAMINATION AFTER ORTHOPEDIC SURGERY: Primary | ICD-10-CM

## 2023-01-25 DIAGNOSIS — Z96.611 PRESENCE OF RIGHT ARTIFICIAL SHOULDER JOINT: ICD-10-CM

## 2023-01-25 NOTE — PROGRESS NOTES
[facility]             Progress Notes by Yuridia White MD at 03/22/22 [address]                    Author: Yuridia White MD  Service: --  Author Type: Physician       Filed: 03/22/22 1444  Encounter Date: 3/22/2022  Status: Signed          : Yuridia White MD (Physician)                            Name: Chrissy Gordon   YOB: 1940   Gender: female   MRN: [id number]                    HPI: Chrissy Gordon is a 78  y.o. right-hand-dominant female seen for right elbow problems. She is 5 months status post I&D removal of implant right shoulder Theodore reverse right total shoulder arthroplasty with a proximal humeral osteotomy and revision reverse right total shoulder arthroplasty with a longstem delta xtend prosthesis biceps tenodesis latissimus dorsi and teres major tendon transfer. She returns noting that she was sick had COVID and missed some therapy. She is weak. She complains of an abnormal gait. ROS/Meds/PSH/PMH/FH/SH: A ten system review of systems was performed and is negative other than what is in the HPI. Tobacco:  reports that she has never smoked. She has never used smokeless tobacco.   There were no vitals taken for this visit. Physical Examination:   She is awake alert female ambulating without difficulty   She is guarding her right arm at her side      The left elbow has a range of motion of 0 to 135 with 85 degrees of supination and 75 degrees of pronation. Patient is non-tender over the medial epicondyle  non-tender over the ulnar nerve with no evidence of any subluxation or dislocation. Negative tinel at the cubital tunnel  Negative flexor pronator stress test.  Patient is non-tender over the radial tunnel  non-tender over the lateral epicondyle with a negative wrist extensor stress test.   The patient is non-tender when shaking hands. Negative middle finger extension stress test.  The biceps tendon is intact.     Present hook test.  Negative reverse shila sign  The left elbow is stable at 0, 30, 70, 90, degrees. No swelling or erythema over the olecranon bursa. Patient has 2+ radial and ulnar pulses and neurovascularly is intact. Her right shoulder has a well-healed deltopectoral incision  Active forward elevation is 0-90  Passively goes 0-140  Deltoid does fire  She appears neurovascularly intact    The right elbow has a range of motion of 10 to 135 with 85 degrees of supination and 75 degrees of pronation. Patient is non-tender over the medial epicondyle  non-tender over the ulnar nerve with no evidence of any subluxation or dislocation. Negative tinel at the cubital tunnel  Negative flexor pronator stress test.  Patient is non-tender over the radial tunnel  non-tender over the lateral epicondyle with a negative wrist extensor stress test.   The patient is non-tender when shaking hands. Negative middle finger extension stress test.  The biceps tendon is intact. Present hook test.  Negative reverse shila sign  The right elbow is stable at 0, 30, 70, 90, degrees. No swelling or erythema over the olecranon bursa. Patient has 2+ radial and ulnar pulses and neurovascularly is intact. Global right elbow pain        Data Reviewed:       XR: AP Y axillary views right shoulder     Clinical Indication    ICD-10-CM    1. Presence of right artificial shoulder joint  Z96.611 XR SHOULDER RIGHT (MIN 2 VIEWS)      2. Follow-up examination after orthopedic surgery  Z09 XR SHOULDER RIGHT (MIN 2 VIEWS)         Report:  AP Y axillary views right shoulder demonstrate a reverse longstem right total shoulder arthroplasty in unchanged position    Impression: As above   Jaime Gaming MD            Impression:      1. Presence of right artificial shoulder joint    2.  Follow-up examination after orthopedic surgery             status post I&D removal of implant right shoulder [place] reverse right total shoulder arthroplasty with a proximal humeral osteotomy and revision reverse right total shoulder arthroplasty with a longstem delta xtend prosthesis biceps tenodesis latissimus dorsi and teres major tendon transfer 5 months  Painful reverse right total shoulder arthroplasty with glenoid bone loss and medialization   Rule out periprosthetic infection status post reverse right total shoulder arthroplasty   Rule out periprosthetic loosening   Status post reverse right total shoulder arthroplasty by Dr. Moises Che for years   Rotator cuff tear arthropathy left shoulder   2 previous surgical procedures by Dr. Ruben Stoner on the left shoulder   Sternoclavicular osteoarthritis both shoulders   Source bolus multiple levels   Status post primary right total hip arthroplasty   Status post revision left total hip arthroplasty   Status post primary left total hip arthroplasty   Status post right total knee arthroplasty   History of low back surgery   Chronic low back pain   Hypercholesterolemia   Coronary disease on aspirin   Systemic osteoarthritis   Status post previous hand surgery with osteoarthritis      Plan:   I discussed the problem with the patient. She is weak which is not unexpected. She has been sick lately and has backed off on therapy. We will put her back in supervised physical therapy to work on progressive range of motion and strengthening of the right shoulder and right elbow. She is complaining of weakness and a gait abnormality in her lower extremities. I will refer her to Dr. Larisa Moore for a neurologic evaluation. I will reassess her progress back in 2 months with new AP, Y and axillary views right shoulder  2.   Self-limited problem     follow up:         Mariia Spears MD

## 2023-02-02 ENCOUNTER — TELEPHONE (OUTPATIENT)
Dept: ORTHOPEDIC SURGERY | Age: 83
End: 2023-02-02

## 2023-02-02 DIAGNOSIS — Z76.89 ENCOUNTER TO ESTABLISH CARE: Primary | ICD-10-CM

## 2023-02-02 NOTE — PERIOP NOTE
Patient verified name and . Order for consent NOT found in EHR at time of PAT visit. Unable to verify case posting against order; surgery verified by patient. Type 1B surgery, PAT phone assessment complete. Orders not received. Labs per surgeon: unknown; no orders received    Labs per anesthesia protocol: Hgb pre-op    Patient answered medical/surgical history questions at their best of ability. All prior to admission medications documented in [facility]. Patient instructed to take the following medications the day of surgery according to anesthesia guidelines with a small sip of water: Celexa,omeprazole. May take tramadol if needed On the day before surgery please take Acetaminophen 1000mg in the morning and then again before bed. You may substitute for Tylenol 650 mg. Hold all vitamins 7 days prior to surgery and NSAIDS 5 days prior to surgery. Prescription meds to hold:Vitamins and supplements, meloxicam  Patient instructed on the following:    > Arrive at [facility], time of arrival to be called the day before by 1700  > NPO after midnight, unless otherwise indicated, including gum, mints, and ice chips  > Responsible adult must drive patient to the hospital, stay during surgery, and patient will need supervision 24 hours after anesthesia  > Use antibacterial soap in shower the night before surgery and on the morning of surgery  > All piercings must be removed prior to arrival.    > Leave all valuables (money and jewelry) at home but bring insurance card and ID on DOS.   > You may be required to pay a deductible or co-pay on the day of your procedure. You can pre-pay by calling [phone] if your surgery is at the [facility] or [phone] if your surgery is at the [facility]. > Do not wear make-up, nail polish, lotions, cologne, perfumes, powders, or oil on skin. Artificial nails are not permitted.

## 2023-02-02 NOTE — TELEPHONE ENCOUNTER
When she was seen last, Dr. Andreas Shah was going to refer her to a neurologist. She has not heard from anyone. She is wanting to know the status of this and also Dr. Andreas Shah wanted to see her back and she wants to get an appt. Please give her a return call. She also needs a refill on Tramadol to the [facility] in [place]. She forgot to ask for this at her last appt.

## 2023-02-10 ENCOUNTER — ANESTHESIA (OUTPATIENT)
Dept: SURGERY | Age: 83
End: 2023-02-10
Payer: MEDICARE

## 2023-02-10 ENCOUNTER — ANESTHESIA EVENT (OUTPATIENT)
Dept: SURGERY | Age: 83
End: 2023-02-10
Payer: MEDICARE

## 2023-02-10 ENCOUNTER — HOSPITAL ENCOUNTER (OUTPATIENT)
Age: 83
Setting detail: OUTPATIENT SURGERY
Discharge: HOME | End: 2023-02-10
Attending: STUDENT IN AN ORGANIZED HEALTH CARE EDUCATION/TRAINING PROGRAM | Admitting: STUDENT IN AN ORGANIZED HEALTH CARE EDUCATION/TRAINING PROGRAM
Payer: MEDICARE

## 2023-02-10 VITALS
HEART RATE: 83 BPM | DIASTOLIC BLOOD PRESSURE: 67 MMHG | HEIGHT: 62 IN | RESPIRATION RATE: 16 BRPM | WEIGHT: 146 LBS | TEMPERATURE: 98.1 F | BODY MASS INDEX: 26.87 KG/M2 | SYSTOLIC BLOOD PRESSURE: 115 MMHG | OXYGEN SATURATION: 98 %

## 2023-02-10 DIAGNOSIS — J35.8 TONSILLAR MASS: ICD-10-CM

## 2023-02-10 LAB — HGB BLD-MCNC: 12.2 G/DL (ref 11.7–15.4)

## 2023-02-10 PROCEDURE — 6370000000 HC RX 637 (ALT 250 FOR IP): Performed by: ANESTHESIOLOGY

## 2023-02-10 PROCEDURE — 42826 REMOVAL OF TONSILS: CPT | Performed by: STUDENT IN AN ORGANIZED HEALTH CARE EDUCATION/TRAINING PROGRAM

## 2023-02-10 PROCEDURE — 3700000001 HC ADD 15 MINUTES (ANESTHESIA): Performed by: STUDENT IN AN ORGANIZED HEALTH CARE EDUCATION/TRAINING PROGRAM

## 2023-02-10 PROCEDURE — 2500000003 HC RX 250 WO HCPCS

## 2023-02-10 PROCEDURE — 2709999900 HC NON-CHARGEABLE SUPPLY: Performed by: STUDENT IN AN ORGANIZED HEALTH CARE EDUCATION/TRAINING PROGRAM

## 2023-02-10 PROCEDURE — 3600000002 HC SURGERY LEVEL 2 BASE: Performed by: STUDENT IN AN ORGANIZED HEALTH CARE EDUCATION/TRAINING PROGRAM

## 2023-02-10 PROCEDURE — 6360000002 HC RX W HCPCS: Performed by: ANESTHESIOLOGY

## 2023-02-10 PROCEDURE — C1713 ANCHOR/SCREW BN/BN,TIS/BN: HCPCS | Performed by: STUDENT IN AN ORGANIZED HEALTH CARE EDUCATION/TRAINING PROGRAM

## 2023-02-10 PROCEDURE — 85018 HEMOGLOBIN: CPT

## 2023-02-10 PROCEDURE — 7100000011 HC PHASE II RECOVERY - ADDTL 15 MIN: Performed by: STUDENT IN AN ORGANIZED HEALTH CARE EDUCATION/TRAINING PROGRAM

## 2023-02-10 PROCEDURE — 2580000003 HC RX 258: Performed by: ANESTHESIOLOGY

## 2023-02-10 PROCEDURE — 7100000001 HC PACU RECOVERY - ADDTL 15 MIN: Performed by: STUDENT IN AN ORGANIZED HEALTH CARE EDUCATION/TRAINING PROGRAM

## 2023-02-10 PROCEDURE — 6360000002 HC RX W HCPCS

## 2023-02-10 PROCEDURE — 3700000000 HC ANESTHESIA ATTENDED CARE: Performed by: STUDENT IN AN ORGANIZED HEALTH CARE EDUCATION/TRAINING PROGRAM

## 2023-02-10 PROCEDURE — 7100000010 HC PHASE II RECOVERY - FIRST 15 MIN: Performed by: STUDENT IN AN ORGANIZED HEALTH CARE EDUCATION/TRAINING PROGRAM

## 2023-02-10 PROCEDURE — 3600000012 HC SURGERY LEVEL 2 ADDTL 15MIN: Performed by: STUDENT IN AN ORGANIZED HEALTH CARE EDUCATION/TRAINING PROGRAM

## 2023-02-10 PROCEDURE — 31526 DX LARYNGOSCOPY W/OPER SCOPE: CPT | Performed by: STUDENT IN AN ORGANIZED HEALTH CARE EDUCATION/TRAINING PROGRAM

## 2023-02-10 PROCEDURE — 7100000000 HC PACU RECOVERY - FIRST 15 MIN: Performed by: STUDENT IN AN ORGANIZED HEALTH CARE EDUCATION/TRAINING PROGRAM

## 2023-02-10 PROCEDURE — 88304 TISSUE EXAM BY PATHOLOGIST: CPT

## 2023-02-10 RX ORDER — SODIUM CHLORIDE, SODIUM LACTATE, POTASSIUM CHLORIDE, CALCIUM CHLORIDE 600; 310; 30; 20 MG/100ML; MG/100ML; MG/100ML; MG/100ML
INJECTION, SOLUTION INTRAVENOUS CONTINUOUS
Status: DISCONTINUED | OUTPATIENT
Start: 2023-02-10 | End: 2023-02-10 | Stop reason: HOSPADM

## 2023-02-10 RX ORDER — HYDROMORPHONE HCL 110MG/55ML
0.5 PATIENT CONTROLLED ANALGESIA SYRINGE INTRAVENOUS EVERY 5 MIN PRN
Status: DISCONTINUED | OUTPATIENT
Start: 2023-02-10 | End: 2023-02-10 | Stop reason: HOSPADM

## 2023-02-10 RX ORDER — IPRATROPIUM BROMIDE AND ALBUTEROL SULFATE 2.5; .5 MG/3ML; MG/3ML
1 SOLUTION RESPIRATORY (INHALATION)
Status: DISCONTINUED | OUTPATIENT
Start: 2023-02-10 | End: 2023-02-10 | Stop reason: HOSPADM

## 2023-02-10 RX ORDER — SODIUM CHLORIDE 0.9 % (FLUSH) 0.9 %
5-40 SYRINGE (ML) INJECTION PRN
Status: DISCONTINUED | OUTPATIENT
Start: 2023-02-10 | End: 2023-02-10 | Stop reason: HOSPADM

## 2023-02-10 RX ORDER — FENTANYL CITRATE 50 UG/ML
100 INJECTION, SOLUTION INTRAMUSCULAR; INTRAVENOUS
Status: DISCONTINUED | OUTPATIENT
Start: 2023-02-10 | End: 2023-02-10 | Stop reason: HOSPADM

## 2023-02-10 RX ORDER — NEOSTIGMINE METHYLSULFATE 1 MG/ML
INJECTION, SOLUTION INTRAVENOUS PRN
Status: DISCONTINUED | OUTPATIENT
Start: 2023-02-10 | End: 2023-02-10 | Stop reason: SDUPTHER

## 2023-02-10 RX ORDER — MIDAZOLAM HYDROCHLORIDE 2 MG/2ML
2 INJECTION, SOLUTION INTRAMUSCULAR; INTRAVENOUS
Status: DISCONTINUED | OUTPATIENT
Start: 2023-02-10 | End: 2023-02-10 | Stop reason: HOSPADM

## 2023-02-10 RX ORDER — SODIUM CHLORIDE 0.9 % (FLUSH) 0.9 %
5-40 SYRINGE (ML) INJECTION EVERY 12 HOURS SCHEDULED
Status: DISCONTINUED | OUTPATIENT
Start: 2023-02-10 | End: 2023-02-10 | Stop reason: HOSPADM

## 2023-02-10 RX ORDER — PROCHLORPERAZINE EDISYLATE 5 MG/ML
5 INJECTION INTRAMUSCULAR; INTRAVENOUS
Status: DISCONTINUED | OUTPATIENT
Start: 2023-02-10 | End: 2023-02-10 | Stop reason: HOSPADM

## 2023-02-10 RX ORDER — ACETAMINOPHEN 500 MG
1000 TABLET ORAL ONCE
Status: COMPLETED | OUTPATIENT
Start: 2023-02-10 | End: 2023-02-10

## 2023-02-10 RX ORDER — LIDOCAINE HYDROCHLORIDE 20 MG/ML
INJECTION, SOLUTION EPIDURAL; INFILTRATION; INTRACAUDAL; PERINEURAL PRN
Status: DISCONTINUED | OUTPATIENT
Start: 2023-02-10 | End: 2023-02-10 | Stop reason: SDUPTHER

## 2023-02-10 RX ORDER — PROPOFOL 10 MG/ML
INJECTION, EMULSION INTRAVENOUS PRN
Status: DISCONTINUED | OUTPATIENT
Start: 2023-02-10 | End: 2023-02-10 | Stop reason: SDUPTHER

## 2023-02-10 RX ORDER — HALOPERIDOL 5 MG/ML
1 INJECTION INTRAMUSCULAR
Status: DISCONTINUED | OUTPATIENT
Start: 2023-02-10 | End: 2023-02-10 | Stop reason: HOSPADM

## 2023-02-10 RX ORDER — DEXMEDETOMIDINE HYDROCHLORIDE 100 UG/ML
INJECTION, SOLUTION INTRAVENOUS PRN
Status: DISCONTINUED | OUTPATIENT
Start: 2023-02-10 | End: 2023-02-10 | Stop reason: SDUPTHER

## 2023-02-10 RX ORDER — ROCURONIUM BROMIDE 10 MG/ML
INJECTION, SOLUTION INTRAVENOUS PRN
Status: DISCONTINUED | OUTPATIENT
Start: 2023-02-10 | End: 2023-02-10 | Stop reason: SDUPTHER

## 2023-02-10 RX ORDER — LIDOCAINE HYDROCHLORIDE 10 MG/ML
1 INJECTION, SOLUTION INFILTRATION; PERINEURAL
Status: DISCONTINUED | OUTPATIENT
Start: 2023-02-10 | End: 2023-02-10 | Stop reason: HOSPADM

## 2023-02-10 RX ORDER — GLYCOPYRROLATE 0.2 MG/ML
INJECTION INTRAMUSCULAR; INTRAVENOUS PRN
Status: DISCONTINUED | OUTPATIENT
Start: 2023-02-10 | End: 2023-02-10 | Stop reason: SDUPTHER

## 2023-02-10 RX ORDER — DEXAMETHASONE SODIUM PHOSPHATE 10 MG/ML
INJECTION INTRAMUSCULAR; INTRAVENOUS PRN
Status: DISCONTINUED | OUTPATIENT
Start: 2023-02-10 | End: 2023-02-10 | Stop reason: SDUPTHER

## 2023-02-10 RX ORDER — SODIUM CHLORIDE 9 MG/ML
INJECTION, SOLUTION INTRAVENOUS PRN
Status: DISCONTINUED | OUTPATIENT
Start: 2023-02-10 | End: 2023-02-10 | Stop reason: HOSPADM

## 2023-02-10 RX ORDER — SUCCINYLCHOLINE CHLORIDE 20 MG/ML
INJECTION INTRAMUSCULAR; INTRAVENOUS PRN
Status: DISCONTINUED | OUTPATIENT
Start: 2023-02-10 | End: 2023-02-10 | Stop reason: SDUPTHER

## 2023-02-10 RX ORDER — ONDANSETRON 2 MG/ML
INJECTION INTRAMUSCULAR; INTRAVENOUS PRN
Status: DISCONTINUED | OUTPATIENT
Start: 2023-02-10 | End: 2023-02-10 | Stop reason: SDUPTHER

## 2023-02-10 RX ORDER — OXYCODONE HYDROCHLORIDE 5 MG/1
5 TABLET ORAL
Status: DISCONTINUED | OUTPATIENT
Start: 2023-02-10 | End: 2023-02-10 | Stop reason: HOSPADM

## 2023-02-10 RX ADMIN — LIDOCAINE HYDROCHLORIDE 60 MG: 20 INJECTION, SOLUTION EPIDURAL; INFILTRATION; INTRACAUDAL; PERINEURAL at 14:07

## 2023-02-10 RX ADMIN — FENTANYL CITRATE 50 MCG: 50 INJECTION INTRAMUSCULAR; INTRAVENOUS at 14:07

## 2023-02-10 RX ADMIN — Medication 3 MG: at 14:40

## 2023-02-10 RX ADMIN — HYDROMORPHONE HYDROCHLORIDE 0.5 MG: 2 INJECTION, SOLUTION INTRAMUSCULAR; INTRAVENOUS; SUBCUTANEOUS at 15:01

## 2023-02-10 RX ADMIN — SODIUM CHLORIDE, POTASSIUM CHLORIDE, SODIUM LACTATE AND CALCIUM CHLORIDE: 600; 310; 30; 20 INJECTION, SOLUTION INTRAVENOUS at 13:18

## 2023-02-10 RX ADMIN — DEXMEDETOMIDINE 8 MCG: 100 INJECTION, SOLUTION, CONCENTRATE INTRAVENOUS at 14:26

## 2023-02-10 RX ADMIN — ROCURONIUM BROMIDE 15 MG: 50 INJECTION, SOLUTION INTRAVENOUS at 14:18

## 2023-02-10 RX ADMIN — Medication 100 MG: at 14:09

## 2023-02-10 RX ADMIN — DEXAMETHASONE SODIUM PHOSPHATE 6 MG: 10 INJECTION INTRAMUSCULAR; INTRAVENOUS at 14:20

## 2023-02-10 RX ADMIN — GLYCOPYRROLATE 0.6 MG: 0.2 INJECTION INTRAMUSCULAR; INTRAVENOUS at 14:40

## 2023-02-10 RX ADMIN — ONDANSETRON 4 MG: 2 INJECTION INTRAMUSCULAR; INTRAVENOUS at 14:20

## 2023-02-10 RX ADMIN — PROPOFOL 30 MG: 10 INJECTION, EMULSION INTRAVENOUS at 14:09

## 2023-02-10 RX ADMIN — PROPOFOL 100 MG: 10 INJECTION, EMULSION INTRAVENOUS at 14:07

## 2023-02-10 RX ADMIN — PROPOFOL 30 MG: 10 INJECTION, EMULSION INTRAVENOUS at 14:24

## 2023-02-10 ASSESSMENT — PAIN DESCRIPTION - LOCATION
LOCATION: THROAT
LOCATION: THROAT

## 2023-02-10 ASSESSMENT — PAIN SCALES - GENERAL
PAINLEVEL_OUTOF10: 3
PAINLEVEL_OUTOF10: 10
PAINLEVEL_OUTOF10: 10
PAINLEVEL_OUTOF10: 5

## 2023-02-10 ASSESSMENT — PAIN - FUNCTIONAL ASSESSMENT: PAIN_FUNCTIONAL_ASSESSMENT: 0-10

## 2023-02-10 NOTE — DISCHARGE INSTRUCTIONS
Liquid diet for the next 2 days and then soft for the next few days. Advance your diet as tolerated  No hot or spicy or warm liquids or foods for a week  Take Tylenol and/or Motrin every 6 hours for pain  If there is any bleeding from the mouth call our main office number any time. ACTIVITY  As tolerated and as directed by your doctor. Bathe or shower as directed by your doctor. DIET  Clear liquids until no nausea or vomiting; then light diet for the first day. Advance to regular diet on second day, unless your doctor orders otherwise. If nausea and vomiting continues, call your doctor. PAIN  Take pain medication as directed by your doctor. Call your doctor if pain is NOT relieved by medication. DO NOT take aspirin of blood thinners unless directed by your doctor. MEDICATION INTERACTION:During your procedure you potentially received a medication or medications which may reduce the effectiveness of oral contraceptives. Please consider other forms of contraception for 1 month following your procedure if you are currently using oral contraceptives as your primary form of birth control.  In addition to this, we recommend continuing your oral contraceptive as prescribed, unless otherwise instructed by your physician, during this time      [address] IF   Excessive bleeding that does not stop after holding pressure over the area  Temperature of 101 degrees F or above  Excessive redness, swelling or bruising, and/ or green or yellow, smelly discharge from incision    After general anesthesia or intravenous sedation, for 24 hours or while taking prescription Narcotics:  Limit your activities  A responsible adult needs to be with you for the next 24 hours  Do not drive and operate hazardous machinery  Do not make important personal or business decisions  Do not drink alcoholic beverages  If you have not urinated within 8 hours after discharge, and you are experiencing discomfort from urinary retention, please go to the nearest ED. If you have sleep apnea and have a CPAP machine, please use it for all naps and sleeping. Please use caution when taking narcotics and any of your home medications that may cause drowsiness. *  Please give a list of your current medications to your Primary Care Provider. *  Please update this list whenever your medications are discontinued, doses are      changed, or new medications (including over-the-counter products) are added. *  Please carry medication information at all times in case of emergency situations. These are general instructions for a healthy lifestyle:  No smoking/ No tobacco products/ Avoid exposure to second hand smoke  Surgeon General's Warning:  Quitting smoking now greatly reduces serious risk to your health. Obesity, smoking, and sedentary lifestyle greatly increases your risk for illness  A healthy diet, regular physical exercise & weight monitoring are important for maintaining a healthy lifestyle    You may be retaining fluid if you have a history of heart failure or if you experience any of the following symptoms:  Weight gain of 3 pounds or more overnight or 5 pounds in a week, increased swelling in our hands or feet or shortness of breath while lying flat in bed. Please call your doctor as soon as you notice any of these symptoms; do not wait until your next office visit. Tonsillectomy: What to Expect at Home  Your Recovery  A tonsillectomy is surgery to remove the tonsils. Sometimes the adenoids are removed during the same surgery. The tonsils and adenoids are in the throat. Your doctor did the surgery through your mouth. Most adults have a lot of throat pain for 1 to 2 weeks or longer. The pain may get worse before it gets better. The pain in your throat can also make your ears hurt. You may have good days and bad days. Most people find that they have the most pain in the first 8 days.  You probably will feel tired for 1 to 2 weeks. You may have bad breath for up to 2 weeks. You may be able to go back to work or your usual routine in 1 to 2 weeks. There will be a white coating in your throat where the tonsils were. The coating is like a scab. It usually starts to come off in 5 to 10 days. It is usually gone in 10 to 16 days. You may see some blood in your spit as the coating comes off. After surgery, you may snore or breathe through your mouth at night. This usually gets better 1 to 2 weeks after surgery. Mouth breathing can make your mouth and throat dry or sore. Place a humidifier by your bed when you sleep. This may make it easier for you to breathe. Follow the directions for cleaning the machine. At first, your voice may sound different. Your voice probably will get back to normal in 2 to 6 weeks. It's common for people to lose weight after this surgery. That's because it can hurt to swallow food at first. As long as you drink plenty of liquids, this is okay. You will probably gain the weight back when you can eat normally again. This care sheet gives you a general idea about how long it will take for you to recover. But each person recovers at a different pace. Follow the steps below to get better as quickly as possible. How can you care for yourself at home? Activity    Rest when you feel tired. Getting enough sleep will help you recover. Try to walk each day. Start by walking a little more than you did the day before. Bit by bit, increase the amount you walk. Walking boosts blood flow and helps prevent pneumonia and constipation. Avoid strenuous activities, such as bicycle riding, jogging, weight lifting, or aerobic exercise, for 2 weeks or until your doctor says it is okay. For 2 weeks, avoid lifting anything that would make you strain. This may include a child, heavy grocery bags and milk containers, a heavy briefcase or backpack, cat litter or dog food bags, or a vacuum .      Avoid dirt, dust, and heat for 2 weeks after surgery. These things can irritate your throat. For about 1 week, try to avoid crowds or people who you know have a cold or the flu. This can help prevent you from getting an infection. You may bathe as usual.     Ask your doctor when you can drive again. You will probably need to take 1 to 2 weeks off from work. It depends on the type of work you do and how you feel. Diet    Drink plenty of fluids to avoid becoming dehydrated. If it is painful to swallow, start out with Popsicles, ice cream, or cold or room-temperature drinks. Do not eat or drink red food items, such as red juice or red Jell-O. The color may make you think you are bleeding. Avoid hot drinks, soda pop, orange or tomato juice, and other acidic foods that can sting the throat. These may make throat pain worse and cause bleeding. For 2 weeks, choose soft foods like pudding, yogurt, canned or cooked fruit, scrambled eggs, and mashed potatoes. Avoid eating hard or scratchy foods like chips or raw vegetables. You may notice that your bowel movements are not regular right after your surgery. This is common. Try to avoid constipation and straining with bowel movements. You may want to take a fiber supplement every day. If you have not had a bowel movement after a couple of days, ask your doctor about taking a mild laxative. Medicines    Your doctor will tell you if and when you can restart your medicines. You will also be given instructions about taking any new medicines. If you stopped taking aspirin or some other blood thinner, your doctor will tell you when to start taking it again. Be safe with medicines. Take pain medicines exactly as directed. If the doctor gave you a prescription medicine for pain, take it as prescribed. If you are not taking a prescription pain medicine, ask your doctor if you can take an over-the-counter medicine.      If you think your pain medicine is making you sick to your stomach: Take your pain medicine after meals (unless your doctor has told you not to). Ask your doctor for a different pain medicine. If your doctor prescribed antibiotics, take them as directed. Do not stop taking them just because you feel better. You need to take the full course of antibiotics. Follow-up care is a key part of your treatment and safety. Be sure to make and go to all appointments, and call your doctor if you are having problems. It's also a good idea to know your test results and keep a list of the medicines you take. When should you call for help? Call 911 anytime you think you may need emergency care. For example, call if:    You passed out (lost consciousness). You have severe trouble breathing. You have a lot of bleeding. Call your doctor now or seek immediate medical care if:    You have signs of infection, such as: Increased pain, swelling, warmth, or redness. Red streaks leading from the area. Pus draining from the area. A fever. You are bleeding. You are too sick to your stomach to drink any fluids. You cannot keep down fluids. You have new pain, or your pain gets worse. Watch closely for changes in your health, and be sure to contact your doctor if:    You do not get better as expected. Where can you learn more? Go to http://www.woods.com/ and enter X297 to learn more about \"Tonsillectomy: What to Expect at Home. \"  Current as of: May 4, 2022               Content Version: 13.5  © 0900-9675 [employer]. Care instructions adapted under license by [facility] ([facility]). If you have questions about a medical condition or this instruction, always ask your healthcare professional. [place] [address] any warranty or liability for your use of this information.

## 2023-02-10 NOTE — ANESTHESIA PROCEDURE NOTES
Airway  Date/Time: 2/10/2023 2:10 PM  Urgency: elective    Airway not difficult    General Information and Staff    Patient location during procedure: OR  Resident/CRNA: SHAVON Montero - CRNA  Performed: resident/CRNA     Indications and Patient Condition  Indications for airway management: anesthesia  Spontaneous Ventilation: absent  Sedation level: deep  Preoxygenated: yes  Patient position: sniffing  MILS not maintained throughout  Mask difficulty assessment: vent by bag mask    Final Airway Details  Final airway type: endotracheal airway      Successful airway: ETT  Cuffed: yes   Successful intubation technique: direct laryngoscopy  Facilitating devices/methods: intubating stylet  Endotracheal tube insertion site: oral  Blade: Dary  Blade size: #3  ETT size (mm): 6.0  Cormack-Lehane Classification: grade I - full view of glottis  Placement verified by: chest auscultation and capnometry   Measured from: lips  ETT to lips (cm): 22  Number of attempts at approach: 1  Ventilation between attempts: bag mask

## 2023-02-10 NOTE — ANESTHESIA POSTPROCEDURE EVALUATION
Department of Anesthesiology  Postprocedure Note    Patient: Lalo Resendiz  MRN: [id number]  YOB: 1940  Date of evaluation: 2/10/2023      Procedure Summary     Date: 02/10/23 Room / Location: [facility] OP OR 04 / [facility]    Anesthesia Start: 6576 Anesthesia Stop: 1458    Procedures:       Direct Suspension LARYNGOSCOPY (Throat)      TONSILLECTOMY (Left: Throat) Diagnosis:       Tonsillar mass      (Tonsillar mass [J35.8])    Surgeons: Jemma Zhang DO Responsible Provider: Nabil James MD    Anesthesia Type: General ASA Status: 3          Anesthesia Type: General    Bryce Phase I: Bryce Score: 10    Bryce Phase II:        Anesthesia Post Evaluation    Patient location during evaluation: PACU  Patient participation: complete - patient participated  Level of consciousness: awake  Airway patency: patent  Nausea & Vomiting: no nausea  Complications: no  Cardiovascular status: hemodynamically stable  Respiratory status: acceptable and nonlabored ventilation  Hydration status: stable  Multimodal analgesia pain management approach

## 2023-02-10 NOTE — PROGRESS NOTES
's pre-procedure visit requested by patient. Conveyed care and concern for patient and family. Offered prayer as requested for patient, family, and staff.     Preeti Hedrick MDiv, BS  Board Certified

## 2023-02-10 NOTE — ANESTHESIA PRE PROCEDURE
Department of Anesthesiology  Preprocedure Note       Name:  Jackie Ahmadi   Age:  78 y.o.  :  1940                                          MRN:  [id number]         Date:  2/10/2023      Surgeon: Torri Dickerson):  Negra Odonnell DO    Procedure: Procedure(s):  Direct Suspension LARYNGOSCOPYwith biopsy    Medications prior to admission:   Prior to Admission medications    Medication Sig Start Date End Date Taking? Authorizing Provider   aspirin 81 MG EC tablet Take 81 mg by mouth daily  Patient not taking: Reported on 2023    Historical Provider, MD   ipratropium (ATROVENT) 0.06 % nasal spray 2 sprays by Nasal route 3 times daily  Patient not taking: Reported on 22   Historical Provider, MD   B Complex Vitamins (VITAMIN-B COMPLEX PO) Take 1 capsule by mouth daily    Historical Provider, MD   predniSONE (DELTASONE) 20 MG tablet 60 mg (3 tabs) PO once daily for 3 days; 40 mg (2 tabs) for 2 days; 20 mg (1 tab) for 2 days; 10 mg (1/2 tab) for 2 days  Patient not taking: No sig reported 22   Mitesh Evans DO   IRON, FERROUS SULFATE, PO Take 1 tablet by mouth daily. Historical Provider, MD   Ascorbic Acid (VITAMIN C) 500 MG CAPS Take 1 capsule by mouth daily.     Historical Provider, MD   citalopram (CELEXA) 20 MG tablet Take 20 mg by mouth daily    Historical Provider, MD   acetaminophen (TYLENOL) 500 MG tablet Take 1,000 mg by mouth every 6 hours as needed for Pain (breakthrough)    Historical Provider, MD   cetirizine (ZYRTEC) 10 MG tablet Take 10 mg by mouth daily  Patient not taking: Reported on 2023    Ar Automatic Reconciliation   fluticasone (FLONASE) 50 MCG/ACT nasal spray 2 sprays by Nasal route in the morning and at bedtime  Patient not taking: Reported on 2023   Ar Automatic Reconciliation   meloxicam (MOBIC) 15 MG tablet Take 15 mg by mouth at bedtime 10/28/21   Ar Automatic Reconciliation   omeprazole (PRILOSEC) 20 MG delayed release capsule Take 20 mg by mouth 2 times daily as needed (reflux) 2/24/21   Ar Automatic Reconciliation   traMADol (ULTRAM) 50 MG tablet Take 50 mg by mouth every 6 hours as needed for Pain (moderate to severe). Ar Automatic Reconciliation       Current medications:    No current facility-administered medications for this visit. No current outpatient medications on file. Facility-Administered Medications Ordered in Other Visits   Medication Dose Route Frequency Provider Last Rate Last Admin    lidocaine 1 % injection 1 mL  1 mL IntraDERmal Once PRN Carina Sampson MD        fentaNYL (SUBLIMAZE) injection 100 mcg  100 mcg IntraVENous Once PRN Carina Sampson MD        lactated ringers IV soln infusion   IntraVENous Continuous Carina Sampson  mL/hr at 02/10/23 1318 New Bag at 02/10/23 1318    sodium chloride flush 0.9 % injection 5-40 mL  5-40 mL IntraVENous 2 times per day Carina Sampson MD        sodium chloride flush 0.9 % injection 5-40 mL  5-40 mL IntraVENous PRN Carina Sampson MD        0.9 % sodium chloride infusion   IntraVENous PRN Carina Sampson MD        midazolam PF (VERSED) injection 2 mg  2 mg IntraVENous Once PRN Carina Sampson MD           Allergies:     Allergies   Allergen Reactions    Codeine Itching     Other reaction(s): Nausea and/or vomiting-Intolerance, Rash-Allergy    Adhesive Tape Rash and Other (See Comments)     Blisters       Problem List:    Patient Active Problem List   Diagnosis Code    PVD (peripheral vascular disease) ([facility] [address] I73.9    Status post right hip replacement Z96.641    Shortness of breath R06.02    Palpitations R00.2    Chest discomfort R07.89    History of COVID-19 Z86.16    Spinal stenosis, lumbar M48.061    Bilateral carotid artery disease (HCC) I77.9    Palpitation R00.2    Hypothyroidism E03.9    Vazquez's esophagus K22.70    Tricuspid valve disorder I07.9    Coronary atherosclerosis of native coronary vessel I25.10    Hypertension, benign I10    Hyperlipidemia E78.5    Pain due to right shoulder joint prosthesis ([facility] [address] T84.84XA, Z96.611    Traumatic closed fracture of greater tuberosity of humerus with minimal displacement, right, initial encounter S42.251A    Periprosthetic fracture around internal prosthetic right shoulder joint M97. 31XA    Presence of right artificial shoulder joint Z96.611    Periprosthetic fracture around internal prosthetic right shoulder joint, initial encounter M97. 31XA    Acute blood loss anemia D62    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) T84.038A, Z96.619    Rib pain R07.81    Hypotension I95.9    Tonsillar mass J35.8       Past Medical History:        Diagnosis Date    Acid reflux     Arthritis     osteo    Vazquez's esophagus     Managed with as needed meds    Bilateral carotid artery disease ([facility] [address]     Followed by cardiology- Dr. Jaya Barrow Chest discomfort     denies current or recent CP 2/2/23    Coronary atherosclerosis of native coronary vessel     Degeneration of lumbar or lumbosacral intervertebral disc     Depression     medication     Gastric ulcer, unspecified as acute or chronic, without mention of hemorrhage or perforation 1989    History of anemia     History of blood transfusion     last in 09/2022 after shoulder surgery    Hyperlipidemia     Hypothyroidism     no meds needed currently (low normal range per pt)    Infectious disease     lyme disease 2004    Nausea & vomiting     Palpitations     PVD (peripheral vascular disease) ([facility] [address]     Scoliosis (and kyphoscoliosis), idiopathic     Spinal stenosis, lumbar 12/7/2009    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    Thoracic or lumbosacral neuritis or radiculitis, unspecified     Tricuspid regurgitation     mild per echo dated 1-11-13    Tricuspid valve disorder        Past Surgical History:        Procedure Laterality Date    APPENDECTOMY  1987    BREAST BIOPSY Left 1980    ENLARGED MILK GLAND    BUNIONECTOMY Bilateral 2009    with hammertoe correction    CARPAL TUNNEL RELEASE Right 10/31/13    ESOPHAGOGASTRODUODENOSCOPY  07/27/2018    2016, 2018    HAMMER TOE SURGERY  2010    revision    HYSTERECTOMY (CERVIX STATUS UNKNOWN)  1988    LAMINECTOMY  04/05/2022    T10 OPEN TOTAL LAMINECTOMY    LUMBAR LAMINECTOMY  2009    L4-L5    ORTHOPEDIC SURGERY  2010    Right hand    REVERSE TOTAL SHOULDER ARTHROPLASTY Right 2017    SHOULDER ARTHROSCOPY  2013    left shoulder cyst    SHOULDER SURGERY Right 8/18/2022    right shoulder irrigation and debridement, right shoulder removal of manjit total shoulder arthroplasty implants, revision right reverse total shoulder arthroplasty with a delta xtend prosthesis and biceps tenodesis, possible remedy spacer. general/interscalene. 23hr observation. performed by Perry South MD at [facility] Left 2007    revision 3/2000    TOTAL HIP ARTHROPLASTY Left 2009    revision    TOTAL KNEE ARTHROPLASTY Right 2007    UPPER GASTROINTESTINAL ENDOSCOPY N/A 8/22/2022    EGD BIOPSY performed by Janneth Hobson MD at [address] History:    Social History     Tobacco Use    Smoking status: Never    Smokeless tobacco: Never   Substance Use Topics    Alcohol use: Yes     Comment: occ                                Counseling given: Not Answered      Vital Signs (Current): There were no vitals filed for this visit.                                            BP Readings from Last 3 Encounters:   02/10/23 (!) 158/71   12/28/22 123/77   12/09/22 (!) 158/82       NPO Status:                                                                                 BMI:   Wt Readings from Last 3 Encounters:   02/10/23 146 lb (66.2 kg)   01/23/23 150 lb (68 kg)   12/28/22 150 lb (68 kg)     There is no height or weight on file to calculate BMI.    CBC:   Lab Results   Component Value Date/Time    WBC 6.9 11/09/2022 01:57 PM    RBC 3.69 11/09/2022 01:57 PM    HGB 12.2 02/10/2023 01:00 PM    HCT 37.4 11/09/2022 01:57 PM    .4 11/09/2022 01:57 PM    RDW 13.2 11/09/2022 01:57 PM     11/09/2022 01:57 PM       CMP:   Lab Results   Component Value Date/Time     11/09/2022 01:57 PM    K 4.2 11/09/2022 01:57 PM     11/09/2022 01:57 PM    CO2 28 11/09/2022 01:57 PM    BUN 12 11/09/2022 01:57 PM    CREATININE 0.90 11/09/2022 01:57 PM    GFRAA >60 08/23/2022 04:44 AM    AGRATIO 1.0 11/30/2021 01:36 PM    LABGLOM >60 11/09/2022 01:57 PM    GLUCOSE 105 11/09/2022 01:57 PM    PROT 6.4 11/09/2022 01:57 PM    CALCIUM 9.3 11/09/2022 01:57 PM    BILITOT 0.6 11/09/2022 01:57 PM    ALKPHOS 85 11/09/2022 01:57 PM    ALKPHOS 74 11/30/2021 01:36 PM    AST 16 11/09/2022 01:57 PM    ALT 17 11/09/2022 01:57 PM       POC Tests: No results for input(s): POCGLU, POCNA, POCK, POCCL, POCBUN, POCHEMO, POCHCT in the last 72 hours.     Coags:   Lab Results   Component Value Date/Time    PROTIME 13.5 08/12/2022 11:01 AM    INR 1.0 08/12/2022 11:01 AM    APTT 35.3 08/12/2022 11:01 AM       HCG (If Applicable): No results found for: PREGTESTUR, PREGSERUM, HCG, HCGQUANT     ABGs: No results found for: PHART, PO2ART, SUG7ADL, PMK6MMO, BEART, Z0UIFMJH     Type & Screen (If Applicable):  No results found for: LABABO, LABRH    Drug/Infectious Status (If Applicable):  No results found for: HIV, HEPCAB    COVID-19 Screening (If Applicable):   Lab Results   Component Value Date/Time    COVID19 Not detected 08/23/2022 08:46 AM           Anesthesia Evaluation  Patient summary reviewed and Nursing notes reviewed no history of anesthetic complications:   Airway: Mallampati: II  TM distance: >3 FB   Neck ROM: full  Mouth opening: > = 3 FB   Dental:      Comment: Crowns, fillings    Pulmonary:Negative Pulmonary ROS breath sounds clear to auscultation                             Cardiovascular:  Exercise tolerance: good (>4 METS),   (+) hypertension:, CAD (Denies MI and chest pain):, dysrhythmias: PAC, pulmonary hypertension (RVSP 43): mild,         Rhythm: regular  Rate: normal                 ROS comment: Echo 2021 -   Left Ventricle: Left ventricle size is normal. Normal wall thickness. Normal wall motion. Normal left ventricular systolic function with a visually estimated EF of 60 - 65%. Normal diastolic function.   Tricuspid Valve: RVSP is 43 mmHg. Neuro/Psych:   (+) depression/anxiety             GI/Hepatic/Renal:   (+) GERD ([place]): well controlled,          ROS comment: Anemia, concern for GI bleed. Hgb 8.2. Endo/Other:    (+) : arthritis: OA., .                 Abdominal:             Vascular:   + PVD, aortic or cerebral, . ROS comment: Bilateral carotid artery disease . Other Findings:             Anesthesia Plan      general     ASA 3     (Recent shoulder revision on 8/18/22, with hgb drop )  Induction: intravenous. Anesthetic plan and risks discussed with patient. Plan discussed with CRNA.                     India Panchal MD   2/10/2023

## 2023-02-11 NOTE — OP NOTE
[facility]  OPERATIVE REPORT    Name:  Zonia Mcburney  MR#:  [id number]  :  1940  ACCOUNT #:  [de-identified]  DATE OF SERVICE:  02/10/2023    PREOPERATIVE DIAGNOSIS:  Left tonsil mass. POSTOPERATIVE DIAGNOSIS:  Left tonsil mass. PROCEDURE PERFORMED:  Direct laryngoscopy with telescope visualization, left tonsillectomy. SURGEON:  John Russell DO    ASSISTANT:  None. ANESTHESIA:  General anesthesia with endotracheal tube. COMPLICATIONS:  None. SPECIMENS REMOVED:  Left tonsil. IMPLANTS:  none. ESTIMATED BLOOD LOSS:  10 mL. DISPOSITION:  Stable to PACU. FINDINGS:  Left-sided firm tonsil mass with discoloration, normal laryngoscopy. INDICATION FOR PROCEDURE:  This is an 78-year-old female, having had a severe left-sided tonsillitis event approximately 3 to 4 months ago and now having residual ongoing and worsening left-sided tonsil pain and a suspicious discolored lesion to the left tonsillar region. Due to the growth and high suspicion of this lesion, it was recommended for tonsillectomy for biopsy purposes. Therefore, all risks, benefits, and alternatives to surgery were reviewed. Informed consent was obtained and signed. She was scheduled for the operating room. PROCEDURE:  The patient was brought from the preoperative waiting area to the operating room and laid supine on the operating room table by anesthesia team.  General anesthesia was induced. Endotracheal tube was placed. A time-out was then performed. She was then prepped and draped in the usual sterile fashion.   A direct Pilling laryngoscope was then obtained and a dental guard was placed to the upper teeth and the laryngoscope was gently and atraumatically inserted towards the oral cavity and oropharynx and to the base of the tongue and vallecula region where there were no suspicious lesions or masses to the base of the tongue or the vallecula and then advanced more inferiorly towards the hypopharynx and glottis, which showed no evidence of any concerning lesions or inflammation and the piriformis sinuses were widely patent as well as the true vocal cords. Therefore, there was no biopsy obtained on laryngoscopy. Next, a McIvor was gently and atraumatically inserted into the oropharynx and suspended on to a Santana stand where there was visualization and palpation of both palatine tonsils where the left palatine tonsil had a fullness as well as inferior rock-hard firm element on palpation. Therefore, left tonsil was grasped at the superior pole using a straight Allis and retracted medially and the Coblator device was brought into the surgical field in the setting of 7 ablation and 3 coagulation and using the ablation function an incision was made to the mucosa of the anterior pillar. This incision was followed circumferentially around the tonsil towards the extracapsular plane until the entire tonsil was excised and removed and passed off the field for specimen. The tonsillar fossa was adequately controlled with hemostasis using the coagulation function. After tonsillectomy, palpation was performed, which again showed a firm hard mass to the resulting tonsillar fossa just deep to the parapharyngeal muscle, upon which there was a high suspicion that this represented an elongated styloid process as there was mobility to the firm mass. Therefore, at this time no further surgical interventions were performed and McIvor mouth gag was removed from the its suspension on the Santana stand and care of the patient was transferred back over to Anesthesia where she awoke from anesthesia with no complications and was extubated successfully. She was then transferred to the PACU in stable condition.       DO CONRAD Coley/[email or web address]  D:  02/10/2023 15:10  T:  02/10/2023 22:50  JOB #:  [id number]

## 2023-02-13 ENCOUNTER — TELEPHONE (OUTPATIENT)
Dept: ENT CLINIC | Age: 83
End: 2023-02-13

## 2023-02-13 DIAGNOSIS — G89.18 POST-OP PAIN: Primary | ICD-10-CM

## 2023-02-13 RX ORDER — ONDANSETRON 4 MG/1
4 TABLET, FILM COATED ORAL 3 TIMES DAILY PRN
Qty: 15 TABLET | Refills: 0 | Status: SHIPPED | OUTPATIENT
Start: 2023-02-13 | End: 2023-02-18

## 2023-02-13 NOTE — TELEPHONE ENCOUNTER
Patient states that she is having post op tonsil pain. She states that she would like pain medication sent to her local pharmacy . Patient also is concerned that she may need an abx due to her many joint replacements. Patient is concerned with discoloration of her throat . Patient states that she believes that she is fine to take Norco . I discussed taking this medication on full stomach and that we will supply Zofran as well. Patient also notified of Lidocaine Lollipops.

## 2023-02-15 NOTE — RESULT ENCOUNTER NOTE
Patient's tonsil specimen was benign without cancer. This is great news. Please call to give results.

## 2023-02-17 ENCOUNTER — TELEPHONE (OUTPATIENT)
Dept: ENT CLINIC | Age: 83
End: 2023-02-17

## 2023-02-17 NOTE — TELEPHONE ENCOUNTER
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 I advised pt to write any questions down and bring to her postop appt so Dr Martha Myles could go over with her.  Patient voiced understanding.//kdm

## 2023-02-20 ENCOUNTER — TELEPHONE (OUTPATIENT)
Dept: ENT CLINIC | Age: 83
End: 2023-02-20

## 2023-02-20 NOTE — TELEPHONE ENCOUNTER
Pt calling in today asking about the biopsy that was preformed. She wants to know if we have received the results from this and to let us know that the rasp in her voice has gotten worse. Please call her when able to answer what we can.

## 2023-02-20 NOTE — TELEPHONE ENCOUNTER
I returned patient's call about biposy results . Results given per Dr. Kelsy Jules , patient voiced understanding . 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 Kelsy Jules at the time of her post op appointment.

## 2023-02-22 ENCOUNTER — HOME HEALTH ADMISSION (OUTPATIENT)
Dept: HOME HEALTH SERVICES | Facility: HOME HEALTH | Age: 83
End: 2023-02-22
Payer: MEDICARE

## 2023-02-22 ENCOUNTER — OFFICE VISIT (OUTPATIENT)
Dept: INTERNAL MEDICINE CLINIC | Facility: CLINIC | Age: 83
End: 2023-02-22
Payer: MEDICARE

## 2023-02-22 VITALS
WEIGHT: 147 LBS | HEART RATE: 80 BPM | DIASTOLIC BLOOD PRESSURE: 70 MMHG | OXYGEN SATURATION: 97 % | HEIGHT: 62 IN | BODY MASS INDEX: 27.05 KG/M2 | SYSTOLIC BLOOD PRESSURE: 120 MMHG

## 2023-02-22 DIAGNOSIS — M25.519 CHRONIC SHOULDER PAIN, UNSPECIFIED LATERALITY: ICD-10-CM

## 2023-02-22 DIAGNOSIS — M19.90 ARTHRITIS: ICD-10-CM

## 2023-02-22 DIAGNOSIS — Z96.651 HISTORY OF TOTAL RIGHT KNEE REPLACEMENT: ICD-10-CM

## 2023-02-22 DIAGNOSIS — H65.93 FLUID LEVEL BEHIND TYMPANIC MEMBRANE OF BOTH EARS: ICD-10-CM

## 2023-02-22 DIAGNOSIS — G89.29 CHRONIC SHOULDER PAIN, UNSPECIFIED LATERALITY: ICD-10-CM

## 2023-02-22 DIAGNOSIS — G31.84 MILD COGNITIVE IMPAIRMENT: ICD-10-CM

## 2023-02-22 DIAGNOSIS — E03.9 ACQUIRED HYPOTHYROIDISM: ICD-10-CM

## 2023-02-22 DIAGNOSIS — Z00.00 MEDICARE ANNUAL WELLNESS VISIT, SUBSEQUENT: ICD-10-CM

## 2023-02-22 DIAGNOSIS — Z98.890 HX OF SPINAL SURGERY: ICD-10-CM

## 2023-02-22 DIAGNOSIS — E78.2 MIXED HYPERLIPIDEMIA: Primary | ICD-10-CM

## 2023-02-22 DIAGNOSIS — F41.9 ANXIETY: ICD-10-CM

## 2023-02-22 DIAGNOSIS — I10 HYPERTENSION, BENIGN: ICD-10-CM

## 2023-02-22 LAB
ERYTHROCYTE [DISTWIDTH] IN BLOOD BY AUTOMATED COUNT: 13.7 % (ref 11.9–14.6)
HCT VFR BLD AUTO: 37.5 % (ref 35.8–46.3)
HGB BLD-MCNC: 12 G/DL (ref 11.7–15.4)
MCH RBC QN AUTO: 32.7 PG (ref 26.1–32.9)
MCHC RBC AUTO-ENTMCNC: 32 G/DL (ref 31.4–35)
MCV RBC AUTO: 102.2 FL (ref 82–102)
NRBC # BLD: 0 K/UL (ref 0–0.2)
PLATELET # BLD AUTO: 276 K/UL (ref 150–450)
PMV BLD AUTO: 11.5 FL (ref 9.4–12.3)
RBC # BLD AUTO: 3.67 M/UL (ref 4.05–5.2)
WBC # BLD AUTO: 7 K/UL (ref 4.3–11.1)

## 2023-02-22 PROCEDURE — G8400 PT W/DXA NO RESULTS DOC: HCPCS | Performed by: NURSE PRACTITIONER

## 2023-02-22 PROCEDURE — 1090F PRES/ABSN URINE INCON ASSESS: CPT | Performed by: NURSE PRACTITIONER

## 2023-02-22 PROCEDURE — 1036F TOBACCO NON-USER: CPT | Performed by: NURSE PRACTITIONER

## 2023-02-22 PROCEDURE — G8427 DOCREV CUR MEDS BY ELIG CLIN: HCPCS | Performed by: NURSE PRACTITIONER

## 2023-02-22 PROCEDURE — 99214 OFFICE O/P EST MOD 30 MIN: CPT | Performed by: NURSE PRACTITIONER

## 2023-02-22 PROCEDURE — G8484 FLU IMMUNIZE NO ADMIN: HCPCS | Performed by: NURSE PRACTITIONER

## 2023-02-22 PROCEDURE — G8417 CALC BMI ABV UP PARAM F/U: HCPCS | Performed by: NURSE PRACTITIONER

## 2023-02-22 PROCEDURE — 1123F ACP DISCUSS/DSCN MKR DOCD: CPT | Performed by: NURSE PRACTITIONER

## 2023-02-22 PROCEDURE — 3074F SYST BP LT 130 MM HG: CPT | Performed by: NURSE PRACTITIONER

## 2023-02-22 PROCEDURE — G0439 PPPS, SUBSEQ VISIT: HCPCS | Performed by: NURSE PRACTITIONER

## 2023-02-22 PROCEDURE — 3078F DIAST BP <80 MM HG: CPT | Performed by: NURSE PRACTITIONER

## 2023-02-22 RX ORDER — OMEPRAZOLE 20 MG/1
20 CAPSULE, DELAYED RELEASE ORAL 2 TIMES DAILY PRN
Qty: 30 CAPSULE | Refills: 11 | Status: SHIPPED | OUTPATIENT
Start: 2023-02-22

## 2023-02-22 RX ORDER — CITALOPRAM 20 MG/1
20 TABLET ORAL DAILY
Qty: 30 TABLET | Refills: 11 | Status: SHIPPED | OUTPATIENT
Start: 2023-02-22

## 2023-02-22 RX ORDER — TRAMADOL HYDROCHLORIDE 50 MG/1
50 TABLET ORAL DAILY PRN
Qty: 30 TABLET | Refills: 0 | Status: SHIPPED | OUTPATIENT
Start: 2023-02-22 | End: 2023-03-24

## 2023-02-22 RX ORDER — FLUTICASONE PROPIONATE 50 MCG
2 SPRAY, SUSPENSION (ML) NASAL 2 TIMES DAILY
Qty: 16 G | Refills: 11 | Status: SHIPPED | OUTPATIENT
Start: 2023-02-22

## 2023-02-22 SDOH — ECONOMIC STABILITY: FOOD INSECURITY: WITHIN THE PAST 12 MONTHS, THE FOOD YOU BOUGHT JUST DIDN'T LAST AND YOU DIDN'T HAVE MONEY TO GET MORE.: NEVER TRUE

## 2023-02-22 SDOH — ECONOMIC STABILITY: FOOD INSECURITY: WITHIN THE PAST 12 MONTHS, YOU WORRIED THAT YOUR FOOD WOULD RUN OUT BEFORE YOU GOT MONEY TO BUY MORE.: NEVER TRUE

## 2023-02-22 SDOH — ECONOMIC STABILITY: INCOME INSECURITY: HOW HARD IS IT FOR YOU TO PAY FOR THE VERY BASICS LIKE FOOD, HOUSING, MEDICAL CARE, AND HEATING?: NOT HARD AT ALL

## 2023-02-22 SDOH — ECONOMIC STABILITY: HOUSING INSECURITY
IN THE LAST 12 MONTHS, WAS THERE A TIME WHEN YOU DID NOT HAVE A STEADY PLACE TO SLEEP OR SLEPT IN A SHELTER (INCLUDING NOW)?: NO

## 2023-02-22 ASSESSMENT — PATIENT HEALTH QUESTIONNAIRE - PHQ9
SUM OF ALL RESPONSES TO PHQ QUESTIONS 1-9: 1
SUM OF ALL RESPONSES TO PHQ QUESTIONS 1-9: 1
SUM OF ALL RESPONSES TO PHQ9 QUESTIONS 1 & 2: 1
SUM OF ALL RESPONSES TO PHQ QUESTIONS 1-9: 1
2. FEELING DOWN, DEPRESSED OR HOPELESS: 1
SUM OF ALL RESPONSES TO PHQ QUESTIONS 1-9: 1
1. LITTLE INTEREST OR PLEASURE IN DOING THINGS: 0

## 2023-02-22 ASSESSMENT — LIFESTYLE VARIABLES
HOW OFTEN DO YOU HAVE A DRINK CONTAINING ALCOHOL: MONTHLY OR LESS
HOW MANY STANDARD DRINKS CONTAINING ALCOHOL DO YOU HAVE ON A TYPICAL DAY: 1 OR 2

## 2023-02-22 NOTE — PROGRESS NOTES
Rakan Snyder (:  1940) is a 78 y.o. female,Established patient, here for evaluation of the following chief complaint(s):  Medicare AWV         ASSESSMENT/PLAN:  1. Mixed hyperlipidemia  -     CBC; Future  -     Comprehensive Metabolic Panel; Future  -     TSH; Future  -      [facility]  2. Acquired hypothyroidism  -     CBC; Future  -     Comprehensive Metabolic Panel; Future  -     TSH; Future  -      [facility]  3. Hypertension, benign  -     CBC; Future  -     Comprehensive Metabolic Panel; Future  -     TSH; Future  -      [facility]  4. Fluid level behind tympanic membrane of both ears  -      [facility]  5. Anxiety  -     CBC; Future  -     Comprehensive Metabolic Panel; Future  -     TSH; Future  -      [facility]  6. Chronic shoulder pain, unspecified laterality  -     traMADol (ULTRAM) 50 MG tablet; Take 1 tablet by mouth daily as needed for Pain (moderate to severe) for up to 30 days. Max Daily Amount: 50 mg, Disp-30 tablet, R-0Normal  -      [facility]  7. History of total right knee replacement  -     [address]  8. Hx of spinal surgery  -     [address]  9. Medicare annual wellness visit, subsequent  10. Arthritis  -     [address]  11. Mild cognitive impairment  -     [address]    Return in 6 months (on 2023), or 6 month with lab, for Medicare Annual Wellness Visit in 1 year. Reviewed records  Recent tonsil surgery, recovering well  Tube in right ear, effusion in left, f/u with ENT  Pain in shoulders, hips, multiples joints, chronic arthritsi with multiples surgeries, refill tramadol, she takes sparinglgy, she can't drive, refer to home health for PT and nursing  Some cognitive impairment, likely worsening with hearing loss, she isn't driving      Subjective   SUBJECTIVE/OBJECTIVE:  Patient is here for follow up. She has had recent shoulder surgery, then since last visit she had tonsil removed with mass. It was benign. She hasn't been able to go to  as she can't drive.   She had spine surgery last year. She has had multiple joint replacements and increased pain from arthritis. She does note some memory worsening. She recently had tubes in ears but still feels fluid in left ear. Her hearing has worsened.     Review of Systems       Objective   Physical Exam  Constitutional:       Appearance: Normal appearance. She is not ill-appearing.   HENT:      Head: Normocephalic.      Right Ear: External ear normal.      Left Ear: External ear normal.      Ears:      Comments: Effusion left ear, TM tube right ear     Mouth/Throat:      Comments: Left tonsil eschar healing  Eyes:      Extraocular Movements: Extraocular movements intact.      Pupils: Pupils are equal, round, and reactive to light.   Cardiovascular:      Rate and Rhythm: Normal rate and regular rhythm.   Pulmonary:      Effort: Pulmonary effort is normal.      Breath sounds: Normal breath sounds.   Musculoskeletal:      Cervical back: Neck supple.   Neurological:      General: No focal deficit present.      Mental Status: She is alert.   Psychiatric:         Mood and Affect: Mood normal.                An electronic signature was used to authenticate this note.    --Sheila Patricia, APRN - CNP   Medicare Annual Wellness Visit    Katie Yarbroughp is here for Medicare AWV    Assessment & Plan   Mixed hyperlipidemia  -     CBC; Future  -     Comprehensive Metabolic Panel; Future  -     TSH; Future  -     [facility]  Acquired hypothyroidism  -     CBC; Future  -     Comprehensive Metabolic Panel; Future  -     TSH; Future  -     [facility]  Hypertension, benign  -     CBC; Future  -     Comprehensive Metabolic Panel; Future  -     TSH; Future  -     [facility]  Fluid level behind tympanic membrane of both ears  -      [facility]  Anxiety  -     CBC; Future  -     Comprehensive Metabolic Panel; Future  -     TSH; Future  -     [facility]  Chronic shoulder pain, unspecified laterality  -     traMADol (ULTRAM) 50 MG tablet; Take 1 tablet by mouth daily as needed for Pain (moderate to severe) for up to 30 days. Max Daily Amount: 50 mg, Disp-30 tablet, R-0Normal  -     [facility]  History of total right knee replacement  -     [facility]  Hx of spinal surgery  -     [facility] Medicare annual wellness visit, subsequent  Arthritis  -     [facility]  Mild cognitive impairment  -     [facility]      Recommendations for Preventive Services Due: see orders and patient instructions/AVS.  Recommended screening schedule for the next 5-10 years is provided to the patient in written form: see Patient Instructions/AVS.     Return in 6 months (on 8/22/2023), or 6 month with lab, for Medicare Annual Wellness Visit in 1 year.     Subjective       Patient's complete Health Risk Assessment and screening values have been reviewed and are found in Flowsheets. The following problems were reviewed today and where indicated follow up appointments were made and/or referrals ordered.    Positive Risk Factor Screenings with Interventions:       Cognitive:   Words recalled: 1 Word Recalled   Clock Drawing Test (CDT): (!) Abnormal   Total Score: (!) 1   Total Score Interpretation: Abnormal Mini-Cog      Interventions:  Has care at home with her spouse - she doesn't drive            Opioid Risk: (Low risk score <55) Opioid risk score: 12    Patient is low risk for opioid use disorder or overdose.  Last PDMP Chris as Reviewed:  Review User Review Instant Review Result                  General HRA Questions:  Select all that apply: (!) New or Increased Pain, New or Increased Fatigue    Pain Interventions:  Refill tramadol, continue PT  exercise  See AVS for additional education material    Fatigue Interventions:  Increase exercise to improve energy         Hearing Screen:  Do you or your family notice any trouble with your hearing that hasn't been managed with hearing aids?: (!) Yes    Interventions: Fu with ENT and audiology     Safety:  Do you have any tripping hazards - loose or unsecured carpets or rugs?: (!) Yes  Interventions:  Recommend fall prevention     ADL's:   Patient reports needing help with:  Select all that apply: [employer], Transportation  Interventions:  Refer home health, she doesn't drive and hasn't been to PT                    Objective   Vitals:    02/22/23 1314   BP: 120/70   Site: Left Upper Arm   Position: Sitting   Cuff Size: Medium Adult   Pulse: 80   SpO2: 97%   Weight: 147 lb (66.7 kg)   Height: 5' 2\" (1.575 m)      Body mass index is 26.89 kg/m². Allergies   Allergen Reactions    Codeine Itching     Other reaction(s): Nausea and/or vomiting-Intolerance, Rash-Allergy    Adhesive Tape Rash and Other (See Comments)     Blisters     Prior to Visit Medications    Medication Sig Taking? Authorizing Provider   traMADol (ULTRAM) 50 MG tablet Take 1 tablet by mouth daily as needed for Pain (moderate to severe) for up to 30 days.  Max Daily Amount: 50 mg Yes SHAVON Burrows CNP   fluticasone (FLONASE) 50 MCG/ACT nasal spray 2 sprays by Nasal route in the morning and at bedtime Yes HSAVON Burrows CNP   citalopram (CELEXA) 20 MG tablet Take 1 tablet by mouth daily Yes SHAVON Burrows CNP   omeprazole (PRILOSEC) 20 MG delayed release capsule Take 1 capsule by mouth 2 times daily as needed (reflux) Yes SHAVON Burrows CNP   acetaminophen (TYLENOL) 500 MG tablet Take 1,000 mg by mouth every 6 hours as needed for Pain (breakthrough) Yes Historical Provider, MD   aspirin 81 MG EC tablet Take 81 mg by mouth daily  Patient not taking: Reported on 2/2/2023  Historical Provider, MD   B Complex Vitamins (VITAMIN-B COMPLEX PO) Take 1 capsule by mouth daily  Patient not taking: Reported on 2/22/2023  Historical Provider, MD   IRON, FERROUS SULFATE, PO Take 1 tablet by mouth daily.  Patient not taking: Reported on 2/22/2023  Historical Provider, MD   Ascorbic Acid (VITAMIN C) 500 MG CAPS Take 1 capsule by mouth daily.  Patient not taking: Reported on 2/22/2023  Historical Provider, MD   cetirizine (ZYRTEC) 10 MG tablet Take 10 mg by mouth daily  Patient not taking: No sig reported  Ar Automatic Reconciliation       CareTeam (Including outside providers/suppliers regularly involved in providing care):   Patient Care Team:  SHAVON Yates CNP as PCP - General (Nurse Practitioner)  SHAVON Yates CNP as PCP - Empaneled Provider  Oneil Mendez DO as Consulting Physician (Internal Medicine Cardiovascular Disease)  Dontae Gannon MD (Gastroenterology)  Mitesh Evans DO (Otolaryngology)     Reviewed and updated this visit:  Tobacco  Allergies  Meds  Med Hx  Surg Hx  Soc Hx  Fam Hx             SHAVON Yates CNP

## 2023-02-22 NOTE — PATIENT INSTRUCTIONS
Learning About Mild Cognitive Impairment (MCI)  What is mild cognitive impairment (MCI)? It's common to forget things sometimes as we get older. But some older people have memory loss that's more than normal aging. It's called mild cognitive impairment, or MCI. It is not the same as dementia. People with the condition often know that their memory or mental function has changed. Tests may show some loss. But their minds work well overall. They can carry out daily tasks that are normal for them. People with MCI have a higher chance of one day getting dementia. But not all people who have it will get dementia. Some people may stay the same over time. What are the symptoms? People with MCI have more memory loss than what occurs with normal aging. They may have increasing trouble with recalling words and keeping up with conversations. They may also have trouble remembering important events and making decisions. What puts you at risk? The risk of getting MCI increases with age. Having high blood pressure or having a family history of MCI may also increase your risk. How is it diagnosed? Your doctor will do a physical exam.  You may be asked questions to check your memory and other mental skills. Your doctor may also talk to close friends and family members. This can help the doctor figure out how your memory and other mental skills have changed. You may get blood tests and tests that look at your brain. These questions and tests can make sure you don't have other conditions that can cause symptoms like MCI. These include depression, sleep problems, and side effects from medicines. How is it treated? There are no medicines to treat MCI or to keep it from progressing to dementia. But treating conditions like high blood pressure and diabetes may help. A person with MCI needs routine follow-up visits with their doctor to check on changes in the person's mental skills.   How can you care for yourself at home?  Keeping your body active can help slow MCI. Exercises like walking can help. Try to stay active mentally too. Read or do things like crossword puzzles if you enjoy doing them. If you need help coping with MCI, you may want to get support from family, friends, a support group, or a counselor who works with people who have [address] Though the future isn't always clear, it can be good to plan ahead with instructions for your care. These are called advanced directives. Having a plan can help make sure that you get the care you want. Current as of: August 25, 2022               Content Version: 13.5  © 2006-2022 [employer]. Care instructions adapted under license by [facility] ([facility]). If you have questions about a medical condition or this instruction, always ask your healthcare professional. [address] any warranty or liability for your use of this information. Fatigue: Care Instructions  Your Care Instructions     Fatigue is a feeling of tiredness, exhaustion, or lack of energy. You may feel fatigue because of too much or not enough activity. It can also come from stress, lack of sleep, boredom, and poor diet. Many medical problems, such as viral infections, can cause fatigue. Emotional problems, especially depression, are often the cause of fatigue. Fatigue is most often a symptom of another problem. Treatment for fatigue depends on the cause. For example, if you have fatigue because you have a certain health problem, treating this problem also treats your fatigue. If depression or anxiety is the cause, treatment may help. Follow-up care is a key part of your treatment and safety. Be sure to make and go to all appointments, and call your doctor if you are having problems. It's also a good idea to know your test results and keep a list of the medicines you take. How can you care for yourself at home? Get regular exercise. But don't overdo it.  Go back and forth between rest and exercise. Get plenty of rest.  Eat a healthy diet. Do not skip meals, especially breakfast.  Reduce your use of caffeine, tobacco, and alcohol. Caffeine is most often found in coffee, tea, cola drinks, and chocolate. Limit medicines that can cause fatigue. This includes tranquilizers and cold and allergy medicines. When should you call for help? Watch closely for changes in your health, and be sure to contact your doctor if:    You have new symptoms such as fever or a rash.     Your fatigue gets worse.     You have been feeling down, depressed, or hopeless. Or you may have lost interest in things that you usually enjoy.     You are not getting better as expected. Where can you learn more? Go to http://www.woods.com/ and enter T795 to learn more about \"Fatigue: Care Instructions. \"  Current as of: February 9, 2022               Content Version: 13.5  © 2006-2022 [employer]. Care instructions adapted under license by [facility] ([facility]). If you have questions about a medical condition or this instruction, always ask your healthcare professional. [address] any warranty or liability for your use of this information. Learning About Activities of Daily Living  What are activities of daily living? Activities of daily living (ADLs) are the basic self-care tasks you do every day. As you age, and if you have health problems, you may find that it's harder to do these things for yourself. That's when you may need some help. Your doctor uses ADLs to measure how much help you need. Knowing what you can and can't do for yourself is an important first step to getting help. And when you have the help you need, you can stay as independent as possible. Your doctor will want to know if you are able to do tasks such as: Take a bath or shower without help. Go to the bathroom by yourself. Dress and undress without help.   Shave, comb your hair, and brush teeth on your own. Get in and out of bed or a chair without help. Feed yourself without help. If you are having trouble doing basic self-care tasks, talk with your doctor. You may want to bring a caregiver or family member who can help the doctor understand your needs and abilities. How will a doctor assess your ADLs? Asking about ADLs is part of a routine health checkup your doctor will likely do as you age. Your health check might be done in a doctor's office, in your home, or at a hospital. The goal is to find out if you are having any problems that could make your health problems worse or that make it unsafe for you to be on your own. To measure your ADLs, your doctor will ask how hard it is for you to do routine tasks. He or she may also want to know if you have changed the way you do a task because of a health problem. He or she may watch how you:  Walk back and forth. Keep your balance while you stand or walk. Move from sitting to standing or from a bed to a chair. Button or unbutton a shirt or sweater. Remove and put on your shoes. It's normal to feel a little worried or anxious if you find you can't do all the things you used to be able to do. Talking with your doctor about ADLs isn't a test that you either pass or fail. It's just a way to get more information about your health and safety. Follow-up care is a key part of your treatment and safety. Be sure to make and go to all appointments, and call your doctor if you are having problems. It's also a good idea to know your test results and keep a list of the medicines you take. Current as of: October 6, 2021               Content Version: 13.5  © 2006-2022 [employer]. Care instructions adapted under license by [facility] ([facility]).  If you have questions about a medical condition or this instruction, always ask your healthcare professional. [address] any warranty or liability for your use of this information. Advance Directives: Care Instructions  Overview  An advance directive is a legal way to state your wishes at the end of your life. It tells your family and your doctor what to do if you can't say what you want. There are two main types of advance directives. You can change them any time your wishes change. Living will. This form tells your family and your doctor your wishes about life support and other treatment. The form is also called a declaration. Medical power of . This form lets you name a person to make treatment decisions for you when you can't speak for yourself. This person is called a health care agent (health care proxy, health care surrogate). The form is also called a durable power of  for health care. If you do not have an advance directive, decisions about your medical care may be made by a family member, or by a doctor or a  who doesn't know you. It may help to think of an advance directive as a gift to the people who care for you. If you have one, they won't have to make tough decisions by themselves. For more information, including forms for your state, see the [address] website (www.caringinfo.org/planning/advance-directives/). Follow-up care is a key part of your treatment and safety. Be sure to make and go to all appointments, and call your doctor if you are having problems. It's also a good idea to know your test results and keep a list of the medicines you take. What should you include in an advance directive? Many states have a unique advance directive form. (It may ask you to address specific issues.) Or you might use a universal form that's approved by many states. If your form doesn't tell you what to address, it may be hard to know what to include in your advance directive. Use the questions below to help you get started. Who do you want to make decisions about your medical care if you are not able to?   What life-support measures do you want if you have a serious illness that gets worse over time or can't be cured? What are you most afraid of that might happen? (Maybe you're afraid of having pain, losing your independence, or being kept alive by machines.)  Where would you prefer to die? (Your home? A hospital? A nursing home?)  Do you want to donate your organs when you die? Do you want certain Ca[employer] practices performed before you die? When should you call for help? Be sure to contact your doctor if you have any questions. Where can you learn more? Go to http://www.matos.com/ and enter R264 to learn more about \"Advance Directives: Care Instructions. \"  Current as of: June 16, 2022               Content Version: 13.5  © 9982-6530 [employer]. Care instructions adapted under license by [facility] ([facility]). If you have questions about a medical condition or this instruction, always ask your healthcare professional. [place] [address] any warranty or liability for your use of this information. A Healthy Heart: Care Instructions  Your Care Instructions     Coronary artery disease, also called heart disease, occurs when a substance called plaque builds up in the vessels that supply oxygen-rich blood to your heart muscle. This can narrow the blood vessels and reduce blood flow. A heart attack happens when blood flow is completely blocked. A high-fat diet, smoking, and other factors increase the risk of heart disease. Your doctor has found that you have a chance of having heart disease. You can do lots of things to keep your heart healthy. It may not be easy, but you can change your diet, exercise more, and quit smoking. These steps really work to lower your chance of heart disease. Follow-up care is a key part of your treatment and safety. Be sure to make and go to all appointments, and call your doctor if you are having problems.  It's also a good idea to know your test results and keep a list of the medicines you take. How can you care for yourself at home? Diet    Use less salt when you cook and eat. This helps lower your blood pressure. Taste food before salting. Add only a little salt when you think you need it. With time, your taste buds will adjust to less salt.     Eat fewer snack items, fast foods, canned soups, and other high-salt, high-fat, processed foods.     Read food labels and try to avoid saturated and trans fats. They increase your risk of heart disease by raising cholesterol levels.     Limit the amount of solid fat-butter, margarine, and shortening-you eat. Use olive, peanut, or canola oil when you cook. Bake, broil, and steam foods instead of frying them.     Eat a variety of fruit and vegetables every day. Dark green, deep orange, red, or yellow fruits and vegetables are especially good for you. Examples include spinach, carrots, peaches, and berries.     Foods high in fiber can reduce your cholesterol and provide important vitamins and minerals. High-fiber foods include whole-grain cereals and breads, oatmeal, beans, brown rice, citrus fruits, and apples.     Eat lean proteins. Heart-healthy proteins include seafood, lean meats and poultry, eggs, beans, peas, nuts, seeds, and soy products.     Limit drinks and foods with added sugar. These include candy, desserts, and soda pop. Lifestyle changes    If your doctor recommends it, get more exercise. Walking is a good choice. Bit by bit, increase the amount you walk every day. Try for at least 30 minutes on most days of the week. You also may want to swim, bike, or do other activities.     Do not smoke. If you need help quitting, talk to your doctor about stop-smoking programs and medicines. These can increase your chances of quitting for good. Quitting smoking may be the most important step you can take to protect your heart. It is never too late to quit.     Limit alcohol to 2 drinks a day for men and 1 drink a day for women. Too much alcohol can cause health problems.     Manage other health problems such as diabetes, high blood pressure, and high cholesterol. If you think you may have a problem with alcohol or drug use, talk to your doctor. Medicines    Take your medicines exactly as prescribed. Call your doctor if you think you are having a problem with your medicine.     If your doctor recommends aspirin, take the amount directed each day. Make sure you take aspirin and not another kind of pain reliever, such as acetaminophen (Tylenol). When should you call for help? Call 911 if you have symptoms of a heart attack. These may include:    Chest pain or pressure, or a strange feeling in the chest.     Sweating.     Shortness of breath.     Pain, pressure, or a strange feeling in the back, neck, jaw, or upper belly or in one or both shoulders or arms.     Lightheadedness or sudden weakness.     A fast or irregular heartbeat. After you call 911, the  may tell you to chew 1 adult-strength or 2 to 4 low-dose aspirin. Wait for an ambulance. Do not try to drive yourself. Watch closely for changes in your health, and be sure to contact your doctor if you have any problems. Where can you learn more? Go to http://www.matos.com/ and enter F075 to learn more about \"A Healthy Heart: Care Instructions. \"  Current as of: September 7, 2022               Content Version: 13.5  © 6201-5895 Healthwise[employer]. Care instructions adapted under license by [facility] ([facility]). If you have questions about a medical condition or this instruction, always ask your healthcare professional. [place] [address] any warranty or liability for your use of this information. Personalized Preventive Plan for Jeanette Ashraf - 2/22/2023  Medicare offers a range of preventive health benefits.  Some of the tests and screenings are paid in full while other may be subject to a deductible, co-insurance, and/or copay.    Some of these benefits include a comprehensive review of your medical history including lifestyle, illnesses that may run in your family, and various assessments and screenings as appropriate. After reviewing your medical record and screening and assessments performed today your provider may have ordered immunizations, labs, imaging, and/or referrals for you. A list of these orders (if applicable) as well as your Preventive Care list are included within your After Visit Summary for your review. Other Preventive Recommendations:    A preventive eye exam performed by an eye specialist is recommended every 1-2 years to screen for glaucoma; cataracts, macular degeneration, and other eye disorders. A preventive dental visit is recommended every 6 months. Try to get at least 150 minutes of exercise per week or 10,000 steps per day on a pedometer . Order or download the FREE \"Exercise & Physical Activity: Your Everyday Guide\" from [employer]. Call 6-890.546.7702 or search [employer] online. You need 2316-4106 mg of calcium and 8154-1686 IU of vitamin D per day. It is possible to meet your calcium requirement with diet alone, but a vitamin D supplement is usually necessary to meet this goal.  When exposed to the sun, use a sunscreen that protects against both UVA and UVB radiation with an SPF of 30 or greater. Reapply every 2 to 3 hours or after sweating, drying off with a towel, or swimming. Always wear a seat belt when traveling in a car. Always wear a helmet when riding a bicycle or motorcycle.

## 2023-02-23 LAB
ALBUMIN SERPL-MCNC: 3.9 G/DL (ref 3.2–4.6)
ALBUMIN/GLOB SERPL: 1.2 (ref 0.4–1.6)
ALP SERPL-CCNC: 80 U/L (ref 50–136)
ALT SERPL-CCNC: 19 U/L (ref 12–65)
ANION GAP SERPL CALC-SCNC: 4 MMOL/L (ref 2–11)
AST SERPL-CCNC: 19 U/L (ref 15–37)
BILIRUB SERPL-MCNC: 0.5 MG/DL (ref 0.2–1.1)
BUN SERPL-MCNC: 15 MG/DL (ref 8–23)
CALCIUM SERPL-MCNC: 9.7 MG/DL (ref 8.3–10.4)
CHLORIDE SERPL-SCNC: 109 MMOL/L (ref 101–110)
CO2 SERPL-SCNC: 25 MMOL/L (ref 21–32)
CREAT SERPL-MCNC: 0.9 MG/DL (ref 0.6–1)
GLOBULIN SER CALC-MCNC: 3.2 G/DL (ref 2.8–4.5)
GLUCOSE SERPL-MCNC: 128 MG/DL (ref 65–100)
POTASSIUM SERPL-SCNC: 4.1 MMOL/L (ref 3.5–5.1)
PROT SERPL-MCNC: 7.1 G/DL (ref 6.3–8.2)
SODIUM SERPL-SCNC: 138 MMOL/L (ref 133–143)
TSH, 3RD GENERATION: 0.73 UIU/ML (ref 0.36–3.74)

## 2023-02-27 ENCOUNTER — OFFICE VISIT (OUTPATIENT)
Dept: ENT CLINIC | Age: 83
End: 2023-02-27

## 2023-02-27 DIAGNOSIS — R13.10 ODYNOPHAGIA: ICD-10-CM

## 2023-02-27 DIAGNOSIS — Z45.89 TYMPANOSTOMY TUBE CHECK: ICD-10-CM

## 2023-02-27 DIAGNOSIS — R49.0 HOARSENESS OF VOICE: Primary | ICD-10-CM

## 2023-02-27 DIAGNOSIS — Z90.89 S/P TONSILLECTOMY: ICD-10-CM

## 2023-02-27 ASSESSMENT — ENCOUNTER SYMPTOMS
ABDOMINAL DISTENTION: 0
COLOR CHANGE: 0
APNEA: 0
EYE DISCHARGE: 0

## 2023-02-27 NOTE — PROGRESS NOTES
HPI:  Jackie Ahmadi is a 78 y.o. female seen Established   Chief Complaint   Patient presents with    Post-Op Check     Post op of DSL on 2/10/23 .          26-year-old female seen for postoperative evaluation having undergone direct laryngoscopy and left tonsillectomy on 2-2-2023 for treatment of firm hard enlarging tonsil mass. She has been doing well having had significant improvement for her pain over the last few days. There has been no no bleeding from her mouth. Presents today for postoperative check and review of pathology. She also has other multiple ENT stated concerns at today's visit to include ongoing and somewhat worsening left-sided hearing loss. She has had a continued significant improvement to her right-sided hearing loss after having had PE tube placed 1 month ago for middle ear effusion. She is utilizing her hearing aids with good benefit on the right side but not very well on the left side. She also has a ongoing complaint of worsening hoarseness which has been somewhat increased over the last couple weeks. She has had a lot of drainage postnasal drip and phlegm typical of having had recent tonsillectomy. Her throat feels raw and she has had some hoarseness changes. This has not been improving over the last few days. Past Medical History, Past Surgical History, Family history, Social History, and Medications were all reviewed with the patient today and updated as necessary.      Allergies   Allergen Reactions    Codeine Itching     Other reaction(s): Nausea and/or vomiting-Intolerance, Rash-Allergy    Adhesive Tape Rash and Other (See Comments)     Blisters       Patient Active Problem List   Diagnosis    PVD (peripheral vascular disease) ([facility] [address]    History of bilateral hip replacements    Shortness of breath    Palpitations    Chest discomfort    History of COVID-19    Spinal stenosis, lumbar    Bilateral carotid artery disease (HCC)    Palpitation    Hypothyroidism    Vazquez's esophagus    Tricuspid valve disorder    Coronary atherosclerosis of native coronary vessel    Hypertension, benign    Hyperlipidemia    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    Periprosthetic fracture around internal prosthetic right shoulder joint    Presence of right artificial shoulder joint    Periprosthetic fracture around internal prosthetic right shoulder joint, initial encounter    Acute blood loss anemia    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 traMADol (ULTRAM) 50 MG tablet Take 1 tablet by mouth daily as needed for Pain (moderate to severe) for up to 30 days. Max Daily Amount: 50 mg    fluticasone (FLONASE) 50 MCG/ACT nasal spray 2 sprays by Nasal route in the morning and at bedtime    citalopram (CELEXA) 20 MG tablet Take 1 tablet by mouth daily    omeprazole (PRILOSEC) 20 MG delayed release capsule Take 1 capsule by mouth 2 times daily as needed (reflux)    aspirin 81 MG EC tablet Take 81 mg by mouth daily (Patient not taking: Reported on 2/2/2023)    B Complex Vitamins (VITAMIN-B COMPLEX PO) Take 1 capsule by mouth daily (Patient not taking: Reported on 2/22/2023)    IRON, FERROUS SULFATE, PO Take 1 tablet by mouth daily. (Patient not taking: Reported on 2/22/2023)    Ascorbic Acid (VITAMIN C) 500 MG CAPS Take 1 capsule by mouth daily.  (Patient not taking: Reported on 2/22/2023)    acetaminophen (TYLENOL) 500 MG tablet Take 1,000 mg by mouth every 6 hours as needed for Pain (breakthrough)    cetirizine (ZYRTEC) 10 MG tablet Take 10 mg by mouth daily (Patient not taking: No sig reported)     No current facility-administered medications for this visit.        Past Medical History:   Diagnosis Date    Acid reflux     Arthritis     osteo    Vazquez's esophagus     Managed with as needed meds    Bilateral carotid artery disease ([facility] [address]     Followed by cardiology- Dr. Lia Christy    Chest discomfort     denies current or recent CP 2/2/23    Coronary atherosclerosis of native coronary vessel     Degeneration of lumbar or lumbosacral intervertebral disc     Depression     medication     Gastric ulcer, unspecified as acute or chronic, without mention of hemorrhage or perforation 1989    History of anemia     History of blood transfusion     last in 09/2022 after shoulder surgery    Hyperlipidemia     Hypothyroidism     no meds needed currently (low normal range per pt)    Infectious disease     lyme disease 2004    Nausea & vomiting     Palpitations     PVD (peripheral vascular disease) ([address]     Scoliosis (and kyphoscoliosis), idiopathic     Spinal stenosis, lumbar 12/7/2009    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    Thoracic or lumbosacral neuritis or radiculitis, unspecified     Tricuspid regurgitation     mild per echo dated 1-11-13    Tricuspid valve disorder        Past Surgical History:   Procedure Laterality Date    APPENDECTOMY  1987    BREAST BIOPSY Left 1980    ENLARGED MILK GLAND    BUNIONECTOMY Bilateral 2009    with hammertoe correction    CARPAL TUNNEL RELEASE Right 10/31/13    ESOPHAGOGASTRODUODENOSCOPY  07/27/2018    2016, 2018    HAMMER TOE SURGERY  2010    revision    HYSTERECTOMY (CERVIX STATUS UNKNOWN)  1988    LAMINECTOMY  04/05/2022    T10 OPEN TOTAL LAMINECTOMY    LARYNGOSCOPY N/A 2/10/2023    Direct Suspension LARYNGOSCOPY performed by Jania Loza DO at [facility]  2009    L4-L5    ORTHOPEDIC SURGERY  2010    Right hand    REVERSE TOTAL SHOULDER ARTHROPLASTY Right 2017    SHOULDER ARTHROSCOPY  2013    left shoulder cyst    SHOULDER SURGERY Right 8/18/2022    right shoulder irrigation and debridement, right shoulder removal of manjit total shoulder arthroplasty implants, revision right reverse total shoulder arthroplasty with a delta xtend prosthesis and biceps tenodesis, possible remedy spacer. general/interscalene. 23hr observation. performed by Nayely Weiss MD at [address] [facility] Left 2/10/2023    TONSILLECTOMY performed by Mark Louis DO at [address] [facility] Left 2007    revision 3/2000    TOTAL HIP ARTHROPLASTY Left 2009    revision    TOTAL KNEE ARTHROPLASTY Right 2007    UPPER GASTROINTESTINAL ENDOSCOPY N/A 8/22/2022    EGD BIOPSY performed by Michael Kinsey MD at [address] History     Tobacco Use    Smoking status: Never    Smokeless tobacco: Never   Substance Use Topics    Alcohol use: Yes     Comment: occ       Family History   Problem Relation Age of Onset    Stroke Other     Diabetes Other     Heart Disease Other     Asthma Other     Breast Cancer Neg Hx     Heart Disease Mother         CONGESTIVE HEART DISEASE    Stroke Father     Hypertension Other         ROS:    Review of Systems   Constitutional:  Negative for activity change. HENT:  Negative for congestion. Eyes:  Negative for discharge. Respiratory:  Negative for apnea. Cardiovascular:  Negative for chest pain. Gastrointestinal:  Negative for abdominal distention. Endocrine: Negative for cold intolerance. Genitourinary:  Negative for difficulty urinating. Musculoskeletal:  Negative for arthralgias. Skin:  Negative for color change. Allergic/Immunologic: Negative for environmental allergies. Neurological:  Negative for dizziness. Hematological:  Negative for adenopathy. Psychiatric/Behavioral:  Negative for agitation. PHYSICAL EXAM:    There were no vitals taken for this visit. Physical Exam  Vitals and nursing note reviewed.    Constitutional: Appearance: Normal appearance. HENT:      Head: Normocephalic and atraumatic. Right Ear: Tympanic membrane, ear canal and external ear normal. There is no impacted cerumen. A PE tube is present. Tympanic membrane is not scarred, perforated, erythematous or retracted. Left Ear: Tympanic membrane, ear canal and external ear normal. There is no impacted cerumen. No PE tube. Tympanic membrane is not scarred, perforated, erythematous or retracted. Ears:      Comments: Right-sided TM with PE tube in the anterior-inferior quadrant without otorrhea or middle ear effusion. Left-sided TM intact with no perforation retraction or middle ear effusion     Nose: Nose normal. No congestion or rhinorrhea. Mouth/Throat:      Mouth: Mucous membranes are moist.      Pharynx: Oropharynx is clear. No oropharyngeal exudate or posterior oropharyngeal erythema. Comments: -NAD, well-appearing  -OC/OP- clear w/ well-healed tonsillar fossa on the left, no scabs/clots, moderate of uvula, no ecchymosis along gingiva, dentition intact    Eyes:      General: No scleral icterus. Pulmonary:      Effort: Pulmonary effort is normal.   Musculoskeletal:      Cervical back: Normal range of motion and neck supple. No rigidity. Lymphadenopathy:      Cervical: No cervical adenopathy. Skin:     General: Skin is warm and dry. Neurological:      Mental Status: She is alert and oriented to person, place, and time. Psychiatric:         Mood and Affect: Mood normal.         Behavior: Behavior normal.          DIAGNOSIS        \"LEFT TONSIL\":  FOLLICULAR LYMPHOID HYPERPLASIA. PROCEDURE: Flexible laryngoscopy  INDICATIONS: Hoarseness of voice, odynophagia  DESCRIPTION: After verbal consent was obtained and a timeout was performed, the flexible scope was advanced down one of the patient's nares into the nasopharynx. The nasal cavity and nasopharynx were clear. The scope was then turned distally down towards the oropharynx.  The BOT and vallecula were clear and the epiglottis was normal in appearance. Both aryepiglottic folds were clear and there was a normal appearing glottis w/ healthy TVCs. No nodules or polyps and the cords were fully mobile. Airway widely patent. No concerning lesions seen along post-cricoid region or within either piriform sinus. The posterior pharyngeal was clear as well. The scope was then carefully removed. The patient tolerated the procedure well and there were no complications. ASSESSMENT and PLAN        ICD-10-CM    1. Hoarseness of voice  R49.0       2. Odynophagia  R13.10       3. S/P tonsillectomy  Z90.89       4. Tympanostomy tube check  Z45.89           Patient was reassured of a normal postoperative check of her oropharynx with the left tonsil no longer with eschar and healing well with mucosal migration. No concerns going forward and she has no dietary restrictions or restrictions to her activity. For concern of hoarseness of voice which has been worsening over the last several days flexible laryngoscopy was performed which thankfully showed no concerning findings with no inflammatory changes and no polyps lesions masses or nodules present. She has been overall reassured and she could consider that this could be due to irritation from postnasal drip typical of the postoperative period with recent endotracheal tube and tonsillectomy. She has ongoing concerns with left-sided hearing loss which she believes has worsened over the last month. She was reassured of no concerns on her ear exam right-sided PE tube intact with no otorrhea or middle ear effusion and left-sided TM intact with no perforation retractions or middle ear effusions. If this concern continues she will follow-up for a hearing test and otherwise she should continue to utilize her hearing aids. I will see her back for follow-up in 6 months for tube check.     Rachid Granado, DO  3/1/2023

## 2023-02-28 ENCOUNTER — TELEPHONE (OUTPATIENT)
Dept: INTERNAL MEDICINE CLINIC | Facility: CLINIC | Age: 83
End: 2023-02-28

## 2023-02-28 ENCOUNTER — HOME CARE VISIT (OUTPATIENT)
Dept: SCHEDULING | Facility: HOME HEALTH | Age: 83
End: 2023-02-28

## 2023-02-28 VITALS
HEART RATE: 60 BPM | DIASTOLIC BLOOD PRESSURE: 70 MMHG | RESPIRATION RATE: 16 BRPM | SYSTOLIC BLOOD PRESSURE: 130 MMHG | TEMPERATURE: 97.8 F | OXYGEN SATURATION: 98 %

## 2023-02-28 PROCEDURE — G0299 HHS/HOSPICE OF RN EA 15 MIN: HCPCS

## 2023-02-28 PROCEDURE — 0221000100 HH NO PAY CLAIM PROCEDURE

## 2023-02-28 ASSESSMENT — ENCOUNTER SYMPTOMS
DYSPNEA ACTIVITY LEVEL: AFTER AMBULATING MORE THAN 20 FT
PAIN LOCATION - PAIN QUALITY: ACHING
STOOL DESCRIPTION: SOFT FORMED

## 2023-02-28 NOTE — TELEPHONE ENCOUNTER
Max notified ok for orders, and In uptodate it is a level C interaction - monitor therapy. She is ok to continue as she has been taking.

## 2023-02-28 NOTE — TELEPHONE ENCOUNTER
Krystian Nurse from [place] states patient has a major drug interaction between tramadol and Celexa. Also, patient agreed to Nursing, PT, and SW. Please Advise.

## 2023-03-01 ENCOUNTER — HOME CARE VISIT (OUTPATIENT)
Dept: SCHEDULING | Facility: HOME HEALTH | Age: 83
End: 2023-03-01

## 2023-03-01 VITALS
SYSTOLIC BLOOD PRESSURE: 118 MMHG | RESPIRATION RATE: 16 BRPM | DIASTOLIC BLOOD PRESSURE: 63 MMHG | BODY MASS INDEX: 24.59 KG/M2 | OXYGEN SATURATION: 98 % | WEIGHT: 144 LBS | HEIGHT: 64 IN | HEART RATE: 81 BPM | TEMPERATURE: 98.6 F

## 2023-03-01 PROCEDURE — G0151 HHCP-SERV OF PT,EA 15 MIN: HCPCS

## 2023-03-01 ASSESSMENT — ENCOUNTER SYMPTOMS
PAIN LOCATION - PAIN QUALITY: DEEP
DYSPNEA ACTIVITY LEVEL: AFTER AMBULATING MORE THAN 20 FT

## 2023-03-03 ENCOUNTER — HOME CARE VISIT (OUTPATIENT)
Dept: SCHEDULING | Facility: HOME HEALTH | Age: 83
End: 2023-03-03
Payer: MEDICARE

## 2023-03-03 PROCEDURE — G0155 HHCP-SVS OF CSW,EA 15 MIN: HCPCS

## 2023-03-09 ENCOUNTER — OFFICE VISIT (OUTPATIENT)
Dept: CARDIOLOGY CLINIC | Age: 83
End: 2023-03-09
Payer: MEDICARE

## 2023-03-09 ENCOUNTER — OFFICE VISIT (OUTPATIENT)
Dept: NEUROLOGY | Age: 83
End: 2023-03-09
Payer: MEDICARE

## 2023-03-09 VITALS
SYSTOLIC BLOOD PRESSURE: 128 MMHG | DIASTOLIC BLOOD PRESSURE: 64 MMHG | HEIGHT: 64 IN | BODY MASS INDEX: 25.13 KG/M2 | WEIGHT: 147.2 LBS | HEART RATE: 66 BPM

## 2023-03-09 VITALS
DIASTOLIC BLOOD PRESSURE: 90 MMHG | HEART RATE: 67 BPM | BODY MASS INDEX: 25.4 KG/M2 | SYSTOLIC BLOOD PRESSURE: 130 MMHG | WEIGHT: 148 LBS

## 2023-03-09 DIAGNOSIS — I73.9 PVD (PERIPHERAL VASCULAR DISEASE) (HCC): ICD-10-CM

## 2023-03-09 DIAGNOSIS — R29.898 WEAKNESS OF BOTH LEGS: ICD-10-CM

## 2023-03-09 DIAGNOSIS — Z09 HOSPITAL DISCHARGE FOLLOW-UP: ICD-10-CM

## 2023-03-09 DIAGNOSIS — I65.23 BILATERAL CAROTID ARTERY STENOSIS: Primary | ICD-10-CM

## 2023-03-09 DIAGNOSIS — R26.9 GAIT DISORDER: Primary | ICD-10-CM

## 2023-03-09 DIAGNOSIS — R00.2 PALPITATIONS: ICD-10-CM

## 2023-03-09 DIAGNOSIS — R20.2 PARESTHESIA OF BILATERAL LEGS: ICD-10-CM

## 2023-03-09 DIAGNOSIS — I25.10 ATHEROSCLEROSIS OF NATIVE CORONARY ARTERY OF NATIVE HEART WITHOUT ANGINA PECTORIS: ICD-10-CM

## 2023-03-09 DIAGNOSIS — N32.9 BLADDER DISORDER: ICD-10-CM

## 2023-03-09 DIAGNOSIS — I10 HYPERTENSION, BENIGN: ICD-10-CM

## 2023-03-09 PROCEDURE — G8427 DOCREV CUR MEDS BY ELIG CLIN: HCPCS | Performed by: INTERNAL MEDICINE

## 2023-03-09 PROCEDURE — 3074F SYST BP LT 130 MM HG: CPT | Performed by: INTERNAL MEDICINE

## 2023-03-09 PROCEDURE — 1123F ACP DISCUSS/DSCN MKR DOCD: CPT | Performed by: INTERNAL MEDICINE

## 2023-03-09 PROCEDURE — 1111F DSCHRG MED/CURRENT MED MERGE: CPT | Performed by: INTERNAL MEDICINE

## 2023-03-09 PROCEDURE — G8427 DOCREV CUR MEDS BY ELIG CLIN: HCPCS | Performed by: PSYCHIATRY & NEUROLOGY

## 2023-03-09 PROCEDURE — 3078F DIAST BP <80 MM HG: CPT | Performed by: PSYCHIATRY & NEUROLOGY

## 2023-03-09 PROCEDURE — 1090F PRES/ABSN URINE INCON ASSESS: CPT | Performed by: INTERNAL MEDICINE

## 2023-03-09 PROCEDURE — 1036F TOBACCO NON-USER: CPT | Performed by: PSYCHIATRY & NEUROLOGY

## 2023-03-09 PROCEDURE — 99205 OFFICE O/P NEW HI 60 MIN: CPT | Performed by: PSYCHIATRY & NEUROLOGY

## 2023-03-09 PROCEDURE — G8417 CALC BMI ABV UP PARAM F/U: HCPCS | Performed by: INTERNAL MEDICINE

## 2023-03-09 PROCEDURE — 3074F SYST BP LT 130 MM HG: CPT | Performed by: PSYCHIATRY & NEUROLOGY

## 2023-03-09 PROCEDURE — 1036F TOBACCO NON-USER: CPT | Performed by: INTERNAL MEDICINE

## 2023-03-09 PROCEDURE — G8484 FLU IMMUNIZE NO ADMIN: HCPCS | Performed by: PSYCHIATRY & NEUROLOGY

## 2023-03-09 PROCEDURE — 3078F DIAST BP <80 MM HG: CPT | Performed by: INTERNAL MEDICINE

## 2023-03-09 PROCEDURE — G8400 PT W/DXA NO RESULTS DOC: HCPCS | Performed by: INTERNAL MEDICINE

## 2023-03-09 PROCEDURE — 1123F ACP DISCUSS/DSCN MKR DOCD: CPT | Performed by: PSYCHIATRY & NEUROLOGY

## 2023-03-09 PROCEDURE — 1090F PRES/ABSN URINE INCON ASSESS: CPT | Performed by: PSYCHIATRY & NEUROLOGY

## 2023-03-09 PROCEDURE — G8400 PT W/DXA NO RESULTS DOC: HCPCS | Performed by: PSYCHIATRY & NEUROLOGY

## 2023-03-09 PROCEDURE — G8417 CALC BMI ABV UP PARAM F/U: HCPCS | Performed by: PSYCHIATRY & NEUROLOGY

## 2023-03-09 PROCEDURE — G8484 FLU IMMUNIZE NO ADMIN: HCPCS | Performed by: INTERNAL MEDICINE

## 2023-03-09 PROCEDURE — 99214 OFFICE O/P EST MOD 30 MIN: CPT | Performed by: INTERNAL MEDICINE

## 2023-03-09 ASSESSMENT — ENCOUNTER SYMPTOMS
SINUS PAIN: 0
SORE THROAT: 0
RESPIRATORY NEGATIVE: 1
STRIDOR: 0
BLURRED VISION: 1
EYE DISCHARGE: 0
BACK PAIN: 0
GASTROINTESTINAL NEGATIVE: 1
EYE REDNESS: 0
ORTHOPNEA: 0
EYE PAIN: 0
DOUBLE VISION: 0
PHOTOPHOBIA: 1

## 2023-03-09 NOTE — PROGRESS NOTES
[address], [address]  PHONE: [phone]     23    NAME:  Darci Hua  : 1940  MRN: [id number]       SUBJECTIVE:   Darci Hua is a 78 y.o. female seen for a follow up visit regarding the following:     Chief Complaint   Patient presents with    Follow-Up from Hospital          HPI:   Here for HTN, Carotid Dz, CAD. Ca score 2017 was 83. Echo 2019: normal EF. Carotid US 2019 and 1460: mild to mod LICA dz  NST :  low risk stress test.  Echo 2021: normal EF, mild pHTN     Had tonsilectomy, seeing ENT. Due to chronic sinus infections. No CP, pressure. She has Taj's esophagus. Stress worse at home. No new angina, SAINI, SOB. Patient denies recent history of orthopnea, PND, excessive dizziness and/or syncope. Past Medical History, Past Surgical History, Family history, Social History, and Medications were all reviewed with the patient today and updated as necessary. Current Outpatient Medications   Medication Sig Dispense Refill    fluticasone (FLONASE) 50 MCG/ACT nasal spray 2 sprays by Nasal route in the morning and at bedtime 16 g 11    citalopram (CELEXA) 20 MG tablet Take 1 tablet by mouth daily 30 tablet 11    omeprazole (PRILOSEC) 20 MG delayed release capsule Take 1 capsule by mouth 2 times daily as needed (reflux) 30 capsule 11    acetaminophen (TYLENOL) 500 MG tablet Take 1,000 mg by mouth every 6 hours as needed for Pain (breakthrough)      Vitamin D, Cholecalciferol, 25 MCG (1000 UT) CAPS Take 1 capsule by mouth Daily. (Patient not taking: Reported on 3/9/2023)      traMADol (ULTRAM) 50 MG tablet Take 1 tablet by mouth daily as needed for Pain (moderate to severe) for up to 30 days.  Max Daily Amount: 50 mg (Patient not taking: Reported on 3/9/2023) 30 tablet 0    aspirin 81 MG EC tablet Take 81 mg by mouth daily (Patient not taking: No sig reported)      B Complex Vitamins (VITAMIN-B COMPLEX PO) Take 1 capsule by mouth daily (Patient not taking: No sig reported)      IRON, FERROUS SULFATE, PO Take 1 tablet by mouth daily. (Patient not taking: No sig reported)      Ascorbic Acid (VITAMIN C) 500 MG CAPS Take 1 capsule by mouth daily. (Patient not taking: No sig reported)      cetirizine (ZYRTEC) 10 MG tablet Take 10 mg by mouth daily (Patient not taking: No sig reported)       No current facility-administered medications for this visit.         Allergies   Allergen Reactions    Codeine Itching     Other reaction(s): Nausea and/or vomiting-Intolerance, Rash-Allergy    Adhesive Tape Rash and Other (See Comments)     Blisters     Patient Active Problem List    Diagnosis Date Noted    Spinal stenosis, lumbar 12/07/2009     Priority: High    Fluid level behind tympanic membrane of both ears 02/22/2023     Priority: Medium    History of total right knee replacement 02/22/2023     Priority: Medium    History of shoulder surgery 02/22/2023     Priority: Medium    Rib pain 08/19/2022     Priority: Medium    Hypotension 08/19/2022     Priority: Medium    Periprosthetic fracture around internal prosthetic right shoulder joint, initial encounter 08/18/2022     Priority: Medium    Acute blood loss anemia 08/18/2022     Priority: Medium    Mechanical loosening of prosthetic shoulder joint ([address] 08/18/2022     Priority: Medium    Tonsillar mass 01/24/2023     Added automatically from request for surgery [id number]      Pain due to right shoulder joint prosthesis ([address] 08/10/2022     Added automatically from request for surgery [id number]      Traumatic closed fracture of greater tuberosity of humerus with minimal displacement, right, initial encounter 08/10/2022     Added automatically from request for surgery [id number]      Periprosthetic fracture around internal prosthetic right shoulder joint 08/10/2022     Added automatically from request for surgery [id number]      Presence of right artificial shoulder joint 08/10/2022     Added automatically from request for surgery [id number]      History of COVID-19 01/28/2021    Palpitation 07/03/2019    PVD (peripheral vascular disease) ([facility] [address] 12/06/2018    Bilateral carotid artery disease ([facility] [address] 03/21/2018    Shortness of breath 01/12/2017    History of bilateral hip replacements 02/26/2016    Tricuspid valve disorder      Echo 2011: normal EF, mod TR, RVSP 40  Echo 2013: normal EF, mild TR        Coronary atherosclerosis of native coronary vessel      Pre Ca score 83  Normal stress test 2011, 2013, 2014        Palpitations     Chest discomfort     Hypothyroidism     Vazquez's esophagus     Hypertension, benign     Hyperlipidemia       Past Surgical History:   Procedure Laterality Date    APPENDECTOMY  1987    BREAST BIOPSY Left 1980    ENLARGED MILK GLAND    BUNIONECTOMY Bilateral 2009    with hammertoe correction    CARPAL TUNNEL RELEASE Right 10/31/13    ESOPHAGOGASTRODUODENOSCOPY  07/27/2018    2016, 2018    HAMMER TOE SURGERY  2010    revision    HYSTERECTOMY (CERVIX STATUS UNKNOWN)  1988    LAMINECTOMY  04/05/2022    T10 OPEN TOTAL LAMINECTOMY    LARYNGOSCOPY N/A 2/10/2023    Direct Suspension LARYNGOSCOPY performed by Manuel Patiño DO at [place] [address]  2009    L4-L5    ORTHOPEDIC SURGERY  2010    Right hand    REVERSE TOTAL SHOULDER ARTHROPLASTY Right 2017    SHOULDER ARTHROSCOPY  2013    left shoulder cyst    SHOULDER SURGERY Right 8/18/2022    right shoulder irrigation and debridement, right shoulder removal of manjit total shoulder arthroplasty implants, revision right reverse total shoulder arthroplasty with a delta xtend prosthesis and biceps tenodesis, possible remedy spacer. general/interscalene. 23hr observation.  performed by Joaquim Reagan MD at  [address] 2/10/2023    TONSILLECTOMY performed by Manuel Patiño DO at [facility] [address] [facility] [address] Left 2007    revision 3/2000    TOTAL HIP ARTHROPLASTY Left 2009    revision TOTAL KNEE ARTHROPLASTY Right 2007    UPPER GASTROINTESTINAL ENDOSCOPY N/A 8/22/2022    EGD BIOPSY performed by Denise Maynard MD at [address] History   Problem Relation Age of Onset    Stroke Other     Diabetes Other     Heart Disease Other     Asthma Other     Breast Cancer Neg Hx     Heart Disease Mother         CONGESTIVE HEART DISEASE    Stroke Father     Hypertension Other      Social History     Tobacco Use    Smoking status: Never    Smokeless tobacco: Never   Substance Use Topics    Alcohol use: Yes     Comment: occ         ROS:    No obvious pertinent positives on review of systems except for what was outlined in the HPI today.       PHYSICAL EXAM:     /64   Pulse 66   Ht 5' 4\" (1.626 m)   Wt 147 lb 3.2 oz (66.8 kg)   BMI 25.27 kg/m²    General/Constitutional:   Alert and oriented x 3, no acute distress  HEENT:   normocephalic, atraumatic, moist mucous membranes  Neck:   No JVD or carotid bruits bilaterally  Cardiovascular:   regular rate and rhythm, no murmur/rub/gallop appreciated  Pulmonary:   clear to auscultation bilaterally, no respiratory distress  Abdomen:   soft, non-tender, non-distended  Ext:   No sig LE edema bilaterally  Skin:  warm and dry, no obvious rashes seen  Neuro:   no obvious sensory or motor deficits  Psychiatric:   normal mood and affect      Lab Results   Component Value Date/Time     02/22/2023 01:58 PM    K 4.1 02/22/2023 01:58 PM     02/22/2023 01:58 PM    CO2 25 02/22/2023 01:58 PM    BUN 15 02/22/2023 01:58 PM    CREATININE 0.90 02/22/2023 01:58 PM    GLUCOSE 128 02/22/2023 01:58 PM    CALCIUM 9.7 02/22/2023 01:58 PM        Lab Results   Component Value Date    WBC 7.0 02/22/2023    HGB 12.0 02/22/2023    HCT 37.5 02/22/2023    .2 (H) 02/22/2023     02/22/2023       No results found for: TSHFT4, TSH    Lab Results   Component Value Date    LABA1C 5.5 11/09/2022     Lab Results   Component Value Date     11/09/2022 Lab Results   Component Value Date    CHOL 200 (H) 11/09/2022     Lab Results   Component Value Date    TRIG 73 11/09/2022     Lab Results   Component Value Date    HDL 70 (H) 11/09/2022     Lab Results   Component Value Date    LDLCALC 115.4 (H) 11/09/2022     Lab Results   Component Value Date    LABVLDL 14.6 11/09/2022     Lab Results   Component Value Date    CHOLHDLRATIO 2.9 11/09/2022           I have Independently reviewed prior care notes, any ER records available, cardiac testing, labs and results with the patient and before seeing the patient today. Also independently reviewed outside records when available. ASSESSMENT:    [place] was seen today for follow-up from hospital.    Diagnoses and all orders for this visit:    Bilateral carotid artery stenosis    PVD (peripheral vascular disease) ([facility] [address]  -     Vascular duplex carotid bilateral; Future    Palpitations    Hypertension, benign    Atherosclerosis of native coronary artery of native heart without angina pectoris  -     Vascular duplex carotid bilateral; Future        PLAN:        1. CAD: Past NST ok, EF normal.    Follow pHTN. Follow for more sx. The patient has been instructed to call with any angina or equivalent as reviewed today. All questions were answered with the patient voicing complete understanding. 2. PVD/Carotid Dz:  Check US. 3. HPL:  Being followed, no med needed. 4. Palp: rare now, LA size noted, follow for more sx. Follow for PAF, follow for more sx. 5. HTN: better at home, some white coat HTN. Follow for now. Patient has been instructed and agrees to call our office with any issues or other concerns related to their cardiac condition(s) and/or complaint(s). Return in about 3 months (around 6/9/2023).        Vazquez Hutchinson, DO  3/9/2023

## 2023-03-09 NOTE — PROGRESS NOTES
3/9/2023  [facility]     Patient is referred by the following provider for consultation regarding as below:    gait dis //  weak legs feet. .. Dear      Lalit Cam., MD                                    NEUROLOGY     CONSULTATION                   Chief Complaint:  Chief Complaint   Patient presents with    New Patient      Unaccompanied[de-identified]          Pleasant elderly white female with lower extremity abnormalities and previous hip and knee operations and replacements with gait disturbance that seems to be increased over the past year. Some bladder disturbance as well. She had back operations. She had spinal operations but this did not improve the gait. She walks with a cane. She walks with unstable gait. She has bladder disturbances. right handed 78 y.o.      female gait disorder and ambulation problems == high risk of fal.   Last op Dr Antony Mark April 2022  -- back. Thoracic. * I reviewed the available and pertinent records - including eHR and Care Everywhere - notes of PMHx, PSHx, Fam Hx, and  and have examined patient with the following findings:       IMAGING REVIEW:  I REVIEWED PERTINENT  IMAGES AND REPORTS WITH THE PATIENT PERSONALLY, DIRECTLY AND FULLY. 15 extra  MINUTES. Reviewed hx testing.      Past Medical History:  Past Medical History:   Diagnosis Date    Acid reflux     Arthritis     osteo    Vazquez's esophagus     Managed with as needed meds    Bilateral carotid artery disease ([facility] [address]     Followed by cardiology- Dr. Dania Han    Chest discomfort     denies current or recent CP 2/2/23    Coronary atherosclerosis of native coronary vessel     Degeneration of lumbar or lumbosacral intervertebral disc     Depression     medication     Gastric ulcer, unspecified as acute or chronic, without mention of hemorrhage or perforation 1989    History of anemia     History of blood transfusion     last in 09/2022 after shoulder surgery    Hyperlipidemia Hypothyroidism     no meds needed currently (low normal range per pt)    Infectious disease     lyme disease 2004    Nausea & vomiting     Palpitations     PVD (peripheral vascular disease) (HCC)     Scoliosis (and kyphoscoliosis), idiopathic     Spinal stenosis, lumbar 12/7/2009    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    Thoracic or lumbosacral neuritis or radiculitis, unspecified     Tricuspid regurgitation     mild per echo dated 1-11-13    Tricuspid valve disorder        Past Surgical History:  Past Surgical History:   Procedure Laterality Date    APPENDECTOMY  1987    BREAST BIOPSY Left 1980    ENLARGED MILK GLAND    BUNIONECTOMY Bilateral 2009    with hammertoe correction    CARPAL TUNNEL RELEASE Right 10/31/13    ESOPHAGOGASTRODUODENOSCOPY  07/27/2018    2016, 2018    HAMMER TOE SURGERY  2010    revision    HYSTERECTOMY (CERVIX STATUS UNKNOWN)  1988    LAMINECTOMY  04/05/2022    T10 OPEN TOTAL LAMINECTOMY    LARYNGOSCOPY N/A 2/10/2023    Direct Suspension LARYNGOSCOPY performed by Celeste Mariano DO at [place] [address]  2009    L4-L5    ORTHOPEDIC SURGERY  2010    Right hand    REVERSE TOTAL SHOULDER ARTHROPLASTY Right 2017    SHOULDER ARTHROSCOPY  2013    left shoulder cyst    SHOULDER SURGERY Right 8/18/2022    right shoulder irrigation and debridement, right shoulder removal of manjit total shoulder arthroplasty implants, revision right reverse total shoulder arthroplasty with a delta xtend prosthesis and biceps tenodesis, possible remedy spacer. general/interscalene. 23hr observation.  performed by James Mcgowan MD at [address] Left 2/10/2023    TONSILLECTOMY performed by Celeste Mariano DO at [address] [place] [address] Left 2007    revision 3/2000    TOTAL HIP ARTHROPLASTY Left 2009    revision    TOTAL KNEE ARTHROPLASTY Right 2007    UPPER GASTROINTESTINAL ENDOSCOPY N/A 8/22/2022    EGD BIOPSY performed by Vanesa Hill MD at [address] [place] [address] Social History:  Social History     Socioeconomic History    Marital status:      Spouse name: Not on file    Number of children: Not on file    Years of education: Not on file    Highest education level: Not on file   Occupational History    Not on file   Tobacco Use    Smoking status: Never    Smokeless tobacco: Never   Vaping Use    Vaping Use: Never used   Substance and Sexual Activity    Alcohol use: Yes     Comment: occ    Drug use: No    Sexual activity: Not on file   Other Topics Concern    Not on file   Social History Narrative    Not on file     Social Determinants of Health     Financial Resource Strain: Low Risk     Difficulty of Paying Living Expenses: Not hard at all   Food Insecurity: No Food Insecurity    Worried About Running Out of Food in the Last Year: Never true    [address] in the Last Year: Never true   Transportation Needs: Unmet Transportation Needs    Lack of Transportation (Medical):  Not on file    Lack of Transportation (Non-Medical): Yes   Physical Activity: Insufficiently Active    Days of Exercise per Week: 4 days    Minutes of Exercise per Session: 30 min   Stress: Not on file   Social Connections: Not on file   Intimate Partner Violence: Not on file   Housing Stability: Unknown    Unable to Pay for Housing in the Last Year: Not on file    Number of [place] in the Last Year: Not on file    Unstable Housing in the Last Year: No       Family History:   Family History   Problem Relation Age of Onset    Stroke Other     Diabetes Other     Heart Disease Other     Asthma Other     Breast Cancer Neg Hx     Heart Disease Mother         CONGESTIVE HEART DISEASE    Stroke Father     Hypertension Other        Medications:      Current Outpatient Medications:     fluticasone (FLONASE) 50 MCG/ACT nasal spray, 2 sprays by Nasal route in the morning and at bedtime, Disp: 16 g, Rfl: 11    citalopram (CELEXA) 20 MG tablet, Take 1 tablet by mouth daily, Disp: 30 tablet, Rfl: 11    omeprazole (PRILOSEC) 20 MG delayed release capsule, Take 1 capsule by mouth 2 times daily as needed (reflux), Disp: 30 capsule, Rfl: 11    aspirin 81 MG EC tablet, Take 81 mg by mouth daily, Disp: , Rfl:     IRON, FERROUS SULFATE, PO, Take 1 tablet by mouth daily, Disp: , Rfl:     acetaminophen (TYLENOL) 500 MG tablet, Take 1,000 mg by mouth every 6 hours as needed for Pain (breakthrough), Disp: , Rfl:     cetirizine (ZYRTEC) 10 MG tablet, Take 10 mg by mouth daily, Disp: , Rfl:       Allergies   Allergen Reactions    Codeine Itching     Other reaction(s): Nausea and/or vomiting-Intolerance, Rash-Allergy    Adhesive Tape Rash and Other (See Comments)     Blisters       Review of Systems:  Review of Systems   Constitutional: Negative. HENT:  Positive for hearing loss. Negative for congestion, ear discharge, ear pain, nosebleeds, sinus pain, sore throat and tinnitus. Eyes:  Positive for blurred vision and photophobia. Negative for double vision, pain, discharge and redness. Respiratory: Negative. Negative for stridor. Cardiovascular:  Positive for chest pain. Negative for palpitations, orthopnea, claudication, leg swelling and PND. Gastrointestinal: Negative. Genitourinary:  Positive for frequency and urgency. Negative for dysuria, flank pain and hematuria. Increased problems   Musculoskeletal:  Positive for neck pain. Negative for back pain, falls, joint pain and myalgias. Skin: Negative. Neurological:  Positive for weakness and headaches (Left side of head). Negative for dizziness, tingling, tremors, sensory change, speech change, focal weakness, seizures and loss of consciousness. Endo/Heme/Allergies:  Negative for environmental allergies and polydipsia. Bruises/bleeds easily. Psychiatric/Behavioral:  Positive for depression and memory loss. Negative for hallucinations, substance abuse and suicidal ideas. The patient is not nervous/anxious and does not have insomnia.     All other systems reviewed and are negative. Extended / Orthostatic Vitals:    Vitals:    03/09/23 1354   BP: (!) 130/90   Site: Left Upper Arm   Position: Sitting   Pulse: 67   Weight: 148 lb (67.1 kg)        Physical Exam  Constitutional:       General: She is awake. She is not in acute distress. Appearance: She is well-developed and well-groomed. She is not ill-appearing, toxic-appearing or diaphoretic. HENT:      Head: Normocephalic and atraumatic. Eyes:      General: No visual field deficit. Extraocular Movements: Extraocular movements intact. Conjunctiva/sclera: Conjunctivae normal.      Pupils: Pupils are equal, round, and reactive to light. Pulmonary:      Effort: Pulmonary effort is normal. No respiratory distress. Breath sounds: No wheezing. Musculoskeletal:         General: Tenderness, deformity and signs of injury (post surgical.) present. No swelling. Normal range of motion. Cervical back: Normal range of motion and neck supple. No rigidity. Right lower leg: No edema. Left lower leg: No edema. Skin:     General: Skin is warm and dry. Capillary Refill: Capillary refill takes less than 2 seconds. Coloration: Skin is not jaundiced or pale. Neurological:      Mental Status: She is alert and easily aroused. Mental status is at baseline. Cranial Nerves: Cranial nerves 2-12 are intact. No cranial nerve deficit, dysarthria or facial asymmetry. Sensory: Sensory deficit (stocking loss PP and Vib) present. Motor: Abnormal muscle tone (dec lowers.) present. No weakness, tremor, atrophy or seizure activity. Coordination: Romberg sign positive. Coordination abnormal. Romberg Test abnormal.      Gait: Gait abnormal and tandem walk abnormal.      Deep Tendon Reflexes: Reflexes abnormal.      Reflex Scores:       Bicep reflexes are 1+ on the right side and 2+ on the left side.        Brachioradialis reflexes are 1+ on the right side and 2+ on the left side. Patellar reflexes are 0 on the right side and 1+ on the left side. Achilles reflexes are 0 on the right side and 0 on the left side. Comments: Gait = abnormal = fatigued easily. Unstable even and walking with a cane. Buckling of the right leg. Limited motion of the right shoulder. No Gerstmann. No Christine. No Volodymyr. Stocking distribution decreased significant pinprick and vibration. No spinal level or upper motor neuron/spasticity. No cerebellar ataxia. No tremors. Psychiatric:         Attention and Perception: Attention normal.         Mood and Affect: Mood normal.         Speech: Speech normal.         Behavior: Behavior normal. Behavior is cooperative. Neurologic Exam     Mental Status   Attention: normal. Concentration: normal.   Speech: speech is normal   Level of consciousness: alert  Knowledge: good and consistent with education. Able to perform simple calculations. Normal comprehension. Cranial Nerves   Cranial nerves II through XII intact. CN III, IV, VI   Pupils are equal, round, and reactive to light.     Motor Exam   Muscle bulk: normal  Overall muscle tone: decreased    Sensory Exam   Right leg light touch: decreased from knee  Left leg light touch: decreased from knee  Right leg vibration: decreased from knee  Left leg vibration: decreased from knee  Right leg proprioception: decreased from knee  Left leg proprioception: decreased from knee  Right leg pinprick: decreased from knee  Left leg pinprick: decreased from knee    Gait, Coordination, and Reflexes     Gait  Gait: non-neurologic, shuffling and wide-based    Coordination   Romberg: positive  Tandem walking coordination: abnormal    Tremor   Resting tremor: absent  Intention tremor: absent  Action tremor: absent    Reflexes   Right brachioradialis: 1+  Left brachioradialis: 2+  Right biceps: 1+  Left biceps: 2+  Right patellar: 0  Left patellar: 1+  Right achilles: 0  Left achilles: 0  Right Mccurdy: absent  Left Mccurdy: absent   There is no tic, twitch, tonic or clonic activity noted. No dyskinesia. Assessment   Assessment / Plan:    Jonah Dale was seen today for new patient. Diagnoses and all orders for this visit:    Gait disorder  -     Ambulatory referral to Physical Therapy    Paresthesia of bilateral legs    Weakness of both legs    Bladder disorder  -     [facility] Unitalex Abts, DO, Urogynecology, Robert  1. Schedule for EMG/NCV lower extremities polyneuropathy likely. Findings are compatible. Symmetric. 2.  Referrals to the above. 3. Information given to the patient. No particular therapeutics as of yet. The Diagnosis and differential diagnostic considerations, and Rx Tx were reviewed with the patient at length. Multiple underlying problems. . unknown as to the neuropathy / polyneuropathy presently . Palak Crumble studies to schedule. High risk of falling explained and to evaluate. \  I have spent greater than 50% of visit discussing and counseling of patient 65 min visit for treatment and diagnostic plan review. More than 50% of this visit  time was spent in counseling and care coordination. The above time includes pre-  and post- face-face time in records review, and preparation including available pertinent images and reports. Notes: Patient is to continue all medications as directed by prescribing physicians. Continuations on today's visit are made based on the patient's report of current medications. Patient acknowledges the above examination and reviews. Current Meds Verified: Current meds/immunizations reviewed, including purpose with pt. Med Recon list given to pt/family. Pt advised to discard old med lists and provide all providers with current list at each visit and carry list with them in case of emergency.         [ *NOTE:  parts or all of this consultation are produced using artificial voice recognition software.   Some speech errors are inherent in such software and may be included in the produced record. ]                Feliberto Zambrano MD  Consultative Neurology, [address] [facility] Constantine Quintana [address] [place] [address], [address] [place] [address]  Phone:  [phone]  Fax:   [phone]

## 2023-03-10 ENCOUNTER — HOME CARE VISIT (OUTPATIENT)
Dept: SCHEDULING | Facility: HOME HEALTH | Age: 83
End: 2023-03-10
Payer: MEDICARE

## 2023-03-10 VITALS
OXYGEN SATURATION: 94 % | HEART RATE: 66 BPM | DIASTOLIC BLOOD PRESSURE: 76 MMHG | TEMPERATURE: 97.6 F | SYSTOLIC BLOOD PRESSURE: 140 MMHG | RESPIRATION RATE: 18 BRPM

## 2023-03-10 PROCEDURE — G0299 HHS/HOSPICE OF RN EA 15 MIN: HCPCS

## 2023-03-10 ASSESSMENT — ENCOUNTER SYMPTOMS
STOOL DESCRIPTION: SOFT FORMED
PAIN LOCATION - PAIN QUALITY: ACHING

## 2023-03-14 ENCOUNTER — HOME CARE VISIT (OUTPATIENT)
Dept: SCHEDULING | Facility: HOME HEALTH | Age: 83
End: 2023-03-14
Payer: MEDICARE

## 2023-03-14 VITALS
OXYGEN SATURATION: 96 % | TEMPERATURE: 96.8 F | SYSTOLIC BLOOD PRESSURE: 144 MMHG | DIASTOLIC BLOOD PRESSURE: 70 MMHG | HEART RATE: 64 BPM | RESPIRATION RATE: 16 BRPM

## 2023-03-14 PROCEDURE — G0299 HHS/HOSPICE OF RN EA 15 MIN: HCPCS

## 2023-03-14 ASSESSMENT — ENCOUNTER SYMPTOMS
STOOL DESCRIPTION: SOFT FORMED
PAIN LOCATION - PAIN QUALITY: ACHING

## 2023-03-20 NOTE — PROGRESS NOTES
Re: Requested Information    Dear  and office staff, we are missing information from your office on patient Alina Nguyen, MRN: [id number], : 1952. We are unable to optimize your patient for surgery without the order being completed.    Please fax the following missing items as noted below:    [] Labs  [] EKG  [x] Medical Clearance  [] Cardiac Clearance  [x] History and Physical  [] Chest X-ray  [] Other     Please fax back as soon as possible to [phone].  DO NOT USE THIS FORM AS AN ORDER. If you have any questions, please contact the [facility] Preadmitting Department at [phone] as soon as possible to avoid any delay in service for your patient.    [facility] Preadmitting Department  (P) [phone]  (F) [phone]      [facility] Order Requirements state:  ALL ORDERS MUST BE DATED, LEGIBLE AND CONTAIN:  1. Full Patient Legal Name (as appears on ’s license)  2. Patient’s Date of Birth (as appears on ’s license)  3. Pre-admission testing as per anesthesia guidelines  4. Diagnosis and procedural consent.  (No spelling errors or abbreviations)  5. Physician/provider name  6. Physician/Provider CMS Approved Signature    All documentation (ie. History and Physical, labs) MUST contain name and date of birth on EACH page.    Thank you for helping us provide you and your patient with the best possible experience!   2022         Post Op day: 3 Days Post-Op     Admit Date: 2022  Admit Diagnosis: Pain due to right shoulder joint prosthesis ([address] [B55.85EM, Z96.611]  Traumatic closed fracture of greater tuberosity of humerus with minimal displacement, right, initial encounter [S42.251A]  Periprosthetic fracture around internal prosthetic right shoulder joint, initial encounter [M97.31XA]  Presence of right artificial shoulder joint [Z96.611]        Subjective: Patient is status-post Procedure(s) (LRB):  right shoulder irrigation and debridement, right shoulder removal of manjit total shoulder arthroplasty implants, revision right reverse total shoulder arthroplasty with a delta xtend prosthesis and biceps tenodesis, possible remedy spacer. general/interscalene. 23hr observation. (Right). Patient doing well. No concerns/complaints. No shortness of breath, chest pain or nausea/vomiting. Objective:   Vitals:    22 0818   BP: (!) 98/54   Pulse: 65   Resp: 20   Temp: 98.4 °F (36.9 °C)   SpO2: 99%    Temp (24hrs), Av.4 °F (36.9 °C), Min:97.3 °F (36.3 °C), Max:99.7 °F (37.6 °C)    Lab Results   Component Value Date/Time    HGB 6.8 2022 03:44 AM     Extremity Exam  Dressing Clean, dry, intact. Neuro intact and unchanged right upper extremity  Sensation intact to light touch  Extremity perfused  No sign of DVT     Assessment / Plan :  S/p Procedure(s) (LRB):  right shoulder irrigation and debridement, right shoulder removal of manjit total shoulder arthroplasty implants, revision right reverse total shoulder arthroplasty with a delta xtend prosthesis and biceps tenodesis, possible remedy spacer. general/interscalene. 23hr observation.  (Right)  Continue current postoperative plan  PT/OT-Continue current weightbearing status and restrictions of involved extremities  Continue current VTE prophylaxis  DIspo-pending  Patient Active Problem List   Diagnosis    PVD (peripheral vascular disease) [facility])    Status post right hip replacement    Shortness of breath    Palpitations    Chest discomfort    History of COVID-19    Spinal stenosis, lumbar    Bilateral carotid artery disease (HCC)    Palpitation    Hypothyroidism    Vazquez's esophagus    Tricuspid valve disorder    Coronary atherosclerosis of native coronary vessel    Hypertension, benign    Hyperlipidemia    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    Periprosthetic fracture around internal prosthetic right shoulder joint    Presence of right artificial shoulder joint    Periprosthetic fracture around internal prosthetic right shoulder joint, initial encounter    Acute blood loss anemia    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

## 2023-03-23 ENCOUNTER — TELEPHONE (OUTPATIENT)
Dept: ORTHOPEDIC SURGERY | Age: 83
End: 2023-03-23

## 2023-03-23 NOTE — TELEPHONE ENCOUNTER
Called and spoke to pt who has an appt on 3/27 with Dr. Joseph Calix. Will discuss PT order at that appt.

## 2023-03-23 NOTE — TELEPHONE ENCOUNTER
Wants an order sent for to [facility] to see [address].  Please call patient to confirm

## 2023-03-27 ENCOUNTER — OFFICE VISIT (OUTPATIENT)
Dept: ORTHOPEDIC SURGERY | Age: 83
End: 2023-03-27

## 2023-03-27 DIAGNOSIS — Z96.611 PRESENCE OF RIGHT ARTIFICIAL SHOULDER JOINT: Primary | ICD-10-CM

## 2023-03-27 DIAGNOSIS — Z09 FOLLOW-UP EXAMINATION AFTER ORTHOPEDIC SURGERY: ICD-10-CM

## 2023-03-27 NOTE — PROGRESS NOTES
right total shoulder arthroplasty in unchanged position    Impression: As above   Mary Whitlock MD            Impression:      1. Presence of right artificial shoulder joint    2. Follow-up examination after orthopedic surgery             status post I&D removal of implant right shoulder Theodore reverse right total shoulder arthroplasty with a proximal humeral osteotomy and revision reverse right total shoulder arthroplasty with a longstem delta xtend prosthesis biceps tenodesis latissimus dorsi and teres major tendon transfer 7 months  Painful reverse right total shoulder arthroplasty with glenoid bone loss and medialization   Rule out periprosthetic infection status post reverse right total shoulder arthroplasty   Rule out periprosthetic loosening   Status post reverse right total shoulder arthroplasty by Dr. Kasia Montano for years   Rotator cuff tear arthropathy left shoulder   2 previous surgical procedures by Dr. Musa Valdovinos on the left shoulder   Sternoclavicular osteoarthritis both shoulders   Source bolus multiple levels   Status post primary right total hip arthroplasty   Status post revision left total hip arthroplasty   Status post primary left total hip arthroplasty   Status post right total knee arthroplasty   History of low back surgery   Chronic low back pain   Hypercholesterolemia   Coronary disease on aspirin   Systemic osteoarthritis   Status post previous hand surgery with osteoarthritis      Plan:   I discussed the problem with the patient. She is slowly improving. She has multiple medical issues unrelated to her right shoulder. We will transition her to outpatient physical therapy working with Marci Diop. She will rehab on her own. She will continue working on strength. I will recheck her back in 2 months with new AP Y and axillary views right shoulder  2.   Self-limited problem     follow up:         Jolie Yap MD

## 2023-03-28 ENCOUNTER — HOME CARE VISIT (OUTPATIENT)
Dept: SCHEDULING | Facility: HOME HEALTH | Age: 83
End: 2023-03-28
Payer: MEDICARE

## 2023-03-28 VITALS
SYSTOLIC BLOOD PRESSURE: 118 MMHG | TEMPERATURE: 96.5 F | RESPIRATION RATE: 16 BRPM | OXYGEN SATURATION: 94 % | HEART RATE: 72 BPM | DIASTOLIC BLOOD PRESSURE: 62 MMHG

## 2023-03-28 PROCEDURE — G0299 HHS/HOSPICE OF RN EA 15 MIN: HCPCS

## 2023-03-28 ASSESSMENT — ENCOUNTER SYMPTOMS: STOOL DESCRIPTION: SOFT FORMED

## 2023-03-29 ENCOUNTER — HOSPITAL ENCOUNTER (OUTPATIENT)
Dept: PHYSICAL THERAPY | Age: 83
Setting detail: RECURRING SERIES
Discharge: HOME | End: 2023-04-01
Payer: MEDICARE

## 2023-03-29 PROCEDURE — 97162 PT EVAL MOD COMPLEX 30 MIN: CPT

## 2023-03-29 PROCEDURE — 97110 THERAPEUTIC EXERCISES: CPT

## 2023-03-29 NOTE — THERAPY EVALUATION
For Services/Other Comments:  > Patient continues to require skilled intervention due to  .need for pt to return to previous level of modified independent function    Total Duration: 46 minutes  Time In: 1015  Time Out: 29 [facility], I certify that the treatment plan above will be carried out by a therapist or under their direction.   Thank you for this referral,  Everett Van, PT, MSPT       Referring Physician Signature: Irena Aguilar MD _______________________________ Date _____________        Post Session Pain  Charge Capture  PT Visit Info MD Guidelines  MyChart

## 2023-03-29 NOTE — PROGRESS NOTES
Mirtha Betancur  : 1940  Primary: Medicare Part A And B (Medicare)  Secondary: [address] [facility] [address] @ [address] [address]  Phone: [phone]  Fax: [phone] Plan Frequency: 2 times a week for 12 weeks  Plan of Care/Certification Expiration Date: 23      >PT Visit Info:  Plan Frequency: 2 times a week for 12 weeks  Plan of Care/Certification Expiration Date: 23  Progress Note Counter: 1      Visit Count:  1    OUTPATIENT PHYSICAL THERAPY:OP NOTE TYPE: Treatment Note 3/29/2023       Episode  }Appt Desk             Treatment Diagnosis:  Pain in Right Shoulder (M25.511)  Stiffness of Right Shoulder, Not elsewhere classified (M25.611)  Medical/Referring Diagnosis:  Presence of right artificial shoulder joint [Z96.611]  Follow-up examination after orthopedic surgery [Z09]  Referring Physician:  Angela Crandall MD MD Orders:  PT Eval and Treat , full motion/full strength  Date of Onset:  revision surgery reverse TSA: 2022  Allergies:   Codeine and Adhesive tape  Restrictions/Precautions:  Restrictions/Precautions: Surgical protocol; None  Required Braces or Orthoses?: No  No data recorded     Interventions Planned (Treatment may consist of any combination of the following):    Current Treatment Recommendations: Strengthening; ROM; Neuromuscular re-education; Manual; Pain management; Home exercise program; Modalities     >Subjective Comments:see eval     >Initial:      n0  number given>Post Session:        no number or complaint of pain  Medications Last Reviewed:  3/29/2023  Updated Objective Findings:  See evaluation note from today  Treatment   THERAPEUTIC EXERCISE: (10 minutes):    Exercises per grid below to improve mobility and strength. Required maximal verbal and tactile cues to promote proper body posture and technique .   Reviewed and issued HEP with chin tuck with scapular retraction for posture

## 2023-03-31 ENCOUNTER — PROCEDURE VISIT (OUTPATIENT)
Dept: NEUROLOGY | Age: 83
End: 2023-03-31

## 2023-03-31 VITALS — BODY MASS INDEX: 25.06 KG/M2 | WEIGHT: 146 LBS

## 2023-03-31 DIAGNOSIS — R20.2 PARESTHESIA OF BILATERAL LEGS: ICD-10-CM

## 2023-03-31 DIAGNOSIS — M54.2 CERVICALGIA: ICD-10-CM

## 2023-03-31 DIAGNOSIS — R29.898 WEAKNESS OF BOTH LEGS: ICD-10-CM

## 2023-03-31 DIAGNOSIS — R26.9 GAIT DISORDER: Primary | ICD-10-CM

## 2023-03-31 DIAGNOSIS — G62.9 NEUROPATHY: ICD-10-CM

## 2023-03-31 NOTE — LETTER
Neuropathy lowers . . superimposed on a chronic back w multiple lumbar operations.         She will return for evaluation left UE -- EMG.  (possible cervical radiculopathy.)        Chung Hollis MD  Consultative Neurology, [address] [facility] Constant[place] Quintana [address] [place] [address], [address] [place] [address]  Phone:  [phone]  Fax:   [phone]

## 2023-03-31 NOTE — PROGRESS NOTES
EMG/Nerve Conduction Study Procedure Note  [facility], [address] [place] [address]   [phone]      Hx:    Exam:     78 y.o. RHW female with leg/feet problems / gait dis / fall- cane -- 3 back ops -paresthesia feet legs and weakness legs. For EMG[de-identified]     SLR neg c dec DTR. ? Higher arches w ? Hammertoes. (Fam hx of this). Also she complains of some cervicalgia and left upper extremity abnormalities with sensory/painful on movement of her neck. Some radiating to the left upper. Positive Spurling. Negative Lhermitte. High arches and some hammertoes. Family history of abnormal arches. Ref[de-identified] Dr Juanita Lim  PCP: FELICITAS Stanley [de-identified]  Sunday Budd  Hgt = 4'6\"        Summary       needle EMG selected limited mm LLE. .. w NCV lowers. .. Controlled environmental factors / EMG lab. Temperature. NCV : sensory segments:    Markedly abnormal = absent/no response/= absent sural SCV SNAP bilaterally. NCV plantar sensory segments:     Deferred. NCV Motor MCV segments:     Abnormal = = left peroneal to EDB is fully absent no response but left peroneal to the tibialis anterior basically normal; right peroneal with very small attenuated CMAP and prolonged terminal latency. The proximal right peroneal is basically normal.  Bilateral tibial also with extremely small CMAP and borderline terminal latency delay with slowing at the proximal MCV segments. F-wave studies:       Abnormal = the left peroneal to EDB absent. Right peroneal and bilateral tibial F waves normal.    H-REFLEX Studies:    Bilateral H reflex delay/prolonged. NEEDLE EMG:   Tested muscles[de-identified]    Left TA MG VL = the gastrocnemius with 1+ IA as well as DA/RAP recruitment.   Tibialis anterior and vastus lateralis normal.      INTERPRETATION:    THESE FINDINGS ARE ASYMMETRIC AND COULD REPRESENT ABNORMALITIES OF AN ASYMMETRIC POLYNEUROPATHY HOWEVER THERE IS SOME VARIANT ANASTOMOSIS AT THE LEFT PERONEAL BUT SOME OF THIS FEATURE OF

## 2023-04-03 ENCOUNTER — APPOINTMENT (OUTPATIENT)
Dept: PHYSICAL THERAPY | Age: 83
End: 2023-04-03
Payer: MEDICARE

## 2023-04-04 ENCOUNTER — TELEPHONE (OUTPATIENT)
Dept: ENT CLINIC | Age: 83
End: 2023-04-04

## 2023-04-04 ENCOUNTER — TELEPHONE (OUTPATIENT)
Dept: CARDIOLOGY CLINIC | Age: 83
End: 2023-04-04

## 2023-04-04 ENCOUNTER — HOSPITAL ENCOUNTER (OUTPATIENT)
Dept: PHYSICAL THERAPY | Age: 83
Setting detail: RECURRING SERIES
Discharge: HOME | End: 2023-04-07
Payer: MEDICARE

## 2023-04-04 PROCEDURE — 97110 THERAPEUTIC EXERCISES: CPT

## 2023-04-04 NOTE — TELEPHONE ENCOUNTER
----- Message from Santy Ashley DO sent at 4/3/2023 12:03 PM EDT -----  Please call the patient. CAROTID US was ok, nothing major or concerning, NO BAD blockages SEEN on the study. Keep regular appointment time, no med changes. Will review more at follow up and get plan for the future.     Thanks,   Helder Walters

## 2023-04-04 NOTE — TELEPHONE ENCOUNTER
Patient left VM voicing billing concerns . I returned patient's call with no answer .  Detailed message left with billing contact number ( [phone] )

## 2023-04-04 NOTE — PROGRESS NOTES
Havery Pay  : 1940  Primary: Medicare Part A And B (Medicare)  Secondary: [address] @ [address] [address]  Phone: [phone]  Fax: [phone] Plan Frequency: 2 times a week for 12 weeks  Plan of Care/Certification Expiration Date: 23      >PT Visit Info:  Plan Frequency: 2 times a week for 12 weeks  Plan of Care/Certification Expiration Date: 23  Progress Note Counter: 1      Visit Count:  2    OUTPATIENT PHYSICAL THERAPY:OP NOTE TYPE: Treatment Note 2023       Episode  }Appt Desk             Treatment Diagnosis:  Pain in Right Shoulder (M25.511)  Stiffness of Right Shoulder, Not elsewhere classified (M25.611)  Medical/Referring Diagnosis:  Presence of right artificial shoulder joint [Z96.611]  Follow-up examination after orthopedic surgery [Z09]  Referring Physician:  Yani Fish MD MD Orders:  PT Eval and Treat , full motion/full strength  Date of Onset:  revision surgery reverse TSA: 2022  Allergies:   Codeine and Adhesive tape  Restrictions/Precautions:  Restrictions/Precautions: Surgical protocol; None  Required Braces or Orthoses?: No  No data recorded     Interventions Planned (Treatment may consist of any combination of the following):    Current Treatment Recommendations: Strengthening; ROM; Neuromuscular re-education; Manual; Pain management; Home exercise program; Modalities     >Subjective Comments: did not do the HEP due to so many doctor visits and being exhausted when getting home     >Initial:       5/10>Post Session: \"It just feels worked in the [employer]  Medications Last Reviewed:  2023  Updated Objective Findings:    Shrugging with elbow flexion during attempts to reach  Treatment   THERAPEUTIC EXERCISE: (38   minutes):  Exercises per grid below to improve mobility and strength.   Required maximal to moderate verbal and tactile cues to promote proper body

## 2023-04-05 ENCOUNTER — APPOINTMENT (OUTPATIENT)
Dept: PHYSICAL THERAPY | Age: 83
End: 2023-04-05
Payer: MEDICARE

## 2023-04-06 ENCOUNTER — APPOINTMENT (OUTPATIENT)
Dept: PHYSICAL THERAPY | Age: 83
End: 2023-04-06
Payer: MEDICARE

## 2023-04-10 ENCOUNTER — APPOINTMENT (OUTPATIENT)
Dept: PHYSICAL THERAPY | Age: 83
End: 2023-04-10
Payer: MEDICARE

## 2023-04-13 ENCOUNTER — APPOINTMENT (OUTPATIENT)
Dept: PHYSICAL THERAPY | Age: 83
End: 2023-04-13
Payer: MEDICARE

## 2023-04-14 ENCOUNTER — HOSPITAL ENCOUNTER (OUTPATIENT)
Dept: PHYSICAL THERAPY | Age: 83
Setting detail: RECURRING SERIES
End: 2023-04-14
Payer: MEDICARE

## 2023-04-17 ENCOUNTER — HOSPITAL ENCOUNTER (OUTPATIENT)
Dept: PHYSICAL THERAPY | Age: 83
Setting detail: RECURRING SERIES
End: 2023-04-17
Payer: MEDICARE

## 2023-04-19 ENCOUNTER — TELEPHONE (OUTPATIENT)
Dept: ORTHOPEDIC SURGERY | Age: 83
End: 2023-04-19

## 2023-04-20 ENCOUNTER — OFFICE VISIT (OUTPATIENT)
Dept: UROGYNECOLOGY | Age: 83
End: 2023-04-20

## 2023-04-20 VITALS — HEIGHT: 63 IN | WEIGHT: 144.4 LBS | BODY MASS INDEX: 25.59 KG/M2

## 2023-04-20 DIAGNOSIS — N81.89 WEAKNESS OF PELVIC FLOOR: ICD-10-CM

## 2023-04-20 DIAGNOSIS — N39.41 URGE INCONTINENCE: ICD-10-CM

## 2023-04-20 DIAGNOSIS — N81.6 RECTOCELE: Primary | ICD-10-CM

## 2023-04-20 DIAGNOSIS — N39.3 SUI (STRESS URINARY INCONTINENCE, FEMALE): ICD-10-CM

## 2023-04-20 PROBLEM — F33.9 MAJOR DEPRESSIVE DISORDER, RECURRENT, UNSPECIFIED (HCC): Status: ACTIVE | Noted: 2023-04-20

## 2023-04-20 PROBLEM — F33.0 MAJOR DEPRESSIVE DISORDER, RECURRENT, MILD (HCC): Status: ACTIVE | Noted: 2023-04-20

## 2023-04-20 PROBLEM — F33.1 MAJOR DEPRESSIVE DISORDER, RECURRENT, MODERATE (HCC): Status: ACTIVE | Noted: 2023-04-20

## 2023-04-20 LAB
BILIRUBIN, URINE, POC: NEGATIVE
BLOOD URINE, POC: NEGATIVE
GLUCOSE URINE, POC: NEGATIVE
KETONES, URINE, POC: NEGATIVE
LEUKOCYTE ESTERASE, URINE, POC: NEGATIVE
NITRITE, URINE, POC: NEGATIVE
PH, URINE, POC: 6.5 (ref 4.6–8)
PROTEIN,URINE, POC: NEGATIVE
SPECIFIC GRAVITY, URINE, POC: 1.02 (ref 1–1.03)
URINALYSIS CLARITY, POC: CLEAR
URINALYSIS COLOR, POC: YELLOW
UROBILINOGEN, POC: NORMAL

## 2023-04-20 RX ORDER — TRAMADOL HYDROCHLORIDE 50 MG/1
TABLET ORAL
COMMUNITY
Start: 2023-03-21

## 2023-04-20 RX ORDER — BROMFENAC SODIUM 0.7 MG/ML
SOLUTION/ DROPS OPHTHALMIC
COMMUNITY
Start: 2023-04-03

## 2023-04-20 NOTE — ASSESSMENT & PLAN NOTE
The patient has overactive bladder. She has tried conservative management including dietary modifications, bladder training and physical therapy. She is requiring medical therapy. Long-term continuing therapy with anticholinergic drugs could induce brain changes partially comparable to those present in Alzheimers disease. We discussed the risks of dry eyes, dry mouth and constipation as well as slow gastric motility. She is not a candidate for anticholinergics because she is over the age of 70, has concerns for the risk of memory loss/ dementia. She already has dry eyes, dry mouth, acid reflux and constipation. Since there are other safer options for treatment, we will prescribe a beta-3 agonist.     She has a history of high blood pressure and therefore is not a candidate for Mirbegron. After assess her symptoms, medical history and other medications, she will be treated with Vibegron 75mg. Samples and coupon given.

## 2023-04-20 NOTE — PROGRESS NOTES
alert and oriented to person, place, and time. Psychiatric:         Mood and Affect: Mood normal.         Behavior: Behavior normal.         Thought Content: Thought content normal.         Judgment: Judgment normal.        Female Genitourinary   Vulva:    Normal. No lesions  Bartholin's Gland:  Bilateral , Normal, nontender  Skenes Gland:  Bilateral, Normal, nontender   Clitoris:  Normal.   Introitus:    Normal.   Urethral Meatus:  Normal appearing, normal size, no lesions, no prolapse  Urethra:  No masses, no tenderness  Vagina:  No atrophy, no discharge, no lesions  Cervix:  No lesions, no discharge  Uterus:  No tenderness, normal mobility   Adnexa:   No masses palpated, no tenderness  Bladder:  No tenderness, no masses palpated  Perineum:  Normal, no lesions    Rectal   Anorectal Exam: No hemorrhoids and no masses or lesions of the perineum        POP-Q: (Pelvic Organ Prolapse - Quantification Exam):  Pelvic Organ Prolapse Quantification 4/20/2023   Anterior Wall (Aa) -3   Anterior Wall (Ba) -3   Cervix or Cuff (C) -6   Genital Haitus (gh) 2   Perineal Body (pb) 1.5   Total Vaginal Length (tvl) 7   Posterior Wall (Ap) 0   Posterior Wall (Bp) 0   Posterior Fornix (D) x       Pelvic floor muscles: Tender Spasm     R. Puborectalis: NO 0 /5    L. Puborectalis: NO 0 /5    R. Pubococcyg NO 0 /5    L. Pubococcyg NO 0 /5    R. Ileococcyg: NO 0 /5    L. Ileococcyg: NO 0 /5    R. Obturator Int: NO 0 /5    L. Obturator Int: NO 0 /5    R. Coccygeus: NO 0 /5    L. Coccygeus: NO 0 /5      Pelvic floor contractions: 1/5    Stress Test of KRISTAL: neg    Post Void Residual collected by straight catheterization: 20cc  The patient was in lithotomy position and the urethra was cleansed to maintain sterility. A 14Fr catheter was used to drain the bladder in sterile fashion. 1. Rectocele  Assessment & Plan:  We discussed the epidemiology, pathogenesis and etiology of pelvic organ prolapse.  This is a chronic condition that has

## 2023-04-20 NOTE — ASSESSMENT & PLAN NOTE
She has weak pelvic floor muscles and poor coordination. I recommend PT. We discussed the purpose of physical therapy which is to strengthen the pelvic floor muscles and teach proper coordination of those muscles. I described the anatomy of those muscles involved and their relationship to the end-organs in the pelvis. I described therapy techniques which include a combination of therapeutic exercise, biofeedback, neuromuscular re-education, home programs, and electrical stimulation, as well as therapeutic massage and ultrasound for pain. RX placed.

## 2023-04-20 NOTE — ASSESSMENT & PLAN NOTE
We discussed the epidemiology, pathogenesis and etiology of pelvic organ prolapse. This is a chronic condition that has progressed. She has seen Dr. Heather Cheung in the past.  We discussed the potential risk factors which include genetics and environmental factors, such as childbirth, aging, menopause, straining, and previous surgery. I offered her management options which included nothing, pessary, and surgery. She has recently had a lot of surgery and would like to avoid surgery if she can.      We are going to try PT first.

## 2023-04-21 ENCOUNTER — APPOINTMENT (OUTPATIENT)
Dept: PHYSICAL THERAPY | Age: 83
End: 2023-04-21
Payer: MEDICARE

## 2023-04-21 ENCOUNTER — TELEPHONE (OUTPATIENT)
Dept: ORTHOPEDIC SURGERY | Age: 83
End: 2023-04-21

## 2023-04-21 NOTE — TELEPHONE ENCOUNTER
Called and spoke to pt who would like to add bilat LE physical therapy to order so that she can see Jeferson Broderick. For both shoulder and LE therapy. Message given to Dr. Megan Beaulieu for review.

## 2023-04-24 ENCOUNTER — TELEPHONE (OUTPATIENT)
Dept: ORTHOPEDIC SURGERY | Age: 83
End: 2023-04-24

## 2023-04-24 ENCOUNTER — HOSPITAL ENCOUNTER (OUTPATIENT)
Dept: PHYSICAL THERAPY | Age: 83
Setting detail: RECURRING SERIES
End: 2023-04-24
Payer: MEDICARE

## 2023-04-24 DIAGNOSIS — Z09 FOLLOW-UP EXAMINATION AFTER ORTHOPEDIC SURGERY: Primary | ICD-10-CM

## 2023-04-24 NOTE — TELEPHONE ENCOUNTER
Called both numbers in chart and was unable to LVM. Was going to advise pt that new order was sent to Evelina Graham. To include LE therapy in addition to her shoulder per AGP.

## 2023-04-25 ENCOUNTER — HOME CARE VISIT (OUTPATIENT)
Dept: SCHEDULING | Facility: HOME HEALTH | Age: 83
End: 2023-04-25
Payer: MEDICARE

## 2023-04-25 VITALS
TEMPERATURE: 97.5 F | RESPIRATION RATE: 16 BRPM | DIASTOLIC BLOOD PRESSURE: 80 MMHG | HEART RATE: 74 BPM | OXYGEN SATURATION: 96 % | SYSTOLIC BLOOD PRESSURE: 124 MMHG

## 2023-04-25 PROCEDURE — G0299 HHS/HOSPICE OF RN EA 15 MIN: HCPCS

## 2023-04-25 ASSESSMENT — ENCOUNTER SYMPTOMS
PAIN LOCATION - PAIN QUALITY: ACHING
STOOL DESCRIPTION: SOFT FORMED

## 2023-04-27 ENCOUNTER — APPOINTMENT (OUTPATIENT)
Dept: PHYSICAL THERAPY | Age: 83
End: 2023-04-27
Payer: MEDICARE

## 2023-05-30 ENCOUNTER — OFFICE VISIT (OUTPATIENT)
Dept: ORTHOPEDIC SURGERY | Age: 83
End: 2023-05-30
Payer: MEDICARE

## 2023-05-30 DIAGNOSIS — Z09 FOLLOW-UP EXAMINATION AFTER ORTHOPEDIC SURGERY: ICD-10-CM

## 2023-05-30 DIAGNOSIS — Z96.611 PRESENCE OF RIGHT ARTIFICIAL SHOULDER JOINT: Primary | ICD-10-CM

## 2023-05-30 PROCEDURE — 1090F PRES/ABSN URINE INCON ASSESS: CPT | Performed by: ORTHOPAEDIC SURGERY

## 2023-05-30 PROCEDURE — 1123F ACP DISCUSS/DSCN MKR DOCD: CPT | Performed by: ORTHOPAEDIC SURGERY

## 2023-05-30 PROCEDURE — 99212 OFFICE O/P EST SF 10 MIN: CPT | Performed by: ORTHOPAEDIC SURGERY

## 2023-05-30 PROCEDURE — G8417 CALC BMI ABV UP PARAM F/U: HCPCS | Performed by: ORTHOPAEDIC SURGERY

## 2023-05-30 PROCEDURE — G8427 DOCREV CUR MEDS BY ELIG CLIN: HCPCS | Performed by: ORTHOPAEDIC SURGERY

## 2023-05-30 PROCEDURE — G8399 PT W/DXA RESULTS DOCUMENT: HCPCS | Performed by: ORTHOPAEDIC SURGERY

## 2023-05-30 PROCEDURE — 1036F TOBACCO NON-USER: CPT | Performed by: ORTHOPAEDIC SURGERY

## 2023-05-30 NOTE — PROGRESS NOTES
test.  Negative reverse shila sign  The left elbow is stable at 0, 30, 70, 90, degrees. No swelling or erythema over the olecranon bursa. Patient has 2+ radial and ulnar pulses and neurovascularly is intact. Her right shoulder has a well-healed deltopectoral incision  Active forward elevation is 0-140  Passively goes 0-160  IR to T12  She appears neurovascularly intact  She is globally weak but improving    The right elbow has a range of motion of 5 to 135 with 85 degrees of supination and 75 degrees of pronation. Patient is non-tender over the medial epicondyle  non-tender over the ulnar nerve with no evidence of any subluxation or dislocation. Negative tinel at the cubital tunnel  Negative flexor pronator stress test.  Patient is non-tender over the radial tunnel  non-tender over the lateral epicondyle with a negative wrist extensor stress test.   The patient is non-tender when shaking hands. Negative middle finger extension stress test.  The biceps tendon is intact. Present hook test.  Negative reverse shila sign  The right elbow is stable at 0, 30, 70, 90, degrees. No swelling or erythema over the olecranon bursa. Patient has 2+ radial and ulnar pulses and neurovascularly is intact. Global right elbow pain        Data Reviewed:       XR: AP Y axillary views right shoulder     Clinical Indication    ICD-10-CM    1. Presence of right artificial shoulder joint  Z96.611 XR SHOULDER RIGHT (MIN 2 VIEWS)      2. Follow-up examination after orthopedic surgery  Z09 XR SHOULDER RIGHT (MIN 2 VIEWS)         Report:  AP Y axillary views right shoulder demonstrate a reverse longstem right total shoulder arthroplasty in unchanged position    Impression: As above   Albert Watts MD            Impression:      1. Presence of right artificial shoulder joint    2.  Follow-up examination after orthopedic surgery             status post I&D removal of implant right shoulder [place] reverse right total

## 2023-05-31 ENCOUNTER — OFFICE VISIT (OUTPATIENT)
Dept: UROGYNECOLOGY | Age: 83
End: 2023-05-31
Payer: MEDICARE

## 2023-05-31 DIAGNOSIS — N39.41 URGE INCONTINENCE: ICD-10-CM

## 2023-05-31 PROCEDURE — G8417 CALC BMI ABV UP PARAM F/U: HCPCS | Performed by: OBSTETRICS & GYNECOLOGY

## 2023-05-31 PROCEDURE — G8399 PT W/DXA RESULTS DOCUMENT: HCPCS | Performed by: OBSTETRICS & GYNECOLOGY

## 2023-05-31 PROCEDURE — 0509F URINE INCON PLAN DOCD: CPT | Performed by: OBSTETRICS & GYNECOLOGY

## 2023-05-31 PROCEDURE — 1036F TOBACCO NON-USER: CPT | Performed by: OBSTETRICS & GYNECOLOGY

## 2023-05-31 PROCEDURE — 1090F PRES/ABSN URINE INCON ASSESS: CPT | Performed by: OBSTETRICS & GYNECOLOGY

## 2023-05-31 PROCEDURE — 1123F ACP DISCUSS/DSCN MKR DOCD: CPT | Performed by: OBSTETRICS & GYNECOLOGY

## 2023-05-31 PROCEDURE — 99212 OFFICE O/P EST SF 10 MIN: CPT | Performed by: OBSTETRICS & GYNECOLOGY

## 2023-05-31 PROCEDURE — G8428 CUR MEDS NOT DOCUMENT: HCPCS | Performed by: OBSTETRICS & GYNECOLOGY

## 2023-05-31 NOTE — PROGRESS NOTES
[facility]  [address]  Dept: [phone]    PCP:  SHAVON Dimas CNP    5/31/2023      HPI:  Ashtyn Taylor is here to follow up on urinary incontinence, rectocele and pelvic floor weakness. Patient was seen approximately a month ago and given Vibegron. The patient states that the medication has been helping a lot. She no longer has to wear pads during the day and goes to the bathroom as soon as she feels the need, however, she is still wearing a pad at night. The cost of the medication is $95. She voids 0 times through the night  She voids 2 times during the day. She is not leaking urine. She is happy with the vibegron    No results found for this visit on 05/31/23. There were no vitals taken for this visit. 1. Urge incontinence  Assessment & Plan:  She is doing really well on the [address] [place] [address]. Due to cost she wants to see if the myrbetriq is better covered. She will let us know. Very happy with the therapy       No follow-ups on file. On this date 5/31/2023 I have spent 10 minutes reviewing previous notes, test results and face to face with the patient discussing the diagnosis and importance of compliance with the treatment plan as well as documenting on the day of the visit.                   Hemalatha Denis, DO

## 2023-05-31 NOTE — ASSESSMENT & PLAN NOTE
She is doing really well on the Vibegron. Due to cost she wants to see if the myrbetriq is better covered. She will let us know.      Very happy with the therapy

## 2023-06-02 ENCOUNTER — OFFICE VISIT (OUTPATIENT)
Age: 83
End: 2023-06-02
Payer: MEDICARE

## 2023-06-02 VITALS
SYSTOLIC BLOOD PRESSURE: 130 MMHG | BODY MASS INDEX: 26.4 KG/M2 | DIASTOLIC BLOOD PRESSURE: 70 MMHG | HEART RATE: 64 BPM | HEIGHT: 63 IN | WEIGHT: 149 LBS

## 2023-06-02 DIAGNOSIS — I65.23 BILATERAL CAROTID ARTERY STENOSIS: ICD-10-CM

## 2023-06-02 DIAGNOSIS — I25.10 ATHEROSCLEROSIS OF NATIVE CORONARY ARTERY OF NATIVE HEART WITHOUT ANGINA PECTORIS: Primary | ICD-10-CM

## 2023-06-02 DIAGNOSIS — R00.2 PALPITATIONS: ICD-10-CM

## 2023-06-02 DIAGNOSIS — I73.9 PVD (PERIPHERAL VASCULAR DISEASE) (HCC): ICD-10-CM

## 2023-06-02 PROCEDURE — 1036F TOBACCO NON-USER: CPT | Performed by: INTERNAL MEDICINE

## 2023-06-02 PROCEDURE — G8417 CALC BMI ABV UP PARAM F/U: HCPCS | Performed by: INTERNAL MEDICINE

## 2023-06-02 PROCEDURE — G8399 PT W/DXA RESULTS DOCUMENT: HCPCS | Performed by: INTERNAL MEDICINE

## 2023-06-02 PROCEDURE — 99214 OFFICE O/P EST MOD 30 MIN: CPT | Performed by: INTERNAL MEDICINE

## 2023-06-02 PROCEDURE — 3075F SYST BP GE 130 - 139MM HG: CPT | Performed by: INTERNAL MEDICINE

## 2023-06-02 PROCEDURE — 1090F PRES/ABSN URINE INCON ASSESS: CPT | Performed by: INTERNAL MEDICINE

## 2023-06-02 PROCEDURE — G8427 DOCREV CUR MEDS BY ELIG CLIN: HCPCS | Performed by: INTERNAL MEDICINE

## 2023-06-02 PROCEDURE — 1123F ACP DISCUSS/DSCN MKR DOCD: CPT | Performed by: INTERNAL MEDICINE

## 2023-06-02 PROCEDURE — 3078F DIAST BP <80 MM HG: CPT | Performed by: INTERNAL MEDICINE

## 2023-06-02 NOTE — PROGRESS NOTES
right shoulder joint prosthesis ([facility] 08/10/2022     Added automatically from request for surgery [id number]      Traumatic closed fracture of greater tuberosity of humerus with minimal displacement, right, initial encounter 08/10/2022     Added automatically from request for surgery [id number]      Periprosthetic fracture around internal prosthetic right shoulder joint 08/10/2022     Added automatically from request for surgery [id number]      Presence of right artificial shoulder joint 08/10/2022     Added automatically from request for surgery [id number]      History of COVID-19 01/28/2021    Palpitation 07/03/2019    PVD (peripheral vascular disease) ([facility] 12/06/2018    Bilateral carotid artery disease ([facility] 03/21/2018    Shortness of breath 01/12/2017    History of bilateral hip replacements 02/26/2016    Tricuspid valve disorder      Echo 2011: normal EF, mod TR, RVSP 40  Echo 2013: normal EF, mild TR        Coronary atherosclerosis of native coronary vessel      Pre Ca score 83  Normal stress test 2011, 2013, 2014        Palpitations     Chest discomfort     Hypothyroidism     Vazquez's esophagus     Hypertension, benign     Hyperlipidemia       Past Surgical History:   Procedure Laterality Date    APPENDECTOMY  1987    BREAST BIOPSY Left 1980    ENLARGED MILK GLAND    BUNIONECTOMY Bilateral 2009    with hammertoe correction    CARPAL TUNNEL RELEASE Right 10/31/13    ESOPHAGOGASTRODUODENOSCOPY  07/27/2018    2016, 2018    HAMMER TOE SURGERY  2010    revision    HYSTERECTOMY (CERVIX STATUS UNKNOWN)  1988    LAMINECTOMY  04/05/2022    T10 OPEN TOTAL LAMINECTOMY    LARYNGOSCOPY N/A 2/10/2023    Direct Suspension LARYNGOSCOPY performed by Chano Roche DO at [facility]  2009    L4-L5    ORTHOPEDIC SURGERY  2010    Right hand    REVERSE TOTAL SHOULDER ARTHROPLASTY Right 2017    SHOULDER ARTHROSCOPY  2013    left shoulder cyst    SHOULDER SURGERY Right 8/18/2022    right shoulder irrigation and

## 2023-06-20 ENCOUNTER — PROCEDURE VISIT (OUTPATIENT)
Dept: NEUROLOGY | Age: 83
End: 2023-06-20

## 2023-06-20 VITALS — OXYGEN SATURATION: 96 % | BODY MASS INDEX: 26.64 KG/M2 | WEIGHT: 148 LBS | HEART RATE: 66 BPM

## 2023-06-20 DIAGNOSIS — R20.0 NUMBNESS AND TINGLING IN BOTH HANDS: ICD-10-CM

## 2023-06-20 DIAGNOSIS — R26.9 GAIT DISORDER: ICD-10-CM

## 2023-06-20 DIAGNOSIS — G56.03 CARPAL TUNNEL SYNDROME ON BOTH SIDES: Primary | ICD-10-CM

## 2023-06-20 DIAGNOSIS — R20.2 NUMBNESS AND TINGLING IN BOTH HANDS: ICD-10-CM

## 2023-06-20 ASSESSMENT — VISUAL ACUITY: OU: 1

## 2023-06-20 ASSESSMENT — ENCOUNTER SYMPTOMS
EYES NEGATIVE: 1
BACK PAIN: 1

## 2023-06-20 NOTE — PROGRESS NOTES
NEUROLOGY  RETURN  OFFICE VISIT [de-identified]                     6/20/2023  Gabriela Goodman is a 78 y.o. female here for [de-identified]  EMG NCV uppers                                      Hx Bilat CTR. And hx BLE EMG 3/31/2023  and T-spine op. Referred by   SHAVON Carballo - CNP     Chief Complaint:   Upper extremity paresthesias and hand pain. Hx bilateral CTR yrs ago. 77 yo [address] [place] here for uppers EMG       Paresthesia and hand weakness - though had BCTR yrs ago. Also moderate gait and transfer and other limb weakness. Active Problems:    * No active hospital problems. *  Resolved Problems:    * No resolved hospital problems.  *    Past Medical History:   Diagnosis Date    Acid reflux     Arthritis     osteo    Vazquez's esophagus     Managed with as needed meds    Bilateral carotid artery disease ([facility]     Followed by cardiology- Dr. Sonia Viera    Chest discomfort     denies current or recent CP 2/2/23    Coronary atherosclerosis of native coronary vessel     Degeneration of lumbar or lumbosacral intervertebral disc     Depression     medication     Gastric ulcer, unspecified as acute or chronic, without mention of hemorrhage or perforation 1989    History of anemia     History of blood transfusion     last in 09/2022 after shoulder surgery    Hyperlipidemia     Hypothyroidism     no meds needed currently (low normal range per pt)    Infectious disease     lyme disease 2004    Nausea & vomiting     Palpitations     PVD (peripheral vascular disease) ([address]     Scoliosis (and kyphoscoliosis), idiopathic     Spinal stenosis, lumbar 12/7/2009    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    Thoracic or lumbosacral neuritis or radiculitis, unspecified     Tricuspid regurgitation     mild per echo dated 1-11-13    Tricuspid valve disorder      Past Surgical History:   Procedure Laterality Date    APPENDECTOMY  1987    BREAST BIOPSY Left 1980    ENLARGED MILK GLAND    BUNIONECTOMY Bilateral 2009
pages.       *Extra to EMG Time*  See accompanying consult . Separate and Extra E/M Time to EMG Time*  ==         Patient is referred by the following provider for consultation regarding as noted:      [[Please note that this consultation is a separate note/E-M from the EMG procedure. ]]              Kathleene Schwab MD  Consultative Neurology, Neurodiagnostics   [facility]    [address]  Phone:  [phone]  Fax:   [phone]          + + +   Glossary:   MUP: motor unit potential;  SNAP: sensory nerve action potential; Fibs:  Fibrillations; Fascic: fasciculations; IA: insertional activity;  IP: interference pattern;  SCV :  Sensory conduction velocity;  MCV: motor conduction velocity; NOTE[de-identified] muscles are abbreviated latin initials. [place]ico[place] raw datafile[de-identified]   * filed at Procedure or [employer].  *

## 2023-06-27 ENCOUNTER — TELEPHONE (OUTPATIENT)
Dept: NEUROLOGY | Age: 83
End: 2023-06-27

## 2023-07-06 ENCOUNTER — HOSPITAL ENCOUNTER (OUTPATIENT)
Dept: PHYSICAL THERAPY | Age: 83
Setting detail: RECURRING SERIES
Discharge: HOME | End: 2023-07-09
Attending: ORTHOPAEDIC SURGERY
Payer: MEDICARE

## 2023-07-06 PROCEDURE — 97110 THERAPEUTIC EXERCISES: CPT

## 2023-07-06 PROCEDURE — 97162 PT EVAL MOD COMPLEX 30 MIN: CPT

## 2023-07-06 PROCEDURE — 97140 MANUAL THERAPY 1/> REGIONS: CPT

## 2023-07-06 ASSESSMENT — PAIN SCALES - GENERAL: PAINLEVEL_OUTOF10: 4

## 2023-07-11 ENCOUNTER — TELEPHONE (OUTPATIENT)
Dept: NEUROLOGY | Age: 83
End: 2023-07-11

## 2023-07-11 DIAGNOSIS — R26.9 GAIT DISORDER: Primary | ICD-10-CM

## 2023-07-11 DIAGNOSIS — R29.898 WEAKNESS OF BOTH LEGS: ICD-10-CM

## 2023-07-12 ENCOUNTER — HOSPITAL ENCOUNTER (OUTPATIENT)
Dept: PHYSICAL THERAPY | Age: 83
Setting detail: RECURRING SERIES
End: 2023-07-12
Attending: ORTHOPAEDIC SURGERY
Payer: MEDICARE

## 2023-07-12 ENCOUNTER — TELEPHONE (OUTPATIENT)
Dept: ORTHOPEDIC SURGERY | Age: 83
End: 2023-07-12

## 2023-07-12 RX ORDER — TRAMADOL HYDROCHLORIDE 50 MG/1
TABLET ORAL
OUTPATIENT
Start: 2023-07-12

## 2023-07-12 NOTE — TELEPHONE ENCOUNTER
Patient notified she would need an appointment to get this medication refilled. Patient states she is unable to come in and she is in the bed. Patient states that she was trying to stay with Mariia Matamoros and let her refill all of her prescriptions, but she will get Dr. Muna Garcia to refill this medication.

## 2023-07-13 ENCOUNTER — HOSPITAL ENCOUNTER (OUTPATIENT)
Dept: PHYSICAL THERAPY | Age: 83
Setting detail: RECURRING SERIES
Discharge: HOME | End: 2023-07-16
Attending: ORTHOPAEDIC SURGERY
Payer: MEDICARE

## 2023-07-13 PROCEDURE — 97140 MANUAL THERAPY 1/> REGIONS: CPT

## 2023-07-13 PROCEDURE — 97110 THERAPEUTIC EXERCISES: CPT

## 2023-07-13 ASSESSMENT — PAIN SCALES - GENERAL: PAINLEVEL_OUTOF10: 6

## 2023-07-14 ENCOUNTER — HOSPITAL ENCOUNTER (OUTPATIENT)
Dept: PHYSICAL THERAPY | Age: 83
Setting detail: RECURRING SERIES
Discharge: HOME | End: 2023-07-17
Attending: ORTHOPAEDIC SURGERY
Payer: MEDICARE

## 2023-07-14 PROCEDURE — 97110 THERAPEUTIC EXERCISES: CPT

## 2023-07-17 ENCOUNTER — HOSPITAL ENCOUNTER (OUTPATIENT)
Dept: PHYSICAL THERAPY | Age: 83
Setting detail: RECURRING SERIES
Discharge: HOME | End: 2023-07-20
Attending: ORTHOPAEDIC SURGERY
Payer: MEDICARE

## 2023-07-17 PROCEDURE — 97110 THERAPEUTIC EXERCISES: CPT

## 2023-07-17 NOTE — PROGRESS NOTES
Dave Jackson  : 1940  Primary: Medicare Part A And B (Medicare)  Secondary: [address] @ [place]  [address]  [address] [facility] [address]  Phone: [phone]  Fax: [phone] Plan Frequency: 2x/week    Plan of Care/Certification Expiration Date: 23      >PT Visit Info:  Plan Frequency: 2x/week  Plan of Care/Certification Expiration Date: 23      Visit Count:  4    OUTPATIENT PHYSICAL THERAPY:OP NOTE TYPE: Treatment Note 2023       Episode  }Appt Desk             Treatment Diagnosis:  Pain in Right Shoulder (M25.511)  Stiffness of Right Shoulder, Not elsewhere classified (M25.611)  Cervicalgia (M54.2)  Medical/Referring Diagnosis:  Presence of right artificial shoulder joint [Z96.611]  Referring Physician:  Tejal Jean MD MD Orders:  PT Eval and Treat   Date of Onset:  Onset Date:  (2022)     Allergies:   Codeine and Adhesive tape  Restrictions/Precautions:  Restrictions/Precautions: Surgical protocol; None  Required Braces or Orthoses?: No  No data recorded   Interventions Planned (Treatment may consist of any combination of the following):    Current Treatment Recommendations: Strengthening; ROM; Balance training; Endurance training; Gait training; Neuromuscular re-education; Manual; Pain management; Home exercise program; Dry needling; Therapeutic activities     >Subjective Comments:Pt reports severe B shoulder pain today. The left shoulder is more painful. >Initial:     moderate to severe at rest >Post Session:   no increase  Medications Last Reviewed:  2023  Updated Objective Findings:   /80  HR 64  Treatment   THERAPEUTIC ACTIVITY: (see below for minutes): Therapeutic activities below to improve mobility, strength, balance and coordination. Required minimal visual, verbal, manual and tactile cues to improve independence and safety with daily activities.   THERAPEUTIC EXERCISE: (see below for minutes):

## 2023-07-20 ENCOUNTER — HOSPITAL ENCOUNTER (OUTPATIENT)
Dept: PHYSICAL THERAPY | Age: 83
Setting detail: RECURRING SERIES
Discharge: HOME | End: 2023-07-23
Attending: ORTHOPAEDIC SURGERY
Payer: MEDICARE

## 2023-07-20 PROCEDURE — 97110 THERAPEUTIC EXERCISES: CPT

## 2023-07-20 NOTE — PROGRESS NOTES
Armando Salazar  : 1940  Primary: Medicare Part A And B (Medicare)  Secondary: [address]  [facility] [address]  Phone: [phone]  Fax: [phone] Plan Frequency: 2x/week    Plan of Care/Certification Expiration Date: 23      >PT Visit Info:  Plan Frequency: 2x/week  Plan of Care/Certification Expiration Date: 23      Visit Count:  5    OUTPATIENT PHYSICAL THERAPY:OP NOTE TYPE: Treatment Note 2023       Episode  }Appt Desk             Treatment Diagnosis:  Pain in Right Shoulder (M25.511)  Stiffness of Right Shoulder, Not elsewhere classified (M25.611)  Cervicalgia (M54.2)  Medical/Referring Diagnosis:  Presence of right artificial shoulder joint [Z96.611]  Referring Physician:  Izzy Lynne.MD OCONNELL Orders:  PT Eval and Treat   Date of Onset:  Onset Date:  (2022)     Allergies:   Codeine and Adhesive tape  Restrictions/Precautions:  Restrictions/Precautions: Surgical protocol; None  Required Braces or Orthoses?: No  No data recorded   Interventions Planned (Treatment may consist of any combination of the following):    Current Treatment Recommendations: Strengthening; ROM; Balance training; Endurance training; Gait training; Neuromuscular re-education; Manual; Pain management; Home exercise program; Dry needling; Therapeutic activities     >Subjective Comments:Pt states \"I don't know why but I feel much better. \"     >Initial:     0/10 R shoulder at rest >Post Session:   no increase  Medications Last Reviewed:  2023  Updated Objective Findings:   /80  HR 64  Treatment   THERAPEUTIC ACTIVITY: (see below for minutes): Therapeutic activities below to improve mobility, strength, balance and coordination. Required minimal visual, verbal, manual and tactile cues to improve independence and safety with daily activities.   THERAPEUTIC EXERCISE: (see below for minutes): Exercises below to improve

## 2023-07-26 ENCOUNTER — HOSPITAL ENCOUNTER (OUTPATIENT)
Dept: PHYSICAL THERAPY | Age: 83
Setting detail: RECURRING SERIES
Discharge: HOME | End: 2023-07-29
Attending: ORTHOPAEDIC SURGERY
Payer: MEDICARE

## 2023-07-26 PROCEDURE — 97110 THERAPEUTIC EXERCISES: CPT

## 2023-07-26 NOTE — PROGRESS NOTES
Sheridan Smyth  : 1940  Primary: Medicare Part A And B (Medicare)  Secondary: [address] [place]  [address] [facility] [address]  Phone: [phone]  Fax: [phone] Plan Frequency: 2x/week    Plan of Care/Certification Expiration Date: 23      >PT Visit Info:  Plan Frequency: 2x/week  Plan of Care/Certification Expiration Date: 23      Visit Count:  6    OUTPATIENT PHYSICAL THERAPY:OP NOTE TYPE: Treatment Note 2023       Episode  }Appt Desk             Treatment Diagnosis:  Pain in Right Shoulder (M25.511)  Stiffness of Right Shoulder, Not elsewhere classified (M25.611)  Cervicalgia (M54.2)  Medical/Referring Diagnosis:  Presence of right artificial shoulder joint [Z96.611]  Referring Physician:  Amy Valdez MD MD Orders:  PT Eval and Treat   Date of Onset:  Onset Date:  (2022)     Allergies:   Codeine and Adhesive tape  Restrictions/Precautions:  Restrictions/Precautions: Surgical protocol; None  Required Braces or Orthoses?: No  No data recorded   Interventions Planned (Treatment may consist of any combination of the following):    Current Treatment Recommendations: Strengthening; ROM; Balance training; Endurance training; Gait training; Neuromuscular re-education; Manual; Pain management; Home exercise program; Dry needling; Therapeutic activities     >Subjective Comments:Overall feeling better but still having trouble with eating using R UE. She has not been working on [employer].     >Initial:     0/10 R shoulder at rest >Post Session:   no increase  Medications Last Reviewed:  2023  Updated Objective Findings:   /80  HR 64  Treatment   THERAPEUTIC ACTIVITY: (see below for minutes): Therapeutic activities below to improve mobility, strength, balance and coordination. Required minimal visual, verbal, manual and tactile cues to improve independence and safety with daily activities.   THERAPEUTIC EXERCISE:

## 2023-07-31 ENCOUNTER — HOSPITAL ENCOUNTER (OUTPATIENT)
Dept: PHYSICAL THERAPY | Age: 83
Setting detail: RECURRING SERIES
Discharge: HOME | End: 2023-08-03
Attending: ORTHOPAEDIC SURGERY
Payer: MEDICARE

## 2023-07-31 PROCEDURE — 97110 THERAPEUTIC EXERCISES: CPT

## 2023-07-31 NOTE — PROGRESS NOTES
Donn Arguelles  : 1940  Primary: Medicare Part A And B (Medicare)  Secondary: [address] @ [place]  [address] [facility] [address]  Phone: [phone]  Fax: [phone] Plan Frequency: 2x/week    Plan of Care/Certification Expiration Date: 23      >PT Visit Info:  Plan Frequency: 2x/week  Plan of Care/Certification Expiration Date: 23      Visit Count:  7    OUTPATIENT PHYSICAL THERAPY:OP NOTE TYPE: Treatment Note 2023       Episode  }Appt Desk             Treatment Diagnosis:  Pain in Right Shoulder (M25.511)  Stiffness of Right Shoulder, Not elsewhere classified (M25.611)  Cervicalgia (M54.2)  Medical/Referring Diagnosis:  Presence of right artificial shoulder joint [Z96.611]  Referring Physician:  Laz Fraser MD MD Orders:  PT Eval and Treat   Date of Onset:  Onset Date:  (2022)     Allergies:   Codeine and Adhesive tape  Restrictions/Precautions:  Restrictions/Precautions: Surgical protocol; None  Required Braces or Orthoses?: No  No data recorded   Interventions Planned (Treatment may consist of any combination of the following):    Current Treatment Recommendations: Strengthening; ROM; Balance training; Endurance training; Gait training; Neuromuscular re-education; Manual; Pain management; Home exercise program; Dry needling; Therapeutic activities     >Subjective Comments: Pt reports working on the home exercise program this weekend. She does not report high levels of pain today. >Initial:    mild to moderate pain in the R shoulder >Post Session:   no increase  Medications Last Reviewed:  2023  Updated Objective Findings:     Treatment   THERAPEUTIC ACTIVITY: (see below for minutes): Therapeutic activities below to improve mobility, strength, balance and coordination. Required minimal visual, verbal, manual and tactile cues to improve independence and safety with daily activities.   THERAPEUTIC

## 2023-08-02 ENCOUNTER — HOSPITAL ENCOUNTER (OUTPATIENT)
Dept: PHYSICAL THERAPY | Age: 83
Setting detail: RECURRING SERIES
Discharge: HOME | End: 2023-08-05
Attending: ORTHOPAEDIC SURGERY
Payer: MEDICARE

## 2023-08-02 PROCEDURE — 97110 THERAPEUTIC EXERCISES: CPT

## 2023-08-02 NOTE — PROGRESS NOTES
Ashishamy Proper  : 1940  Primary: Medicare Part A And B (Medicare)  Secondary: [address] @ [address] [address] [facility]  [place] [address]  Phone: [phone]  Fax: [phone] Plan Frequency: 2x/week    Plan of Care/Certification Expiration Date: 23      >PT Visit Info:  Plan Frequency: 2x/week  Plan of Care/Certification Expiration Date: 23      Visit Count:  8    OUTPATIENT PHYSICAL THERAPY:OP NOTE TYPE: Progress Note & Treatment Note 2023       Episode  }Appt Desk             Treatment Diagnosis:  Pain in Right Shoulder (M25.511)  Stiffness of Right Shoulder, Not elsewhere classified (M25.611)  Cervicalgia (M54.2)  Medical/Referring Diagnosis:  Presence of right artificial shoulder joint [Z96.611]  Referring Physician:  Ovidio Patiño MD MD Orders:  PT Eval and Treat   Date of Onset:  Onset Date:  (2022)     Allergies:   Codeine and Adhesive tape  Restrictions/Precautions:  Restrictions/Precautions: Surgical protocol; None  Required Braces or Orthoses?: No  No data recorded   Interventions Planned (Treatment may consist of any combination of the following):    Current Treatment Recommendations: Strengthening; ROM; Balance training; Endurance training; Gait training; Neuromuscular re-education; Manual; Pain management; Home exercise program; Dry needling; Therapeutic activities     >Subjective Comments: Pt reports working on the home exercise program this weekend. She does not report high levels of pain today.      >Initial:    mild to moderate pain in the R shoulder >Post Session:   no increase  Medications Last Reviewed:  2023  Updated Objective Findings:    OBJECTIVE               AROM PROM     R L R L   Flexion 95   125     Abduction 84         ER 18         IR           Extension           Apleys ER occiput occiput       Apleys IR                     Strength     R L   Flexion 4-     Abduction 4     ER 3     IR 3+
Continue to progress gentle strengthening. Communication/Consultation:  None today  Equipment provided today:  None  Recommendations/Intent for next treatment session: Next visit will focus on progressive AROM and strength.     >Total Treatment Billable Duration: 40 minutes  Time In: 1400  Time Out: [address], PTA       Charge Capture  }Post Session Pain  PT Visit Info  [employer] Portal  MD Guidelines  Scanned Media  Benefits  MyChart    Future Appointments   Date Time Provider [address]   8/7/2023  2:00 PM [place], PTA [facility]   8/9/2023  3:15 PM MD TEETEE Carroll [facility] AM[facility]   8/10/2023  2:00 PM [place], PTA [facility]   8/15/2023  2:00 PM [place], PTA [facility]   8/17/2023  2:00 PM [place], PTA [facility]   8/21/2023  2:00 PM [place], PTA [facility]   8/22/2023  1:00 PM Anabelle Tilley, APRN - CNP TRIM [facility] AMB   8/23/2023  2:00 PM Omaira Alejandre, PT [facility]   8/28/2023  2:45 PM Mitesh Hess, DO ENTG [facility] AMB   8/29/2023  2:00 PM [place], PTA [facility]   8/30/2023  1:45 PM MD TEETEE aCrroll [facility] AMB   8/31/2023  2:00 PM Omaira Alejandre, PT [facility]   12/5/2023  1:00 PM Brett Parker, DO UCDG [facility] AMB

## 2023-08-07 ENCOUNTER — HOSPITAL ENCOUNTER (OUTPATIENT)
Dept: PHYSICAL THERAPY | Age: 83
Setting detail: RECURRING SERIES
Discharge: HOME | End: 2023-08-10
Attending: ORTHOPAEDIC SURGERY
Payer: MEDICARE

## 2023-08-07 PROCEDURE — 97110 THERAPEUTIC EXERCISES: CPT

## 2023-08-07 NOTE — PROGRESS NOTES
TRIM [facility] AM[facility]   8/23/2023  2:00 PM Brennen Fan, PT [facility]   8/28/2023  2:45 PM Mitesh Guillen, DO ENTG [facility] AMB   8/29/2023  2:00 PM Gely Elliott, PTA [facility]   8/30/2023  1:45 PM MD TEETEE Del Rosario [facility] AMB   8/31/2023  2:00 PM [facility]   12/5/2023  1:00 PM Lolly Homans, DO [facility] AMB

## 2023-08-10 ENCOUNTER — HOSPITAL ENCOUNTER (OUTPATIENT)
Dept: PHYSICAL THERAPY | Age: 83
Setting detail: RECURRING SERIES
Discharge: HOME | End: 2023-08-13
Attending: ORTHOPAEDIC SURGERY
Payer: MEDICARE

## 2023-08-10 PROCEDURE — 97110 THERAPEUTIC EXERCISES: CPT

## 2023-08-15 ENCOUNTER — APPOINTMENT (OUTPATIENT)
Dept: PHYSICAL THERAPY | Age: 83
End: 2023-08-15
Attending: ORTHOPAEDIC SURGERY
Payer: MEDICARE

## 2023-08-16 ENCOUNTER — HOSPITAL ENCOUNTER (OUTPATIENT)
Dept: PHYSICAL THERAPY | Age: 83
Setting detail: RECURRING SERIES
Discharge: HOME | End: 2023-08-19
Attending: ORTHOPAEDIC SURGERY
Payer: MEDICARE

## 2023-08-16 PROCEDURE — 97110 THERAPEUTIC EXERCISES: CPT

## 2023-08-17 ENCOUNTER — HOSPITAL ENCOUNTER (OUTPATIENT)
Dept: PHYSICAL THERAPY | Age: 83
Setting detail: RECURRING SERIES
Discharge: HOME | End: 2023-08-20
Attending: ORTHOPAEDIC SURGERY
Payer: MEDICARE

## 2023-08-17 PROCEDURE — 97110 THERAPEUTIC EXERCISES: CPT

## 2023-08-17 NOTE — PROGRESS NOTES
PM Dorie Guerra PTA [facility]   8/22/2023  1:00 PM Rafael Perez, APRN - CNP TRIM [facility] AMB   8/23/2023  2:00 PM Michel García, JANETH [facility]   8/28/2023 11:00 AM DO CHAKA GrovesG [facility] AMB   8/29/2023  2:00 PM Dorie Guerra PTA [facility]   8/30/2023  1:45 PM Burgess Suki MD POABBY [facility] AMB   8/31/2023  2:00 PM WILLIE BOTELLO ATC [facility]   12/5/2023  1:00 PM Quentin Glasgow DO UCDG [facility] AMB

## 2023-08-21 ENCOUNTER — HOSPITAL ENCOUNTER (OUTPATIENT)
Dept: PHYSICAL THERAPY | Age: 83
Setting detail: RECURRING SERIES
Discharge: HOME | End: 2023-08-24
Attending: ORTHOPAEDIC SURGERY
Payer: MEDICARE

## 2023-08-21 PROCEDURE — 97110 THERAPEUTIC EXERCISES: CPT

## 2023-08-21 NOTE — PROGRESS NOTES
self-care/home management as related to dressing, bathing and grooming. Required minimal verbal cueing to facilitate activities of daily living skills and compensatory activities  MECHANICAL TRACTION: (see below for minutes): Traction was used due to the patient's radiculopathy in order to relieve pain in or originating from the spine    Exercises/Activities: 40 minutes: Therapeutic exercise. Date:  8/16/23 Date:  8/17/23 Date:  8/21/23   Activity/Exercise Parameters Parameters Parameters   UBE Level 2, 5 min forward/back  Level 2, 6 min forward/back Level 2, 6 min forward/back   Standing wand activities 2x10 each  Shoulder extension and horizontal ADD/ABD behind back 3x10 shoulder ext in standing    Supine flexion 3x10 AROM  3x10, 1# B UE Supine with wand 3x10 Supine with wand 3x10   Supine press up 3x10, 1# B UE  3x10 B UE 1#   Seated ER behind head 2x10 Side lying ER 3x10    ROM with over pressure to R shoulder (therapist assist) With R scapula stabilized into ER, flexion, scaption x 10 min With R scapula stabilized into ER, flexion, scaption x5 min With R scapula stabilized into flexion, scaption and ER   rows  Black 3x10 Black 3x10   Standing shoulder extension   Green R 3x10     Modalities: moist hot pack to R shoulder in supine, 10 minutes post exercise    HEP and Education:  HEP as above, rationale, progression  Pt has verbalized and demonstrated compliance with HEP and scheduled appointments. Treatment/Session Summary:    >Treatment Assessment: Pt was unable to flex the shoulder with rotational control. No reports of increased pain. Communication/Consultation:  None today  Equipment provided today:  None  Recommendations/Intent for next treatment session: Next visit will focus on progressive AROM and strength.     >Total Treatment Billable Duration: 40 min  Time In: 1405  Time Out: [address], PTA       Charge Capture  }Post Session Pain  PT Visit Info  [facility] Portal  MD Guidelines

## 2023-08-23 ENCOUNTER — OFFICE VISIT (OUTPATIENT)
Dept: INTERNAL MEDICINE CLINIC | Facility: CLINIC | Age: 83
End: 2023-08-23
Payer: MEDICARE

## 2023-08-23 ENCOUNTER — HOSPITAL ENCOUNTER (OUTPATIENT)
Dept: PHYSICAL THERAPY | Age: 83
Setting detail: RECURRING SERIES
Discharge: HOME | End: 2023-08-26
Attending: ORTHOPAEDIC SURGERY
Payer: MEDICARE

## 2023-08-23 VITALS
HEIGHT: 63 IN | TEMPERATURE: 97.6 F | OXYGEN SATURATION: 96 % | WEIGHT: 149 LBS | BODY MASS INDEX: 26.4 KG/M2 | HEART RATE: 62 BPM | DIASTOLIC BLOOD PRESSURE: 60 MMHG | SYSTOLIC BLOOD PRESSURE: 122 MMHG

## 2023-08-23 DIAGNOSIS — M25.519 CHRONIC SHOULDER PAIN, UNSPECIFIED LATERALITY: ICD-10-CM

## 2023-08-23 DIAGNOSIS — E55.9 VITAMIN D DEFICIENCY, UNSPECIFIED: ICD-10-CM

## 2023-08-23 DIAGNOSIS — I10 HYPERTENSION, BENIGN: ICD-10-CM

## 2023-08-23 DIAGNOSIS — Z98.890 HX OF SPINAL SURGERY: ICD-10-CM

## 2023-08-23 DIAGNOSIS — F33.9 EPISODE OF RECURRENT MAJOR DEPRESSIVE DISORDER, UNSPECIFIED DEPRESSION EPISODE SEVERITY (HCC): ICD-10-CM

## 2023-08-23 DIAGNOSIS — R79.89 LOW VITAMIN D LEVEL: ICD-10-CM

## 2023-08-23 DIAGNOSIS — R73.09 ELEVATED GLUCOSE: ICD-10-CM

## 2023-08-23 DIAGNOSIS — I49.9 IRREGULAR HEART RATE: ICD-10-CM

## 2023-08-23 DIAGNOSIS — E78.2 MIXED HYPERLIPIDEMIA: Primary | ICD-10-CM

## 2023-08-23 DIAGNOSIS — G89.29 CHRONIC SHOULDER PAIN, UNSPECIFIED LATERALITY: ICD-10-CM

## 2023-08-23 LAB
ALBUMIN SERPL-MCNC: 4.1 G/DL (ref 3.2–4.6)
ALBUMIN/GLOB SERPL: 1.4 (ref 0.4–1.6)
ALP SERPL-CCNC: 87 U/L (ref 50–136)
ALT SERPL-CCNC: 21 U/L (ref 12–65)
ANION GAP SERPL CALC-SCNC: 5 MMOL/L (ref 2–11)
AST SERPL-CCNC: 27 U/L (ref 15–37)
BILIRUB SERPL-MCNC: 0.9 MG/DL (ref 0.2–1.1)
BUN SERPL-MCNC: 23 MG/DL (ref 8–23)
CALCIUM SERPL-MCNC: 9.9 MG/DL (ref 8.3–10.4)
CHLORIDE SERPL-SCNC: 106 MMOL/L (ref 101–110)
CHOLEST SERPL-MCNC: 206 MG/DL
CO2 SERPL-SCNC: 29 MMOL/L (ref 21–32)
CREAT SERPL-MCNC: 1 MG/DL (ref 0.6–1)
ERYTHROCYTE [DISTWIDTH] IN BLOOD BY AUTOMATED COUNT: 13.7 % (ref 11.9–14.6)
EST. AVERAGE GLUCOSE BLD GHB EST-MCNC: 114 MG/DL
GLOBULIN SER CALC-MCNC: 2.9 G/DL (ref 2.8–4.5)
GLUCOSE SERPL-MCNC: 96 MG/DL (ref 65–100)
HBA1C MFR BLD: 5.6 % (ref 4.8–5.6)
HCT VFR BLD AUTO: 40 % (ref 35.8–46.3)
HDLC SERPL-MCNC: 87 MG/DL (ref 40–60)
HDLC SERPL: 2.4
HGB BLD-MCNC: 12.8 G/DL (ref 11.7–15.4)
LDLC SERPL CALC-MCNC: 107.4 MG/DL
MCH RBC QN AUTO: 32.8 PG (ref 26.1–32.9)
MCHC RBC AUTO-ENTMCNC: 32 G/DL (ref 31.4–35)
MCV RBC AUTO: 102.6 FL (ref 82–102)
NRBC # BLD: 0 K/UL (ref 0–0.2)
PLATELET # BLD AUTO: 233 K/UL (ref 150–450)
PMV BLD AUTO: 11.8 FL (ref 9.4–12.3)
POTASSIUM SERPL-SCNC: 4.5 MMOL/L (ref 3.5–5.1)
PROT SERPL-MCNC: 7 G/DL (ref 6.3–8.2)
RBC # BLD AUTO: 3.9 M/UL (ref 4.05–5.2)
SODIUM SERPL-SCNC: 140 MMOL/L (ref 133–143)
TRIGL SERPL-MCNC: 58 MG/DL (ref 35–150)
VLDLC SERPL CALC-MCNC: 11.6 MG/DL (ref 6–23)
WBC # BLD AUTO: 5.7 K/UL (ref 4.3–11.1)

## 2023-08-23 PROCEDURE — 93000 ELECTROCARDIOGRAM COMPLETE: CPT | Performed by: NURSE PRACTITIONER

## 2023-08-23 PROCEDURE — 3074F SYST BP LT 130 MM HG: CPT | Performed by: NURSE PRACTITIONER

## 2023-08-23 PROCEDURE — 99214 OFFICE O/P EST MOD 30 MIN: CPT | Performed by: NURSE PRACTITIONER

## 2023-08-23 PROCEDURE — 1036F TOBACCO NON-USER: CPT | Performed by: NURSE PRACTITIONER

## 2023-08-23 PROCEDURE — G8399 PT W/DXA RESULTS DOCUMENT: HCPCS | Performed by: NURSE PRACTITIONER

## 2023-08-23 PROCEDURE — 1090F PRES/ABSN URINE INCON ASSESS: CPT | Performed by: NURSE PRACTITIONER

## 2023-08-23 PROCEDURE — G8427 DOCREV CUR MEDS BY ELIG CLIN: HCPCS | Performed by: NURSE PRACTITIONER

## 2023-08-23 PROCEDURE — 97110 THERAPEUTIC EXERCISES: CPT

## 2023-08-23 PROCEDURE — G8417 CALC BMI ABV UP PARAM F/U: HCPCS | Performed by: NURSE PRACTITIONER

## 2023-08-23 PROCEDURE — 3078F DIAST BP <80 MM HG: CPT | Performed by: NURSE PRACTITIONER

## 2023-08-23 PROCEDURE — 1123F ACP DISCUSS/DSCN MKR DOCD: CPT | Performed by: NURSE PRACTITIONER

## 2023-08-23 RX ORDER — COVID-19 ANTIGEN TEST
1 KIT MISCELLANEOUS EVERY MORNING
COMMUNITY

## 2023-08-23 RX ORDER — CITALOPRAM 20 MG/1
20 TABLET ORAL DAILY
Qty: 90 TABLET | Refills: 1 | Status: SHIPPED | OUTPATIENT
Start: 2023-08-23

## 2023-08-23 NOTE — PROGRESS NOTES
Alyssa Carrillo (:  1940) is a 78 y.o. female,Established patient, here for evaluation of the following chief complaint(s):  Cholesterol Problem (6 month follow up /)         ASSESSMENT/PLAN:  1. Mixed hyperlipidemia  -     Hemoglobin A1C; Future  -     Lipid Panel; Future  -     Comprehensive Metabolic Panel; Future  -     CBC; Future  -     Vitamin D 25 Hydroxy; Future  2. Hypertension, benign  -     Hemoglobin A1C; Future  -     Lipid Panel; Future  -     Comprehensive Metabolic Panel; Future  -     CBC; Future  -     Vitamin D 25 Hydroxy; Future  3. Low vitamin D level  -     Vitamin D 25 Hydroxy; Future  4. Elevated glucose  -     Hemoglobin A1C; Future  5. Episode of recurrent major depressive disorder, unspecified depression episode severity ([address])  Assessment & Plan:   Continue celexa, doing well and depression controlled  6. Vitamin D deficiency, unspecified  -     Vitamin D 25 Hydroxy; Future  7. Chronic shoulder pain, unspecified laterality  -     External Referral To Pain Medicine  8. Hx of spinal surgery  -     External Referral To Pain Medicine      No follow-ups on file. Rcommend Tdap and shingrix at pharmacy  Flu shot in October  Check routine lab  C/o worsening arthralgia, has had good results with Dr Mery Hassan with pain management, refer back  F/u with ortho this month  Continue PT for weakness, slow getting out of chair today and gait abnormal  Needs PT for balance and gait as well as shoulder pain  Ekg sinus today, discussed seeing cardiology to wear monitor, declines at this time, if palpitations or SOB or irregular rhythm continue will notify and see cardiology    Subjective   SUBJECTIVE/OBJECTIVE:  Patient is here for follow up. She is in PT at [facility] and feels improvement. She still has shoulder pain and back pain and pain all over. She used to see Dr Mery Hassan and would like to see him again.    She saw neurology for her weakness, gait, and was referred again to PT    She

## 2023-08-24 LAB — 25(OH)D3 SERPL-MCNC: 56.3 NG/ML (ref 30–100)

## 2023-08-28 ENCOUNTER — TELEPHONE (OUTPATIENT)
Dept: INTERNAL MEDICINE CLINIC | Facility: CLINIC | Age: 83
End: 2023-08-28

## 2023-08-28 NOTE — TELEPHONE ENCOUNTER
Patient would like to know if someone would give her a call back in regards to her lab results. Patient states that she has some questions that she would like to ask. Please Advise.

## 2023-08-28 NOTE — TELEPHONE ENCOUNTER
Can you give her a prescription Vitamin D, and B-1? Also, would like to talk about some abnormal labs on 8/23. She was concerned about her RBC, MCV, total cholesterol, hdl, and ldl.

## 2023-08-29 ENCOUNTER — HOSPITAL ENCOUNTER (OUTPATIENT)
Dept: PHYSICAL THERAPY | Age: 83
Setting detail: RECURRING SERIES
Discharge: HOME | End: 2023-09-01
Attending: ORTHOPAEDIC SURGERY
Payer: MEDICARE

## 2023-08-29 PROCEDURE — 97110 THERAPEUTIC EXERCISES: CPT

## 2023-08-29 RX ORDER — THIAMINE HCL 50 MG
50 TABLET ORAL DAILY
Qty: 30 TABLET | Refills: 5 | Status: SHIPPED | OUTPATIENT
Start: 2023-08-29

## 2023-08-29 NOTE — TELEPHONE ENCOUNTER
Left message for patient to return call. But if she returns call:     Her MCV Is large, but stable and unchanged. Her vitamin d was normal, I can refill, but truly it is normal and likely not needed. Does she drink any alcohol? I am refilling b-1 but again, not sure it is actually needed. I would always recommend decrease or eliminate alcohol to make MCV smaller. Valaria Daft HDL is great, flagged because it is high but that is good. LDL is near optimal, 7 points high, I would not take meds, but keep up the good. Work. It came down from last check. Normal creatinine, GFR is 4 point low, so hydrate well, but not a worrisome number.

## 2023-08-30 ENCOUNTER — TELEPHONE (OUTPATIENT)
Dept: INTERNAL MEDICINE CLINIC | Facility: CLINIC | Age: 83
End: 2023-08-30

## 2023-08-30 ENCOUNTER — TELEPHONE (OUTPATIENT)
Dept: ENT CLINIC | Age: 83
End: 2023-08-30

## 2023-08-30 ENCOUNTER — OFFICE VISIT (OUTPATIENT)
Dept: ORTHOPEDIC SURGERY | Age: 83
End: 2023-08-30
Payer: MEDICARE

## 2023-08-30 DIAGNOSIS — M75.102 ROTATOR CUFF TEAR ARTHROPATHY OF LEFT SHOULDER: Primary | ICD-10-CM

## 2023-08-30 DIAGNOSIS — Z96.611 PRESENCE OF RIGHT ARTIFICIAL SHOULDER JOINT: ICD-10-CM

## 2023-08-30 DIAGNOSIS — Z09 FOLLOW-UP EXAMINATION AFTER ORTHOPEDIC SURGERY: ICD-10-CM

## 2023-08-30 DIAGNOSIS — M12.812 ROTATOR CUFF TEAR ARTHROPATHY OF LEFT SHOULDER: Primary | ICD-10-CM

## 2023-08-30 PROCEDURE — 1123F ACP DISCUSS/DSCN MKR DOCD: CPT | Performed by: ORTHOPAEDIC SURGERY

## 2023-08-30 PROCEDURE — 1090F PRES/ABSN URINE INCON ASSESS: CPT | Performed by: ORTHOPAEDIC SURGERY

## 2023-08-30 PROCEDURE — G8427 DOCREV CUR MEDS BY ELIG CLIN: HCPCS | Performed by: ORTHOPAEDIC SURGERY

## 2023-08-30 PROCEDURE — G8417 CALC BMI ABV UP PARAM F/U: HCPCS | Performed by: ORTHOPAEDIC SURGERY

## 2023-08-30 PROCEDURE — 99214 OFFICE O/P EST MOD 30 MIN: CPT | Performed by: ORTHOPAEDIC SURGERY

## 2023-08-30 PROCEDURE — 1036F TOBACCO NON-USER: CPT | Performed by: ORTHOPAEDIC SURGERY

## 2023-08-30 PROCEDURE — G8399 PT W/DXA RESULTS DOCUMENT: HCPCS | Performed by: ORTHOPAEDIC SURGERY

## 2023-08-30 NOTE — PROGRESS NOTES
[facility]             Progress Notes by Christina Mckeon MD at 03/22/22 [address]                    Author: Christina Mckeon MD  Service: --  Author Type: Physician       Filed: 03/22/22 1444  Encounter Date: 3/22/2022  Status: Signed          : Christina Mckeon MD (Physician)                            Name: David Roberts   YOB: 1940   Gender: female   MRN: [id number]                    HPI: Davdi Roberts is a 78  y.o. right-hand-dominant female seen for bilateral shoulder problems. .  She is 1 year status post I&D removal of implant right shoulder Theodore reverse right total shoulder arthroplasty with a proximal humeral osteotomy and revision reverse right total shoulder arthroplasty with a longstem delta xtend prosthesis biceps tenodesis latissimus dorsi and teres major tendon transfer. She returns noting that her shoulder is improving with therapy. She she has known rotator cuff tear arthropathy left shoulder. She had a flareup earlier in the month. She is doing little bit better. ROS/Meds/PSH/PMH/FH/SH: A ten system review of systems was performed and is negative other than what is in the HPI. Tobacco:  reports that she has never smoked. She has never used smokeless tobacco.   There were no vitals taken for this visit. Physical Examination:   She is awake alert female ambulating without difficulty   She is guarding her right arm at her side      The left elbow has a range of motion of 0 to 135 with 85 degrees of supination and 75 degrees of pronation. Patient is non-tender over the medial epicondyle  non-tender over the ulnar nerve with no evidence of any subluxation or dislocation. Negative tinel at the cubital tunnel  Negative flexor pronator stress test.  Patient is non-tender over the radial tunnel  non-tender over the lateral epicondyle with a negative wrist extensor stress test.   The patient is non-tender when shaking hands.     Negative middle finger

## 2023-08-30 NOTE — TELEPHONE ENCOUNTER
Patient left VM voicing billing concerns . I returned patient's call with no answer .  Detailed message left with billing contact number ( [phone] )

## 2023-08-31 ENCOUNTER — HOSPITAL ENCOUNTER (OUTPATIENT)
Dept: PHYSICAL THERAPY | Age: 83
Setting detail: RECURRING SERIES
End: 2023-08-31
Attending: ORTHOPAEDIC SURGERY
Payer: MEDICARE

## 2023-08-31 PROCEDURE — 97110 THERAPEUTIC EXERCISES: CPT

## 2023-08-31 PROCEDURE — 97010 HOT OR COLD PACKS THERAPY: CPT

## 2023-08-31 NOTE — THERAPY RECERTIFICATION
Brittny Han  : 1940  Primary: Medicare Part A And B (Medicare)  Secondary: [address] @ [place]  [address] [facility] [facility] [address]  Phone: [phone]  Fax: [phone] Plan Frequency: 2x/week    Plan of Care/Certification Expiration Date: 23      PT Visit Info:  Plan Frequency: 2x/week  Plan of Care/Certification Expiration Date: 23      Visit Count:  16                OUTPATIENT PHYSICAL THERAPY:             OP NOTE TYPE: Recertification 3/37/8049               Episode (PT R shoulder) Appt Desk         Treatment Diagnosis:  Pain in Right Shoulder (M25.511)  Stiffness of Right Shoulder, Not elsewhere classified (M25.611)  Cervicalgia (M54.2)  Medical/Referring Diagnosis:  Presence of right artificial shoulder joint [Z96.611]  Referring Physician:  Wenceslao Ashby MD MD Orders:  PT Eval and Treat   Return MD Appt:  TBD  Date of Onset:  Onset Date:  (2022)      Allergies:  Codeine and Adhesive tape  Restrictions/Precautions:    Restrictions/Precautions: None        Medications Last Reviewed:  2023     SUBJECTIVE   History of Injury/Illness (Reason for Referral):  Patient reports that she continues to feel weak throughout the day. Patient states that ROM limitations make it difficulty to fix her hair and feed herself. Patient reports continued difficulty completing daily activities and ADL's due to right shoulder weakness.    Patient Stated Goal(s):  \"feed self and do hair\"  Initial:      7-8/10 Post Session:      3-4/10  Past Medical History/Comorbidities:   Ms. Loan Casas  has a past medical history of Acid reflux, Arthritis, Vazquez's esophagus, Bilateral carotid artery disease ([address]), Chest discomfort, Coronary atherosclerosis of native coronary vessel, Degeneration of lumbar or lumbosacral intervertebral disc, Depression, Gastric ulcer, unspecified as acute or chronic, without mention of hemorrhage or perforation,

## 2023-09-05 ENCOUNTER — OFFICE VISIT (OUTPATIENT)
Dept: ENT CLINIC | Age: 83
End: 2023-09-05
Payer: MEDICARE

## 2023-09-05 VITALS — HEIGHT: 63 IN | WEIGHT: 150 LBS | RESPIRATION RATE: 16 BRPM | BODY MASS INDEX: 26.58 KG/M2

## 2023-09-05 DIAGNOSIS — H92.01 OTALGIA, RIGHT EAR: ICD-10-CM

## 2023-09-05 DIAGNOSIS — H61.23 BILATERAL IMPACTED CERUMEN: ICD-10-CM

## 2023-09-05 DIAGNOSIS — Z45.89 TYMPANOSTOMY TUBE CHECK: ICD-10-CM

## 2023-09-05 DIAGNOSIS — M26.629 TMJ SYNDROME: Primary | ICD-10-CM

## 2023-09-05 DIAGNOSIS — H69.81 DYSFUNCTION OF RIGHT EUSTACHIAN TUBE: ICD-10-CM

## 2023-09-05 PROCEDURE — 1123F ACP DISCUSS/DSCN MKR DOCD: CPT | Performed by: STUDENT IN AN ORGANIZED HEALTH CARE EDUCATION/TRAINING PROGRAM

## 2023-09-05 PROCEDURE — 1036F TOBACCO NON-USER: CPT | Performed by: STUDENT IN AN ORGANIZED HEALTH CARE EDUCATION/TRAINING PROGRAM

## 2023-09-05 PROCEDURE — 99213 OFFICE O/P EST LOW 20 MIN: CPT | Performed by: STUDENT IN AN ORGANIZED HEALTH CARE EDUCATION/TRAINING PROGRAM

## 2023-09-05 PROCEDURE — 69210 REMOVE IMPACTED EAR WAX UNI: CPT | Performed by: STUDENT IN AN ORGANIZED HEALTH CARE EDUCATION/TRAINING PROGRAM

## 2023-09-05 PROCEDURE — 1090F PRES/ABSN URINE INCON ASSESS: CPT | Performed by: STUDENT IN AN ORGANIZED HEALTH CARE EDUCATION/TRAINING PROGRAM

## 2023-09-05 PROCEDURE — G8399 PT W/DXA RESULTS DOCUMENT: HCPCS | Performed by: STUDENT IN AN ORGANIZED HEALTH CARE EDUCATION/TRAINING PROGRAM

## 2023-09-05 PROCEDURE — G8417 CALC BMI ABV UP PARAM F/U: HCPCS | Performed by: STUDENT IN AN ORGANIZED HEALTH CARE EDUCATION/TRAINING PROGRAM

## 2023-09-05 PROCEDURE — G8427 DOCREV CUR MEDS BY ELIG CLIN: HCPCS | Performed by: STUDENT IN AN ORGANIZED HEALTH CARE EDUCATION/TRAINING PROGRAM

## 2023-09-05 RX ORDER — AMOXICILLIN AND CLAVULANATE POTASSIUM 875; 125 MG/1; MG/1
TABLET, FILM COATED ORAL
Qty: 14 TABLET | Refills: 0 | OUTPATIENT
Start: 2023-09-05

## 2023-09-05 ASSESSMENT — ENCOUNTER SYMPTOMS
NAUSEA: 0
APNEA: 0
SHORTNESS OF BREATH: 0
CHOKING: 0
SINUS PAIN: 0
STRIDOR: 0
EYE DISCHARGE: 0
FACIAL SWELLING: 0
DIARRHEA: 0
EYE PAIN: 0
CONSTIPATION: 0
SINUS PRESSURE: 0
EYE ITCHING: 0
COUGH: 0
WHEEZING: 0

## 2023-09-05 NOTE — PROGRESS NOTES
HPI:  Sadaf Ellis is a 78 y.o. female seen Established   Chief Complaint   Patient presents with    Follow-up     Patient presents today for 6 MO tube check. Patient states that her R ear has some pain that radiates to jaw bone . 78-year-old female seen for a follow-up evaluation history of right-sided PE tube placed in January 2023 for chronic middle ear effusion. She has done well in regards to her hearing. Still wearing her hearing aids with good benefit. There has been no new concerns to her right ear. Here today for tube check. She has had ongoing right-sided otalgia with radiation into her right jaw. 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 Past Medical History, Past Surgical History, Family history, Social History, and Medications were all reviewed with the patient today and updated as necessary.      Allergies   Allergen Reactions    Codeine Itching     Other reaction(s): Nausea and/or vomiting-Intolerance, Rash-Allergy    Adhesive Tape Rash and Other (See Comments)     Blisters       Patient Active Problem List   Diagnosis    PVD (peripheral vascular disease) ([address])    History of bilateral hip replacements    Shortness of breath    Palpitations    Chest discomfort    History of COVID-19    Spinal stenosis, lumbar    Bilateral carotid artery disease (HCC)    Palpitation    Hypothyroidism    Vazquez's esophagus    Tricuspid valve disorder    Coronary atherosclerosis of native coronary vessel    Hypertension, benign    Hyperlipidemia    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    Periprosthetic fracture around internal

## 2023-09-06 ENCOUNTER — HOSPITAL ENCOUNTER (OUTPATIENT)
Dept: PHYSICAL THERAPY | Age: 83
Setting detail: RECURRING SERIES
End: 2023-09-06
Attending: ORTHOPAEDIC SURGERY
Payer: MEDICARE

## 2023-09-08 ENCOUNTER — HOSPITAL ENCOUNTER (OUTPATIENT)
Dept: PHYSICAL THERAPY | Age: 83
Setting detail: RECURRING SERIES
Discharge: HOME | End: 2023-09-11
Attending: ORTHOPAEDIC SURGERY
Payer: MEDICARE

## 2023-09-08 PROCEDURE — 97110 THERAPEUTIC EXERCISES: CPT

## 2023-09-12 ENCOUNTER — HOSPITAL ENCOUNTER (OUTPATIENT)
Dept: PHYSICAL THERAPY | Age: 83
Setting detail: RECURRING SERIES
Discharge: HOME | End: 2023-09-15
Attending: ORTHOPAEDIC SURGERY
Payer: MEDICARE

## 2023-09-12 ENCOUNTER — HOSPITAL ENCOUNTER (OUTPATIENT)
Dept: PHYSICAL THERAPY | Age: 83
Setting detail: RECURRING SERIES
End: 2023-09-12
Attending: ORTHOPAEDIC SURGERY
Payer: MEDICARE

## 2023-09-12 PROCEDURE — 97110 THERAPEUTIC EXERCISES: CPT

## 2023-09-12 NOTE — PROGRESS NOTES
(MET)  Gross deltoid strength improved to 4-/5 for improved ability to perform household tasks (ONGOING)  Discharge Goals: Time Frame: 8 weeks  AROM flx to 110 deg for improved ability to do hair (MET)  Max pain 2/10 for improved sleep (ONGOING)          Outcome Measure: Tool Used: Disabilities of the Arm, Shoulder and Hand (DASH) Questionnaire - Quick Version  Score:  Initial: 45.5% Most Recent: 34/55 (Date: 8-31-23    Interpretation of Score: The DASH is designed to measure the activities of daily living in person's with upper extremity dysfunction or pain. Each section is scored on a 1-5 scale, 5 representing the greatest disability. The scores of each section are added together for a total score of 55. Treatment   THERAPEUTIC ACTIVITY: (see below for minutes): Therapeutic activities below to improve mobility, strength, balance and coordination. Required minimal visual, verbal, manual and tactile cues to improve independence and safety with daily activities. THERAPEUTIC EXERCISE: (see below for minutes): Exercises below to improve mobility, strength, balance and coordination. Required minimal verbal and manual cues to promote proper body alignment, promote proper body posture and promote proper body mechanics. Progressed resistance, range, repetitions and complexity of movement as indicated. NEUROMUSCULAR RE-ED: (see below for minutes): Neuromuscular re-education to restore normal body movement patterns. MANUAL THERAPY: (see below for minutes): Joint mobilization and Soft tissue mobilization was utilized and necessary because of the patient's restricted joint motion, painful spasm, loss of articular motion, and restricted motion of soft tissue.    MODALITIES: (see below for minutes): to decrease pain and/or swelling  GAIT TRAINING: (see below for minutes): Gait training to improve and/or restore physical functioning as related to mobility, balance and coordination  SELF CARE: (see below for minutes):

## 2023-09-14 ENCOUNTER — HOSPITAL ENCOUNTER (OUTPATIENT)
Dept: PHYSICAL THERAPY | Age: 83
Setting detail: RECURRING SERIES
End: 2023-09-14
Attending: ORTHOPAEDIC SURGERY
Payer: MEDICARE

## 2023-09-15 ENCOUNTER — HOSPITAL ENCOUNTER (OUTPATIENT)
Dept: PHYSICAL THERAPY | Age: 83
Setting detail: RECURRING SERIES
Discharge: HOME | End: 2023-09-18
Attending: ORTHOPAEDIC SURGERY
Payer: MEDICARE

## 2023-09-15 PROCEDURE — 97110 THERAPEUTIC EXERCISES: CPT

## 2023-09-19 ENCOUNTER — HOSPITAL ENCOUNTER (OUTPATIENT)
Dept: PHYSICAL THERAPY | Age: 83
Setting detail: RECURRING SERIES
Discharge: HOME | End: 2023-09-22
Attending: ORTHOPAEDIC SURGERY
Payer: MEDICARE

## 2023-09-19 PROCEDURE — 97110 THERAPEUTIC EXERCISES: CPT

## 2023-09-20 ENCOUNTER — TELEPHONE (OUTPATIENT)
Dept: INTERNAL MEDICINE CLINIC | Facility: CLINIC | Age: 83
End: 2023-09-20

## 2023-09-20 DIAGNOSIS — Z78.0 POSTMENOPAUSAL: Primary | ICD-10-CM

## 2023-09-20 NOTE — TELEPHONE ENCOUNTER
Patient also states she needs an antibiotic prior to her dental appointment. She states she has several next week. Patient placed on schedule for tomorrow, and notified she can discuss this with you at her appt tomorrow.

## 2023-09-20 NOTE — TELEPHONE ENCOUNTER
Patient called stating she was having some issues, and wanted me to ask you what she should do regarding these issues. 1.) Patient states she is so tired all the time, and on her recent lab work her hemoglobin was down to 12. Patient states Dr. Cole Kennedy told her that was low before she went into surgery for her arm and he wanted it above 12. So she wanted to know if you can send Rx in for Iron. 2.) When she went for therapy last time the therapist said she needed a bone density test completed because they think she needs to be on Fosamax for her bones. Can we order Bone Density test and Fosamax for her Bones? 3.) Regarding her Antidepressant she is not sure if it is working anymore. She states she has been on Celexa for years, and that just to get dressed in the mornings she has problems. She is not sure if its anxiety or depression or both that is causing her to be so late all the time because she can't get ready. Please Advise.

## 2023-09-21 ENCOUNTER — OFFICE VISIT (OUTPATIENT)
Dept: INTERNAL MEDICINE CLINIC | Facility: CLINIC | Age: 83
End: 2023-09-21
Payer: MEDICARE

## 2023-09-21 ENCOUNTER — HOSPITAL ENCOUNTER (OUTPATIENT)
Dept: PHYSICAL THERAPY | Age: 83
Setting detail: RECURRING SERIES
Discharge: HOME | End: 2023-09-24
Attending: ORTHOPAEDIC SURGERY
Payer: MEDICARE

## 2023-09-21 VITALS
SYSTOLIC BLOOD PRESSURE: 110 MMHG | HEIGHT: 63 IN | WEIGHT: 153 LBS | TEMPERATURE: 98.1 F | DIASTOLIC BLOOD PRESSURE: 70 MMHG | HEART RATE: 65 BPM | BODY MASS INDEX: 27.11 KG/M2 | OXYGEN SATURATION: 93 %

## 2023-09-21 DIAGNOSIS — F32.A ANXIETY AND DEPRESSION: Primary | ICD-10-CM

## 2023-09-21 DIAGNOSIS — F32.A FATIGUE DUE TO DEPRESSION: ICD-10-CM

## 2023-09-21 DIAGNOSIS — F41.9 ANXIETY AND DEPRESSION: Primary | ICD-10-CM

## 2023-09-21 DIAGNOSIS — M19.90 ARTHRITIS: ICD-10-CM

## 2023-09-21 DIAGNOSIS — Z74.8 ASSISTANCE NEEDED WITH TRANSPORTATION: ICD-10-CM

## 2023-09-21 DIAGNOSIS — R53.83 FATIGUE DUE TO DEPRESSION: ICD-10-CM

## 2023-09-21 DIAGNOSIS — D75.89 MACROCYTOSIS WITHOUT ANEMIA: ICD-10-CM

## 2023-09-21 LAB
CRP SERPL-MCNC: <0.3 MG/DL (ref 0–0.9)
ERYTHROCYTE [DISTWIDTH] IN BLOOD BY AUTOMATED COUNT: 13.5 % (ref 11.9–14.6)
ERYTHROCYTE [SEDIMENTATION RATE] IN BLOOD: 8 MM/HR (ref 0–30)
FERRITIN SERPL-MCNC: 87 NG/ML (ref 8–388)
HCT VFR BLD AUTO: 37 % (ref 35.8–46.3)
HGB BLD-MCNC: 12 G/DL (ref 11.7–15.4)
IRON SERPL-MCNC: 69 UG/DL (ref 35–150)
MCH RBC QN AUTO: 32.6 PG (ref 26.1–32.9)
MCHC RBC AUTO-ENTMCNC: 32.4 G/DL (ref 31.4–35)
MCV RBC AUTO: 100.5 FL (ref 82–102)
NRBC # BLD: 0 K/UL (ref 0–0.2)
PLATELET # BLD AUTO: 198 K/UL (ref 150–450)
PMV BLD AUTO: 11.6 FL (ref 9.4–12.3)
RBC # BLD AUTO: 3.68 M/UL (ref 4.05–5.2)
TIBC SERPL-MCNC: 301 UG/DL (ref 250–450)
VIT B12 SERPL-MCNC: 1910 PG/ML (ref 193–986)
WBC # BLD AUTO: 5.3 K/UL (ref 4.3–11.1)

## 2023-09-21 PROCEDURE — 3074F SYST BP LT 130 MM HG: CPT | Performed by: NURSE PRACTITIONER

## 2023-09-21 PROCEDURE — 1090F PRES/ABSN URINE INCON ASSESS: CPT | Performed by: NURSE PRACTITIONER

## 2023-09-21 PROCEDURE — G8427 DOCREV CUR MEDS BY ELIG CLIN: HCPCS | Performed by: NURSE PRACTITIONER

## 2023-09-21 PROCEDURE — G8399 PT W/DXA RESULTS DOCUMENT: HCPCS | Performed by: NURSE PRACTITIONER

## 2023-09-21 PROCEDURE — 1036F TOBACCO NON-USER: CPT | Performed by: NURSE PRACTITIONER

## 2023-09-21 PROCEDURE — G8417 CALC BMI ABV UP PARAM F/U: HCPCS | Performed by: NURSE PRACTITIONER

## 2023-09-21 PROCEDURE — 3078F DIAST BP <80 MM HG: CPT | Performed by: NURSE PRACTITIONER

## 2023-09-21 PROCEDURE — 99214 OFFICE O/P EST MOD 30 MIN: CPT | Performed by: NURSE PRACTITIONER

## 2023-09-21 PROCEDURE — 97110 THERAPEUTIC EXERCISES: CPT

## 2023-09-21 PROCEDURE — 1123F ACP DISCUSS/DSCN MKR DOCD: CPT | Performed by: NURSE PRACTITIONER

## 2023-09-21 RX ORDER — AMOXICILLIN 500 MG/1
2000 CAPSULE ORAL
Qty: 4 CAPSULE | Refills: 0 | Status: SHIPPED | OUTPATIENT
Start: 2023-09-21 | End: 2023-09-21

## 2023-09-21 RX ORDER — SERTRALINE HYDROCHLORIDE 25 MG/1
25 TABLET, FILM COATED ORAL DAILY
Qty: 30 TABLET | Refills: 3 | Status: SHIPPED | OUTPATIENT
Start: 2023-09-21

## 2023-09-21 ASSESSMENT — ANXIETY QUESTIONNAIRES
6. BECOMING EASILY ANNOYED OR IRRITABLE: 3
IF YOU CHECKED OFF ANY PROBLEMS ON THIS QUESTIONNAIRE, HOW DIFFICULT HAVE THESE PROBLEMS MADE IT FOR YOU TO DO YOUR WORK, TAKE CARE OF THINGS AT HOME, OR GET ALONG WITH OTHER PEOPLE: VERY DIFFICULT
4. TROUBLE RELAXING: 0
7. FEELING AFRAID AS IF SOMETHING AWFUL MIGHT HAPPEN: 0
GAD7 TOTAL SCORE: 6
1. FEELING NERVOUS, ANXIOUS, OR ON EDGE: 3
5. BEING SO RESTLESS THAT IT IS HARD TO SIT STILL: 0
3. WORRYING TOO MUCH ABOUT DIFFERENT THINGS: 0
2. NOT BEING ABLE TO STOP OR CONTROL WORRYING: 0

## 2023-09-21 ASSESSMENT — PATIENT HEALTH QUESTIONNAIRE - PHQ9
10. IF YOU CHECKED OFF ANY PROBLEMS, HOW DIFFICULT HAVE THESE PROBLEMS MADE IT FOR YOU TO DO YOUR WORK, TAKE CARE OF THINGS AT HOME, OR GET ALONG WITH OTHER PEOPLE: 2
SUM OF ALL RESPONSES TO PHQ QUESTIONS 1-9: 12
5. POOR APPETITE OR OVEREATING: 3
2. FEELING DOWN, DEPRESSED OR HOPELESS: 3
7. TROUBLE CONCENTRATING ON THINGS, SUCH AS READING THE NEWSPAPER OR WATCHING TELEVISION: 0
1. LITTLE INTEREST OR PLEASURE IN DOING THINGS: 3
SUM OF ALL RESPONSES TO PHQ QUESTIONS 1-9: 12
9. THOUGHTS THAT YOU WOULD BE BETTER OFF DEAD, OR OF HURTING YOURSELF: 0
8. MOVING OR SPEAKING SO SLOWLY THAT OTHER PEOPLE COULD HAVE NOTICED. OR THE OPPOSITE, BEING SO FIGETY OR RESTLESS THAT YOU HAVE BEEN MOVING AROUND A LOT MORE THAN USUAL: 0
3. TROUBLE FALLING OR STAYING ASLEEP: 0
4. FEELING TIRED OR HAVING LITTLE ENERGY: 3
SUM OF ALL RESPONSES TO PHQ9 QUESTIONS 1 & 2: 6
SUM OF ALL RESPONSES TO PHQ QUESTIONS 1-9: 12
SUM OF ALL RESPONSES TO PHQ QUESTIONS 1-9: 12
6. FEELING BAD ABOUT YOURSELF - OR THAT YOU ARE A FAILURE OR HAVE LET YOURSELF OR YOUR FAMILY DOWN: 0

## 2023-09-21 NOTE — PROGRESS NOTES
Jose Pryor (:  1940) is a 78 y.o. female,Established patient, here for evaluation of the following chief complaint(s): Anxiety, Depression, and Discuss Labs         ASSESSMENT/PLAN:  1. Anxiety and depression  -     Iron; Future  -     Ferritin; Future  -     Total Iron Binding Capacity; Future  -     Vitamin B12; Future  -     CBC; Future  -     [place] (Counseling)  -     [address] [place] [address] Coordination/Social Work [facility] Care Management  2. Macrocytosis without anemia  -     Iron; Future  -     Ferritin; Future  -     Total Iron Binding Capacity; Future  -     Vitamin B12; Future  -     CBC; Future  3. Fatigue due to depression  -     Iron; Future  -     Ferritin; Future  -     Total Iron Binding Capacity; Future  -     Vitamin B12; Future  -     CBC; Future  -     [place] (Counseling)  -     [address] [place] [address] Coordination/Social Work [facility] Care Management  4. Arthritis  -     Rheumatoid Factor  -     Sedimentation Rate  -     C-Reactive Protein  -     CCP Antibodies, IGG/IGA  -     SO, Direct, w/Reflex  5. Assistance needed with transportation  -     [address] [place] [address] Coordination/Social Work - [address] [place] [address] Management      Return in about 4 weeks (around 10/19/2023). Anxiety and depression worsening, she has increased to 40mg celexa  Stop celexa and try zoloft  She requests labs for arthritis, wants to be checked for rheumatoid  Refer to social work for needs with transportation  Requests abx for dental prophylaxis, discussed current guidelines that are not needed for joint replacements, but she requests as she has historically taken prior to oral care      Subjective   SUBJECTIVE/OBJECTIVE:  Patient is here for depression and anxiety. She has been on celexa for years.  She also took paxil in the past. The last 2 days she is taking celexa 40mg and so far no improvement    She has a glass of wine here and there and a rum and coke here

## 2023-09-22 LAB
CCP IGA+IGG SERPL IA-ACNC: <1 UNITS (ref 0–19)
RHEUMATOID FACT SER QL LA: POSITIVE
RHEUMATOID FACT TITR SER LA: NORMAL {TITER}

## 2023-09-23 LAB — ANA SER QL: NEGATIVE

## 2023-09-25 ENCOUNTER — CARE COORDINATION (OUTPATIENT)
Dept: CARE COORDINATION | Facility: CLINIC | Age: 83
End: 2023-09-25

## 2023-09-25 DIAGNOSIS — M19.90 ARTHRITIS: Primary | ICD-10-CM

## 2023-09-25 DIAGNOSIS — R76.8 RHEUMATOID FACTOR POSITIVE: ICD-10-CM

## 2023-09-25 NOTE — CARE COORDINATION
Spoke with the patient who states that she does have transportation locally just not to doctors. She states that her  does most of the driving but cant drive farther than locally and her doctors are on the [facility]. [facility] provided the patient with the [facility] [employer] [facility] [employer] transportation Program. The patient also agreed to call her insurance company and inquire about transportation. She agreed to call the [facility] and has the contact info, for any needs in the future.

## 2023-09-26 ENCOUNTER — HOSPITAL ENCOUNTER (OUTPATIENT)
Dept: PHYSICAL THERAPY | Age: 83
Setting detail: RECURRING SERIES
Discharge: HOME | End: 2023-09-29
Attending: ORTHOPAEDIC SURGERY
Payer: MEDICARE

## 2023-09-26 PROCEDURE — 97110 THERAPEUTIC EXERCISES: CPT

## 2023-09-26 NOTE — PROGRESS NOTES
for all motions   rows   Wall slides 2x10        Side lying abduction          Standing shoulder extension          Supine Horizontal ABD with band Red 3 x 10 Red  3 x 15          Modalities: moist hot pack to R shoulder in supine, 15 minutes post exercise    HEP and Education:  HEP as above, rationale, progression  Pt has verbalized and demonstrated compliance with HEP and scheduled appointments. Treatment/Session Summary:    >Treatment Assessment: Pt reported no pain today and her PROM is within normal limits. Continue to progress strengthening with respect to pain levels. Communication/Consultation:  None today  Equipment provided today:  None  Recommendations/Intent for next treatment session: Next visit will focus on progressive AROM and strength.     >Total Treatment Billable Duration: 30 minutes (pt was 15 min late)  Time In: 1500  Time Out: [address], PTA       Charge Capture  }Post Session Pain  PT Visit Info  [facility] Portal  MD Guidelines  Scanned Media  Benefits  [employer]    Future Appointments   Date Time Provider [address]   9/29/2023  2:00 PM Patsie Nan, PTA [facility]   10/2/2023  2:00 PM Jesus Banuelos, PT [facility]   10/4/2023  2:00 PM Patsie Nan, PTA [facility]   10/9/2023  2:00 PM Jesus Banuelos, PT [facility]   10/11/2023  2:00 PM Patsie Nan, PTA [facility]   10/16/2023  2:00 PM Jesus Banuelos, PT [facility]   10/18/2023  2:00 PM Patsie Nan, PTA [facility]   10/19/2023 11:00 AM Storm Moss APRN - CNP [facility]   10/23/2023  2:00 PM Jesus Banuelos, PT [facility]   10/25/2023  2:00 PM Patsie Nan, PTA [facility]   10/30/2023  2:00 PM Jesus Banuelos, PT [facility]   12/5/2023  1:00 PM Kayy Mckeon, DO [facility]   1/8/2024  2:45 PM Mitesh Marquez DO [facility]   2/26/2024 11:00 AM Storm Moss APRN - CNP [facility]   3/4/2024  2:45 PM Griselda Muñzo., MD [facility]

## 2023-09-29 ENCOUNTER — HOSPITAL ENCOUNTER (OUTPATIENT)
Dept: PHYSICAL THERAPY | Age: 83
Setting detail: RECURRING SERIES
End: 2023-09-29
Attending: ORTHOPAEDIC SURGERY
Payer: MEDICARE

## 2023-09-29 PROCEDURE — 97110 THERAPEUTIC EXERCISES: CPT

## 2023-10-02 ENCOUNTER — HOSPITAL ENCOUNTER (OUTPATIENT)
Dept: PHYSICAL THERAPY | Age: 83
Setting detail: RECURRING SERIES
Discharge: HOME | End: 2023-10-05
Attending: ORTHOPAEDIC SURGERY
Payer: MEDICARE

## 2023-10-02 PROCEDURE — 97110 THERAPEUTIC EXERCISES: CPT

## 2023-10-03 ENCOUNTER — TELEPHONE (OUTPATIENT)
Dept: INTERNAL MEDICINE CLINIC | Facility: CLINIC | Age: 83
End: 2023-10-03

## 2023-10-03 DIAGNOSIS — M19.90 ARTHRITIS: Primary | ICD-10-CM

## 2023-10-03 NOTE — TELEPHONE ENCOUNTER
Patient states she can not get in with Rheumatology Till December 29 th. Patient states she is in so much pain she can not stand it. Patient states she would like something to help her with the pain. Please Advise.

## 2023-10-04 ENCOUNTER — APPOINTMENT (OUTPATIENT)
Dept: PHYSICAL THERAPY | Age: 83
End: 2023-10-04
Attending: ORTHOPAEDIC SURGERY
Payer: MEDICARE

## 2023-10-04 RX ORDER — TRAMADOL HYDROCHLORIDE 50 MG/1
50 TABLET ORAL EVERY 8 HOURS PRN
Qty: 28 TABLET | Refills: 0 | Status: SHIPPED | OUTPATIENT
Start: 2023-10-04 | End: 2023-10-13

## 2023-10-04 NOTE — TELEPHONE ENCOUNTER
Left detailed voice message for patient, and asked for a return call to find out if she would like short supply of tramadol or to go to pain management.

## 2023-10-05 ENCOUNTER — HOSPITAL ENCOUNTER (OUTPATIENT)
Dept: PHYSICAL THERAPY | Age: 83
Setting detail: RECURRING SERIES
End: 2023-10-05
Attending: ORTHOPAEDIC SURGERY
Payer: MEDICARE

## 2023-10-06 ENCOUNTER — HOSPITAL ENCOUNTER (OUTPATIENT)
Dept: PHYSICAL THERAPY | Age: 83
Setting detail: RECURRING SERIES
End: 2023-10-06
Attending: ORTHOPAEDIC SURGERY
Payer: MEDICARE

## 2023-10-09 ENCOUNTER — HOSPITAL ENCOUNTER (OUTPATIENT)
Dept: PHYSICAL THERAPY | Age: 83
Setting detail: RECURRING SERIES
Discharge: HOME | End: 2023-10-12
Attending: ORTHOPAEDIC SURGERY
Payer: MEDICARE

## 2023-10-09 PROCEDURE — 97110 THERAPEUTIC EXERCISES: CPT

## 2023-10-09 NOTE — PROGRESS NOTES
swelling  GAIT TRAINING: (see below for minutes): Gait training to improve and/or restore physical functioning as related to mobility, balance and coordination  SELF CARE: (see below for minutes): Self care to improve and/or restore self-care/home management as related to dressing, bathing and grooming. Required minimal verbal cueing to facilitate activities of daily living skills and compensatory activities  MECHANICAL TRACTION: (see below for minutes): Traction was used due to the patient's radiculopathy in order to relieve pain in or originating from the spine    Exercises/Activities: 46 minutes: Therapeutic exercise. Date  10/2/23 Date  10/9/23   Activity/Exercise     UBE L4 3 mins forwards and 2 mins backwards L4 3 mins forwards and backwards   Standing wand activities Finger ladder into flexion x10 (level 45) Finger ladder into flexion x10 (level 43)   Supine flexion 3 x 10 3 x 12   Supine press up Supine cane press up 3x10 Supine cane press up 3x12   Seated ER behind head Sidelying ABD  3 x 10    Side lying ER 3x10  Sidelying ABD  3 x 10    Side lying ER 3x10    ROM with over pressure to R shoulder (therapist assist) Supine PROM with over pressure into ER, scaption, and flexion. Supine PROM with over pressure into ER, scaption, and flexion. rows     Side lying abduction     Standing shoulder extension     Supine Horizontal ABD with band       Modalities: moist hot pack to R shoulder in supine, 15 minutes post exercise    HEP and Education:  HEP as above, rationale, progression  Pt has verbalized and demonstrated compliance with HEP and scheduled appointments. Treatment/Session Summary:    >Treatment Assessment: Patient has had increased R shoulder and elbow pain since last week when she rolled out of her bed, fell to the floor and transferred herself back into bed. R shoulder AROM flexion, scaption and IR slightly less and ER slightly improved compared to last assessment.  Patient has been

## 2023-10-11 ENCOUNTER — TELEPHONE (OUTPATIENT)
Dept: ORTHOPEDIC SURGERY | Age: 83
End: 2023-10-11

## 2023-10-11 NOTE — TELEPHONE ENCOUNTER
Called and spoke to pt who states that she fell out of her bed a week ago and has been having pain in her hips, her knee and her elbow. She states that she was also just recently diagnosed with rheumatoid arthritis. She was referred to a rheumatologist by her PCP, but appt is not until late December. Pt is scheduled to see her PCP next week and that is quicker that [facility] will be able to see pt, so she will defer making an appt with [facility] at this time. I provided her with info for [facility], where we have been able to get patients in sooner. She will speak with her PCP about a referral there. She will call us back if she has any issues.

## 2023-10-11 NOTE — TELEPHONE ENCOUNTER
Patient would like for Alexis Lane to call her she is having a problem , she fell out of bed, elbow is really hurting

## 2023-10-13 ENCOUNTER — HOSPITAL ENCOUNTER (OUTPATIENT)
Dept: PHYSICAL THERAPY | Age: 83
Setting detail: RECURRING SERIES
Discharge: HOME | End: 2023-10-16
Attending: ORTHOPAEDIC SURGERY
Payer: MEDICARE

## 2023-10-13 PROCEDURE — 97110 THERAPEUTIC EXERCISES: CPT

## 2023-10-13 NOTE — PROGRESS NOTES
IR 4/5. R elbow AROM ext-flex: 0- deg., prontation-supination 60-0-55. L elbow ext-flex: 0-8-145 deg, prontation-supination 80-0-80. Goals: (Goals have been discussed and agreed upon with patient.)  Short-Term Functional Goals: Time Frame: 4 weeks  AROM flx to 100 deg for improved feeding (MET)  Gross deltoid strength improved to 4-/5 for improved ability to perform household tasks (ONGOING)  Discharge Goals: Time Frame: 8 weeks  AROM flx to 110 deg for improved ability to do hair (MET)  Max pain 2/10 for improved sleep (ONGOING)          Outcome Measure: Tool Used: Disabilities of the Arm, Shoulder and Hand (DASH) Questionnaire - Quick Version  Score:  Initial: 45.5% Most Recent: 29/55 (Date: 10-9-23    Interpretation of Score: The DASH is designed to measure the activities of daily living in person's with upper extremity dysfunction or pain. Each section is scored on a 1-5 scale, 5 representing the greatest disability. The scores of each section are added together for a total score of 55. Treatment   THERAPEUTIC ACTIVITY: (see below for minutes): Therapeutic activities below to improve mobility, strength, balance and coordination. Required minimal visual, verbal, manual and tactile cues to improve independence and safety with daily activities. THERAPEUTIC EXERCISE: (see below for minutes): Exercises below to improve mobility, strength, balance and coordination. Required minimal verbal and manual cues to promote proper body alignment, promote proper body posture and promote proper body mechanics. Progressed resistance, range, repetitions and complexity of movement as indicated. NEUROMUSCULAR RE-ED: (see below for minutes): Neuromuscular re-education to restore normal body movement patterns.   MANUAL THERAPY: (see below for minutes): Joint mobilization and Soft tissue mobilization was utilized and necessary because of the patient's restricted joint motion, painful spasm, loss of articular

## 2023-10-16 ENCOUNTER — HOSPITAL ENCOUNTER (OUTPATIENT)
Dept: PHYSICAL THERAPY | Age: 83
Setting detail: RECURRING SERIES
Discharge: HOME | End: 2023-10-19
Attending: ORTHOPAEDIC SURGERY
Payer: MEDICARE

## 2023-10-16 PROCEDURE — 97110 THERAPEUTIC EXERCISES: CPT

## 2023-10-16 NOTE — PROGRESS NOTES
ext-flex: 0-8-145 deg, prontation-supination 80-0-80. Goals: (Goals have been discussed and agreed upon with patient.)  Short-Term Functional Goals: Time Frame: 4 weeks  AROM flx to 100 deg for improved feeding (MET)  Gross deltoid strength improved to 4-/5 for improved ability to perform household tasks (ONGOING)  Discharge Goals: Time Frame: 8 weeks  AROM flx to 110 deg for improved ability to do hair (MET)  Max pain 2/10 for improved sleep (ONGOING)          Outcome Measure: Tool Used: Disabilities of the Arm, Shoulder and Hand (DASH) Questionnaire - Quick Version  Score:  Initial: 45.5% Most Recent: 29/55 (Date: 10-9-23    Interpretation of Score: The DASH is designed to measure the activities of daily living in person's with upper extremity dysfunction or pain. Each section is scored on a 1-5 scale, 5 representing the greatest disability. The scores of each section are added together for a total score of 55. Treatment   THERAPEUTIC ACTIVITY: (see below for minutes): Therapeutic activities below to improve mobility, strength, balance and coordination. Required minimal visual, verbal, manual and tactile cues to improve independence and safety with daily activities. THERAPEUTIC EXERCISE: (see below for minutes): Exercises below to improve mobility, strength, balance and coordination. Required minimal verbal and manual cues to promote proper body alignment, promote proper body posture and promote proper body mechanics. Progressed resistance, range, repetitions and complexity of movement as indicated. NEUROMUSCULAR RE-ED: (see below for minutes): Neuromuscular re-education to restore normal body movement patterns. MANUAL THERAPY: (see below for minutes): Joint mobilization and Soft tissue mobilization was utilized and necessary because of the patient's restricted joint motion, painful spasm, loss of articular motion, and restricted motion of soft tissue.    MODALITIES: (see below for minutes): to

## 2023-10-18 ENCOUNTER — HOSPITAL ENCOUNTER (OUTPATIENT)
Dept: PHYSICAL THERAPY | Age: 83
Setting detail: RECURRING SERIES
Discharge: HOME | End: 2023-10-21
Attending: ORTHOPAEDIC SURGERY
Payer: MEDICARE

## 2023-10-18 PROCEDURE — 97110 THERAPEUTIC EXERCISES: CPT

## 2023-10-19 ENCOUNTER — TELEPHONE (OUTPATIENT)
Dept: INTERNAL MEDICINE CLINIC | Facility: CLINIC | Age: 83
End: 2023-10-19

## 2023-10-19 ENCOUNTER — OFFICE VISIT (OUTPATIENT)
Dept: INTERNAL MEDICINE CLINIC | Facility: CLINIC | Age: 83
End: 2023-10-19
Payer: MEDICARE

## 2023-10-19 VITALS
BODY MASS INDEX: 27.29 KG/M2 | WEIGHT: 154 LBS | DIASTOLIC BLOOD PRESSURE: 80 MMHG | SYSTOLIC BLOOD PRESSURE: 124 MMHG | TEMPERATURE: 98.7 F | HEIGHT: 63 IN | OXYGEN SATURATION: 96 % | HEART RATE: 72 BPM

## 2023-10-19 DIAGNOSIS — M05.80 POLYARTHRITIS WITH POSITIVE RHEUMATOID FACTOR (HCC): ICD-10-CM

## 2023-10-19 DIAGNOSIS — K21.00 GASTROESOPHAGEAL REFLUX DISEASE WITH ESOPHAGITIS WITHOUT HEMORRHAGE: ICD-10-CM

## 2023-10-19 DIAGNOSIS — D75.89 MACROCYTOSIS WITHOUT ANEMIA: ICD-10-CM

## 2023-10-19 DIAGNOSIS — R53.83 FATIGUE DUE TO DEPRESSION: ICD-10-CM

## 2023-10-19 DIAGNOSIS — Z96.651 HISTORY OF TOTAL RIGHT KNEE REPLACEMENT: ICD-10-CM

## 2023-10-19 DIAGNOSIS — R29.898 WEAKNESS OF BOTH LEGS: ICD-10-CM

## 2023-10-19 DIAGNOSIS — F32.A FATIGUE DUE TO DEPRESSION: ICD-10-CM

## 2023-10-19 DIAGNOSIS — F33.1 MAJOR DEPRESSIVE DISORDER, RECURRENT, MODERATE (HCC): Primary | ICD-10-CM

## 2023-10-19 PROCEDURE — 1090F PRES/ABSN URINE INCON ASSESS: CPT | Performed by: NURSE PRACTITIONER

## 2023-10-19 PROCEDURE — G8417 CALC BMI ABV UP PARAM F/U: HCPCS | Performed by: NURSE PRACTITIONER

## 2023-10-19 PROCEDURE — 3079F DIAST BP 80-89 MM HG: CPT | Performed by: NURSE PRACTITIONER

## 2023-10-19 PROCEDURE — 99214 OFFICE O/P EST MOD 30 MIN: CPT | Performed by: NURSE PRACTITIONER

## 2023-10-19 PROCEDURE — G8484 FLU IMMUNIZE NO ADMIN: HCPCS | Performed by: NURSE PRACTITIONER

## 2023-10-19 PROCEDURE — G8399 PT W/DXA RESULTS DOCUMENT: HCPCS | Performed by: NURSE PRACTITIONER

## 2023-10-19 PROCEDURE — 3074F SYST BP LT 130 MM HG: CPT | Performed by: NURSE PRACTITIONER

## 2023-10-19 PROCEDURE — G8427 DOCREV CUR MEDS BY ELIG CLIN: HCPCS | Performed by: NURSE PRACTITIONER

## 2023-10-19 PROCEDURE — 1123F ACP DISCUSS/DSCN MKR DOCD: CPT | Performed by: NURSE PRACTITIONER

## 2023-10-19 PROCEDURE — 1036F TOBACCO NON-USER: CPT | Performed by: NURSE PRACTITIONER

## 2023-10-19 RX ORDER — CITALOPRAM 20 MG/1
20 TABLET ORAL DAILY
Qty: 90 TABLET | Refills: 1 | Status: SHIPPED | OUTPATIENT
Start: 2023-10-19

## 2023-10-19 RX ORDER — OMEPRAZOLE 20 MG/1
20 CAPSULE, DELAYED RELEASE ORAL DAILY PRN
Qty: 30 CAPSULE | Refills: 3 | Status: SHIPPED | OUTPATIENT
Start: 2023-10-19

## 2023-10-19 NOTE — TELEPHONE ENCOUNTER
Patient came back in office after leaving stating that [facility] sports medicine Harry Moran is needing a new referral for PT for her lower body.

## 2023-10-19 NOTE — PROGRESS NOTES
Lele Puckett (:  1940) is a 78 y.o. female,Established patient, here for evaluation of the following chief complaint(s): Anxiety (4 week follow up /) and Depression         ASSESSMENT/PLAN:  1. Major depressive disorder, recurrent, moderate  2. Gastroesophageal reflux disease with esophagitis without hemorrhage  -     Comprehensive Metabolic Panel; Future  -     CBC; Future  -     Methylmalonic Acid, Serum; Future  -     Iron; Future  -     Ferritin; Future  -     Vitamin B12; Future  3. Polyarthritis with positive rheumatoid factor (HCC)  4. Macrocytosis without anemia  -     Comprehensive Metabolic Panel; Future  -     CBC; Future  -     Methylmalonic Acid, Serum; Future  -     Iron; Future  -     Ferritin; Future  -     Vitamin B12; Future  5. Fatigue due to depression  -     Comprehensive Metabolic Panel; Future  -     CBC; Future  -     Methylmalonic Acid, Serum; Future  -     Iron; Future  -     Ferritin; Future  -     Vitamin B12; Future      Return in about 6 months (around 2024) for w/lab. Reviewed lab  Positive RF, keep rheumatology referral  She didn't like tramadol, just pushing through at this time  Refer again to PT  She didn't start zoloft, refill celexa  Recheck lab 6 months  Declines flu vaccine      Subjective   SUBJECTIVE/OBJECTIVE:  Patient is here for follow up. She started tramadol for pain, it makes her feel bad. She didn't take sertraline. She went back on citalopram.   She doesn't have issues on it and feels her depression is ok. Anxiety        Depression      Review of Systems   Psychiatric/Behavioral:  Positive for depression. Objective   Physical Exam  Constitutional:       Appearance: Normal appearance. HENT:      Head: Normocephalic. Right Ear: External ear normal.      Left Ear: External ear normal.   Eyes:      Extraocular Movements: Extraocular movements intact. Pupils: Pupils are equal, round, and reactive to light.

## 2023-10-23 ENCOUNTER — HOSPITAL ENCOUNTER (OUTPATIENT)
Dept: PHYSICAL THERAPY | Age: 83
Setting detail: RECURRING SERIES
Discharge: HOME | End: 2023-10-26
Attending: ORTHOPAEDIC SURGERY
Payer: MEDICARE

## 2023-10-23 ENCOUNTER — APPOINTMENT (OUTPATIENT)
Dept: PHYSICAL THERAPY | Age: 83
End: 2023-10-23
Attending: ORTHOPAEDIC SURGERY
Payer: MEDICARE

## 2023-10-23 DIAGNOSIS — R29.898 WEAKNESS OF BOTH LOWER EXTREMITIES: ICD-10-CM

## 2023-10-23 DIAGNOSIS — R26.89 BALANCE DISORDER: Primary | ICD-10-CM

## 2023-10-23 PROCEDURE — 97162 PT EVAL MOD COMPLEX 30 MIN: CPT

## 2023-10-23 PROCEDURE — 97110 THERAPEUTIC EXERCISES: CPT

## 2023-10-23 NOTE — THERAPY EVALUATION
David Roberts  : 1940  Primary: Medicare Part A And B (Medicare)  Secondary: [address] @ [address] [address] [facility] [place] [address]  Phone: [phone]  Fax: [phone] Plan Frequency: 2x/week for 8 weeks    Plan of Care/Certification Expiration Date: 24      PT Visit Info:  Plan Frequency: 2x/week for 8 weeks  Plan of Care/Certification Expiration Date: 24        Visit Count:  2                OUTPATIENT PHYSICAL THERAPY:             Initial Assessment 10/23/2023               Episode (B LE weakness/balance) Appt Desk         Treatment Diagnosis:  R26.89, R29.898  Balance disorder  Weakness of both lower extremities  Medical/Referring Diagnosis:      Referring Physician:  SHAVON Laws CNP, MD Orders:  PT Eval and Treat   Return MD Appt:  TBD in 6 months  Date of Onset:       Allergies:  Codeine and Adhesive tape  Restrictions/Precautions:    Restrictions/Precautions: Fall Risk        Medications Last Reviewed:  10/23/2023     SUBJECTIVE   History of Injury/Illness (Reason for Referral):  Patient reports since 2022 she has had progressive LE weakness, balance deficits and has been unable to drive. Patient states that she has been using a walking stick for the past 14 months to assist with balance. Patient had been attending PT for her R shoulder but had continued difficulty walking, balance issues and 1 recent fall. Patient rolled out of bed about 1 month ago and fell to the floor. Patient had increased R shoulder and elbow pain after fall that has improved. Patient referred to PT to improve LE strength, balance and mobility  Patient Stated Goal(s): \"Be able to walk better, better balance and get back to driving. \"  Initial:      4/10 Post Session:      4/10  Past Medical History/Comorbidities:   Ms. Josie Segura  has a past medical history of Acid reflux, Arthritis, Vazquez's esophagus, Bilateral carotid artery

## 2023-10-23 NOTE — PROGRESS NOTES
Gus Goodpasture  : 1940  Primary: Medicare Part A And B (Medicare)  Secondary: [address] @ [address] [address] [facility] [address]  Phone: [phone]  Fax: [phone] Plan Frequency: 2x/week for 8 weeks    Plan of Care/Certification Expiration Date: 24      >PT Visit Info:  Plan Frequency: 2x/week for 8 weeks  Plan of Care/Certification Expiration Date: 24      Visit Count:  2    OUTPATIENT PHYSICAL THERAPY: Treatment Note 10/23/2023       Episode  }Appt Desk             Treatment Diagnosis:  R26.89, R29.898  Balance disorder  Weakness of both lower extremities  Medical/Referring Diagnosis:    History of total right knee replacement  Weakness of both legs  Referring Physician:  SHAVON Caldera CNP, MD Orders:  PT Eval and Treat   Date of Onset:  Onset Date:  (2022)     Allergies:   Codeine and Adhesive tape  Restrictions/Precautions:  Restrictions/Precautions: Fall Risk  Required Braces or Orthoses?: No  No data recorded   Interventions Planned (Treatment may consist of any combination of the following):    Current Treatment Recommendations: Strengthening; ROM; Balance training; Functional mobility training; Endurance training; Gait training; Neuromuscular re-education; Home exercise program; Safety education & training; Patient/Caregiver education & training; Modalities     >Subjective Comments: see initial evaluation     >Initial:      4/10>Post Session:        4/10  Medications Last Reviewed:  10/23/2023  Updated Objective Findings:  See Evaluation Note from today  Treatment   TREATMENT:   THERAPEUTIC ACTIVITY: ( see below for minutes): Therapeutic activities per grid below to improve mobility, strength, balance and coordination. Required minimal visual, verbal, manual and tactile cues to improve independence and safety with daily activities .   THERAPEUTIC EXERCISE: (see below for minutes): Exercises per grid

## 2023-10-25 ENCOUNTER — APPOINTMENT (OUTPATIENT)
Dept: PHYSICAL THERAPY | Age: 83
End: 2023-10-25
Attending: ORTHOPAEDIC SURGERY
Payer: MEDICARE

## 2023-10-27 ENCOUNTER — HOSPITAL ENCOUNTER (OUTPATIENT)
Dept: PHYSICAL THERAPY | Age: 83
Setting detail: RECURRING SERIES
Discharge: HOME | End: 2023-10-30
Attending: ORTHOPAEDIC SURGERY
Payer: MEDICARE

## 2023-10-27 PROCEDURE — 97110 THERAPEUTIC EXERCISES: CPT

## 2023-10-27 NOTE — PROGRESS NOTES
Graciela Callchristy  : 1940  Primary: Medicare Part A And B (Medicare)  Secondary: [address] @ [address] [address] [facility] [address]  Phone: [phone]  Fax: [phone] Plan Frequency: 2x/week for 8 weeks    Plan of Care/Certification Expiration Date: 24      >PT Visit Info:  Plan Frequency: 2x/week for 8 weeks  Plan of Care/Certification Expiration Date: 24      Visit Count:  3    OUTPATIENT PHYSICAL THERAPY: Treatment Note 10/27/2023       Episode  }Appt Desk             Treatment Diagnosis:  R26.89, R29.898  No data found  Medical/Referring Diagnosis:      Referring Physician:  SHAVON Mg CNP, MD Orders:  PT Eval and Treat   Date of Onset:  Onset Date:  (2022)     Allergies:   Codeine and Adhesive tape  Restrictions/Precautions:  Restrictions/Precautions: Fall Risk  Required Braces or Orthoses?: No  No data recorded   Interventions Planned (Treatment may consist of any combination of the following):    Current Treatment Recommendations: Strengthening; ROM; Balance training; Functional mobility training; Endurance training; Gait training; Neuromuscular re-education; Home exercise program; Safety education & training; Patient/Caregiver education & training; Modalities     >Subjective Comments: Patient reports that she has been completing her HEP throughout the day when she is sitting.      >Initial:      4/10>Post Session:        4/10  Medications Last Reviewed:  10/27/2023  Updated Objective Findings:  See Evaluation Note from today  Treatment   TREATMENT:   THERAPEUTIC ACTIVITY: ( see below for minutes): Therapeutic activities per grid below to improve mobility, strength, balance and coordination. Required minimal visual, verbal, manual and tactile cues to improve independence and safety with daily activities .   THERAPEUTIC EXERCISE: (see below for minutes): Exercises per grid below to improve mobility,

## 2023-10-30 ENCOUNTER — HOSPITAL ENCOUNTER (OUTPATIENT)
Dept: PHYSICAL THERAPY | Age: 83
Setting detail: RECURRING SERIES
Discharge: HOME | End: 2023-11-02
Attending: ORTHOPAEDIC SURGERY
Payer: MEDICARE

## 2023-10-30 ENCOUNTER — APPOINTMENT (OUTPATIENT)
Dept: PHYSICAL THERAPY | Age: 83
End: 2023-10-30
Attending: ORTHOPAEDIC SURGERY
Payer: MEDICARE

## 2023-10-30 PROCEDURE — 97110 THERAPEUTIC EXERCISES: CPT

## 2023-10-30 NOTE — PROGRESS NOTES
David Roberts  : 1940  Primary: Medicare Part A And B (Medicare)  Secondary: [address] [address] [address] [facility] [place] [address]  Phone: [phone]  Fax: [phone] Plan Frequency: 2x/week for 8 weeks    Plan of Care/Certification Expiration Date: 24      >PT Visit Info:  Plan Frequency: 2x/week for 8 weeks  Plan of Care/Certification Expiration Date: 24      Visit Count:  28    OUTPATIENT PHYSICAL THERAPY: Treatment Note 10/30/2023       Episode  }Appt Desk             Treatment Diagnosis:  R26.89, R29.898  No data found  Medical/Referring Diagnosis:      Referring Physician:  SHAVON Laws CNP, MD Orders:  PT Eval and Treat   Date of Onset:  Onset Date:  (2022)     Allergies:   Codeine and Adhesive tape  Restrictions/Precautions:  Restrictions/Precautions: Fall Risk  Required Braces or Orthoses?: No  No data recorded   Interventions Planned (Treatment may consist of any combination of the following):    Current Treatment Recommendations: Strengthening; ROM; Balance training; Functional mobility training; Endurance training; Gait training; Neuromuscular re-education; Home exercise program; Safety education & training; Patient/Caregiver education & training; Modalities     >Subjective Comments: Patient reports that she has been fatigued this weekend. Patient states he has been completing her HEP and trying to walk more. >Initial:      4/10>Post Session:        4/10  Medications Last Reviewed:  10/30/2023  Updated Objective Findings:  None Today  Treatment   TREATMENT:   THERAPEUTIC ACTIVITY: ( see below for minutes): Therapeutic activities per grid below to improve mobility, strength, balance and coordination. Required minimal visual, verbal, manual and tactile cues to improve independence and safety with daily activities .   THERAPEUTIC EXERCISE: (see below for minutes): Exercises per grid below to

## 2023-11-02 ENCOUNTER — HOSPITAL ENCOUNTER (OUTPATIENT)
Dept: PHYSICAL THERAPY | Age: 83
Setting detail: RECURRING SERIES
End: 2023-11-02
Attending: ORTHOPAEDIC SURGERY
Payer: MEDICARE

## 2023-11-02 NOTE — PROGRESS NOTES
Alice Staton  : 1940  Primary: Medicare Part A And B (Medicare)  Secondary: [address] @ [address] [address]  Phone: [phone]  Fax: [phone] Plan Frequency: 2x/week for 8 weeks    Plan of Care/Certification Expiration Date: 24      >PT Visit Info:  Plan Frequency: 2x/week for 8 weeks  Plan of Care/Certification Expiration Date: 24         OUTPATIENT PHYSICAL THERAPY: Cancellation 2023       Episode  }Appt Desk           Patient cancelled scheduled appointment.

## 2023-11-07 ENCOUNTER — APPOINTMENT (OUTPATIENT)
Dept: PHYSICAL THERAPY | Age: 83
End: 2023-11-07
Attending: ORTHOPAEDIC SURGERY
Payer: MEDICARE

## 2023-11-08 ENCOUNTER — HOSPITAL ENCOUNTER (OUTPATIENT)
Dept: PHYSICAL THERAPY | Age: 83
Setting detail: RECURRING SERIES
Discharge: HOME | End: 2023-11-11
Attending: ORTHOPAEDIC SURGERY
Payer: MEDICARE

## 2023-11-08 PROCEDURE — 97110 THERAPEUTIC EXERCISES: CPT

## 2023-11-08 NOTE — PROGRESS NOTES
will have B LE strength of 5/5 to improve mobility and balance. Patient will be able to  tandem stance with each foot forward for 30 seconds on firm ground with eyes open. Discharge Goals: Time Frame: (8 Weeks)  Patient will complete 10 sit<>stands during 30 second sit<>stand test indicating improved balance. Patient will complete timed up and go test in less than 15 seconds indicating improved balance. Patient will be able to stand 15 seconds on each leg during single leg stance on firm ground with eyes open. Outcome Measure: Tool Used: Knee injury and Osteoarthritis Outcome Score for Joint Replacement (KOOS, JR)  Score:  Initial: 12 (Interval: 57.140) 10/23/2023 Most Recent: TBD   Interpretation of Score: The KOOS, JR contains 7 items from the original KOOS survey. Items are coded from 0 to 4, none to extreme respectively. Essence Bro is scored by summing the raw response (range 0-28) and then converting it to an interval score using the table provided below. The interval score ranges from 0 to 100 where 0 represents total knee disability and 100 represents perfect knee health. Treatment/Session Summary:    >Treatment Assessment: Patient still needs to work on balance. Increased sway on foam when eyes closed. She does all exercises as requested. Communication/Consultation:  None today  Equipment provided today:  None  Recommendations/Intent for next treatment session: Next visit will focus on improving strength, balance, mobility and endurance.     >Total Treatment Billable Duration: 40  minutes  Time In: 1110  Time Out: 1155    Judge Alpers, PT       Charge Capture  }Post Session Pain  PT Visit Info  [employer] Portal  MD Guidelines  Scanned Media  Benefits  MyChart    Future Appointments   Date Time Provider [address]   11/10/2023  2:00 PM [place] [facility]   11/13/2023  2:00 PM Kushal Whitfield PT [facility]   11/15/2023  2:00 PM Kushal Whitfield PT [facility]

## 2023-11-10 ENCOUNTER — HOSPITAL ENCOUNTER (OUTPATIENT)
Dept: PHYSICAL THERAPY | Age: 83
Setting detail: RECURRING SERIES
End: 2023-11-10
Attending: ORTHOPAEDIC SURGERY
Payer: MEDICARE

## 2023-11-13 ENCOUNTER — HOSPITAL ENCOUNTER (OUTPATIENT)
Dept: PHYSICAL THERAPY | Age: 83
Setting detail: RECURRING SERIES
Discharge: HOME | End: 2023-11-16
Attending: ORTHOPAEDIC SURGERY
Payer: MEDICARE

## 2023-11-13 PROCEDURE — 97110 THERAPEUTIC EXERCISES: CPT

## 2023-11-13 NOTE — PROGRESS NOTES
Manual Therapy:                        Functional Activities:                                             Goals: (Goals have been discussed and agreed upon with patient.)  Short-Term Functional Goals: Time Frame: (4 Weeks)  Patient will be independent with HEP. Patient will have B LE strength of 5/5 to improve mobility and balance. Patient will be able to  tandem stance with each foot forward for 30 seconds on firm ground with eyes open. Discharge Goals: Time Frame: (8 Weeks)  Patient will complete 10 sit<>stands during 30 second sit<>stand test indicating improved balance. Patient will complete timed up and go test in less than 15 seconds indicating improved balance. Patient will be able to stand 15 seconds on each leg during single leg stance on firm ground with eyes open. Outcome Measure: Tool Used: Knee injury and Osteoarthritis Outcome Score for Joint Replacement (KOOS, JR)  Score:  Initial: 12 (Interval: 57.140) 10/23/2023 Most Recent: TBD   Interpretation of Score: The KOOS, JR contains 7 items from the original KOOS survey. Items are coded from 0 to 4, none to extreme respectively. Dellia Post is scored by summing the raw response (range 0-28) and then converting it to an interval score using the table provided below. The interval score ranges from 0 to 100 where 0 represents total knee disability and 100 represents perfect knee health. Treatment/Session Summary:    >Treatment Assessment: Pt keeps narrow ISABELLA with ambulation and lacks weight shifts adequate to advance feet especially on R side causing LOB. Pt was told to work on weight shifting in the mirror at home and will continue to focus on weight shift prior to stepping after standing up from chair.       Communication/Consultation:  None today  Equipment provided today:  None  Recommendations/Intent for next treatment session: Next visit will focus on improving strength, balance, mobility and

## 2023-11-15 ENCOUNTER — HOSPITAL ENCOUNTER (OUTPATIENT)
Dept: PHYSICAL THERAPY | Age: 83
Setting detail: RECURRING SERIES
Discharge: HOME | End: 2023-11-18
Attending: ORTHOPAEDIC SURGERY
Payer: MEDICARE

## 2023-11-15 PROCEDURE — 97110 THERAPEUTIC EXERCISES: CPT

## 2023-11-15 NOTE — PROGRESS NOTES
Nancy Ospina  : 1940  Primary: Medicare Part A And B (Medicare)  Secondary: [address] @ [address] [address] [facility] [address]  Phone: [phone]  Fax: [phone] Plan Frequency: 2x/week for 8 weeks    Plan of Care/Certification Expiration Date: 24      >PT Visit Info:  Plan Frequency: 2x/week for 8 weeks  Plan of Care/Certification Expiration Date: 24      Visit Count:  30    OUTPATIENT PHYSICAL THERAPY: Treatment Note 11/15/2023       Episode  }Appt Desk             Treatment Diagnosis:  R26.89, R29.898  No data found  Medical/Referring Diagnosis:      Referring Physician:  SHAVON Hargrove CNP, MD Orders:  PT Eval and Treat   Date of Onset:  Onset Date:  (2022)     Allergies:   Codeine and Adhesive tape  Restrictions/Precautions:  Restrictions/Precautions: Fall Risk  Required Braces or Orthoses?: No  No data recorded   Interventions Planned (Treatment may consist of any combination of the following):    Current Treatment Recommendations: Strengthening; ROM; Balance training; Functional mobility training; Endurance training; Gait training; Neuromuscular re-education; Home exercise program; Safety education & training; Patient/Caregiver education & training; Modalities     >Subjective Comments: \"Im doing better with my weight shifts. \"  >Initial:      0/10>Post Session:        0/10  Medications Last Reviewed:  11/15/2023  Updated Objective Findings:  None Today  Treatment   TREATMENT:   THERAPEUTIC ACTIVITY: ( see below for minutes): Therapeutic activities per grid below to improve mobility, strength, balance and coordination. Required minimal visual, verbal, manual and tactile cues to improve independence and safety with daily activities . THERAPEUTIC EXERCISE: (see below for minutes): Exercises per grid below to improve mobility, strength, balance and coordination.  Required minimal verbal and manual cues to

## 2023-11-20 ENCOUNTER — HOSPITAL ENCOUNTER (OUTPATIENT)
Dept: PHYSICAL THERAPY | Age: 83
Setting detail: RECURRING SERIES
Discharge: HOME | End: 2023-11-23
Attending: ORTHOPAEDIC SURGERY
Payer: MEDICARE

## 2023-11-20 PROCEDURE — 97110 THERAPEUTIC EXERCISES: CPT

## 2023-11-20 NOTE — PROGRESS NOTES
Aung Dc  : 1940  Primary: Medicare Part A And B (Medicare)  Secondary: [address] @ [address] [address] [facility]  [place] [address]  Phone: [phone]  Fax: [phone] Plan Frequency: 2x/week for 8 weeks    Plan of Care/Certification Expiration Date: 24      >PT Visit Info:  Plan Frequency: 2x/week for 8 weeks  Plan of Care/Certification Expiration Date: 24      Visit Count:  31    OUTPATIENT PHYSICAL THERAPY: Treatment Note 2023       Episode  }Appt Desk             Treatment Diagnosis:  R26.89, R29.898  No data found  Medical/Referring Diagnosis:      Referring Physician:  SHAVON Rios CNP, MD Orders:  PT Eval and Treat   Date of Onset:  Onset Date:  (2022)     Allergies:   Codeine and Adhesive tape  Restrictions/Precautions:  Restrictions/Precautions: Fall Risk  Required Braces or Orthoses?: No  No data recorded   Interventions Planned (Treatment may consist of any combination of the following):    Current Treatment Recommendations: Strengthening; ROM; Balance training; Functional mobility training; Endurance training; Gait training; Neuromuscular re-education; Home exercise program; Safety education & training; Patient/Caregiver education & training; Modalities     >Subjective Comments: Pt states \"I almost fell in the kitchen. I was standing at the sink and I turned to look at the microwave clock. I would have fallen if it wasn't for the cabinets. \"  Pt did not fall, she caught herself with her Ue's. >Initial:      0/10>Post Session:        0/10  Medications Last Reviewed:  2023  Updated Objective Findings:    Treatment   TREATMENT:   THERAPEUTIC ACTIVITY: ( see below for minutes): Therapeutic activities per grid below to improve mobility, strength, balance and coordination.  Required minimal visual, verbal, manual and tactile cues to improve independence and safety with daily activities

## 2023-11-22 ENCOUNTER — HOSPITAL ENCOUNTER (OUTPATIENT)
Dept: PHYSICAL THERAPY | Age: 83
Setting detail: RECURRING SERIES
Discharge: HOME | End: 2023-11-25
Attending: ORTHOPAEDIC SURGERY
Payer: MEDICARE

## 2023-11-22 PROCEDURE — 97110 THERAPEUTIC EXERCISES: CPT

## 2023-11-22 NOTE — PROGRESS NOTES
Aung Dc  : 1940  Primary: Medicare Part A And B (Medicare)  Secondary: [address] @ [address] [address] [facility]  [place] [address]  Phone: [phone]  Fax: [phone] Plan Frequency: 2x/week for 8 weeks    Plan of Care/Certification Expiration Date: 24      >PT Visit Info:  Plan Frequency: 2x/week for 8 weeks  Plan of Care/Certification Expiration Date: 24      Visit Count:  28    OUTPATIENT PHYSICAL THERAPY: Progress Report andTreatment Note 2023       Episode  }Appt Desk             Treatment Diagnosis:  R26.89, R29.898  No data found  Medical/Referring Diagnosis:      Referring Physician:  SHAVON Rios CNP, MD Orders:  PT Eval and Treat   Date of Onset:  Onset Date:  (2022)     Allergies:   Codeine and Adhesive tape  Restrictions/Precautions:  Restrictions/Precautions: Fall Risk  Required Braces or Orthoses?: No  No data recorded   Interventions Planned (Treatment may consist of any combination of the following):    Current Treatment Recommendations: Strengthening; ROM; Balance training; Functional mobility training; Endurance training; Gait training; Neuromuscular re-education; Home exercise program; Safety education & training; Patient/Caregiver education & training; Modalities     >Subjective Comments: Pt reports that she feels \"wobbly\" today and not sure why. Patient states that her balance has not felt the same since almost falling in the kitchen. >Initial:      0/10>Post Session:        0/10  Medications Last Reviewed:  2023  Updated Objective Findings:      23:    TUG 22 sec  B LE strength 5/5 except: B hip flexion, extension and abduction 4/5.  30 second sit<>stand test: 5x  Treatment   TREATMENT:   THERAPEUTIC ACTIVITY: ( see below for minutes): Therapeutic activities per grid below to improve mobility, strength, balance and coordination.  Required minimal visual, verbal, manual

## 2023-11-27 ENCOUNTER — HOSPITAL ENCOUNTER (OUTPATIENT)
Dept: PHYSICAL THERAPY | Age: 83
Setting detail: RECURRING SERIES
Discharge: HOME | End: 2023-11-30
Attending: ORTHOPAEDIC SURGERY
Payer: MEDICARE

## 2023-11-27 PROCEDURE — 97110 THERAPEUTIC EXERCISES: CPT

## 2023-11-27 NOTE — PROGRESS NOTES
survey. Items are coded from 0 to 4, none to extreme respectively. Essence Bro is scored by summing the raw response (range 0-28) and then converting it to an interval score using the table provided below. The interval score ranges from 0 to 100 where 0 represents total knee disability and 100 represents perfect knee health. Treatment/Session Summary:    >Treatment Assessment: Patient did well with thoracic rotation on the foam pad. Will continue to progress dynamic balance to increase independence with ADLs. Plan to continue to progress strength, endurance and balance to improve balance and decrease fall risk. Communication/Consultation:  None today  Equipment provided today:  None  Recommendations/Intent for next treatment session: Next visit will focus on improving strength, balance, mobility and endurance.     >Total Treatment Billable Duration: 35 minutes (pt was 10 min late)  Time In: 1540  Time Out: [address], [facility]       Charge Capture  }Post Session Pain  PT Visit Info  [employer] Portal  MD Guidelines  Scanned Media  Benefits  Evcarcohart    Future Appointments   Date Time Provider [address]   11/29/2023  2:00 PM Kushal Lute, PT [facility]   12/4/2023 11:45 AM Kushal Lute, PT [facility]   12/5/2023  1:00 PM Chetan Zhou, DO [facility]   12/6/2023  2:00 PM Riccardo RIVERA, PTA [facility]   12/12/2023  2:00 PM Kushal Lute, PT [facility]   12/14/2023 11:45 AM Kushal Lute, PT [facility]   12/19/2023  2:00 PM Kushal Lute, PT [facility]   12/21/2023  3:30 PM Kushal Lute, PT [facility]   12/27/2023  2:45 PM Kushal Lute, PT [facility]   12/29/2023  9:00 AM Mona Cordova DO [address] [facility]   12/29/2023  2:45 PM Kushal Lute, PT [facility]   1/8/2024  2:45 PM Alf Saini DO [facility] AM[facility]   3/4/2024  2:45 PM John Mccormack MD POAP [facility] AM[facility]   4/1/2024  1:00 PM JOYCE Campos [facility] AMB   4/22/2024 10:00 AM TRIM LAB RESOURCE TRIM [facility]   4/29/2024

## 2023-11-29 ENCOUNTER — HOSPITAL ENCOUNTER (OUTPATIENT)
Dept: PHYSICAL THERAPY | Age: 83
Setting detail: RECURRING SERIES
Discharge: HOME | End: 2023-12-02
Attending: ORTHOPAEDIC SURGERY
Payer: MEDICARE

## 2023-11-29 PROCEDURE — 97110 THERAPEUTIC EXERCISES: CPT

## 2023-11-29 NOTE — PROGRESS NOTES
Artur Correia  : 1940  Primary: Medicare Part A And B (Medicare)  Secondary: [address] @ [address] [address] [facility] [address]  Phone: [phone]  Fax: [phone] Plan Frequency: 2x/week for 8 weeks    Plan of Care/Certification Expiration Date: 24      >PT Visit Info:  Plan Frequency: 2x/week for 8 weeks  Plan of Care/Certification Expiration Date: 24      Visit Count:  34    OUTPATIENT PHYSICAL THERAPY: Treatment Note 2023       Episode  }Appt Desk             Treatment Diagnosis:  R26.89, R29.898  No data found  Medical/Referring Diagnosis:      Referring Physician:  SHAVON Sweeney CNP, MD Orders:  PT Eval and Treat   Date of Onset:  Onset Date:  (2022)     Allergies:   Codeine and Adhesive tape  Restrictions/Precautions:  Restrictions/Precautions: Fall Risk  Required Braces or Orthoses?: No  No data recorded   Interventions Planned (Treatment may consist of any combination of the following):    Current Treatment Recommendations: Strengthening; ROM; Balance training; Functional mobility training; Endurance training; Gait training; Neuromuscular re-education; Home exercise program; Safety education & training; Patient/Caregiver education & training; Modalities     >Subjective Comments: Pt reports earlier today that she had an episode of difficulty and fatigue. Patient rescheduled earlier appointment to later this afternoon. >Initial:    pain in R elbow >Post Session:     no increase reported but pt stated that her legs felt weak after treatment  Medications Last Reviewed:  2023  Updated Objective Findings:      23:    TUG 22 sec  B LE strength /5 except: B hip flexion, extension and abduction 4/5.  30 second sit<>stand test: 5x  Treatment   TREATMENT:   THERAPEUTIC ACTIVITY: ( see below for minutes):  Therapeutic activities per grid below to improve mobility, strength, balance and

## 2023-12-04 ENCOUNTER — HOSPITAL ENCOUNTER (OUTPATIENT)
Dept: PHYSICAL THERAPY | Age: 83
Setting detail: RECURRING SERIES
Discharge: HOME | End: 2023-12-07
Attending: ORTHOPAEDIC SURGERY
Payer: MEDICARE

## 2023-12-04 PROCEDURE — 97110 THERAPEUTIC EXERCISES: CPT

## 2023-12-04 NOTE — PROGRESS NOTES
Aung Dc  : 1940  Primary: Medicare Part A And B (Medicare)  Secondary: [address] @ [address] [address] [facility] [address]  Phone: [phone]  Fax: [phone] Plan Frequency: 2x/week for 8 weeks    Plan of Care/Certification Expiration Date: 24      >PT Visit Info:  Plan Frequency: 2x/week for 8 weeks  Plan of Care/Certification Expiration Date: 24      Visit Count:  12    OUTPATIENT PHYSICAL THERAPY: Treatment Note 2023       Episode  }Appt Desk             Treatment Diagnosis:  R26.89, R29.898  No data found  Medical/Referring Diagnosis:      Referring Physician:  SHAVON Rios CNP, MD Orders:  PT Eval and Treat   Date of Onset:  Onset Date:  (2022)     Allergies:   Codeine and Adhesive tape  Restrictions/Precautions:  Restrictions/Precautions: Fall Risk  Required Braces or Orthoses?: No  No data recorded   Interventions Planned (Treatment may consist of any combination of the following):    Current Treatment Recommendations: Strengthening; ROM; Balance training; Functional mobility training; Endurance training; Gait training; Neuromuscular re-education; Home exercise program; Safety education & training; Patient/Caregiver education & training; Modalities     >Subjective Comments: Pt reports that she had pain all over this weekend with the rainy weather. Patient states that pain has been really bad in the morning. >Initial:    7/10>Post Session:     no increase reported but pt stated that her legs felt weak after treatment  Medications Last Reviewed:  2023  Updated Objective Findings:      23:    TUG 22 sec  B LE strength  except: B hip flexion, extension and abduction .  30 second sit<>stand test: 5x  Treatment   TREATMENT:   THERAPEUTIC ACTIVITY: ( see below for minutes): Therapeutic activities per grid below to improve mobility, strength, balance and coordination.  Required

## 2023-12-05 ENCOUNTER — OFFICE VISIT (OUTPATIENT)
Age: 83
End: 2023-12-05
Payer: MEDICARE

## 2023-12-05 VITALS
WEIGHT: 154 LBS | DIASTOLIC BLOOD PRESSURE: 64 MMHG | HEIGHT: 63 IN | HEART RATE: 64 BPM | SYSTOLIC BLOOD PRESSURE: 132 MMHG | BODY MASS INDEX: 27.29 KG/M2

## 2023-12-05 DIAGNOSIS — I65.23 BILATERAL CAROTID ARTERY STENOSIS: ICD-10-CM

## 2023-12-05 DIAGNOSIS — I10 HYPERTENSION, BENIGN: ICD-10-CM

## 2023-12-05 DIAGNOSIS — R00.2 PALPITATION: ICD-10-CM

## 2023-12-05 DIAGNOSIS — I25.10 ATHEROSCLEROSIS OF NATIVE CORONARY ARTERY OF NATIVE HEART WITHOUT ANGINA PECTORIS: Primary | ICD-10-CM

## 2023-12-05 DIAGNOSIS — I73.9 PVD (PERIPHERAL VASCULAR DISEASE) (HCC): ICD-10-CM

## 2023-12-05 PROCEDURE — 3078F DIAST BP <80 MM HG: CPT | Performed by: INTERNAL MEDICINE

## 2023-12-05 PROCEDURE — 99214 OFFICE O/P EST MOD 30 MIN: CPT | Performed by: INTERNAL MEDICINE

## 2023-12-05 PROCEDURE — 3075F SYST BP GE 130 - 139MM HG: CPT | Performed by: INTERNAL MEDICINE

## 2023-12-05 PROCEDURE — 1036F TOBACCO NON-USER: CPT | Performed by: INTERNAL MEDICINE

## 2023-12-05 PROCEDURE — G8399 PT W/DXA RESULTS DOCUMENT: HCPCS | Performed by: INTERNAL MEDICINE

## 2023-12-05 PROCEDURE — G8417 CALC BMI ABV UP PARAM F/U: HCPCS | Performed by: INTERNAL MEDICINE

## 2023-12-05 PROCEDURE — G8428 CUR MEDS NOT DOCUMENT: HCPCS | Performed by: INTERNAL MEDICINE

## 2023-12-05 PROCEDURE — 1090F PRES/ABSN URINE INCON ASSESS: CPT | Performed by: INTERNAL MEDICINE

## 2023-12-05 PROCEDURE — G8484 FLU IMMUNIZE NO ADMIN: HCPCS | Performed by: INTERNAL MEDICINE

## 2023-12-05 PROCEDURE — 1123F ACP DISCUSS/DSCN MKR DOCD: CPT | Performed by: INTERNAL MEDICINE

## 2023-12-05 NOTE — PROGRESS NOTES
09/21/2023     09/21/2023       No results found for: \"TSHFT4\", \"TSH\"    Lab Results   Component Value Date    LABA1C 5.6 08/23/2023     Lab Results   Component Value Date     08/23/2023       Lab Results   Component Value Date    CHOL 206 (H) 08/23/2023    CHOL 200 (H) 11/09/2022     Lab Results   Component Value Date    TRIG 58 08/23/2023    TRIG 73 11/09/2022     Lab Results   Component Value Date    HDL 87 (H) 08/23/2023    HDL 70 (H) 11/09/2022     Lab Results   Component Value Date    LDLCALC 107.4 (H) 08/23/2023    LDLCALC 115.4 (H) 11/09/2022     Lab Results   Component Value Date    LABVLDL 11.6 08/23/2023    LABVLDL 14.6 11/09/2022     Lab Results   Component Value Date    CHOLHDLRATIO 2.4 08/23/2023    CHOLHDLRATIO 2.9 11/09/2022           I have Independently reviewed prior care notes, any ER records available, cardiac testing, labs and results with the patient and before seeing the patient today. Also independently reviewed outside records when available. ASSESSMENT:    Mariza Espana was seen today for coronary artery disease. Diagnoses and all orders for this visit:    Atherosclerosis of native coronary artery of native heart without angina pectoris  -     Echo (TTE) complete (PRN contrast/bubble/strain/3D); Future  -     Nuclear stress test with myocardial perfusion; Future    Hypertension, benign  -     Echo (TTE) complete (PRN contrast/bubble/strain/3D); Future  -     Nuclear stress test with myocardial perfusion; Future    Palpitation    PVD (peripheral vascular disease) (HCC)    Bilateral carotid artery stenosis          PLAN:     1. CAD: some sx now. pHTN? ? Check echo and NST. Given these risk factors and symptoms as outlined, plan for NST. We did review options of NST, LHC, other stress testing and agreed to proceed with NST in our office. To ER for worsening angina. Plan on definitive LHC for worsening angina. 2. PVD/Carotid Dz:  US ok, remin on ASA.       3.

## 2023-12-06 ENCOUNTER — HOSPITAL ENCOUNTER (OUTPATIENT)
Dept: PHYSICAL THERAPY | Age: 83
Setting detail: RECURRING SERIES
End: 2023-12-06
Attending: ORTHOPAEDIC SURGERY
Payer: MEDICARE

## 2023-12-08 ENCOUNTER — HOSPITAL ENCOUNTER (OUTPATIENT)
Dept: PHYSICAL THERAPY | Age: 83
Setting detail: RECURRING SERIES
End: 2023-12-08
Attending: ORTHOPAEDIC SURGERY
Payer: MEDICARE

## 2023-12-12 ENCOUNTER — APPOINTMENT (OUTPATIENT)
Dept: PHYSICAL THERAPY | Age: 83
End: 2023-12-12
Attending: ORTHOPAEDIC SURGERY
Payer: MEDICARE

## 2023-12-13 ENCOUNTER — HOSPITAL ENCOUNTER (OUTPATIENT)
Dept: PHYSICAL THERAPY | Age: 83
Setting detail: RECURRING SERIES
Discharge: HOME | End: 2023-12-16
Attending: ORTHOPAEDIC SURGERY
Payer: MEDICARE

## 2023-12-13 PROCEDURE — 97110 THERAPEUTIC EXERCISES: CPT

## 2023-12-13 NOTE — PROGRESS NOTES
Mark Mew  : 1940  Primary: Medicare Part A And B (Medicare)  Secondary: [address] [address] [address] [facility] [address]  Phone: [phone]  Fax: [phone] Plan Frequency: 2x/week for 8 weeks    Plan of Care/Certification Expiration Date: 24      >PT Visit Info:  Plan Frequency: 2x/week for 8 weeks  Plan of Care/Certification Expiration Date: 24      Visit Count:  13    OUTPATIENT PHYSICAL THERAPY: Treatment Note 2023       Episode  }Appt Desk             Treatment Diagnosis:  R26.89, R29.898  No data found  Medical/Referring Diagnosis:      Referring Physician:  SHAVON Monte CNP, MD Orders:  PT Eval and Treat   Date of Onset:  Onset Date:  (2022)     Allergies:   Codeine and Adhesive tape  Restrictions/Precautions:  Restrictions/Precautions: Fall Risk  Required Braces or Orthoses?: No  No data recorded   Interventions Planned (Treatment may consist of any combination of the following):    Current Treatment Recommendations: Strengthening; ROM; Balance training; Functional mobility training; Endurance training; Gait training; Neuromuscular re-education; Home exercise program; Safety education & training; Patient/Caregiver education & training; Modalities     >Subjective Comments: Pt reports she has been having a rough couple of days. Patient has a nuclear stress test and Echocardiogram in January due to shortness of breath lately. >Initial:    7/10>Post Session:     no increase reported but pt stated that her legs felt weak after treatment  Medications Last Reviewed:  2023  Updated Objective Findings:      23:    TUG 22 sec  B LE strength  except: B hip flexion, extension and abduction .  30 second sit<>stand test: 5x  Treatment   TREATMENT:   THERAPEUTIC ACTIVITY: ( see below for minutes):  Therapeutic activities per grid below to improve mobility, strength, balance and

## 2023-12-14 ENCOUNTER — HOSPITAL ENCOUNTER (OUTPATIENT)
Dept: PHYSICAL THERAPY | Age: 83
Setting detail: RECURRING SERIES
End: 2023-12-14
Attending: ORTHOPAEDIC SURGERY
Payer: MEDICARE

## 2023-12-21 ENCOUNTER — HOSPITAL ENCOUNTER (OUTPATIENT)
Dept: PHYSICAL THERAPY | Age: 83
Setting detail: RECURRING SERIES
Discharge: HOME | End: 2023-12-24
Attending: ORTHOPAEDIC SURGERY
Payer: MEDICARE

## 2023-12-21 PROCEDURE — 97110 THERAPEUTIC EXERCISES: CPT

## 2023-12-21 NOTE — PROGRESS NOTES
D, PTA [facility]   3/4/2024  2:45 PM Posta, Emmanuel Hummel., MD POAP [facility]   4/1/2024  1:00 PM JOYCE Vasques [facility]SP[facility] AMB   4/22/2024 10:00 AM TRIM LAB RESOURCE TRIM [facility]   4/29/2024 11:00 AM SHAVON Monzon - JADEN TRIM [facility] AM[facility]

## 2023-12-27 ENCOUNTER — HOSPITAL ENCOUNTER (OUTPATIENT)
Dept: PHYSICAL THERAPY | Age: 83
Setting detail: RECURRING SERIES
End: 2023-12-27
Attending: ORTHOPAEDIC SURGERY
Payer: MEDICARE

## 2024-01-03 ENCOUNTER — HOSPITAL ENCOUNTER (OUTPATIENT)
Dept: PHYSICAL THERAPY | Age: 84
Setting detail: RECURRING SERIES
Discharge: HOME | End: 2024-01-06
Attending: ORTHOPAEDIC SURGERY
Payer: MEDICARE

## 2024-01-03 PROCEDURE — 97110 THERAPEUTIC EXERCISES: CPT

## 2024-01-03 NOTE — PROGRESS NOTES
Katie Israel  : 1940  Primary: Medicare Part A And B (Medicare)  Secondary: [employer] S[employer]IOR MEDIC[employer] SUPP [facility] [place] [facility] [place] [address] [address]  [facility] [address]  Phone: [phone]  Fax: [phone] Plan Frequency: 2x/week for 8 weeks    Plan of Care/Certification Expiration Date: 24      >PT Visit Info:  Plan Frequency: 2x/week for 8 weeks  Plan of Care/Certification Expiration Date: 24      Visit Count:  17    OUTPATIENT PHYSICAL THERAPY: Treatment and Progress Note 1/3/2024       Episode  }Appt Desk             Treatment Diagnosis:  R26.89, R29.898  No data found  Medical/Referring Diagnosis:      Referring Physician:  Sheila Patricia APRN - CNP MD Orders:  PT Eval and Treat   Date of Onset:  Onset Date:  (2022)     Allergies:   Codeine and Adhesive tape  Restrictions/Precautions:  Restrictions/Precautions: Fall Risk  Required Braces or Orthoses?: No  No data recorded   Interventions Planned (Treatment may consist of any combination of the following):    Current Treatment Recommendations: Strengthening; ROM; Balance training; Functional mobility training; Endurance training; Gait training; Neuromuscular re-education; Home exercise program; Safety education & training; Patient/Caregiver education & training; Modalities     >Subjective Comments: Pt reports she has had increased neck, back and bilateral hand pain this past week. Patient states that her Rheumatologist canceled her appointment last week due to illness and rescheduled it for this week but was not notified of the time/date of appointment and missed it. Patient has called to reschedule but has not heard back.  >Initial:    3-4/10>Post Session:     no increase Medications Last Reviewed:  1/3/2024  Updated Objective Findings:        23:    TUG 20 seconds  B LE strength 5/5 except: B hip flexion, extension and abduction 4+/5.  30 second sit<>stand test: 6X      11/22/23:    TUG 22

## 2024-01-05 ENCOUNTER — HOSPITAL ENCOUNTER (OUTPATIENT)
Dept: PHYSICAL THERAPY | Age: 84
Setting detail: RECURRING SERIES
End: 2024-01-05
Attending: ORTHOPAEDIC SURGERY
Payer: MEDICARE

## 2024-01-08 ENCOUNTER — OFFICE VISIT (OUTPATIENT)
Dept: AUDIOLOGY | Age: 84
End: 2024-01-08
Payer: MEDICARE

## 2024-01-08 ENCOUNTER — OFFICE VISIT (OUTPATIENT)
Dept: ENT CLINIC | Age: 84
End: 2024-01-08
Payer: MEDICARE

## 2024-01-08 ENCOUNTER — TELEPHONE (OUTPATIENT)
Age: 84
End: 2024-01-08

## 2024-01-08 VITALS — WEIGHT: 154 LBS | HEIGHT: 62 IN | BODY MASS INDEX: 28.34 KG/M2 | RESPIRATION RATE: 18 BRPM

## 2024-01-08 DIAGNOSIS — H90.3 SENSORINEURAL HEARING LOSS, BILATERAL: Primary | ICD-10-CM

## 2024-01-08 DIAGNOSIS — H61.21 IMPACTED CERUMEN OF RIGHT EAR: ICD-10-CM

## 2024-01-08 DIAGNOSIS — T85.698A EXTRUSION OF TYMPANOSTOMY TUBE: ICD-10-CM

## 2024-01-08 DIAGNOSIS — M26.629 TMJ SYNDROME: ICD-10-CM

## 2024-01-08 DIAGNOSIS — H69.91 DYSFUNCTION OF RIGHT EUSTACHIAN TUBE: Primary | ICD-10-CM

## 2024-01-08 PROCEDURE — 1090F PRES/ABSN URINE INCON ASSESS: CPT | Performed by: STUDENT IN AN ORGANIZED HEALTH CARE EDUCATION/TRAINING PROGRAM

## 2024-01-08 PROCEDURE — 1036F TOBACCO NON-USER: CPT | Performed by: STUDENT IN AN ORGANIZED HEALTH CARE EDUCATION/TRAINING PROGRAM

## 2024-01-08 PROCEDURE — 99213 OFFICE O/P EST LOW 20 MIN: CPT | Performed by: STUDENT IN AN ORGANIZED HEALTH CARE EDUCATION/TRAINING PROGRAM

## 2024-01-08 PROCEDURE — G8484 FLU IMMUNIZE NO ADMIN: HCPCS | Performed by: STUDENT IN AN ORGANIZED HEALTH CARE EDUCATION/TRAINING PROGRAM

## 2024-01-08 PROCEDURE — 1123F ACP DISCUSS/DSCN MKR DOCD: CPT | Performed by: STUDENT IN AN ORGANIZED HEALTH CARE EDUCATION/TRAINING PROGRAM

## 2024-01-08 PROCEDURE — 69210 REMOVE IMPACTED EAR WAX UNI: CPT | Performed by: STUDENT IN AN ORGANIZED HEALTH CARE EDUCATION/TRAINING PROGRAM

## 2024-01-08 PROCEDURE — G8399 PT W/DXA RESULTS DOCUMENT: HCPCS | Performed by: STUDENT IN AN ORGANIZED HEALTH CARE EDUCATION/TRAINING PROGRAM

## 2024-01-08 PROCEDURE — 92567 TYMPANOMETRY: CPT | Performed by: AUDIOLOGIST

## 2024-01-08 PROCEDURE — G8417 CALC BMI ABV UP PARAM F/U: HCPCS | Performed by: STUDENT IN AN ORGANIZED HEALTH CARE EDUCATION/TRAINING PROGRAM

## 2024-01-08 PROCEDURE — 92557 COMPREHENSIVE HEARING TEST: CPT | Performed by: AUDIOLOGIST

## 2024-01-08 PROCEDURE — G8427 DOCREV CUR MEDS BY ELIG CLIN: HCPCS | Performed by: STUDENT IN AN ORGANIZED HEALTH CARE EDUCATION/TRAINING PROGRAM

## 2024-01-08 RX ORDER — THIAMINE HCL 50 MG
50 TABLET ORAL DAILY
Qty: 90 TABLET | Refills: 1 | Status: SHIPPED | OUTPATIENT
Start: 2024-01-08

## 2024-01-08 RX ORDER — MELATONIN
1 DAILY
Qty: 90 TABLET | Refills: 1 | Status: SHIPPED | OUTPATIENT
Start: 2024-01-08

## 2024-01-08 NOTE — PROGRESS NOTES
HPI:  Katie Israel is a 83 y.o. female seen Established   Chief Complaint   Patient presents with    Follow-up     Patient presents today for 4 MO tube check . Patient has noticed a decrease and is concerned about R ear .          82-year-old female seen for a follow-up evaluation history of right-sided PE tube placed in January 2023 for chronic middle ear effusion.  She has done well over the last year since tube placement and still utilizing her hearing aids with good benefit.  She has felt some diminished hearing clogged ear sensation to the right ear for the last week or so prompting her to come in for a more sooner evaluation for tube check after 4 months.  There has been no otorrhea otalgia dizziness or vertigo.  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  She also had a left-sided tonsillectomy last year with no ongoing throat pain at this time.  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    Past Medical History, Past Surgical History, Family history, Social History, and Medications were all reviewed with the patient today and updated as necessary.     Allergies   Allergen Reactions    Codeine Itching     Other reaction(s): Nausea and/or vomiting-Intolerance, Rash-Allergy    Adhesive Tape Rash and Other (See Comments)     Blisters       Patient Active Problem List   Diagnosis    PVD (peripheral vascular disease) (HCC)    History of bilateral hip replacements    Shortness of breath    Palpitations    Chest discomfort    History of COVID-19    Spinal stenosis, lumbar    Bilateral carotid artery disease (HCC)    Palpitation    Hypothyroidism    Vazquez's esophagus    Tricuspid valve disorder    Coronary atherosclerosis of native coronary vessel    Hypertension, benign    Hyperlipidemia    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

## 2024-01-08 NOTE — PROGRESS NOTES
AUDIOLOGY EVALUATION    Katie Israel had Tympanometry and Audiometry performed today.    The patient reports decreased hearing in her right ear.     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

## 2024-01-08 NOTE — TELEPHONE ENCOUNTER
----- Message from Shayy Rod MA sent at 1/8/2024  2:15 PM EST -----    ----- Message -----  From: Oneil Mendez DO  Sent: 1/5/2024   6:55 PM EST  To: Anastasia Alvarez MA    Please call the patient.  NST was ok, nothing major or concerning.  If having more angina (worsening SAINI, CP, SOB), please let us know.  Will review more at their follow up appointment and get plan for the future.    Thanks,   Andrea

## 2024-01-09 ENCOUNTER — HOSPITAL ENCOUNTER (OUTPATIENT)
Dept: PHYSICAL THERAPY | Age: 84
Setting detail: RECURRING SERIES
End: 2024-01-09
Attending: ORTHOPAEDIC SURGERY
Payer: MEDICARE

## 2024-01-10 NOTE — TELEPHONE ENCOUNTER
Pt returned call. Made pt aware per Dr Mendez Please call the patient.  NST was ok, nothing major or concerning.  If having more angina (worsening SAINI, CP, SOB), please let us know.  Will review more at their follow up appointment and get plan for the future.     Pt verbalized understanding.

## 2024-01-11 NOTE — PROGRESS NOTES
finger is the worst finger affected.  Her right wrist had a ganglion cyst which was removed but now still hurts.  She also has sharp burning pain pain right over the dorsal aspect of her right hand near the thumb.  Nothing particularly makes it worse.  She has chronic deformities including enlargement  Several PIP and DIP joints of the hands.  She also has associated swelling over the PIP joints of the hands.  She is unable to fully extend her right second pinky at the PIP joint.  She has no right ankle swelling and feels nauseated and painful.  She denies any stiffness in the joint.  The pain in the joint is not worse with walking.  The thing that bothers her most about her right leg is that her calf is constantly swollen.  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  Recommended sclerotherapy but not performed.    Treatment:  Meloxicam helps the most -avoiding taking prior GI bleed  Tried tramadol 50 mg daily-significantly reduced pain to 5 out of 10 and allow her to get through her day discontinued b/c felt dizzy, willing to try this again    Labs checked for joint pain at patient's request revealed positive RF 1:56. Negative CCP, SO, ESR, CRP.      Review Of Systems:  Positive for fatigue, dry eye, foreign body sensation of the eye, eye itching, sores in mouth, cough, stomach pain, black stool.    She denies any incontinence shortness of breath.  History Reviewed:    Past Medical History  Past Medical History:   Diagnosis Date    Acid reflux     Arthritis     osteo    Vazquez's esophagus     Managed with as needed meds    Bilateral carotid artery disease (HCC)     Followed by cardiology- Dr. Mendez    Chest discomfort     denies current or recent CP 2/2/23    Coronary atherosclerosis of native coronary vessel     Degeneration of lumbar or lumbosacral intervertebral disc     Depression     medication     Gastric ulcer,

## 2024-01-12 ENCOUNTER — OFFICE VISIT (OUTPATIENT)
Dept: RHEUMATOLOGY | Age: 84
End: 2024-01-12

## 2024-01-12 ENCOUNTER — HOSPITAL ENCOUNTER (OUTPATIENT)
Dept: GENERAL RADIOLOGY | Age: 84
End: 2024-01-12
Payer: MEDICARE

## 2024-01-12 ENCOUNTER — HOSPITAL ENCOUNTER (OUTPATIENT)
Dept: PHYSICAL THERAPY | Age: 84
Setting detail: RECURRING SERIES
End: 2024-01-12
Attending: ORTHOPAEDIC SURGERY
Payer: MEDICARE

## 2024-01-12 ENCOUNTER — APPOINTMENT (OUTPATIENT)
Dept: PHYSICAL THERAPY | Age: 84
End: 2024-01-12
Attending: ORTHOPAEDIC SURGERY
Payer: MEDICARE

## 2024-01-12 VITALS
HEIGHT: 62 IN | HEART RATE: 66 BPM | BODY MASS INDEX: 28.16 KG/M2 | SYSTOLIC BLOOD PRESSURE: 132 MMHG | DIASTOLIC BLOOD PRESSURE: 59 MMHG | TEMPERATURE: 98.1 F | WEIGHT: 153 LBS | RESPIRATION RATE: 14 BRPM

## 2024-01-12 DIAGNOSIS — M19.90 OSTEOARTHRITIS, UNSPECIFIED OSTEOARTHRITIS TYPE, UNSPECIFIED SITE: ICD-10-CM

## 2024-01-12 DIAGNOSIS — M05.80 POLYARTHRITIS WITH POSITIVE RHEUMATOID FACTOR (HCC): ICD-10-CM

## 2024-01-12 DIAGNOSIS — Z01.89 ENCOUNTER FOR OTHER SPECIFIED SPECIAL EXAMINATIONS: ICD-10-CM

## 2024-01-12 DIAGNOSIS — M05.80 POLYARTHRITIS WITH POSITIVE RHEUMATOID FACTOR (HCC): Primary | ICD-10-CM

## 2024-01-12 PROCEDURE — 73610 X-RAY EXAM OF ANKLE: CPT

## 2024-01-12 PROCEDURE — 73110 X-RAY EXAM OF WRIST: CPT

## 2024-01-12 PROCEDURE — 73130 X-RAY EXAM OF HAND: CPT

## 2024-01-12 RX ORDER — SENNOSIDES 8.6 MG
650 CAPSULE ORAL 2 TIMES DAILY
COMMUNITY

## 2024-01-12 RX ORDER — SENNOSIDES 8.6 MG
650 CAPSULE ORAL 2 TIMES DAILY
COMMUNITY
End: 2024-01-12

## 2024-01-12 RX ORDER — TRAMADOL HYDROCHLORIDE 50 MG/1
25 TABLET ORAL DAILY
Qty: 7 TABLET | Refills: 0 | Status: SHIPPED | OUTPATIENT
Start: 2024-01-12 | End: 2024-01-26

## 2024-01-12 ASSESSMENT — JOINT PAIN
TOTAL NUMBER OF SWOLLEN JOINTS: 9
TOTAL NUMBER OF TENDER JOINTS: 12

## 2024-01-12 NOTE — PATIENT INSTRUCTIONS
Get labs and x-rays done, working up arthritis  Start taking tramadol 0.5 tab (25mg) daily as needed for joint pain  Stop taking mobic  Meet in 2 weeks in person

## 2024-01-13 LAB — RHEUMATOID FACT SERPL-ACNC: 17.5 IU/ML

## 2024-01-14 LAB
HAV IGM SER QL: NONREACTIVE
HBV CORE IGM SER QL: NONREACTIVE
HBV SURFACE AG SER QL: NONREACTIVE
HCV AB SER QL: NONREACTIVE

## 2024-01-15 ENCOUNTER — HOSPITAL ENCOUNTER (OUTPATIENT)
Dept: PHYSICAL THERAPY | Age: 84
Setting detail: RECURRING SERIES
Discharge: HOME | End: 2024-01-18
Attending: ORTHOPAEDIC SURGERY
Payer: MEDICARE

## 2024-01-15 PROCEDURE — 97110 THERAPEUTIC EXERCISES: CPT

## 2024-01-15 NOTE — PROGRESS NOTES
forward Marching walk in // bars 5 laps    Lateral band walk x 4 laps blue band with cues to keep feet forward Marching walk in // bars 5 laps    Lateral band walk x 4 laps blue band with cues to keep feet forward Marching walk in // bars 5 laps Marching walk in // bars 5 laps    Lateral band walk x 4 laps blue band with cues to keep feet forward    Sit to stand with one pad in chair, 3x10 no UE support Sit to stand with one pad in chair, 3x10 no UE support  Sit to stand with one pad in chair, 3x10 no UE support Sit to stand with one pad in chair, 3x10 no UE support    B long arc quad and marching 4 x 10 5# cuff B long arc quad and marching 3 x 10 5# cuff B long arc quad and marching 3 x 10 5# cuff B long arc quad and marching 3 x 10 5# cuff B long arc quad and marching 3 x 10 5# cuff   Sit to stand   ground                       foam        Standing EO/EC on foam        LAQs        Seated marching        Proprioceptive Activities:                                Manual Therapy:                        Functional Activities:                                             Goals: (Goals have been discussed and agreed upon with patient.)  Short-Term Functional Goals: Time Frame: (4 Weeks)  Patient will be independent with HEP.(Ongoing)  Patient will have B LE strength of 5/5 to improve mobility and balance. (Ongoing)  Patient will be able to  tandem stance with each foot forward for 30 seconds on firm ground with eyes open. (Ongoing)  Discharge Goals: Time Frame: (8 Weeks)  Patient will complete 10 sit<>stands during 30 second sit<>stand test indicating improved balance. (Ongoing)  Patient will complete timed up and go test in less than 15 seconds indicating improved balance. (Ongoing)  Patient will be able to stand 15 seconds on each leg during single leg stance on firm ground with eyes open. (Ongoing)          Outcome Measure:   Tool Used: Knee injury and Osteoarthritis Outcome Score for Joint Replacement (KOOS,

## 2024-01-17 ENCOUNTER — TELEPHONE (OUTPATIENT)
Age: 84
End: 2024-01-17

## 2024-01-17 LAB
M TB IFN-G BLD-IMP: NEGATIVE
M TB IFN-G CD4+ T-CELLS BLD-ACNC: 0.1 IU/ML
M TBIFN-G CD4+ CD8+T-CELLS BLD-ACNC: 0.06 IU/ML
QUANTIFERON CRITERIA: NORMAL
QUANTIFERON MITOGEN VALUE: >10 IU/ML
QUANTIFERON NIL VALUE: 0.1 IU/ML

## 2024-01-17 NOTE — TELEPHONE ENCOUNTER
----- Message from Oneil Mendez DO sent at 1/17/2024 12:45 PM EST -----  Please call the patient.  ECHO was ok, nothing major or concerning.  If having more angina (worsening SAINI, CP, SOB), please let us know.  Will review more at follow up appointment and get plan for the future.    ThanksAndrea

## 2024-01-18 ENCOUNTER — HOSPITAL ENCOUNTER (OUTPATIENT)
Dept: PHYSICAL THERAPY | Age: 84
Setting detail: RECURRING SERIES
End: 2024-01-18
Attending: ORTHOPAEDIC SURGERY
Payer: MEDICARE

## 2024-01-18 ENCOUNTER — TELEPHONE (OUTPATIENT)
Age: 84
End: 2024-01-18

## 2024-01-18 LAB
14-3-3 ETA AG SER IA-MCNC: 3.24 NG/ML
BIOMARKER COMMENT: NORMAL
CRP RESULT: 1.5 MG/L
EGF RESULT: 39 PG/ML
FOOTNOTE/HISTORY: NORMAL
IL-6 RESULT: 8.3 PG/ML
LEPTIN RESULT: 27 NG/ML
Lab: NORMAL
Lab: NORMAL
MMP-1 RESULT: 15 NG/ML
MMP3 RESULT: 15 NG/ML
RESISTIN RESULT: 4.1 NG/ML
RISK OF RADIOGRAPHIC PROGRESS: 4 %
SAA RESULT: 1.5 UG/ML
TNF-RI RESULT: 1.4 NG/ML
VCAM-1 RESULT: 0.76 UG/ML
VECTRA SCORE: 33
VECTRA(R) LEVEL: NORMAL
VEGF-A RESULT: 500 PG/ML
YKL-40 RESULT: 120 NG/ML

## 2024-01-18 NOTE — TELEPHONE ENCOUNTER
Called pt discussed results from previous encounters. Pt is wondering why is having an irregular HR and if could be seen 1/19/24 after 2 pm since daughter could bring her and  is not able to.     Advised will discuss with Dr. Mendez and if need to call back will do if unable to will see back as planned on 1/24/24

## 2024-01-18 NOTE — TELEPHONE ENCOUNTER
Patient called stating she would like to discuss her test results followed by a few questions.    Please call and advise.

## 2024-01-22 ENCOUNTER — HOSPITAL ENCOUNTER (OUTPATIENT)
Dept: PHYSICAL THERAPY | Age: 84
Setting detail: RECURRING SERIES
Discharge: HOME | End: 2024-01-25
Attending: ORTHOPAEDIC SURGERY
Payer: MEDICARE

## 2024-01-22 PROCEDURE — 97110 THERAPEUTIC EXERCISES: CPT

## 2024-01-22 NOTE — PROGRESS NOTES
[facility]   2/21/2024  2:45 PM Allen Shanks, PT [facility]   2/26/2024  2:45 PM Allen Shanks, PT [facility]   2/28/2024  2:45 PM Allen Shanks, PT [facility]   3/4/2024  2:45 PM Rafal Mccormack Jr., MD [facility]   4/1/2024  1:00 PM Ravinder Napoles LISW [facility]   4/22/2024 10:00 AM TRIM LAB RESOURCE TRIM [facility]   4/29/2024 11:00 AM Sheila Patricia, APRN - CNP TRIM [facility]   1/15/2025  2:00 PM Mitesh Evans DO [facility]NT[facility]   1/15/2025  2:00 PM Carrie Vora AuD [facility]

## 2024-01-23 ENCOUNTER — OFFICE VISIT (OUTPATIENT)
Dept: RHEUMATOLOGY | Age: 84
End: 2024-01-23
Payer: MEDICARE

## 2024-01-23 ENCOUNTER — TELEPHONE (OUTPATIENT)
Age: 84
End: 2024-01-23

## 2024-01-23 VITALS
BODY MASS INDEX: 27.97 KG/M2 | HEIGHT: 62 IN | HEART RATE: 54 BPM | WEIGHT: 152 LBS | SYSTOLIC BLOOD PRESSURE: 133 MMHG | DIASTOLIC BLOOD PRESSURE: 57 MMHG | TEMPERATURE: 98 F

## 2024-01-23 DIAGNOSIS — F11.90 CHRONIC, CONTINUOUS USE OF OPIOIDS: ICD-10-CM

## 2024-01-23 DIAGNOSIS — M05.9 SEROPOSITIVE RHEUMATOID ARTHRITIS (HCC): Primary | ICD-10-CM

## 2024-01-23 DIAGNOSIS — M54.2 CERVICALGIA: ICD-10-CM

## 2024-01-23 DIAGNOSIS — M15.4 EROSIVE OSTEOARTHRITIS OF HANDS, BILATERAL: ICD-10-CM

## 2024-01-23 PROCEDURE — G8417 CALC BMI ABV UP PARAM F/U: HCPCS | Performed by: INTERNAL MEDICINE

## 2024-01-23 PROCEDURE — 1090F PRES/ABSN URINE INCON ASSESS: CPT | Performed by: INTERNAL MEDICINE

## 2024-01-23 PROCEDURE — G8484 FLU IMMUNIZE NO ADMIN: HCPCS | Performed by: INTERNAL MEDICINE

## 2024-01-23 PROCEDURE — 3078F DIAST BP <80 MM HG: CPT | Performed by: INTERNAL MEDICINE

## 2024-01-23 PROCEDURE — 1036F TOBACCO NON-USER: CPT | Performed by: INTERNAL MEDICINE

## 2024-01-23 PROCEDURE — G8399 PT W/DXA RESULTS DOCUMENT: HCPCS | Performed by: INTERNAL MEDICINE

## 2024-01-23 PROCEDURE — 3075F SYST BP GE 130 - 139MM HG: CPT | Performed by: INTERNAL MEDICINE

## 2024-01-23 PROCEDURE — 1123F ACP DISCUSS/DSCN MKR DOCD: CPT | Performed by: INTERNAL MEDICINE

## 2024-01-23 PROCEDURE — G8427 DOCREV CUR MEDS BY ELIG CLIN: HCPCS | Performed by: INTERNAL MEDICINE

## 2024-01-23 PROCEDURE — 99214 OFFICE O/P EST MOD 30 MIN: CPT | Performed by: INTERNAL MEDICINE

## 2024-01-23 RX ORDER — TRAMADOL HYDROCHLORIDE 50 MG/1
50 TABLET ORAL EVERY 12 HOURS PRN
Qty: 60 TABLET | Refills: 1 | Status: SHIPPED | OUTPATIENT
Start: 2024-01-23 | End: 2024-03-23

## 2024-01-23 RX ORDER — SULFASALAZINE 500 MG/1
TABLET, DELAYED RELEASE ORAL
Qty: 120 TABLET | Refills: 3 | Status: SHIPPED | OUTPATIENT
Start: 2024-01-23

## 2024-01-23 ASSESSMENT — ROUTINE ASSESSMENT OF PATIENT INDEX DATA (RAPID3)
WHEN YOU AWAKENED IN THE MORNING OVER THE LAST WEEK, PLEASE INDICATE THE AMOUNT OF TIME IT TAKES UNTIL YOU ARE AS LIMBER AS YOU WILL BE FOR THE DAY: > 1 HOUR
ON A SCALE OF ONE TO TEN, HOW DIFFICULT WAS IT FOR YOU TO COMPLETE THE LISTED DAILY PHYSICAL TASKS OVER THE LAST WEEK: 0.4
ON A SCALE OF ONE TO TEN, HOW MUCH PAIN HAVE YOU HAD BECAUSE OF YOUR CONDITION OVER THE PAST WEEK?: 7

## 2024-01-23 NOTE — PROGRESS NOTES
protected by state and federal law. If you have  received this document in error please call [phone].  V.1.0 / 9-21      AGATHA Result 01/12/2024 1.5  ug/mL Final    Comment: (NOTE)  RA Range: (0.29-85)  RA Percentile: 36%      CRP Result 01/12/2024 1.5  mg/L Final    Comment: (NOTE)  RA Range: (0.19-92)  RA Percentile: 26%      VCAM-1 Result 01/12/2024 0.76  ug/mL Final    Comment: (NOTE)  RA Range: (0.39-1.2)  RA Percentile: 72%      IL-6 Result 01/12/2024 8.3  pg/mL Final    Comment: (NOTE)  RA Range: (2.5-200)  RA Percentile: 36%      TNF-RI Result 01/12/2024 1.4  ng/mL Final    Comment: (NOTE)  RA Range: (0.8-3.9)  RA Percentile: 44%      EGF Result 01/12/2024 39  pg/mL Final    Comment: (NOTE)  RA Range: ()  RA Percentile: 21%      VEGF-A Result 01/12/2024 500  pg/mL Final    Comment: (NOTE)  RA Range: ()  RA Percentile: 89%      Leptin Result 01/12/2024 27  ng/mL Final    Comment: (NOTE)  RA Range: (1.5-120)  RA Percentile: 57%      Resistin Result 01/12/2024 4.1  ng/mL Final    Comment: (NOTE)  RA Range: (3.5-21)  RA Percentile: 5%      MMP-1 Result 01/12/2024 15  ng/mL Final    Comment: (NOTE)  RA Range: (1.3-23)  RA Percentile: 91%      MMP3 Result 01/12/2024 15  ng/mL Final    Comment: (NOTE)  RA Range: (7.9-160)  RA Percentile: 25%      YKL-40 Result 01/12/2024 120  ng/mL Final    Comment: (NOTE)  RA Range: ()  RA Percentile: 69%      Biomarker Comment 01/12/2024 Comment    Final    Comment: (NOTE)  These reported analyte values and reference ranges are intended  for use in the generation of the Vectra score only.  These results  should not be used interchangeably with results generated by  different methodologies.      Footnote/History 01/12/2024 Comment    Final    Comment: (NOTE)  RA Range: These 95% reference ranges were established from  325,781 patient samples tested at [facility].  RA Percentile: Subject's biomarker level relative to levels  in RA

## 2024-01-23 NOTE — TELEPHONE ENCOUNTER
Patient called stating she has the following issues :    Had Echo and NST  Had to cancel appt due to transportation issue  Would like to discuss test results.    Please call and advise.

## 2024-01-23 NOTE — PATIENT INSTRUCTIONS
Call your pain management doctor and see if they can schedule to evaluate your neck pain    Start Sulfasalazine Take 500mg (1 tab) daily for 1 week Take 500mg (1 tab) twice a day for 1 week Take 1000mg (2 tabs) in the morning and 500mg (1 tab) in the evening for 1 week Take 1000mg (2 tabs) twice a day   Get labs repeated in 4 weeks    Try either more miralax or prune juice or prunes to help you have a bowel movement every other day

## 2024-01-25 ENCOUNTER — APPOINTMENT (OUTPATIENT)
Dept: PHYSICAL THERAPY | Age: 84
End: 2024-01-25
Attending: ORTHOPAEDIC SURGERY
Payer: MEDICARE

## 2024-01-25 NOTE — TELEPHONE ENCOUNTER
Called pt advised results were given 1/10/24 &1/17/24 again on 1/18/24. Pt states would like results printed and will have son come by to  results. Advised pt Dr Mendez would like for her to come in office for follow up, pt states unable to do come due to transportation issues.     Printed test results placed upfront in [place] office.

## 2024-01-26 ENCOUNTER — HOSPITAL ENCOUNTER (OUTPATIENT)
Dept: PHYSICAL THERAPY | Age: 84
Setting detail: RECURRING SERIES
Discharge: HOME | End: 2024-01-29
Attending: ORTHOPAEDIC SURGERY
Payer: MEDICARE

## 2024-01-26 PROCEDURE — 97110 THERAPEUTIC EXERCISES: CPT

## 2024-01-26 NOTE — PROGRESS NOTES
Katie Israel  : 1940  Primary: Medicare Part A And B (Medicare)  Secondary: [employer] SENIOR MEDICARE SUPP [facility] @ [place] [address] [address]  [facility] [address]  Phone: [phone]  Fax: [phone] Plan Frequency: 2x/week for 8 weeks    Plan of Care/Certification Expiration Date: 24      >PT Visit Info:  Plan Frequency: 2x/week for 8 weeks  Plan of Care/Certification Expiration Date: 24      Visit Count:  20    OUTPATIENT PHYSICAL THERAPY: Treatment Note 2024       Episode  }Appt Desk             Treatment Diagnosis:  R26.89, R29.898  No data found  Medical/Referring Diagnosis:      Referring Physician:  Sheila Patricia APRN - CNP MD Orders:  PT Eval and Treat   Date of Onset:  Onset Date:  (2022)     Allergies:   Codeine and Adhesive tape  Restrictions/Precautions:  Restrictions/Precautions: Fall Risk  Required Braces or Orthoses?: No  No data recorded   Interventions Planned (Treatment may consist of any combination of the following):    Current Treatment Recommendations: Strengthening; ROM; Balance training; Functional mobility training; Endurance training; Gait training; Neuromuscular re-education; Home exercise program; Safety education & training; Patient/Caregiver education & training; Modalities     >Subjective Comments: Pt reports increased pain in her R shoulder/arm and bilateral hands today. Patient states she use a Swiffer sweeper last night with increased pain afterwards.     >Initial:    7-8/10 >Post Session:     no increase   Medications Last Reviewed:  2024  Updated Objective Findings:      24:  B LE strength 5/5 except: B hip flexion, extension and abduction 4+/5.  30 second sit<>stand test: 6X    12/21/23:    TUG 20 seconds  B LE strength 5/5 except: B hip flexion, extension and abduction 4+/5.  30 second sit<>stand test: 6X      11/22/23:    TUG 22 sec  B LE strength 5/5 except: B hip flexion, extension and abduction

## 2024-01-26 NOTE — THERAPY RECERTIFICATION
(HCC), Chest discomfort, Coronary atherosclerosis of native coronary vessel, Degeneration of lumbar or lumbosacral intervertebral disc, Depression, Gastric ulcer, unspecified as acute or chronic, without mention of hemorrhage or perforation, History of anemia, History of blood transfusion, Hyperlipidemia, Hypothyroidism, Infectious disease, Nausea & vomiting, Palpitations, PVD (peripheral vascular disease) (HCC), Scoliosis (and kyphoscoliosis), idiopathic, Spinal stenosis, lumbar, Status post right hip replacement, Thoracic or lumbosacral neuritis or radiculitis, unspecified, Tricuspid regurgitation, and Tricuspid valve disorder.  Ms. Israel  has a past surgical history that includes Hammer toe surgery (2010); Bunionectomy (Bilateral, 2009); Total hip arthroplasty (Left, 2007); lumbar laminectomy (2009); Hysterectomy (1988); Appendectomy (1987); Total hip arthroplasty (Left, 2009); Carpal tunnel release (Bilateral, 10/31/13); Total knee arthroplasty (Right, 2007); Breast biopsy (Left, 1980); orthopedic surgery (2010); Shoulder arthroscopy (2013); laminectomy (04/05/2022); Esophagogastroduodenoscopy (07/27/2018); Reverse total shoulder arthroplasty (Right, 2017); shoulder surgery (Right, 08/18/2022); Upper gastrointestinal endoscopy (N/A, 08/22/2022); laryngoscopy (N/A, 02/10/2023); and Tonsillectomy (Left, 02/10/2023).  Social History/Living Environment:   Lives With: Spouse  Type of Home: Mobile home  Home Access: Ramped entrance  Bathroom Shower/Tub: Walk-in shower       Prior Level of Function/Work/Activity:   Prior level of function: Independent  Current level of function: Independent with extra time         Learning:   Does the patient/guardian have any barriers to learning?: No barriers       Fall Risk Scale:   Bran Total Score: 65  Bran Fall Risk: High (45 and higher)             OBJECTIVE   12/21/23:     TUG 20 seconds  B LE strength 5/5 except: B hip flexion, extension and abduction 4+/5.  30 second

## 2024-01-28 PROBLEM — M15.4 EROSIVE OSTEOARTHRITIS OF HANDS, BILATERAL: Status: ACTIVE | Noted: 2024-01-28

## 2024-01-28 PROBLEM — M05.9 SEROPOSITIVE RHEUMATOID ARTHRITIS (HCC): Status: ACTIVE | Noted: 2023-10-19

## 2024-01-29 ENCOUNTER — HOSPITAL ENCOUNTER (OUTPATIENT)
Dept: PHYSICAL THERAPY | Age: 84
Setting detail: RECURRING SERIES
Discharge: HOME | End: 2024-02-01
Attending: ORTHOPAEDIC SURGERY
Payer: MEDICARE

## 2024-01-29 PROCEDURE — 97110 THERAPEUTIC EXERCISES: CPT

## 2024-01-29 NOTE — PROGRESS NOTES
Katie Israel  : 1940  Primary: Medicare Part A And B (Medicare)  Secondary: [employer] SENIOR MEDICARE SUPP [facility] @ [place] [address] [address]  [facility] [address]  Phone: [phone]  Fax: [phone] Plan Frequency: 2x/week for 8 weeks    Plan of Care/Certification Expiration Date: 24      >PT Visit Info:  Plan Frequency: 2x/week for 8 weeks  Plan of Care/Certification Expiration Date: 24      Visit Count:  21    OUTPATIENT PHYSICAL THERAPY: Treatment Note 2024       Episode  }Appt Desk             Treatment Diagnosis:  R26.89, R29.898  No data found  Medical/Referring Diagnosis:      Referring Physician:  Sheila Patricia APRN - CNP MD Orders:  PT Eval and Treat   Date of Onset:  Onset Date:  (2022)     Allergies:   Codeine and Adhesive tape  Restrictions/Precautions:  Restrictions/Precautions: Fall Risk  Required Braces or Orthoses?: No  No data recorded   Interventions Planned (Treatment may consist of any combination of the following):    Current Treatment Recommendations: Strengthening; ROM; Balance training; Functional mobility training; Endurance training; Gait training; Neuromuscular re-education; Home exercise program; Safety education & training; Patient/Caregiver education & training; Modalities     >Subjective Comments: Pt reports she has her usual aches and pains today. Patient states that she woke up in a lot of pain yesterday morning but improved throughout the day.      >Initial:    7/10 >Post Session:     no increase   Medications Last Reviewed:  2024  Updated Objective Findings:      24:  B LE strength 5/5 except: B hip flexion, extension and abduction 4+/5.  30 second sit<>stand test: 6X    12/21/23:    TUG 20 seconds  B LE strength 5/5 except: B hip flexion, extension and abduction 4+/5.  30 second sit<>stand test: 6X      11/22/23:    TUG 22 sec  B LE strength 5/5 except: B hip flexion, extension and abduction

## 2024-01-31 ENCOUNTER — APPOINTMENT (OUTPATIENT)
Dept: PHYSICAL THERAPY | Age: 84
End: 2024-01-31
Attending: ORTHOPAEDIC SURGERY
Payer: MEDICARE

## 2024-02-01 ENCOUNTER — HOSPITAL ENCOUNTER (OUTPATIENT)
Dept: PHYSICAL THERAPY | Age: 84
Setting detail: RECURRING SERIES
End: 2024-02-01
Attending: ORTHOPAEDIC SURGERY
Payer: MEDICARE

## 2024-02-05 ENCOUNTER — HOSPITAL ENCOUNTER (OUTPATIENT)
Dept: PHYSICAL THERAPY | Age: 84
Setting detail: RECURRING SERIES
End: 2024-02-05
Attending: ORTHOPAEDIC SURGERY
Payer: MEDICARE

## 2024-02-05 NOTE — PROGRESS NOTES
Patient called to cancel visit today and on 2/7/24 due to increased pain. Patient stated that she had placed a call into her rheumatologist to discuss recent pain medication that she started taking a few weeks ago.

## 2024-02-07 ENCOUNTER — TELEPHONE (OUTPATIENT)
Dept: RHEUMATOLOGY | Age: 84
End: 2024-02-07

## 2024-02-07 ENCOUNTER — HOSPITAL ENCOUNTER (OUTPATIENT)
Dept: PHYSICAL THERAPY | Age: 84
Setting detail: RECURRING SERIES
End: 2024-02-07
Attending: ORTHOPAEDIC SURGERY
Payer: MEDICARE

## 2024-02-07 NOTE — PROGRESS NOTES
Patient canceled this visit when she called on 2/5/24 to cancel visits for the week due to increased pain levels.

## 2024-02-07 NOTE — TELEPHONE ENCOUNTER
I called this pt back. She say the medication that she is on called \" Sulfa \" had her feeling bad yesterday. Pt states that today she seem to be ok. If she starts back feeling bad she will give us a call back.

## 2024-02-12 ENCOUNTER — HOSPITAL ENCOUNTER (OUTPATIENT)
Dept: PHYSICAL THERAPY | Age: 84
Setting detail: RECURRING SERIES
End: 2024-02-12
Attending: ORTHOPAEDIC SURGERY
Payer: MEDICARE

## 2024-02-14 ENCOUNTER — HOSPITAL ENCOUNTER (OUTPATIENT)
Dept: PHYSICAL THERAPY | Age: 84
Setting detail: RECURRING SERIES
End: 2024-02-14
Attending: ORTHOPAEDIC SURGERY
Payer: MEDICARE

## 2024-02-15 ENCOUNTER — HOSPITAL ENCOUNTER (OUTPATIENT)
Dept: PHYSICAL THERAPY | Age: 84
Setting detail: RECURRING SERIES
Discharge: HOME | End: 2024-02-18
Attending: ORTHOPAEDIC SURGERY
Payer: MEDICARE

## 2024-02-15 PROCEDURE — 97110 THERAPEUTIC EXERCISES: CPT

## 2024-02-15 NOTE — PROGRESS NOTES
Katie Israel  : 1940  Primary: Medicare Part A And B (Medicare)  Secondary: [employer] SENIOR MEDICARE SUPP [facility] @ [place] [address] [address]  [facility] [address]  Phone: [phone]  Fax: [phone] Plan Frequency: 2x/week for 8 weeks    Plan of Care/Certification Expiration Date: 24      >PT Visit Info:  Plan Frequency: 2x/week for 8 weeks  Plan of Care/Certification Expiration Date: 24      Visit Count:  22    OUTPATIENT PHYSICAL THERAPY: Treatment Note 2/15/2024       Episode  }Appt Desk             Treatment Diagnosis:  R26.89, R29.898  No data found  Medical/Referring Diagnosis:      Referring Physician:  Sheila Patricia APRN - CNP MD Orders:  PT Eval and Treat   Date of Onset:  Onset Date:  (2022)     Allergies:   Codeine and Adhesive tape  Restrictions/Precautions:  Restrictions/Precautions: Fall Risk  Required Braces or Orthoses?: No  No data recorded   Interventions Planned (Treatment may consist of any combination of the following):    Current Treatment Recommendations: Strengthening; ROM; Balance training; Functional mobility training; Endurance training; Gait training; Neuromuscular re-education; Home exercise program; Safety education & training; Patient/Caregiver education & training; Modalities     >Subjective Comments: Pt reports that she has continued to have increased pain the past few weeks. Patient states her back and both hands have had the most pain. Patient states that she had sharp R knee pain upon waking yesterday that decreased throughout the day. Patient has called pain management regarding her back pain and to call her rheumatologist to report pain at multiple joints throughout her body.     >Initial:    7/10 >Post Session:     no increase, \"felt better to move\"   Medications Last Reviewed:  2/15/2024  Updated Objective Findings:      24:  B LE strength 5/5 except: B hip flexion, extension and abduction 4+/5.  30

## 2024-02-19 ENCOUNTER — HOSPITAL ENCOUNTER (OUTPATIENT)
Dept: PHYSICAL THERAPY | Age: 84
Setting detail: RECURRING SERIES
End: 2024-02-19
Attending: ORTHOPAEDIC SURGERY
Payer: MEDICARE

## 2024-02-21 ENCOUNTER — HOSPITAL ENCOUNTER (OUTPATIENT)
Dept: PHYSICAL THERAPY | Age: 84
Setting detail: RECURRING SERIES
End: 2024-02-21
Attending: ORTHOPAEDIC SURGERY
Payer: MEDICARE

## 2024-02-26 ENCOUNTER — HOSPITAL ENCOUNTER (OUTPATIENT)
Dept: PHYSICAL THERAPY | Age: 84
Setting detail: RECURRING SERIES
Discharge: HOME | End: 2024-02-29
Attending: ORTHOPAEDIC SURGERY
Payer: MEDICARE

## 2024-02-26 PROCEDURE — 97110 THERAPEUTIC EXERCISES: CPT

## 2024-02-26 NOTE — PROGRESS NOTES
sit<>stand test indicating improved balance. (Ongoing)  Patient will complete timed up and go test in less than 15 seconds indicating improved balance. (Ongoing)  Patient will be able to stand 15 seconds on each leg during single leg stance on firm ground with eyes open. (Ongoing)          Outcome Measure:   Tool Used: Knee injury and Osteoarthritis Outcome Score for Joint Replacement (KOOS, JR)  Score:  Initial: 12 (Interval: 57.140) 10/23/2023 Most Recent: 3 pts. (Interval 54.840) 2/26/24   Interpretation of Score:  The KOOS, JR contains 7 items from the original KOOS survey. Items are coded from 0 to 4, none to extreme respectively.   KOOS, JR is scored by summing the raw response (range 0-28) and then converting it to an interval score using the table provided below. The interval score ranges from 0 to 100 where 0 represents total knee disability and 100 represents perfect knee health.      Treatment/Session Summary:    >Treatment Assessment: Patient has continued to have increased pain in multiple areas including R shoulder, lumbar spine, B hands/fingers and knees. Patient has follow up for right shoulder next week and with rheumatologist on 3/28/24. Patient has upcoming appointment with pain management as well. Patient has only attended a total of 4 visits in the past month due to cancellations due to pain and not feeling well. Patient with increased difficulty and lumbar pain with 30 sec sit<>stand test today with fewer repetitions. Plan to continue to progress patient as pain level allows.      Communication/Consultation:  None today  Equipment provided today:  None  Recommendations/Intent for next treatment session: Next visit will focus on improving strength, balance, mobility and endurance.    >Total Treatment Billable Duration: 45 minutes   Time In: 1446  Time Out: 1531    Allen Shanks, PT       Charge Capture  }Post Session Pain  PT Visit Info  [facility] Portal  MD Guidelines  Scanned Media  Benefits

## 2024-02-27 RX ORDER — OMEPRAZOLE 20 MG/1
CAPSULE, DELAYED RELEASE ORAL
Qty: 90 CAPSULE | Refills: 1 | Status: SHIPPED | OUTPATIENT
Start: 2024-02-27

## 2024-02-28 ENCOUNTER — HOSPITAL ENCOUNTER (OUTPATIENT)
Dept: PHYSICAL THERAPY | Age: 84
Setting detail: RECURRING SERIES
End: 2024-02-28
Attending: ORTHOPAEDIC SURGERY
Payer: MEDICARE

## 2024-03-04 ENCOUNTER — OFFICE VISIT (OUTPATIENT)
Dept: ORTHOPEDIC SURGERY | Age: 84
End: 2024-03-04
Payer: MEDICARE

## 2024-03-04 ENCOUNTER — TELEPHONE (OUTPATIENT)
Dept: RHEUMATOLOGY | Age: 84
End: 2024-03-04

## 2024-03-04 DIAGNOSIS — Z79.899 LONG-TERM USE OF HIGH-RISK MEDICATION: ICD-10-CM

## 2024-03-04 DIAGNOSIS — Z96.611 PRESENCE OF RIGHT ARTIFICIAL SHOULDER JOINT: Primary | ICD-10-CM

## 2024-03-04 DIAGNOSIS — M05.9 SEROPOSITIVE RHEUMATOID ARTHRITIS (HCC): Primary | ICD-10-CM

## 2024-03-04 DIAGNOSIS — Z09 FOLLOW-UP EXAMINATION AFTER ORTHOPEDIC SURGERY: ICD-10-CM

## 2024-03-04 PROCEDURE — G8484 FLU IMMUNIZE NO ADMIN: HCPCS | Performed by: ORTHOPAEDIC SURGERY

## 2024-03-04 PROCEDURE — G8399 PT W/DXA RESULTS DOCUMENT: HCPCS | Performed by: ORTHOPAEDIC SURGERY

## 2024-03-04 PROCEDURE — 1036F TOBACCO NON-USER: CPT | Performed by: ORTHOPAEDIC SURGERY

## 2024-03-04 PROCEDURE — 1090F PRES/ABSN URINE INCON ASSESS: CPT | Performed by: ORTHOPAEDIC SURGERY

## 2024-03-04 PROCEDURE — 99212 OFFICE O/P EST SF 10 MIN: CPT | Performed by: ORTHOPAEDIC SURGERY

## 2024-03-04 PROCEDURE — G8427 DOCREV CUR MEDS BY ELIG CLIN: HCPCS | Performed by: ORTHOPAEDIC SURGERY

## 2024-03-04 PROCEDURE — G8417 CALC BMI ABV UP PARAM F/U: HCPCS | Performed by: ORTHOPAEDIC SURGERY

## 2024-03-04 PROCEDURE — 1123F ACP DISCUSS/DSCN MKR DOCD: CPT | Performed by: ORTHOPAEDIC SURGERY

## 2024-03-04 NOTE — PROGRESS NOTES
prosthesis biceps tenodesis latissimus dorsi and teres major tendon transfer 1.5 years  Painful reverse right total shoulder arthroplasty with glenoid bone loss and medialization   Rule out periprosthetic infection status post reverse right total shoulder arthroplasty   Rule out periprosthetic loosening   Status post reverse right total shoulder arthroplasty by Dr. Chandler for years   Rotator cuff tear arthropathy left shoulder   2 previous surgical procedures by Dr. Yates on the left shoulder   Sternoclavicular osteoarthritis both shoulders   Source bolus multiple levels   Status post primary right total hip arthroplasty   Status post revision left total hip arthroplasty   Status post primary left total hip arthroplasty   Status post right total knee arthroplasty   History of low back surgery   Chronic low back pain   Hypercholesterolemia   Coronary disease on aspirin   Systemic osteoarthritis   Status post previous hand surgery with osteoarthritis      Plan:   I discussed the problem with the patient.  Overall her right shoulder is the same with no change on physical exam or her radiographic studies.  She has multiple complaints primarily related to systemic issues including chronic pain and rheumatoid arthritis.  She is scheduled to see her primary care physician, a pain physician and her rheumatologist.  She may want a second opinion in regards to rheumatologic treatment.  At this point we will observe her.  I will recheck her back in 6 months with new AP Y and axillary views right shoulder  2.  Self-limited problem   follow up:         Rafal Mccormack Jr., MD

## 2024-03-04 NOTE — TELEPHONE ENCOUNTER
Pt called this morning stating she was in the ER this past weekend and the ER doctor told Mrs. Chavis to give you a call and let you know. I informed the patient you are not in the office on Mondays but you would be back in the office on Tuesday.

## 2024-03-05 ENCOUNTER — HOSPITAL ENCOUNTER (OUTPATIENT)
Dept: PHYSICAL THERAPY | Age: 84
Setting detail: RECURRING SERIES
Discharge: HOME | End: 2024-03-08
Attending: ORTHOPAEDIC SURGERY
Payer: MEDICARE

## 2024-03-05 ENCOUNTER — TELEPHONE (OUTPATIENT)
Dept: RHEUMATOLOGY | Age: 84
End: 2024-03-05

## 2024-03-05 PROCEDURE — 97110 THERAPEUTIC EXERCISES: CPT

## 2024-03-05 NOTE — TELEPHONE ENCOUNTER
Spoke with this PT she states that the sulfasalazine is not helping her and that she is still constipated and was wondering if there is anything else she could take?

## 2024-03-05 NOTE — PROGRESS NOTES
Scanned Media  Benefits  MyChart    Future Appointments   Date Time Provider Department Center   3/6/2024  2:30 PM Ye Grissom, DO [facility]   3/7/2024  2:45 PM Allen Shanks, PT [facility]   3/12/2024  2:45 PM Allen Shanks, PT [facility]   3/14/2024  2:45 PM Allen Shanks, PT [facility]   3/18/2024  2:45 PM Isabel Goodman, PTA [facility]   3/20/2024  2:45 PM Isabel Goodman, PTA [facility]   3/21/2024  2:15 PM Oneil Mendez, DO [facility]   3/25/2024  2:45 PM Isabel Goodman, PTA [facility]   3/27/2024  2:45 PM Isabel Goodman, PTA [facility]   4/1/2024  1:00 PM Ravinder Napoles LISW [facility] AMB   4/22/2024 10:00 AM TRIM LAB RESOURCE TRIM [facility] AMB   4/29/2024 11:00 AM Sheila Patricia, APRN - CNP [facility] AMB   9/4/2024  1:15 PM Rafal Mccormack Jr., MD [facility] AMB   1/15/2025  2:00 PM Mitesh Evans DO [facility] AMB   1/15/2025  2:00 PM Carrie Vora AuD [facility] AMB

## 2024-03-06 ENCOUNTER — OFFICE VISIT (OUTPATIENT)
Dept: RHEUMATOLOGY | Age: 84
End: 2024-03-06
Payer: MEDICARE

## 2024-03-06 VITALS
HEART RATE: 64 BPM | RESPIRATION RATE: 16 BRPM | DIASTOLIC BLOOD PRESSURE: 68 MMHG | WEIGHT: 151 LBS | SYSTOLIC BLOOD PRESSURE: 145 MMHG | BODY MASS INDEX: 27.79 KG/M2 | HEIGHT: 62 IN | TEMPERATURE: 98.8 F

## 2024-03-06 DIAGNOSIS — M79.18 MYALGIA, OTHER SITE: ICD-10-CM

## 2024-03-06 DIAGNOSIS — F11.90 CHRONIC, CONTINUOUS USE OF OPIOIDS: ICD-10-CM

## 2024-03-06 DIAGNOSIS — Z79.899 LONG-TERM USE OF HIGH-RISK MEDICATION: ICD-10-CM

## 2024-03-06 DIAGNOSIS — M05.9 SEROPOSITIVE RHEUMATOID ARTHRITIS (HCC): ICD-10-CM

## 2024-03-06 DIAGNOSIS — M15.4 EROSIVE OSTEOARTHRITIS OF HANDS, BILATERAL: ICD-10-CM

## 2024-03-06 DIAGNOSIS — M54.2 CERVICALGIA: ICD-10-CM

## 2024-03-06 DIAGNOSIS — R51.9 ACUTE INTRACTABLE HEADACHE, UNSPECIFIED HEADACHE TYPE: ICD-10-CM

## 2024-03-06 DIAGNOSIS — F11.90 CHRONIC, CONTINUOUS USE OF OPIOIDS: Primary | ICD-10-CM

## 2024-03-06 LAB
ALBUMIN SERPL-MCNC: 4.2 G/DL (ref 3.2–4.6)
ALBUMIN SERPL-MCNC: 4.2 G/DL (ref 3.2–4.6)
ALBUMIN/GLOB SERPL: 1.4 (ref 0.4–1.6)
ALBUMIN/GLOB SERPL: 1.4 (ref 0.4–1.6)
ALP SERPL-CCNC: 91 U/L (ref 50–136)
ALP SERPL-CCNC: 94 U/L (ref 50–136)
ALT SERPL-CCNC: 30 U/L (ref 12–65)
ALT SERPL-CCNC: 30 U/L (ref 12–65)
AMPHET UR QL SCN: NEGATIVE
ANION GAP SERPL CALC-SCNC: 5 MMOL/L (ref 2–11)
AST SERPL-CCNC: 34 U/L (ref 15–37)
AST SERPL-CCNC: 37 U/L (ref 15–37)
BARBITURATES UR QL SCN: NEGATIVE
BASOPHILS # BLD: 0.1 K/UL (ref 0–0.2)
BASOPHILS NFR BLD: 1 % (ref 0–2)
BENZODIAZ UR QL: NEGATIVE
BILIRUB DIRECT SERPL-MCNC: 0.2 MG/DL
BILIRUB SERPL-MCNC: 0.6 MG/DL (ref 0.2–1.1)
BILIRUB SERPL-MCNC: 0.7 MG/DL (ref 0.2–1.1)
BUN SERPL-MCNC: 20 MG/DL (ref 8–23)
CALCIUM SERPL-MCNC: 9.8 MG/DL (ref 8.3–10.4)
CANNABINOIDS UR QL SCN: NEGATIVE
CHLORIDE SERPL-SCNC: 109 MMOL/L (ref 103–113)
CO2 SERPL-SCNC: 29 MMOL/L (ref 21–32)
COCAINE UR QL SCN: NEGATIVE
CREAT SERPL-MCNC: 1 MG/DL (ref 0.6–1)
CRP SERPL-MCNC: <0.3 MG/DL (ref 0–0.9)
DIFFERENTIAL METHOD BLD: ABNORMAL
EOSINOPHIL # BLD: 0.1 K/UL (ref 0–0.8)
EOSINOPHIL NFR BLD: 2 % (ref 0.5–7.8)
ERYTHROCYTE [DISTWIDTH] IN BLOOD BY AUTOMATED COUNT: 14.1 % (ref 11.9–14.6)
ERYTHROCYTE [SEDIMENTATION RATE] IN BLOOD: 6 MM/HR (ref 0–30)
GLOBULIN SER CALC-MCNC: 3 G/DL (ref 2.8–4.5)
GLOBULIN SER CALC-MCNC: 3.1 G/DL (ref 2.8–4.5)
GLUCOSE SERPL-MCNC: 86 MG/DL (ref 65–100)
HCT VFR BLD AUTO: 38.2 % (ref 35.8–46.3)
HGB BLD-MCNC: 12.1 G/DL (ref 11.7–15.4)
IMM GRANULOCYTES # BLD AUTO: 0 K/UL (ref 0–0.5)
IMM GRANULOCYTES NFR BLD AUTO: 0 % (ref 0–5)
LYMPHOCYTES # BLD: 1.4 K/UL (ref 0.5–4.6)
LYMPHOCYTES NFR BLD: 30 % (ref 13–44)
MCH RBC QN AUTO: 31.7 PG (ref 26.1–32.9)
MCHC RBC AUTO-ENTMCNC: 31.7 G/DL (ref 31.4–35)
MCV RBC AUTO: 100 FL (ref 82–102)
METHADONE UR QL: NEGATIVE
MONOCYTES # BLD: 0.4 K/UL (ref 0.1–1.3)
MONOCYTES NFR BLD: 9 % (ref 4–12)
NEUTS SEG # BLD: 2.7 K/UL (ref 1.7–8.2)
NEUTS SEG NFR BLD: 58 % (ref 43–78)
NRBC # BLD: 0 K/UL (ref 0–0.2)
OPIATES UR QL: NEGATIVE
PCP UR QL: NEGATIVE
PLATELET # BLD AUTO: 229 K/UL (ref 150–450)
PMV BLD AUTO: 11.1 FL (ref 9.4–12.3)
POTASSIUM SERPL-SCNC: 5 MMOL/L (ref 3.5–5.1)
PROT SERPL-MCNC: 7.2 G/DL (ref 6.3–8.2)
PROT SERPL-MCNC: 7.3 G/DL (ref 6.3–8.2)
RBC # BLD AUTO: 3.82 M/UL (ref 4.05–5.2)
SODIUM SERPL-SCNC: 143 MMOL/L (ref 136–146)
WBC # BLD AUTO: 4.7 K/UL (ref 4.3–11.1)

## 2024-03-06 PROCEDURE — 3078F DIAST BP <80 MM HG: CPT | Performed by: INTERNAL MEDICINE

## 2024-03-06 PROCEDURE — G8484 FLU IMMUNIZE NO ADMIN: HCPCS | Performed by: INTERNAL MEDICINE

## 2024-03-06 PROCEDURE — G8417 CALC BMI ABV UP PARAM F/U: HCPCS | Performed by: INTERNAL MEDICINE

## 2024-03-06 PROCEDURE — 1090F PRES/ABSN URINE INCON ASSESS: CPT | Performed by: INTERNAL MEDICINE

## 2024-03-06 PROCEDURE — 3077F SYST BP >= 140 MM HG: CPT | Performed by: INTERNAL MEDICINE

## 2024-03-06 PROCEDURE — G8427 DOCREV CUR MEDS BY ELIG CLIN: HCPCS | Performed by: INTERNAL MEDICINE

## 2024-03-06 PROCEDURE — 1123F ACP DISCUSS/DSCN MKR DOCD: CPT | Performed by: INTERNAL MEDICINE

## 2024-03-06 PROCEDURE — 1036F TOBACCO NON-USER: CPT | Performed by: INTERNAL MEDICINE

## 2024-03-06 PROCEDURE — 99214 OFFICE O/P EST MOD 30 MIN: CPT | Performed by: INTERNAL MEDICINE

## 2024-03-06 PROCEDURE — G8399 PT W/DXA RESULTS DOCUMENT: HCPCS | Performed by: INTERNAL MEDICINE

## 2024-03-06 RX ORDER — PREDNISONE 5 MG/1
TABLET ORAL
Qty: 162 TABLET | Refills: 0 | Status: SHIPPED | OUTPATIENT
Start: 2024-03-06 | End: 2024-05-19

## 2024-03-06 ASSESSMENT — ROUTINE ASSESSMENT OF PATIENT INDEX DATA (RAPID3)
ON A SCALE OF ONE TO TEN, HOW DIFFICULT WAS IT FOR YOU TO COMPLETE THE LISTED DAILY PHYSICAL TASKS OVER THE LAST WEEK: 1.6
ON A SCALE OF ONE TO TEN, CONSIDERING ALL THE WAYS IN WHICH ILLNESS AND HEALTH CONDITIONS MAY AFFECT YOU AT THIS TIME, PLEASE INDICATE BELOW HOW YOU ARE DOING:: 10
WHEN YOU AWAKENED IN THE MORNING OVER THE LAST WEEK, PLEASE INDICATE THE AMOUNT OF TIME IT TAKES UNTIL YOU ARE AS LIMBER AS YOU WILL BE FOR THE DAY: > 1 HOUR
ON A SCALE OF ONE TO TEN, HOW MUCH PAIN HAVE YOU HAD BECAUSE OF YOUR CONDITION OVER THE PAST WEEK?: 10
ON A SCALE OF ONE TO TEN, HOW MUCH OF A PROBLEM HAS UNUSUAL FATIGUE OR TIREDNESS BEEN FOR YOU OVER THE PAST WEEK?: 8

## 2024-03-06 ASSESSMENT — JOINT PAIN
TOTAL NUMBER OF TENDER JOINTS: 14
TOTAL NUMBER OF SWOLLEN JOINTS: 7

## 2024-03-06 NOTE — PATIENT INSTRUCTIONS
Purchase an enema to use now for constipation  Agree with stopping sulfasalazine   Prednisone 15mg daily for 2 weeks, then 10mg daily  Stop taking meloxicam   Citracal petites 2 tabs daily  Return for phone visit in 2 week    Anti-inflammatory diet    [email or web address]://[email or web address]/health-wellness/[email or web address]-living/nutrition/[email or web address]/the-[email or web address]-[email or web address]-diet

## 2024-03-06 NOTE — PROGRESS NOTES
REVIEW OF SYSTEMS:  A total of 10 systems (musculoskeletal system as stated in the HPI and the following 9 systems) were reviewed with patient today and were negative except as stated in the HPI and except for the following (depicted with an \"X\"):        \"X\" Constitutional  \"X\" HEENT /Mouth  \"X\" Cardiovascular and  Respiratory (2 systems)  \"X\" Gastrointestinal    Fever/chills  x Hair loss   Shortness of breath   Upset stomach    Falls  x Dry mouth  x Coughing  x Diarrhea / constipation    Wt loss  x Mouth sores  x Wheezing  x Heartburn   x Wt gain   Ringing ears  x Chest pain  x Dark or bloody stools   x Night sweats  x Diff. swallowing   None of above  x Nausea or vomiting    None of above   None of above      None of above                \"X\" Integumentary  \"X\" Neurological  \"X\" Genitourinary  \"X\" Psychiatric   x Easy bruising  x Numbness/ tingling   Female problems  x Depression   x Rashes  x Weakness   Problems with urination   Feeling anxious    Sun sensitivity  x Headaches  X None of above   Problems sleeping    None of above   None of above      None of above       
Volume MOD A4C 01/17/2024 75 (A)  22 - 52 mL Final    LA Volume BP 01/17/2024 64 (A)  22 - 52 mL Final    AV Peak Gradient 01/17/2024 5  mmHg Final    AV Peak Velocity 01/17/2024 1.1  m/s Final    MV A Velocity 01/17/2024 0.63  m/s Final    MV E Wave Deceleration Time 01/17/2024 246.5  ms Final    MV E Velocity 01/17/2024 0.99  m/s Final    LV E' Lateral Velocity 01/17/2024 15  cm/s Final    LV E' Septal Velocity 01/17/2024 5  cm/s Final    MR Peak Gradient 01/17/2024 81  mmHg Final    MR Peak Velocity 01/17/2024 4.5  m/s Final    TAPSE 01/17/2024 1.9  1.7 cm Final    TR Peak Gradient 01/17/2024 43  mmHg Final    TR Max Velocity 01/17/2024 3.29  m/s Final    Body Surface Area 01/17/2024 1.74  m2 Final    Fractional Shortening 2D 01/17/2024 30  28 - 44 % Final    LV ESV Index BP 01/17/2024 15  mL/m2 Final    LV EDV Index BP 01/17/2024 41  mL/m2 Final    LV ESV Index A4C 01/17/2024 12  mL/m2 Final    LV EDV Index A4C 01/17/2024 41  mL/m2 Final    LV ESV Index A2C 01/17/2024 20  mL/m2 Final    LV EDV Index A2C 01/17/2024 42  mL/m2 Final    LVIDd Index 01/17/2024 2.34  cm/m2 Final    LVIDs Index 01/17/2024 1.64  cm/m2 Final    LV RWT Ratio 01/17/2024 0.50   Final    LV Mass 2D 01/17/2024 118.2  67 - 162 g Final    LV Mass 2D Index 01/17/2024 69.1  43 - 95 g/m2 Final    MV E/A 01/17/2024 1.57   Final    E/E' Ratio (Averaged) 01/17/2024 13.20   Final    E/E' Lateral 01/17/2024 6.60   Final    LA Volume Index BP 01/17/2024 37 (A)  16 - 34 ml/m2 Final    LA Volume Index A/L 01/17/2024 41  16 - 34 mL/m2 Final    LA Volume Index A-L A2C 01/17/2024 34  16 - 34 mL/m2 Final    LA Volume Index A-L A4C 01/17/2024 48 (A)  16 - 34 mL/m2 Final    LA Volume Index MOD A2C 01/17/2024 32  16 - 34 ml/m2 Final    LA Volume Index MOD A4C 01/17/2024 44 (A)  16 - 34 ml/m2 Final    LA Size Index 01/17/2024 2.46  cm/m2 Final    AV Velocity Ratio 01/17/2024 0.64   Final    Est. RA Pressure 01/17/2024 3  mmHg Final    RVSP 01/17/2024 46  mmHg

## 2024-03-07 ENCOUNTER — HOSPITAL ENCOUNTER (OUTPATIENT)
Dept: PHYSICAL THERAPY | Age: 84
Setting detail: RECURRING SERIES
End: 2024-03-07
Attending: ORTHOPAEDIC SURGERY
Payer: MEDICARE

## 2024-03-07 ENCOUNTER — TELEPHONE (OUTPATIENT)
Dept: RHEUMATOLOGY | Age: 84
End: 2024-03-07

## 2024-03-07 RX ORDER — FLUTICASONE PROPIONATE 50 MCG
2 SPRAY, SUSPENSION (ML) NASAL 2 TIMES DAILY
Qty: 3 EACH | Refills: 3 | Status: SHIPPED | OUTPATIENT
Start: 2024-03-07

## 2024-03-08 ENCOUNTER — HOSPITAL ENCOUNTER (OUTPATIENT)
Dept: PHYSICAL THERAPY | Age: 84
Setting detail: RECURRING SERIES
Discharge: HOME | End: 2024-03-11
Attending: ORTHOPAEDIC SURGERY
Payer: MEDICARE

## 2024-03-08 PROCEDURE — 97110 THERAPEUTIC EXERCISES: CPT

## 2024-03-08 NOTE — PROGRESS NOTES
session: Next visit will focus on improving strength, balance, mobility and endurance.    >Total Treatment Billable Duration: 43 minutes   Time In: 1140  Time Out: 1225    Isabel Goodman PTA       Charge Capture  }Post Session Pain  PT Visit Info  [facility]  MD Guidelines  Scanned Media  Benefits  MyChart    Future Appointments   Date Time Provider Department Center   3/12/2024  2:45 PM Allen Shanks, PT [facility]   3/14/2024  2:45 PM Allen Shanks, PT [facility]   3/18/2024  2:45 PM Isabel Goodman, PTA [facility]   3/20/2024  2:45 PM Isabel Goodman, PTA [facility]   3/21/2024  2:15 PM Oneil Mendez, DO [facility]   3/22/2024  2:30 PM Ye Grissom, DO [facility]   3/25/2024  2:45 PM Isabel Goodman, PTA [facility]   3/27/2024  2:45 PM Isabel Goodman, PTA [facility]   4/1/2024  1:00 PM Ravinder Napoles LISW [facility]   4/22/2024 10:00 AM TRIM LAB RESOURCE TRIM [facility]   4/29/2024 11:00 AM Sheila Patricia, APRN - CNP TRIM [facility]   9/4/2024  1:15 PM Rafal Mccormack Jr., MD [facility]   1/15/2025  2:00 PM Mitesh Evans DO [facility]   1/15/2025  2:00 PM Carrie Vora AuD [facility]

## 2024-03-11 ENCOUNTER — TELEPHONE (OUTPATIENT)
Dept: INTERNAL MEDICINE CLINIC | Facility: CLINIC | Age: 84
End: 2024-03-11

## 2024-03-11 NOTE — TELEPHONE ENCOUNTER
Patient states you referred her to the Rheumatologist for the pain and issues. Patient states she was given prednisone made her feel good the first day. Patient states she was able to walk across the floor. Patient states she was given a high powered shot in ER which gave her relief. She went back to Dr. Grubbs (Rheumatologist) and was given the prednisone. Patient states she now has back pain. She called Dr. Glynn office and she ordered an MRI for her. She would like to get this done at [facility]. Per Dr. Glynn office instructions she was told to call here for us to fax over a referral for her to get the test completed there. Please Advise.

## 2024-03-11 NOTE — TELEPHONE ENCOUNTER
Patient called and states that she would like to know if you will give her a call back. Patient did not state what she needed to speak to you about. Please Advise.

## 2024-03-12 ENCOUNTER — TELEPHONE (OUTPATIENT)
Dept: RHEUMATOLOGY | Age: 84
End: 2024-03-12

## 2024-03-12 ENCOUNTER — TELEPHONE (OUTPATIENT)
Dept: INTERNAL MEDICINE CLINIC | Facility: CLINIC | Age: 84
End: 2024-03-12

## 2024-03-12 ENCOUNTER — HOSPITAL ENCOUNTER (OUTPATIENT)
Dept: PHYSICAL THERAPY | Age: 84
Setting detail: RECURRING SERIES
End: 2024-03-12
Attending: ORTHOPAEDIC SURGERY
Payer: MEDICARE

## 2024-03-12 NOTE — TELEPHONE ENCOUNTER
I called the patient regarding her:  Severe back pain  Been ongoing for a few months, tried to ignore it  Worst w/activity and better with rest  Difficult to walk and cancel her therapy b/c of this  Even sitting down it's hurts real bad  10/10 pain  Dr. Glnyn wants her to have an MRI of her lumbar spine, will have this done around 4/9  She is taking the prednisone - helping a little  She did increase her tramadol 50mg BID - helping a little   She continues to take tylenol arthritis   She has so much pain in her stomach on the left side    Advised her to continue taking her medications.  Has MRI scheduled with Dr. Glynn.  Needs to take miralax daily while on tramadol which can cause constipation.

## 2024-03-12 NOTE — TELEPHONE ENCOUNTER
Patient states she would like for Dr. Renard mike to have the results. She is going to call Dr. Renard mike see if they can fax order over.

## 2024-03-12 NOTE — TELEPHONE ENCOUNTER
Patient would like to know if you will give her a call back in regards to yesterdays conversation. Please Advise.

## 2024-03-12 NOTE — TELEPHONE ENCOUNTER
Pt left vm that she needs to have an MRI of her lower back but needs someone to order it. She said that she is having trouble walking. She is trying to get in touch with her PCP to see if they will order it for her. She has not seen Dr Glynn in a year so she did not know if she needed to reach out to her or not. She said that Dr Grissom started her on Prednisone. She said that she is not taking the Sulfasalazine. She also said that her constipation is much better.     SHE HAS A VV SCHEDULED FOR 3/22/24 AND SHE SAID THAT SHE CAN DISCUSS ALL OF THIS AT THAT TIME.

## 2024-03-13 LAB
BIOMARKER COMMENT: NORMAL
CRP RESULT: 1.7 MG/L
EGF RESULT: 54 PG/ML
FOOTNOTE/HISTORY: NORMAL
IL-6 RESULT: 14 PG/ML
LEPTIN RESULT: 19 NG/ML
Lab: NORMAL
Lab: NORMAL
MMP-1 RESULT: 11 NG/ML
MMP3 RESULT: 14 NG/ML
RESISTIN RESULT: 1.1 NG/ML
RISK OF RADIOGRAPHIC PROGRESS: 4 %
SAA RESULT: 1 UG/ML
TNF-RI RESULT: 0.83 NG/ML
VCAM-1 RESULT: 1.2 UG/ML
VECTRA SCORE: 32
VECTRA(R) LEVEL: NORMAL
VEGF-A RESULT: 360 PG/ML
YKL-40 RESULT: 140 NG/ML

## 2024-03-14 ENCOUNTER — HOSPITAL ENCOUNTER (OUTPATIENT)
Dept: PHYSICAL THERAPY | Age: 84
Setting detail: RECURRING SERIES
End: 2024-03-14
Attending: ORTHOPAEDIC SURGERY
Payer: MEDICARE

## 2024-03-18 ENCOUNTER — HOSPITAL ENCOUNTER (OUTPATIENT)
Dept: PHYSICAL THERAPY | Age: 84
Setting detail: RECURRING SERIES
End: 2024-03-18
Attending: ORTHOPAEDIC SURGERY
Payer: MEDICARE

## 2024-03-18 NOTE — PROGRESS NOTES
Pt cancelled and requested to be discharged. She is taking care of her  and is having difficulty attending PT visits.

## 2024-03-20 ENCOUNTER — APPOINTMENT (OUTPATIENT)
Dept: PHYSICAL THERAPY | Age: 84
End: 2024-03-20
Attending: ORTHOPAEDIC SURGERY
Payer: MEDICARE

## 2024-03-21 ENCOUNTER — OFFICE VISIT (OUTPATIENT)
Age: 84
End: 2024-03-21
Payer: MEDICARE

## 2024-03-21 VITALS
SYSTOLIC BLOOD PRESSURE: 120 MMHG | WEIGHT: 152 LBS | DIASTOLIC BLOOD PRESSURE: 60 MMHG | BODY MASS INDEX: 27.97 KG/M2 | HEIGHT: 62 IN | HEART RATE: 64 BPM

## 2024-03-21 DIAGNOSIS — I65.23 BILATERAL CAROTID ARTERY STENOSIS: ICD-10-CM

## 2024-03-21 DIAGNOSIS — I10 HYPERTENSION, BENIGN: Primary | ICD-10-CM

## 2024-03-21 DIAGNOSIS — I25.10 ATHEROSCLEROSIS OF NATIVE CORONARY ARTERY OF NATIVE HEART WITHOUT ANGINA PECTORIS: ICD-10-CM

## 2024-03-21 DIAGNOSIS — I73.9 PVD (PERIPHERAL VASCULAR DISEASE) (HCC): ICD-10-CM

## 2024-03-21 DIAGNOSIS — R00.2 PALPITATIONS: ICD-10-CM

## 2024-03-21 PROCEDURE — G8417 CALC BMI ABV UP PARAM F/U: HCPCS | Performed by: INTERNAL MEDICINE

## 2024-03-21 PROCEDURE — 1090F PRES/ABSN URINE INCON ASSESS: CPT | Performed by: INTERNAL MEDICINE

## 2024-03-21 PROCEDURE — G8428 CUR MEDS NOT DOCUMENT: HCPCS | Performed by: INTERNAL MEDICINE

## 2024-03-21 PROCEDURE — G8399 PT W/DXA RESULTS DOCUMENT: HCPCS | Performed by: INTERNAL MEDICINE

## 2024-03-21 PROCEDURE — 1123F ACP DISCUSS/DSCN MKR DOCD: CPT | Performed by: INTERNAL MEDICINE

## 2024-03-21 PROCEDURE — 99214 OFFICE O/P EST MOD 30 MIN: CPT | Performed by: INTERNAL MEDICINE

## 2024-03-21 PROCEDURE — 3074F SYST BP LT 130 MM HG: CPT | Performed by: INTERNAL MEDICINE

## 2024-03-21 PROCEDURE — 1036F TOBACCO NON-USER: CPT | Performed by: INTERNAL MEDICINE

## 2024-03-21 PROCEDURE — 3078F DIAST BP <80 MM HG: CPT | Performed by: INTERNAL MEDICINE

## 2024-03-21 PROCEDURE — G8484 FLU IMMUNIZE NO ADMIN: HCPCS | Performed by: INTERNAL MEDICINE

## 2024-03-21 NOTE — PROGRESS NOTES
Katie Israel, was evaluated through a synchronous (real-time) audio encounter. The patient (or guardian if applicable) is aware that this is a billable service, which includes applicable co-pays. This Virtual Visit was conducted with patient's (and/or legal guardian's) consent. Patient identification was verified, and a caregiver was present when appropriate.   The patient was located at Home: [address] [place], [place] [address]ers [facility] [address]  Provider was located at Home (Appt Dept State): MARIE Israel (:  1940) is a Established patient, presenting virtually for evaluation of the following:    Assessment & Plan   Below is the assessment and plan developed based on review of pertinent history, physical exam, labs, studies, and medications.  1. Seropositive rheumatoid arthritis (HCC)  Assessment & Plan:  Seropositive (+RF 17.5, +14.3.3 ETA 3.24) erosive RA dx 2024  Manifestations: inflammatory arthritis of the hands and wrists, hips, shoulders, sicca sx  b/l hand XR 2024: Bilateral SLAC wrist, degenerative, postsurgical change. 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 ESR, CRP were both normal. She was started on prednisone 15mg daily with improvement in her hip and shoulder pain but her headache persists. She also reports having symptoms of jaw claudication, scalp tenderness, and vision changes which are concerning for this condition.    Plan:  -check stat bilateral temporal artery US to assess for changes suggestive of GCA  -I have called the patients ophthalmology office and they will have her evaluated for AION or other cause of visual changes reported above, they will see her this coming Monday   -start

## 2024-03-21 NOTE — PROGRESS NOTES
[address] [facility], [place] [address]  PHONE: [phone]     24    NAME:  Katie Israel  : 1940  MRN: [id number]       SUBJECTIVE:   Katie Israel is a 83 y.o. female seen for a follow up visit regarding the following:     Chief Complaint   Patient presents with    Hyperlipidemia    Hypertension       HPI:   Here for HTN, Carotid Dz, CAD.    Ca score 2017 was 83.   Carotid US 2019 and 3/2021 and 3/2023: mild to mod LICA dz  Echo 2021 and 2024: normal EF, mild pHTN  NST 2024: The study is negative for myocardial ischemia. Findings suggest a low risk of cardiac events.      She has some palp.   Pos for RA.  Seeing rheum in later Dec.  More arthritis issues.  New and getting worse. On prednisone now, 14 days at this time.    Some SAINI, some SOB.  No CP, pressure.       Patient denies recent history of orthopnea, PND, excessive dizziness and/or syncope.      Past Medical History, Past Surgical History, Family history, Social History, and Medications were all reviewed with the patient today and updated as necessary.     Current Outpatient Medications   Medication Sig Dispense Refill    predniSONE (DELTASONE) 5 MG tablet Take 3 tablets by mouth daily for 14 days, THEN 2 tablets daily. 162 tablet 0    omeprazole (PRILOSEC) 20 MG delayed release capsule TAKE 1 CAPSULE BY MOUTH EVERY DAY AS NEEDED FOR REFLUX 90 capsule 1    traMADol (ULTRAM) 50 MG tablet Take 1 tablet by mouth every 12 hours as needed for Pain for up to 60 days. Intended supply: 5 days. Take lowest dose possible to manage pain Max Daily Amount: 100 mg 60 tablet 1    acetaminophen (TYLENOL 8 HOUR ARTHRITIS PAIN) 650 MG extended release tablet Take 1 tablet by mouth in the morning and at bedtime      Thiamine HCl (VITAMIN B-1) 50 MG tablet TAKE 1 TABLET BY MOUTH EVERY DAY 90 tablet 1    citalopram (CELEXA) 20 MG tablet Take 1 tablet by mouth daily 90 tablet 1    vitamin B-12 (CYANOCOBALAMIN) 500 MCG tablet

## 2024-03-22 ENCOUNTER — TELEMEDICINE (OUTPATIENT)
Dept: RHEUMATOLOGY | Age: 84
End: 2024-03-22
Payer: MEDICARE

## 2024-03-22 ENCOUNTER — TELEPHONE (OUTPATIENT)
Age: 84
End: 2024-03-22

## 2024-03-22 ENCOUNTER — TELEPHONE (OUTPATIENT)
Dept: ORTHOPEDIC SURGERY | Age: 84
End: 2024-03-22

## 2024-03-22 DIAGNOSIS — Z96.611 PRESENCE OF RIGHT ARTIFICIAL SHOULDER JOINT: ICD-10-CM

## 2024-03-22 DIAGNOSIS — M15.4 EROSIVE OSTEOARTHRITIS OF HANDS, BILATERAL: ICD-10-CM

## 2024-03-22 DIAGNOSIS — R51.9 SCALP TENDERNESS: ICD-10-CM

## 2024-03-22 DIAGNOSIS — M05.9 SEROPOSITIVE RHEUMATOID ARTHRITIS (HCC): Primary | ICD-10-CM

## 2024-03-22 DIAGNOSIS — M26.69 JAW CLAUDICATION: ICD-10-CM

## 2024-03-22 DIAGNOSIS — R51.9 ACUTE INTRACTABLE HEADACHE, UNSPECIFIED HEADACHE TYPE: ICD-10-CM

## 2024-03-22 DIAGNOSIS — M48.061 SPINAL STENOSIS OF LUMBAR REGION, UNSPECIFIED WHETHER NEUROGENIC CLAUDICATION PRESENT: ICD-10-CM

## 2024-03-22 DIAGNOSIS — H53.9 CHANGES IN VISION: ICD-10-CM

## 2024-03-22 PROCEDURE — 99443 PR PHYS/QHP TELEPHONE EVALUATION 21-30 MIN: CPT | Performed by: INTERNAL MEDICINE

## 2024-03-22 RX ORDER — LEFLUNOMIDE 20 MG/1
10 TABLET ORAL DAILY
Qty: 15 TABLET | Refills: 2 | Status: SHIPPED | OUTPATIENT
Start: 2024-03-22 | End: 2024-06-20

## 2024-03-22 NOTE — ASSESSMENT & PLAN NOTE
Having low back pain with suspected radiculopathy further evaluated with MRI of the L spine w/Dr. Glynn next week.

## 2024-03-22 NOTE — ASSESSMENT & PLAN NOTE
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 ESR, CRP were both normal. She was started on prednisone 15mg daily with improvement in her hip and shoulder pain but her headache persists. She also reports having symptoms of jaw claudication, scalp tenderness, and vision changes which are concerning for this condition.    Plan:  -check stat bilateral temporal artery US to assess for changes suggestive of GCA  -I have called the patients ophthalmology office and they will have her evaluated for AION or other cause of visual changes reported above, they will see her this coming Monday   -start leflunomide 10mg daily  -continue prednisone 10mg daily  -RTC In 4 weeks for f/u    Discussed the risks and benefits of Leflunomide with patient including, but not limited to, nausea, diarrhea, rash, alopecia, liver toxicity, increased risk of infection, and need for lab monitoring every 8-12 weeks.  Patient expressed understanding of these risks and wishes to proceed with treatment.     Patient is aware that if she is sick requiring her to be on an antibiotic or antiviral drug she will need to skip administering the biologic and taking the methotrexate until she has completed the antibiotic/antiviral course and is over the infection.    Risks and benefits of steroid use discussed with patient including but not limited to elevated blood sugar or worsening of current diabetes, increased appetite, weight gain, thin skin with easy bruising,

## 2024-03-22 NOTE — PATIENT INSTRUCTIONS
Schedule eye appointment for next week b/c of new headache and vision changes  Schedule temporal artery ultrasound  Stay on prednisone 10mg daily  Start leflunomide 10mg (1/2 tab) daily   Follow-up in 1 month in person

## 2024-03-25 ENCOUNTER — APPOINTMENT (OUTPATIENT)
Dept: PHYSICAL THERAPY | Age: 84
End: 2024-03-25
Attending: ORTHOPAEDIC SURGERY
Payer: MEDICARE

## 2024-03-27 ENCOUNTER — APPOINTMENT (OUTPATIENT)
Dept: PHYSICAL THERAPY | Age: 84
End: 2024-03-27
Attending: ORTHOPAEDIC SURGERY
Payer: MEDICARE

## 2024-04-04 ENCOUNTER — HOSPITAL ENCOUNTER (OUTPATIENT)
Dept: ULTRASOUND IMAGING | Age: 84
Discharge: HOME | End: 2024-04-04
Attending: INTERNAL MEDICINE
Payer: MEDICARE

## 2024-04-04 DIAGNOSIS — R51.9 SCALP TENDERNESS: ICD-10-CM

## 2024-04-04 DIAGNOSIS — R51.9 ACUTE INTRACTABLE HEADACHE, UNSPECIFIED HEADACHE TYPE: ICD-10-CM

## 2024-04-04 DIAGNOSIS — M26.69 JAW CLAUDICATION: ICD-10-CM

## 2024-04-04 DIAGNOSIS — H53.9 CHANGES IN VISION: ICD-10-CM

## 2024-04-04 PROCEDURE — 93882 EXTRACRANIAL UNI/LTD STUDY: CPT

## 2024-04-05 PROCEDURE — 93882 EXTRACRANIAL UNI/LTD STUDY: CPT | Performed by: RADIOLOGY

## 2024-04-11 ENCOUNTER — TELEPHONE (OUTPATIENT)
Dept: RHEUMATOLOGY | Age: 84
End: 2024-04-11

## 2024-04-11 NOTE — TELEPHONE ENCOUNTER
Spoke with PT about reducing her prednisone and also told her about her temporal artery ultrasound was not suggestive.

## 2024-04-22 ENCOUNTER — NURSE ONLY (OUTPATIENT)
Dept: INTERNAL MEDICINE CLINIC | Facility: CLINIC | Age: 84
End: 2024-04-22

## 2024-04-22 DIAGNOSIS — R79.89 ABNORMAL CBC: Primary | ICD-10-CM

## 2024-04-22 DIAGNOSIS — R71.8 RBC ABNORMALITY: ICD-10-CM

## 2024-04-22 DIAGNOSIS — K21.00 GASTROESOPHAGEAL REFLUX DISEASE WITH ESOPHAGITIS WITHOUT HEMORRHAGE: ICD-10-CM

## 2024-04-22 DIAGNOSIS — D75.89 MACROCYTOSIS WITHOUT ANEMIA: ICD-10-CM

## 2024-04-22 DIAGNOSIS — F32.A FATIGUE DUE TO DEPRESSION: ICD-10-CM

## 2024-04-22 DIAGNOSIS — R53.83 FATIGUE DUE TO DEPRESSION: ICD-10-CM

## 2024-04-22 LAB
ALBUMIN SERPL-MCNC: 4 G/DL (ref 3.2–4.6)
ALBUMIN/GLOB SERPL: 1.3 (ref 0.4–1.6)
ALP SERPL-CCNC: 73 U/L (ref 50–136)
ALT SERPL-CCNC: 29 U/L (ref 12–65)
ANION GAP SERPL CALC-SCNC: 6 MMOL/L (ref 2–11)
AST SERPL-CCNC: 26 U/L (ref 15–37)
BILIRUB SERPL-MCNC: 0.8 MG/DL (ref 0.2–1.1)
BUN SERPL-MCNC: 20 MG/DL (ref 8–23)
CALCIUM SERPL-MCNC: 9.6 MG/DL (ref 8.3–10.4)
CHLORIDE SERPL-SCNC: 106 MMOL/L (ref 103–113)
CO2 SERPL-SCNC: 29 MMOL/L (ref 21–32)
CREAT SERPL-MCNC: 1 MG/DL (ref 0.6–1)
ERYTHROCYTE [DISTWIDTH] IN BLOOD BY AUTOMATED COUNT: 15.6 % (ref 11.9–14.6)
FERRITIN SERPL-MCNC: 99 NG/ML (ref 8–388)
GLOBULIN SER CALC-MCNC: 3 G/DL (ref 2.8–4.5)
GLUCOSE SERPL-MCNC: 121 MG/DL (ref 65–100)
HCT VFR BLD AUTO: 39 % (ref 35.8–46.3)
HGB BLD-MCNC: 12.4 G/DL (ref 11.7–15.4)
IRON SERPL-MCNC: 109 UG/DL (ref 35–150)
MCH RBC QN AUTO: 32.5 PG (ref 26.1–32.9)
MCHC RBC AUTO-ENTMCNC: 31.8 G/DL (ref 31.4–35)
MCV RBC AUTO: 102.4 FL (ref 82–102)
NRBC # BLD: 0 K/UL (ref 0–0.2)
PLATELET # BLD AUTO: 222 K/UL (ref 150–450)
PMV BLD AUTO: 11.2 FL (ref 9.4–12.3)
POTASSIUM SERPL-SCNC: 4 MMOL/L (ref 3.5–5.1)
PROT SERPL-MCNC: 7 G/DL (ref 6.3–8.2)
RBC # BLD AUTO: 3.81 M/UL (ref 4.05–5.2)
SODIUM SERPL-SCNC: 141 MMOL/L (ref 136–146)
VIT B12 SERPL-MCNC: 1327 PG/ML (ref 193–986)
WBC # BLD AUTO: 8.1 K/UL (ref 4.3–11.1)

## 2024-04-29 ENCOUNTER — TELEPHONE (OUTPATIENT)
Age: 84
End: 2024-04-29

## 2024-04-29 ENCOUNTER — OFFICE VISIT (OUTPATIENT)
Dept: INTERNAL MEDICINE CLINIC | Facility: CLINIC | Age: 84
End: 2024-04-29
Payer: MEDICARE

## 2024-04-29 VITALS
DIASTOLIC BLOOD PRESSURE: 54 MMHG | HEART RATE: 64 BPM | TEMPERATURE: 98.1 F | OXYGEN SATURATION: 97 % | SYSTOLIC BLOOD PRESSURE: 120 MMHG | BODY MASS INDEX: 28.52 KG/M2 | HEIGHT: 62 IN | WEIGHT: 155 LBS

## 2024-04-29 DIAGNOSIS — Z74.8 ASSISTANCE NEEDED WITH TRANSPORTATION: ICD-10-CM

## 2024-04-29 DIAGNOSIS — F32.A FATIGUE DUE TO DEPRESSION: ICD-10-CM

## 2024-04-29 DIAGNOSIS — R15.2 INCONTINENCE OF FECES WITH FECAL URGENCY: ICD-10-CM

## 2024-04-29 DIAGNOSIS — R53.83 FATIGUE DUE TO DEPRESSION: ICD-10-CM

## 2024-04-29 DIAGNOSIS — N39.41 URGE INCONTINENCE OF URINE: ICD-10-CM

## 2024-04-29 DIAGNOSIS — M05.80 POLYARTHRITIS WITH POSITIVE RHEUMATOID FACTOR (HCC): ICD-10-CM

## 2024-04-29 DIAGNOSIS — R15.9 INCONTINENCE OF FECES WITH FECAL URGENCY: ICD-10-CM

## 2024-04-29 DIAGNOSIS — R73.09 ELEVATED GLUCOSE: ICD-10-CM

## 2024-04-29 DIAGNOSIS — Z00.00 MEDICARE ANNUAL WELLNESS VISIT, SUBSEQUENT: Primary | ICD-10-CM

## 2024-04-29 DIAGNOSIS — D75.89 MACROCYTOSIS WITHOUT ANEMIA: ICD-10-CM

## 2024-04-29 DIAGNOSIS — E78.2 MIXED HYPERLIPIDEMIA: ICD-10-CM

## 2024-04-29 LAB — METHYLMALONATE SERPL-SCNC: 212 NMOL/L (ref 0–378)

## 2024-04-29 PROCEDURE — 99214 OFFICE O/P EST MOD 30 MIN: CPT | Performed by: NURSE PRACTITIONER

## 2024-04-29 PROCEDURE — G8427 DOCREV CUR MEDS BY ELIG CLIN: HCPCS | Performed by: NURSE PRACTITIONER

## 2024-04-29 PROCEDURE — G8417 CALC BMI ABV UP PARAM F/U: HCPCS | Performed by: NURSE PRACTITIONER

## 2024-04-29 PROCEDURE — 3078F DIAST BP <80 MM HG: CPT | Performed by: NURSE PRACTITIONER

## 2024-04-29 PROCEDURE — G8399 PT W/DXA RESULTS DOCUMENT: HCPCS | Performed by: NURSE PRACTITIONER

## 2024-04-29 PROCEDURE — 1036F TOBACCO NON-USER: CPT | Performed by: NURSE PRACTITIONER

## 2024-04-29 PROCEDURE — 3074F SYST BP LT 130 MM HG: CPT | Performed by: NURSE PRACTITIONER

## 2024-04-29 PROCEDURE — 1123F ACP DISCUSS/DSCN MKR DOCD: CPT | Performed by: NURSE PRACTITIONER

## 2024-04-29 PROCEDURE — 0509F URINE INCON PLAN DOCD: CPT | Performed by: NURSE PRACTITIONER

## 2024-04-29 PROCEDURE — 1090F PRES/ABSN URINE INCON ASSESS: CPT | Performed by: NURSE PRACTITIONER

## 2024-04-29 PROCEDURE — G0439 PPPS, SUBSEQ VISIT: HCPCS | Performed by: NURSE PRACTITIONER

## 2024-04-29 SDOH — ECONOMIC STABILITY: FOOD INSECURITY: WITHIN THE PAST 12 MONTHS, THE FOOD YOU BOUGHT JUST DIDN'T LAST AND YOU DIDN'T HAVE MONEY TO GET MORE.: NEVER TRUE

## 2024-04-29 SDOH — ECONOMIC STABILITY: FOOD INSECURITY: WITHIN THE PAST 12 MONTHS, YOU WORRIED THAT YOUR FOOD WOULD RUN OUT BEFORE YOU GOT MONEY TO BUY MORE.: NEVER TRUE

## 2024-04-29 SDOH — ECONOMIC STABILITY: INCOME INSECURITY: HOW HARD IS IT FOR YOU TO PAY FOR THE VERY BASICS LIKE FOOD, HOUSING, MEDICAL CARE, AND HEATING?: NOT HARD AT ALL

## 2024-04-29 ASSESSMENT — PATIENT HEALTH QUESTIONNAIRE - PHQ9
SUM OF ALL RESPONSES TO PHQ QUESTIONS 1-9: 1
1. LITTLE INTEREST OR PLEASURE IN DOING THINGS: SEVERAL DAYS
SUM OF ALL RESPONSES TO PHQ QUESTIONS 1-9: 1
2. FEELING DOWN, DEPRESSED OR HOPELESS: NOT AT ALL
SUM OF ALL RESPONSES TO PHQ9 QUESTIONS 1 & 2: 1
SUM OF ALL RESPONSES TO PHQ QUESTIONS 1-9: 1
SUM OF ALL RESPONSES TO PHQ QUESTIONS 1-9: 1

## 2024-04-29 ASSESSMENT — LIFESTYLE VARIABLES
HOW OFTEN DO YOU HAVE A DRINK CONTAINING ALCOHOL: NEVER
HOW MANY STANDARD DRINKS CONTAINING ALCOHOL DO YOU HAVE ON A TYPICAL DAY: PATIENT DOES NOT DRINK

## 2024-04-29 NOTE — TELEPHONE ENCOUNTER
Let's try then adding dilt 120 at supper time, see if this helps, see me as planned, call for issues.   Thanks

## 2024-04-29 NOTE — TELEPHONE ENCOUNTER
----- Message from Oneil Mendez DO sent at 4/29/2024 12:54 PM EDT -----  Please call her, monitor was ok, no obvious AF seen, good news.  She did have irreg beat, but no AF.    How is she feeling?  How are the palp?  Thanks

## 2024-04-29 NOTE — TELEPHONE ENCOUNTER
Called pt advised of Dr. Mendez's response. Pt gave a verbal understanding.      Pt states doing well just has episodes of fatigue and the palp are about the same.

## 2024-04-29 NOTE — PROGRESS NOTES
50 MG tablet TAKE 1 TABLET BY MOUTH EVERY DAY Yes Sheila Patricia APRN - CNP   citalopram (CELEXA) 20 MG tablet Take 1 tablet by mouth daily Yes Sheila Patricia APRN - CNP   cetirizine (ZYRTEC) 10 MG tablet Take 1 tablet by mouth daily Yes Automatic Reconciliation, Ar   vibegron (GEMTESA) 75 MG TABS tablet Take 1 tablet by mouth daily  Patient not taking: Reported on 3/21/2024  Ilana Davis DO CareTeam (Including outside providers/suppliers regularly involved in providing care):   Patient Care Team:  Sheila Patricia APRN - CNP as PCP - General (Nurse Practitioner)  Sheila Patricia APRN - CNP as PCP - Empaneled Provider  Oneil Mendez DO as Consulting Physician (Internal Medicine Cardiovascular Disease)  Dontae Gannon MD (Gastroenterology)  Mitesh Evans DO (Otolaryngology)     Reviewed and updated this visit:  Allergies  Meds  Med Hx  Surg Hx  Soc Hx  Fam Hx

## 2024-04-30 RX ORDER — DILTIAZEM HYDROCHLORIDE 120 MG/1
120 CAPSULE, COATED, EXTENDED RELEASE ORAL DAILY
Qty: 30 CAPSULE | Refills: 1 | Status: SHIPPED | OUTPATIENT
Start: 2024-04-30

## 2024-04-30 NOTE — TELEPHONE ENCOUNTER
Called pt advised of Dr. Mendez's response. Pt gave a verbal understanding.      Pt states does not feel needs another med, but feels like it could be due to blood being low. Had labs drawn yesterday at PCP.     Pt states will speak to pharmacy first and will call us back, will let us know if will proceed with med.

## 2024-04-30 NOTE — TELEPHONE ENCOUNTER
Pt called back and said she called the pharmacy in regards to the medication and they said it was ok to take in conjunction with her other meds. It is ok to put rx through.    This is in relation to the last encounter that is closed for adding dilt 120

## 2024-05-01 ENCOUNTER — OFFICE VISIT (OUTPATIENT)
Dept: RHEUMATOLOGY | Age: 84
End: 2024-05-01
Payer: MEDICARE

## 2024-05-01 VITALS
DIASTOLIC BLOOD PRESSURE: 71 MMHG | WEIGHT: 153 LBS | HEIGHT: 62 IN | BODY MASS INDEX: 28.16 KG/M2 | TEMPERATURE: 98.1 F | SYSTOLIC BLOOD PRESSURE: 122 MMHG | HEART RATE: 68 BPM

## 2024-05-01 DIAGNOSIS — R51.9 ACUTE INTRACTABLE HEADACHE, UNSPECIFIED HEADACHE TYPE: ICD-10-CM

## 2024-05-01 DIAGNOSIS — M48.061 SPINAL STENOSIS OF LUMBAR REGION, UNSPECIFIED WHETHER NEUROGENIC CLAUDICATION PRESENT: ICD-10-CM

## 2024-05-01 DIAGNOSIS — M05.9 SEROPOSITIVE RHEUMATOID ARTHRITIS (HCC): ICD-10-CM

## 2024-05-01 DIAGNOSIS — Z96.611 PAIN DUE TO RIGHT SHOULDER JOINT PROSTHESIS (HCC): ICD-10-CM

## 2024-05-01 DIAGNOSIS — M05.9 SEROPOSITIVE RHEUMATOID ARTHRITIS (HCC): Primary | ICD-10-CM

## 2024-05-01 DIAGNOSIS — M15.4 EROSIVE OSTEOARTHRITIS OF HANDS, BILATERAL: ICD-10-CM

## 2024-05-01 DIAGNOSIS — D75.89 MACROCYTOSIS: ICD-10-CM

## 2024-05-01 DIAGNOSIS — Z13.820 OSTEOPOROSIS SCREENING: ICD-10-CM

## 2024-05-01 DIAGNOSIS — M54.2 CERVICALGIA: ICD-10-CM

## 2024-05-01 DIAGNOSIS — T84.84XA PAIN DUE TO RIGHT SHOULDER JOINT PROSTHESIS (HCC): ICD-10-CM

## 2024-05-01 DIAGNOSIS — Z09 ENCOUNTER FOR FOLLOW-UP EXAMINATION AFTER COMPLETED TREATMENT FOR CONDITIONS OTHER THAN MALIGNANT NEOPLASM: ICD-10-CM

## 2024-05-01 DIAGNOSIS — R53.82 CHRONIC FATIGUE: ICD-10-CM

## 2024-05-01 LAB
ALBUMIN SERPL-MCNC: 4.2 G/DL (ref 3.2–4.6)
ALBUMIN/GLOB SERPL: 1.4 (ref 1–1.9)
ALP SERPL-CCNC: 70 U/L (ref 35–104)
ALT SERPL-CCNC: 22 U/L (ref 12–65)
ANION GAP SERPL CALC-SCNC: 11 MMOL/L (ref 9–18)
AST SERPL-CCNC: 39 U/L (ref 15–37)
BASOPHILS # BLD: 0 K/UL (ref 0–0.2)
BASOPHILS NFR BLD: 1 % (ref 0–2)
BILIRUB SERPL-MCNC: 0.6 MG/DL (ref 0–1.2)
BUN SERPL-MCNC: 23 MG/DL (ref 8–23)
CALCIUM SERPL-MCNC: 9.6 MG/DL (ref 8.8–10.2)
CHLORIDE SERPL-SCNC: 103 MMOL/L (ref 98–107)
CO2 SERPL-SCNC: 26 MMOL/L (ref 20–28)
CREAT SERPL-MCNC: 1.07 MG/DL (ref 0.6–1.1)
DIFFERENTIAL METHOD BLD: ABNORMAL
EOSINOPHIL # BLD: 0 K/UL (ref 0–0.8)
EOSINOPHIL NFR BLD: 1 % (ref 0.5–7.8)
ERYTHROCYTE [DISTWIDTH] IN BLOOD BY AUTOMATED COUNT: 15.2 % (ref 11.9–14.6)
GLOBULIN SER CALC-MCNC: 3 G/DL (ref 2.3–3.5)
GLUCOSE SERPL-MCNC: 119 MG/DL (ref 70–99)
HCT VFR BLD AUTO: 39.2 % (ref 35.8–46.3)
HGB BLD-MCNC: 12.8 G/DL (ref 11.7–15.4)
IMM GRANULOCYTES # BLD AUTO: 0 K/UL (ref 0–0.5)
IMM GRANULOCYTES NFR BLD AUTO: 0 % (ref 0–5)
LYMPHOCYTES # BLD: 0.7 K/UL (ref 0.5–4.6)
LYMPHOCYTES NFR BLD: 14 % (ref 13–44)
MCH RBC QN AUTO: 33.2 PG (ref 26.1–32.9)
MCHC RBC AUTO-ENTMCNC: 32.7 G/DL (ref 31.4–35)
MCV RBC AUTO: 101.6 FL (ref 82–102)
MONOCYTES # BLD: 0.4 K/UL (ref 0.1–1.3)
MONOCYTES NFR BLD: 7 % (ref 4–12)
NEUTS SEG # BLD: 4.3 K/UL (ref 1.7–8.2)
NEUTS SEG NFR BLD: 77 % (ref 43–78)
NRBC # BLD: 0 K/UL (ref 0–0.2)
PLATELET # BLD AUTO: 251 K/UL (ref 150–450)
PMV BLD AUTO: 10.7 FL (ref 9.4–12.3)
POTASSIUM SERPL-SCNC: 4.6 MMOL/L (ref 3.5–5.1)
PROT SERPL-MCNC: 7.1 G/DL (ref 6.3–8.2)
RBC # BLD AUTO: 3.86 M/UL (ref 4.05–5.2)
SODIUM SERPL-SCNC: 140 MMOL/L (ref 136–145)
WBC # BLD AUTO: 5.5 K/UL (ref 4.3–11.1)

## 2024-05-01 PROCEDURE — 99214 OFFICE O/P EST MOD 30 MIN: CPT | Performed by: INTERNAL MEDICINE

## 2024-05-01 PROCEDURE — G8417 CALC BMI ABV UP PARAM F/U: HCPCS | Performed by: INTERNAL MEDICINE

## 2024-05-01 PROCEDURE — 1123F ACP DISCUSS/DSCN MKR DOCD: CPT | Performed by: INTERNAL MEDICINE

## 2024-05-01 PROCEDURE — 3074F SYST BP LT 130 MM HG: CPT | Performed by: INTERNAL MEDICINE

## 2024-05-01 PROCEDURE — 1036F TOBACCO NON-USER: CPT | Performed by: INTERNAL MEDICINE

## 2024-05-01 PROCEDURE — G8399 PT W/DXA RESULTS DOCUMENT: HCPCS | Performed by: INTERNAL MEDICINE

## 2024-05-01 PROCEDURE — 3078F DIAST BP <80 MM HG: CPT | Performed by: INTERNAL MEDICINE

## 2024-05-01 PROCEDURE — 1090F PRES/ABSN URINE INCON ASSESS: CPT | Performed by: INTERNAL MEDICINE

## 2024-05-01 PROCEDURE — G8427 DOCREV CUR MEDS BY ELIG CLIN: HCPCS | Performed by: INTERNAL MEDICINE

## 2024-05-01 RX ORDER — PREDNISOLONE ACETATE 10 MG/ML
SUSPENSION/ DROPS OPHTHALMIC
COMMUNITY
Start: 2024-04-12

## 2024-05-01 RX ORDER — MELATONIN
1 DAILY
COMMUNITY
Start: 2024-04-06

## 2024-05-01 RX ORDER — MINOXIDIL 2.5 MG/1
TABLET ORAL
COMMUNITY
Start: 2024-03-04

## 2024-05-01 NOTE — PROGRESS NOTES
Monocytes % 03/06/2024 9  4.0 - 12.0 % Final    Eosinophils % 03/06/2024 2  0.5 - 7.8 % Final    Basophils % 03/06/2024 1  0.0 - 2.0 % Final    Immature Granulocytes % 03/06/2024 0  0.0 - 5.0 % Final    Neutrophils Absolute 03/06/2024 2.7  1.7 - 8.2 K/UL Final    Lymphocytes Absolute 03/06/2024 1.4  0.5 - 4.6 K/UL Final    Monocytes Absolute 03/06/2024 0.4  0.1 - 1.3 K/UL Final    Eosinophils Absolute 03/06/2024 0.1  0.0 - 0.8 K/UL Final    Basophils Absolute 03/06/2024 0.1  0.0 - 0.2 K/UL Final    Immature Granulocytes Absolute 03/06/2024 0.0  0.0 - 0.5 K/UL Final    Vectra Score 03/06/2024 32    Final    Comment: (NOTE)  Change in Score  No Meaningful Change of >=8 UnitsVectra Score Interpretation  Patient has a Moderate Vectra Score that decreased by less  than 8 units from the previous score, which was also in the  Moderate category.  The patient has a limited risk for  radiographic progression.  Consider adjusting treatment  regimen to reduce inflammation or observing the patient, and  retesting at next clinical visit.      Risk of Radiographic Progress 03/06/2024 4  % Final    Comment: (NOTE)  1-Year Risk of Radiographic Progression      Vectra(R) Level 03/06/2024 Comment    Final    Comment: (NOTE)  Moderate  Vectra Disease Activity Levels:  High: 45 to 100  Moderate: 30 to 44  Low: 1 to 29      Test Description: 03/06/2024 Comment    Final    Comment: (NOTE)  VECTRA SCORE DESCRIPTION  Vectra Score measures the concentrations of 12 serum  proteins. An algorithm is applied to these concentrations to  calculate a disease activity score on a scale of 1 to 100.  The Vectra Score is personalized based on the age, gender,  and adiposity of the patient.  RISK OF RADIOGRAPHIC PROGRESSION (RP):  The risk of RP is shown as a function of Vectra Score.  (see chart right). The definition of RP is a 1-year total  Sharp score change of >5 units. Increased risk of RP  means a greater chance of irreversible joint

## 2024-05-01 NOTE — PATIENT INSTRUCTIONS
Reduce the prednisone to 1/2 tablet once a day for 1 week, then 1/2 tablet every other day for 1 week, then stop   Continue leflunomide 1 full tablet daily  Return for f/u in 3 months   Schedule the hematology appointment for large red blood cells  Would recommend that you get the shingrix and RSV vaccines, no need to change the way you change leflunomide     Patient is aware that if she is sick requiring her to be on an antibiotic or antiviral drug she will need to skip administering the biologic and taking the leflunomide until she has completed the antibiotic/antiviral course and is over the infection.

## 2024-05-02 ENCOUNTER — CARE COORDINATION (OUTPATIENT)
Dept: CARE COORDINATION | Facility: CLINIC | Age: 84
End: 2024-05-02

## 2024-05-02 DIAGNOSIS — R79.89 ELEVATED LFTS: Primary | ICD-10-CM

## 2024-05-02 NOTE — CARE COORDINATION
ZOE-CM to inquire with patient's insurance about transportation and call patient back. Spoke with patient who is in agreement.

## 2024-05-13 ENCOUNTER — HOSPITAL ENCOUNTER (OUTPATIENT)
Dept: MAMMOGRAPHY | Age: 84
Discharge: HOME | End: 2024-05-16
Attending: INTERNAL MEDICINE
Payer: MEDICARE

## 2024-05-13 DIAGNOSIS — Z13.820 OSTEOPOROSIS SCREENING: ICD-10-CM

## 2024-05-13 DIAGNOSIS — Z09 ENCOUNTER FOR FOLLOW-UP EXAMINATION AFTER COMPLETED TREATMENT FOR CONDITIONS OTHER THAN MALIGNANT NEOPLASM: ICD-10-CM

## 2024-05-13 PROCEDURE — 77080 DXA BONE DENSITY AXIAL: CPT

## 2024-05-14 ENCOUNTER — TELEPHONE (OUTPATIENT)
Dept: RHEUMATOLOGY | Age: 84
End: 2024-05-14

## 2024-05-14 NOTE — TELEPHONE ENCOUNTER
Spoke with this PT about this message below. PT already have the virtual visit set up for this Friday!    ----- Message from Ye Grissom DO sent at 5/14/2024 10:38 AM EDT -----  Please call the patient and notify her that the bone density scan shows that she has osteoporosis which is an increased risk of fractures compared to the average person. Our office will call her to schedule her for a virtual visit to discuss treatment options for this.

## 2024-05-16 ENCOUNTER — OFFICE VISIT (OUTPATIENT)
Age: 84
End: 2024-05-16
Payer: MEDICARE

## 2024-05-16 VITALS
BODY MASS INDEX: 28.45 KG/M2 | SYSTOLIC BLOOD PRESSURE: 120 MMHG | WEIGHT: 154.6 LBS | DIASTOLIC BLOOD PRESSURE: 70 MMHG | HEART RATE: 72 BPM | HEIGHT: 62 IN

## 2024-05-16 DIAGNOSIS — R00.2 PALPITATIONS: ICD-10-CM

## 2024-05-16 DIAGNOSIS — I25.10 ATHEROSCLEROSIS OF NATIVE CORONARY ARTERY OF NATIVE HEART WITHOUT ANGINA PECTORIS: ICD-10-CM

## 2024-05-16 DIAGNOSIS — I10 HYPERTENSION, BENIGN: Primary | ICD-10-CM

## 2024-05-16 DIAGNOSIS — I73.9 PVD (PERIPHERAL VASCULAR DISEASE) (HCC): ICD-10-CM

## 2024-05-16 PROCEDURE — 3074F SYST BP LT 130 MM HG: CPT | Performed by: INTERNAL MEDICINE

## 2024-05-16 PROCEDURE — 99214 OFFICE O/P EST MOD 30 MIN: CPT | Performed by: INTERNAL MEDICINE

## 2024-05-16 PROCEDURE — G8428 CUR MEDS NOT DOCUMENT: HCPCS | Performed by: INTERNAL MEDICINE

## 2024-05-16 PROCEDURE — 3078F DIAST BP <80 MM HG: CPT | Performed by: INTERNAL MEDICINE

## 2024-05-16 PROCEDURE — G8399 PT W/DXA RESULTS DOCUMENT: HCPCS | Performed by: INTERNAL MEDICINE

## 2024-05-16 PROCEDURE — 1036F TOBACCO NON-USER: CPT | Performed by: INTERNAL MEDICINE

## 2024-05-16 PROCEDURE — 1090F PRES/ABSN URINE INCON ASSESS: CPT | Performed by: INTERNAL MEDICINE

## 2024-05-16 PROCEDURE — 1123F ACP DISCUSS/DSCN MKR DOCD: CPT | Performed by: INTERNAL MEDICINE

## 2024-05-16 PROCEDURE — G8417 CALC BMI ABV UP PARAM F/U: HCPCS | Performed by: INTERNAL MEDICINE

## 2024-05-16 NOTE — PROGRESS NOTES
[address] [facility], [place] [address]  PHONE: [phone]     24    NAME:  Katie Israel  : 1940  MRN: [id number]       SUBJECTIVE:   Katie Israel is a 83 y.o. female seen for a follow up visit regarding the following:     Chief Complaint   Patient presents with    Palpitations     Monitor          HPI:   Here for HTN, Carotid Dz, CAD.    Ca score 2017 was 83.   Carotid US 2019 and 3/2021 and 3/2023: mild to mod LICA dz  Echo 2021 and 2024: normal EF, mild pHTN  NST 2024:  low risk of cardiac events.   Monitor 3/2024: 3 week: avg HR 71bpm, sinus to sinus tach, runs of A tach, PACs, no obvious AF seen.     She has some palp. No new angina.  Better now.  Getting off prednisone for RA.   No CP, pressure.     NO new SAINI. Some lack of emergy.   Patient denies recent history of orthopnea, PND, excessive dizziness and/or syncope.            Past Medical History, Past Surgical History, Family history, Social History, and Medications were all reviewed with the patient today and updated as necessary.     Current Outpatient Medications   Medication Sig Dispense Refill    vitamin D3 (CHOLECALCIFEROL) 25 MCG (1000 UT) TABS tablet Take 1 tablet by mouth daily      minoxidil (LONITEN) 2.5 MG tablet       prednisoLONE acetate (PRED FORTE) 1 % ophthalmic suspension INSTILL 1 DROP BY OPHTHALMIC ROUTE 3 TIMES EVERY DAY INTO OPERATIVE EYE X 3 DAYS.      leflunomide (ARAVA) 20 MG tablet Take 0.5 tablets by mouth daily 15 tablet 2    omeprazole (PRILOSEC) 20 MG delayed release capsule TAKE 1 CAPSULE BY MOUTH EVERY DAY AS NEEDED FOR REFLUX 90 capsule 1    acetaminophen (TYLENOL 8 HOUR ARTHRITIS PAIN) 650 MG extended release tablet Take 1 tablet by mouth in the morning and at bedtime      Thiamine HCl (VITAMIN B-1) 50 MG tablet TAKE 1 TABLET BY MOUTH EVERY DAY 90 tablet 1    citalopram (CELEXA) 20 MG tablet Take 1 tablet by mouth daily 90 tablet 1    cetirizine (ZYRTEC) 10

## 2024-05-20 PROBLEM — M81.0 AGE-RELATED OSTEOPOROSIS WITHOUT CURRENT PATHOLOGICAL FRACTURE: Status: ACTIVE | Noted: 2024-05-20

## 2024-06-07 ENCOUNTER — TELEPHONE (OUTPATIENT)
Dept: RHEUMATOLOGY | Age: 84
End: 2024-06-07

## 2024-06-07 DIAGNOSIS — M15.4 EROSIVE OSTEOARTHRITIS OF HANDS, BILATERAL: Primary | ICD-10-CM

## 2024-06-07 RX ORDER — TRAMADOL HYDROCHLORIDE 50 MG/1
50 TABLET ORAL 2 TIMES DAILY PRN
Qty: 60 TABLET | Refills: 1 | Status: SHIPPED | OUTPATIENT
Start: 2024-06-07 | End: 2024-08-06

## 2024-06-07 NOTE — TELEPHONE ENCOUNTER
Pt left vm that she needs a refill for Tramadol sent to [facility], [place]. She said that she needs to speak to Dr Grissom about this medication also.

## 2024-06-10 NOTE — TELEPHONE ENCOUNTER
I called and left  for pt that Dr Grissom tried to call her and she sent the rx in for her, Tramadol.

## 2024-06-19 ENCOUNTER — TELEPHONE (OUTPATIENT)
Dept: RHEUMATOLOGY | Age: 84
End: 2024-06-19

## 2024-06-19 NOTE — TELEPHONE ENCOUNTER
Pt called stating that the leflunomide she is taking she is having issues with it. She asked is their something else she can take or an injection instead

## 2024-06-20 ENCOUNTER — TELEPHONE (OUTPATIENT)
Dept: RHEUMATOLOGY | Age: 84
End: 2024-06-20

## 2024-06-20 DIAGNOSIS — M05.9 SEROPOSITIVE RHEUMATOID ARTHRITIS (HCC): Primary | ICD-10-CM

## 2024-06-20 RX ORDER — ADALIMUMAB 40MG/0.4ML
40 KIT SUBCUTANEOUS
Qty: 1 EACH | Refills: 3 | Status: SHIPPED | OUTPATIENT
Start: 2024-06-20

## 2024-06-20 NOTE — TELEPHONE ENCOUNTER
She is jerking all the time, maybe from when she had lyme disease which affects her nerves. Feels like she's jittering inside. Still having problems with her stomach w/pain and nausea. No numbness/tingling. She has been having itching of the skin, no jaundice. She also feels very weak. She would like to stop the medication.       Plan:  -Stop taking leflunomide b/c of adverse affects above  -For joint pain/swelling advised to take Prednisone 10mg for 5 days, 7.5mg for 5 days, 5mg for 5 days then stop  -plan to start humira 40mg sq qowk for rheumatoid arthritis

## 2024-06-20 NOTE — TELEPHONE ENCOUNTER
Per email from Ector Roa:  PA approved but copay is $1933.47.    Will need to seek assistance for patient.

## 2024-06-20 NOTE — TELEPHONE ENCOUNTER
----- Message from Ye Grissom DO sent at 6/20/2024  2:59 PM EDT -----  Regarding: humira prior auth  Seropositive rheumatoid arthritis failed leflunomide and ssz. Cannot use MTX due to renal function.    I would like to start her on humira 40mg sq every other week.     Thanks,  Ye

## 2024-06-20 NOTE — TELEPHONE ENCOUNTER
Mario ordered by Dr. Grissom. Rx entered and pended for reviww and sig to go to [facility]. Patient has MCR and [employer] Supp. Not sure what her rx benefits are, but may require patient assistance.

## 2024-06-21 NOTE — TELEPHONE ENCOUNTER
PHONE CALL to patient to discuss cost of Humira and patient assistance.   Pt on Arava. Asking if any older drugs would be an option. Told her Humira is 25 years old, just has not come down in price.     Discussed her insurance coverage. With [facility] and [employer] supplement, she would be covered for an in office procedure 100% after meeting her [facility] ded because it would be billed through her medical benefits, not pharmacy. She is very willing to come in for an infusion or injection based on Dr. Grissom's recommendations. I told her I would check with Dr. Grissom and let her know her thoughts.

## 2024-07-03 RX ORDER — INFLIXIMAB 100 MG/10ML
3 INJECTION, POWDER, LYOPHILIZED, FOR SOLUTION INTRAVENOUS ONCE
Start: 2024-07-10 | End: 2024-07-10

## 2024-07-03 RX ORDER — EPINEPHRINE 1 MG/ML
0.3 INJECTION, SOLUTION, CONCENTRATE INTRAVENOUS PRN
OUTPATIENT
Start: 2024-07-10

## 2024-07-03 RX ORDER — DIPHENHYDRAMINE HCL 25 MG
25 TABLET ORAL ONCE
OUTPATIENT
Start: 2024-07-10 | End: 2024-07-10

## 2024-07-03 RX ORDER — FAMOTIDINE 10 MG/ML
20 INJECTION, SOLUTION INTRAVENOUS
OUTPATIENT
Start: 2024-07-10

## 2024-07-03 RX ORDER — ACETAMINOPHEN 325 MG/1
650 TABLET ORAL
OUTPATIENT
Start: 2024-07-10

## 2024-07-03 RX ORDER — ACETAMINOPHEN 325 MG/1
650 TABLET ORAL ONCE
OUTPATIENT
Start: 2024-07-10 | End: 2024-07-10

## 2024-07-03 RX ORDER — SODIUM CHLORIDE 9 MG/ML
INJECTION, SOLUTION INTRAVENOUS CONTINUOUS
OUTPATIENT
Start: 2024-07-10

## 2024-07-03 RX ORDER — DIPHENHYDRAMINE HYDROCHLORIDE 50 MG/ML
50 INJECTION INTRAMUSCULAR; INTRAVENOUS
OUTPATIENT
Start: 2024-07-10

## 2024-07-03 RX ORDER — ONDANSETRON 2 MG/ML
8 INJECTION INTRAMUSCULAR; INTRAVENOUS
OUTPATIENT
Start: 2024-07-10

## 2024-07-03 RX ORDER — ALBUTEROL SULFATE 90 UG/1
4 AEROSOL, METERED RESPIRATORY (INHALATION) PRN
OUTPATIENT
Start: 2024-07-10

## 2024-07-09 RX ORDER — MELATONIN
1 DAILY
Qty: 90 TABLET | Refills: 1 | Status: SHIPPED | OUTPATIENT
Start: 2024-07-09

## 2024-07-09 RX ORDER — THIAMINE HCL 50 MG
50 TABLET ORAL DAILY
Qty: 90 TABLET | Refills: 1 | Status: SHIPPED | OUTPATIENT
Start: 2024-07-09

## 2024-07-10 ENCOUNTER — NURSE ONLY (OUTPATIENT)
Dept: RHEUMATOLOGY | Age: 84
End: 2024-07-10
Payer: MEDICARE

## 2024-07-10 VITALS
WEIGHT: 152 LBS | SYSTOLIC BLOOD PRESSURE: 119 MMHG | BODY MASS INDEX: 27.8 KG/M2 | DIASTOLIC BLOOD PRESSURE: 64 MMHG | HEART RATE: 81 BPM | TEMPERATURE: 97.7 F

## 2024-07-10 DIAGNOSIS — M05.9 SEROPOSITIVE RHEUMATOID ARTHRITIS (HCC): Primary | ICD-10-CM

## 2024-07-10 PROCEDURE — 96415 CHEMO IV INFUSION ADDL HR: CPT | Performed by: INTERNAL MEDICINE

## 2024-07-10 PROCEDURE — 96413 CHEMO IV INFUSION 1 HR: CPT | Performed by: INTERNAL MEDICINE

## 2024-07-10 RX ORDER — ACETAMINOPHEN 325 MG/1
650 TABLET ORAL ONCE
OUTPATIENT
Start: 2024-07-24 | End: 2024-07-24

## 2024-07-10 RX ORDER — EPINEPHRINE 1 MG/ML
0.3 INJECTION, SOLUTION, CONCENTRATE INTRAVENOUS PRN
OUTPATIENT
Start: 2024-07-24

## 2024-07-10 RX ORDER — ONDANSETRON 2 MG/ML
8 INJECTION INTRAMUSCULAR; INTRAVENOUS
OUTPATIENT
Start: 2024-07-24

## 2024-07-10 RX ORDER — DIPHENHYDRAMINE HCL 25 MG
25 CAPSULE ORAL ONCE
OUTPATIENT
Start: 2024-07-24 | End: 2024-07-24

## 2024-07-10 RX ORDER — ACETAMINOPHEN 325 MG/1
650 TABLET ORAL
OUTPATIENT
Start: 2024-07-24

## 2024-07-10 RX ORDER — INFLIXIMAB 100 MG/10ML
3 INJECTION, POWDER, LYOPHILIZED, FOR SOLUTION INTRAVENOUS ONCE
Status: DISCONTINUED | OUTPATIENT
Start: 2024-07-10 | End: 2024-07-10 | Stop reason: HOSPADM

## 2024-07-10 RX ORDER — INFLIXIMAB 100 MG/10ML
3 INJECTION, POWDER, LYOPHILIZED, FOR SOLUTION INTRAVENOUS ONCE
Start: 2024-07-24 | End: 2024-07-24

## 2024-07-10 RX ORDER — DIPHENHYDRAMINE HCL 25 MG
25 CAPSULE ORAL ONCE
Status: DISCONTINUED | OUTPATIENT
Start: 2024-07-10 | End: 2024-07-10 | Stop reason: HOSPADM

## 2024-07-10 RX ORDER — INFLIXIMAB 100 MG/10ML
3 INJECTION, POWDER, LYOPHILIZED, FOR SOLUTION INTRAVENOUS ONCE
Status: COMPLETED | OUTPATIENT
Start: 2024-07-10 | End: 2024-07-10

## 2024-07-10 RX ORDER — ALBUTEROL SULFATE 90 UG/1
4 AEROSOL, METERED RESPIRATORY (INHALATION) PRN
OUTPATIENT
Start: 2024-07-24

## 2024-07-10 RX ORDER — DIPHENHYDRAMINE HYDROCHLORIDE 50 MG/ML
50 INJECTION INTRAMUSCULAR; INTRAVENOUS
OUTPATIENT
Start: 2024-07-24

## 2024-07-10 RX ORDER — ACETAMINOPHEN 325 MG/1
650 TABLET ORAL ONCE
Status: DISCONTINUED | OUTPATIENT
Start: 2024-07-10 | End: 2024-07-10 | Stop reason: HOSPADM

## 2024-07-10 RX ORDER — SODIUM CHLORIDE 9 MG/ML
INJECTION, SOLUTION INTRAVENOUS CONTINUOUS
OUTPATIENT
Start: 2024-07-24

## 2024-07-10 RX ADMIN — INFLIXIMAB 200 MG: 100 INJECTION, POWDER, LYOPHILIZED, FOR SOLUTION INTRAVENOUS at 15:08

## 2024-07-11 ENCOUNTER — TELEPHONE (OUTPATIENT)
Dept: RHEUMATOLOGY | Age: 84
End: 2024-07-11

## 2024-07-11 NOTE — TELEPHONE ENCOUNTER
Pt left vm that she had her infusion yesterday and she needs to speak to Dr Grissom abut her medications?

## 2024-07-15 DIAGNOSIS — D75.89 MACROCYTOSIS: Primary | ICD-10-CM

## 2024-07-15 NOTE — PROGRESS NOTES
NEW PATIENT INTAKE    Referral Diagnosis: Macrocytosis      Referring Provider: Ye Grissom DO    Primary Care Provider: Sheila Patricia APRN - CNP     Family History of Cancer/ Hematology Disorders: No known family history    Presenting Symptoms: Macrocytosis    Chronological History of Pertinent Events:     84yo white female    4/29/24 - Met with PCP (Psychiatric)  Here for MDD, 6-month f/u and for AWV  c/o fatigue, due to depression; macrocytosis without anemia noted.  Referral placed with [facility].  Abnormal labs (4/22/24 - EPIC)  eGFR - 55   Vit B-12 - 1327  RBC - 3.81   MCV - 102.4  RDW - 15.6    5/1/24 - Met with Rheumatologist, Ye Grissom DO (EPIC)  Here for f/u; since the last visit, patient is feeling \"good\".  Her biggest issue is her right shoulder and right elbow; pain: 6/10.  She has been on prednisone 5mg daily since 4/11/24. She has been on leflunomide 20mg daily. She is taking tramadol not even weekly now.   Patient states she is mainly bothered by being tired all the time, she had palpitations and had a heart monitoring checked by cardiology.  Dr. Mendez her cardiologist was planning to start her on diltiazem but she did not start this because the palpitations have improved and she is worried the medication might drop her blood pressure.  Abnormal labs (5/1/24 - EPIC)  eGFR - 51   AST - 39  RBC - 3.86   MCH - 33.2  RDW - 15.2  Assessment/Plan:   Macrocytosis - referral placed with [facility]  DEXA BONE DENSITY AXIAL SKELETON; Future  Chronic fatigue - Discussed with patient this could be 2/2 to autoimmune disease vs DMARD. She reports reduced energy since reducing the dose of her prednisone. No anemia. Normal vitamin d level.   Will continue to monitor; RTC in 3 months.    6/20/24 - TE with Rheumatologist (EPIC)  c/o jerking all the time; she thinks it may be from when she had Lyme disease. “feels like she is jittering inside”. Also, c/o stomach with pain and nausea.  c/o

## 2024-07-17 ENCOUNTER — HOSPITAL ENCOUNTER (OUTPATIENT)
Dept: LAB | Age: 84
Discharge: HOME | End: 2024-07-20
Payer: MEDICARE

## 2024-07-17 ENCOUNTER — OFFICE VISIT (OUTPATIENT)
Dept: ONCOLOGY | Age: 84
End: 2024-07-17
Payer: MEDICARE

## 2024-07-17 VITALS
HEIGHT: 64 IN | BODY MASS INDEX: 25.62 KG/M2 | TEMPERATURE: 97.4 F | WEIGHT: 150.1 LBS | SYSTOLIC BLOOD PRESSURE: 135 MMHG | OXYGEN SATURATION: 94 % | HEART RATE: 63 BPM | RESPIRATION RATE: 18 BRPM | DIASTOLIC BLOOD PRESSURE: 76 MMHG

## 2024-07-17 DIAGNOSIS — D75.89 MACROCYTOSIS: Primary | ICD-10-CM

## 2024-07-17 DIAGNOSIS — D75.89 MACROCYTOSIS: ICD-10-CM

## 2024-07-17 DIAGNOSIS — D69.2 SENILE PURPURA (HCC): ICD-10-CM

## 2024-07-17 LAB
ALBUMIN SERPL-MCNC: 4.1 G/DL (ref 3.2–4.6)
ALBUMIN/GLOB SERPL: 1.5 (ref 1–1.9)
ALP SERPL-CCNC: 85 U/L (ref 35–104)
ALT SERPL-CCNC: 20 U/L (ref 12–65)
ANION GAP SERPL CALC-SCNC: 11 MMOL/L (ref 9–18)
AST SERPL-CCNC: 34 U/L (ref 15–37)
BASOPHILS # BLD: 0.1 K/UL (ref 0–0.2)
BASOPHILS NFR BLD: 2 % (ref 0–2)
BILIRUB SERPL-MCNC: 0.5 MG/DL (ref 0–1.2)
BUN SERPL-MCNC: 27 MG/DL (ref 8–23)
CALCIUM SERPL-MCNC: 9.3 MG/DL (ref 8.8–10.2)
CHLORIDE SERPL-SCNC: 105 MMOL/L (ref 98–107)
CO2 SERPL-SCNC: 26 MMOL/L (ref 20–28)
CREAT SERPL-MCNC: 1.16 MG/DL (ref 0.6–1.1)
DIFFERENTIAL METHOD BLD: ABNORMAL
EOSINOPHIL # BLD: 0.1 K/UL (ref 0–0.8)
EOSINOPHIL NFR BLD: 2 % (ref 0.5–7.8)
ERYTHROCYTE [DISTWIDTH] IN BLOOD BY AUTOMATED COUNT: 12.9 % (ref 11.9–14.6)
GLOBULIN SER CALC-MCNC: 2.8 G/DL (ref 2.3–3.5)
GLUCOSE SERPL-MCNC: 100 MG/DL (ref 70–99)
HCT VFR BLD AUTO: 38.5 % (ref 35.8–46.3)
HGB BLD-MCNC: 12.4 G/DL (ref 11.7–15.4)
IMM GRANULOCYTES # BLD AUTO: 0 K/UL (ref 0–0.5)
IMM GRANULOCYTES NFR BLD AUTO: 0 % (ref 0–5)
LYMPHOCYTES # BLD: 1.4 K/UL (ref 0.5–4.6)
LYMPHOCYTES NFR BLD: 29 % (ref 13–44)
MCH RBC QN AUTO: 32 PG (ref 26.1–32.9)
MCHC RBC AUTO-ENTMCNC: 32.2 G/DL (ref 31.4–35)
MCV RBC AUTO: 99.5 FL (ref 82–102)
MONOCYTES # BLD: 0.5 K/UL (ref 0.1–1.3)
MONOCYTES NFR BLD: 10 % (ref 4–12)
NEUTS SEG # BLD: 2.7 K/UL (ref 1.7–8.2)
NEUTS SEG NFR BLD: 57 % (ref 43–78)
NRBC # BLD: 0 K/UL (ref 0–0.2)
PLATELET # BLD AUTO: 233 K/UL (ref 150–450)
PMV BLD AUTO: 10.7 FL (ref 9.4–12.3)
POTASSIUM SERPL-SCNC: 4.6 MMOL/L (ref 3.5–5.1)
PROT SERPL-MCNC: 7 G/DL (ref 6.3–8.2)
RBC # BLD AUTO: 3.87 M/UL (ref 4.05–5.2)
SODIUM SERPL-SCNC: 142 MMOL/L (ref 136–145)
WBC # BLD AUTO: 4.7 K/UL (ref 4.3–11.1)

## 2024-07-17 PROCEDURE — G8427 DOCREV CUR MEDS BY ELIG CLIN: HCPCS | Performed by: INTERNAL MEDICINE

## 2024-07-17 PROCEDURE — 36415 COLL VENOUS BLD VENIPUNCTURE: CPT

## 2024-07-17 PROCEDURE — G8417 CALC BMI ABV UP PARAM F/U: HCPCS | Performed by: INTERNAL MEDICINE

## 2024-07-17 PROCEDURE — 3075F SYST BP GE 130 - 139MM HG: CPT | Performed by: INTERNAL MEDICINE

## 2024-07-17 PROCEDURE — 80053 COMPREHEN METABOLIC PANEL: CPT

## 2024-07-17 PROCEDURE — 85025 COMPLETE CBC W/AUTO DIFF WBC: CPT

## 2024-07-17 PROCEDURE — 99204 OFFICE O/P NEW MOD 45 MIN: CPT | Performed by: INTERNAL MEDICINE

## 2024-07-17 PROCEDURE — 1036F TOBACCO NON-USER: CPT | Performed by: INTERNAL MEDICINE

## 2024-07-17 PROCEDURE — 3078F DIAST BP <80 MM HG: CPT | Performed by: INTERNAL MEDICINE

## 2024-07-17 PROCEDURE — 1123F ACP DISCUSS/DSCN MKR DOCD: CPT | Performed by: INTERNAL MEDICINE

## 2024-07-17 PROCEDURE — G8399 PT W/DXA RESULTS DOCUMENT: HCPCS | Performed by: INTERNAL MEDICINE

## 2024-07-17 PROCEDURE — 1090F PRES/ABSN URINE INCON ASSESS: CPT | Performed by: INTERNAL MEDICINE

## 2024-07-17 ASSESSMENT — PATIENT HEALTH QUESTIONNAIRE - PHQ9
1. LITTLE INTEREST OR PLEASURE IN DOING THINGS: NOT AT ALL
SUM OF ALL RESPONSES TO PHQ9 QUESTIONS 1 & 2: 0
SUM OF ALL RESPONSES TO PHQ QUESTIONS 1-9: 0
2. FEELING DOWN, DEPRESSED OR HOPELESS: NOT AT ALL
SUM OF ALL RESPONSES TO PHQ QUESTIONS 1-9: 0

## 2024-07-17 NOTE — PROGRESS NOTES
[facility]: Office Visit New Patient H/P    Chief Complaint:    Macrocytosis    History of Present Illness:  Referral Diagnosis: Macrocytosis      Referring Provider: Ye Grissom DO     Primary Care Provider: Sheila Patricia APRN - CNP            Family History of Cancer/ Hematology Disorders: No known family history     Presenting Symptoms: Macrocytosis      83 y.o. white female     4/29/24 - Met with PCP ([facility])  Here for MDD, 6-month f/u and for AWV  c/o fatigue, due to depression; macrocytosis without anemia noted.  Referral placed with [facility].  Abnormal labs (4/22/24 - [facility])  eGFR - 55                              Vit B-12 - 1327  RBC - 3.81                             MCV - 102.4  RDW - 15.6     5/1/24 - Met with Rheumatologist, Ye Grissom DO (EPIC)  Here for f/u; since the last visit, patient is feeling \"good\".  Her biggest issue is her right shoulder and right elbow; pain: 6/10.  She has been on prednisone 5mg daily since 4/11/24. She has been on leflunomide 20mg daily. She is taking tramadol not even weekly now.   Patient states she is mainly bothered by being tired all the time, she had palpitations and had a heart monitoring checked by cardiology.  Dr. Mendez her cardiologist was planning to start her on diltiazem but she did not start this because the palpitations have improved and she is worried the medication might drop her blood pressure.  Abnormal labs (5/1/24 - EPIC)  eGFR - 51                              AST - 39  RBC - 3.86                             MCH - 33.2  RDW - 15.2  Assessment/Plan:   Macrocytosis - referral placed with [facility]  DEXA BONE DENSITY AXIAL SKELETON; Future  Chronic fatigue - Discussed with patient this could be 2/2 to autoimmune disease vs DMARD. She reports reduced energy since reducing the dose of her prednisone. No anemia. Normal vitamin d level.   Will continue to monitor; RTC in 3 months.     6/20/24 - TE with

## 2024-07-17 NOTE — PATIENT INSTRUCTIONS
Patient Information from Today's Visit    The members of your Medical Home are listed below:    Physician Provider: Ann-Marie Mata Medical Oncologist  Advanced Practice Clinician: Irma Olivo NP  Registered Nurse: Lavinia,   Navigator: N/A  Medical Assistant: Yue ACEVEDO MA  : Nichole LATHAM   Supportive Care Services: Jer CARRASCO LMSW    Diagnosis: Macrocytosis      Follow Up Instructions:   - No follow up with Dr. Mata. Blood work today looks good.      Treatment Summary has been discussed and given to patient:No      Current Labs:   Hospital Outpatient Visit on 07/17/2024   Component Date Value Ref Range Status    Sodium 07/17/2024 142  136 - 145 mmol/L Final    Potassium 07/17/2024 4.6  3.5 - 5.1 mmol/L Final    Chloride 07/17/2024 105  98 - 107 mmol/L Final    CO2 07/17/2024 26  20 - 28 mmol/L Final    Anion Gap 07/17/2024 11  9 - 18 mmol/L Final    Glucose 07/17/2024 100 (H)  70 - 99 mg/dL Final    Comment: <70 mg/dL Consistent with, but not fully diagnostic of hypoglycemia.  100 - 125 mg/dL Impaired fasting glucose/consistent with pre-diabetes mellitus.  > 126 mg/dl Fasting glucose consistent with overt diabetes mellitus      BUN 07/17/2024 27 (H)  8 - 23 MG/DL Final    Creatinine 07/17/2024 1.16 (H)  0.60 - 1.10 MG/DL Final    Est, Glom Filt Rate 07/17/2024 46 (L)  >60 ml/min/1.73m2 Final    Comment:    Pediatric calculator link: https://www.kidney.org/professionals/kdoqi/gfr_calculatorped     These results are not intended for use in patients <18 years of age.     eGFR results are calculated without a race factor using  the 2021 CKD-EPI equation. Careful clinical correlation is recommended, particularly when comparing to results calculated using previous equations.  The CKD-EPI equation is less accurate in patients with extremes of muscle mass, extra-renal metabolism of creatinine, excessive creatine ingestion, or following therapy that affects renal tubular secretion.      Calcium 07/17/2024 9.3  8.8

## 2024-07-19 NOTE — TELEPHONE ENCOUNTER
I called and left vm for the pt that I'm just following up on this to make sure that you got taken care since Dr Grissom had to leave a message when she called her.

## 2024-07-24 ENCOUNTER — OFFICE VISIT (OUTPATIENT)
Dept: RHEUMATOLOGY | Age: 84
End: 2024-07-24
Payer: MEDICARE

## 2024-07-24 ENCOUNTER — NURSE ONLY (OUTPATIENT)
Dept: RHEUMATOLOGY | Age: 84
End: 2024-07-24
Payer: MEDICARE

## 2024-07-24 VITALS
DIASTOLIC BLOOD PRESSURE: 80 MMHG | BODY MASS INDEX: 25.61 KG/M2 | HEART RATE: 66 BPM | WEIGHT: 150 LBS | SYSTOLIC BLOOD PRESSURE: 129 MMHG | HEIGHT: 64 IN | TEMPERATURE: 97.4 F

## 2024-07-24 VITALS
BODY MASS INDEX: 25.72 KG/M2 | SYSTOLIC BLOOD PRESSURE: 129 MMHG | TEMPERATURE: 97.7 F | DIASTOLIC BLOOD PRESSURE: 80 MMHG | WEIGHT: 149.91 LBS | HEART RATE: 66 BPM

## 2024-07-24 DIAGNOSIS — M48.061 SPINAL STENOSIS OF LUMBAR REGION, UNSPECIFIED WHETHER NEUROGENIC CLAUDICATION PRESENT: ICD-10-CM

## 2024-07-24 DIAGNOSIS — M81.0 AGE-RELATED OSTEOPOROSIS WITHOUT CURRENT PATHOLOGICAL FRACTURE: ICD-10-CM

## 2024-07-24 DIAGNOSIS — M15.4 EROSIVE OSTEOARTHRITIS OF HANDS, BILATERAL: ICD-10-CM

## 2024-07-24 DIAGNOSIS — M05.9 SEROPOSITIVE RHEUMATOID ARTHRITIS (HCC): Primary | ICD-10-CM

## 2024-07-24 DIAGNOSIS — Z79.899 LONG-TERM USE OF HIGH-RISK MEDICATION: ICD-10-CM

## 2024-07-24 DIAGNOSIS — G56.03 CARPAL TUNNEL SYNDROME ON BOTH SIDES: ICD-10-CM

## 2024-07-24 DIAGNOSIS — F11.20 OPIOID DEPENDENCE WITH CURRENT USE (HCC): ICD-10-CM

## 2024-07-24 PROCEDURE — 1036F TOBACCO NON-USER: CPT | Performed by: INTERNAL MEDICINE

## 2024-07-24 PROCEDURE — G8399 PT W/DXA RESULTS DOCUMENT: HCPCS | Performed by: INTERNAL MEDICINE

## 2024-07-24 PROCEDURE — G8417 CALC BMI ABV UP PARAM F/U: HCPCS | Performed by: INTERNAL MEDICINE

## 2024-07-24 PROCEDURE — 3074F SYST BP LT 130 MM HG: CPT | Performed by: INTERNAL MEDICINE

## 2024-07-24 PROCEDURE — 96413 CHEMO IV INFUSION 1 HR: CPT | Performed by: INTERNAL MEDICINE

## 2024-07-24 PROCEDURE — 1123F ACP DISCUSS/DSCN MKR DOCD: CPT | Performed by: INTERNAL MEDICINE

## 2024-07-24 PROCEDURE — 99214 OFFICE O/P EST MOD 30 MIN: CPT | Performed by: INTERNAL MEDICINE

## 2024-07-24 PROCEDURE — 96415 CHEMO IV INFUSION ADDL HR: CPT | Performed by: INTERNAL MEDICINE

## 2024-07-24 PROCEDURE — 1090F PRES/ABSN URINE INCON ASSESS: CPT | Performed by: INTERNAL MEDICINE

## 2024-07-24 PROCEDURE — G8427 DOCREV CUR MEDS BY ELIG CLIN: HCPCS | Performed by: INTERNAL MEDICINE

## 2024-07-24 PROCEDURE — 3079F DIAST BP 80-89 MM HG: CPT | Performed by: INTERNAL MEDICINE

## 2024-07-24 RX ORDER — INFLIXIMAB 100 MG/10ML
3 INJECTION, POWDER, LYOPHILIZED, FOR SOLUTION INTRAVENOUS ONCE
Start: 2024-08-21 | End: 2024-08-21

## 2024-07-24 RX ORDER — ACETAMINOPHEN 325 MG/1
650 TABLET ORAL
OUTPATIENT
Start: 2024-08-21

## 2024-07-24 RX ORDER — ACETAMINOPHEN 325 MG/1
650 TABLET ORAL ONCE
Status: DISCONTINUED | OUTPATIENT
Start: 2024-07-24 | End: 2024-07-24 | Stop reason: HOSPADM

## 2024-07-24 RX ORDER — DIPHENHYDRAMINE HCL 25 MG
25 CAPSULE ORAL ONCE
OUTPATIENT
Start: 2024-08-21 | End: 2024-08-21

## 2024-07-24 RX ORDER — DIPHENHYDRAMINE HCL 25 MG
25 CAPSULE ORAL ONCE
Status: DISCONTINUED | OUTPATIENT
Start: 2024-07-24 | End: 2024-07-24 | Stop reason: HOSPADM

## 2024-07-24 RX ORDER — INFLIXIMAB 100 MG/10ML
3 INJECTION, POWDER, LYOPHILIZED, FOR SOLUTION INTRAVENOUS ONCE
Status: CANCELLED
Start: 2024-08-21 | End: 2024-08-21

## 2024-07-24 RX ORDER — DIPHENHYDRAMINE HYDROCHLORIDE 50 MG/ML
50 INJECTION INTRAMUSCULAR; INTRAVENOUS
OUTPATIENT
Start: 2024-08-21

## 2024-07-24 RX ORDER — ACETAMINOPHEN 325 MG/1
650 TABLET ORAL ONCE
OUTPATIENT
Start: 2024-08-21 | End: 2024-08-21

## 2024-07-24 RX ORDER — INFLIXIMAB 100 MG/10ML
3 INJECTION, POWDER, LYOPHILIZED, FOR SOLUTION INTRAVENOUS ONCE
Status: COMPLETED | OUTPATIENT
Start: 2024-07-24 | End: 2024-07-24

## 2024-07-24 RX ORDER — ALBUTEROL SULFATE 90 UG/1
4 AEROSOL, METERED RESPIRATORY (INHALATION) PRN
OUTPATIENT
Start: 2024-08-21

## 2024-07-24 RX ORDER — EPINEPHRINE 1 MG/ML
0.3 INJECTION, SOLUTION, CONCENTRATE INTRAVENOUS PRN
OUTPATIENT
Start: 2024-08-21

## 2024-07-24 RX ORDER — ACETAMINOPHEN 325 MG/1
650 TABLET ORAL ONCE
Status: CANCELLED | OUTPATIENT
Start: 2024-08-21 | End: 2024-08-21

## 2024-07-24 RX ORDER — ONDANSETRON 2 MG/ML
8 INJECTION INTRAMUSCULAR; INTRAVENOUS
OUTPATIENT
Start: 2024-08-21

## 2024-07-24 RX ORDER — UBIDECARENONE 75 MG
100 CAPSULE ORAL DAILY
Qty: 90 TABLET | Refills: 0 | Status: SHIPPED | OUTPATIENT
Start: 2024-07-24 | End: 2024-10-22

## 2024-07-24 RX ORDER — SODIUM CHLORIDE 9 MG/ML
INJECTION, SOLUTION INTRAVENOUS CONTINUOUS
OUTPATIENT
Start: 2024-08-21

## 2024-07-24 RX ORDER — DIPHENHYDRAMINE HCL 25 MG
25 CAPSULE ORAL ONCE
Status: CANCELLED | OUTPATIENT
Start: 2024-08-21 | End: 2024-08-21

## 2024-07-24 RX ADMIN — INFLIXIMAB 200 MG: 100 INJECTION, POWDER, LYOPHILIZED, FOR SOLUTION INTRAVENOUS at 13:50

## 2024-07-24 NOTE — PROGRESS NOTES
[facility]HEUMATOLOGY  [address] [place] [address], [address]  [place] [address]  Office : [phone], Fax: [phone]       Remicade infusion 7/24/2024 200 mg =3mg/kg infused in 0.9% NaCl. 0 mg of Remicade was wasted. Patient tolerated the infusion without complications. Infusion placed in left wrist vein.  IV inserted by Georgie Bazan RN  Next Infusion in 4 weeks     IV insertion time: 1330  Medication start time: 1350  Medication completion time: 1555    Patient discharged feeling fine and instructed to call the office with any post-infusion issues.    Pre-Infusion Questionnaire  Has your insurance changed or have you received a new card since your last visit?  no  Have you had any infections since your last infusion?   no  Since your last visit, have you been hospitalized, been to the ER, or Urgent Care Center for any reason?  no  Have you recently had GI problems, open wounds, urinary problems, or chest pain?   no  Are you currently taking antibiotics?   no  Have you recently had or are you scheduled for any surgical procedures?   no  Have you had a TB test in the last year?   yes, 1/12/2024  Did you premedicate for today's infusion?  yes, zyrtec 10 mg PO  Have you received any vaccinations in the last 6 months, or planning to receive in the next 3 months: COVID, Flu, Pheumonia, Shingles?  no  When was your last appointment with Dr. Chavis, Dr. Rios, or Dr. Hsieh or Dr. Rosario.   7/24/2024  When was your last infusion?        7/10/2024  12. Are you in skilled nursing facility, Rehab, or living at a nursing home?      no    Reviewed and Confirmed by Katie Israel

## 2024-07-24 NOTE — PATIENT INSTRUCTIONS
Start taking fosamax 35 mg once a week   Continue remicaide infusions  Continue the tramadol 50mg bid twice a day as needed  Get labs done 10/1/2024, the labs are done every 3 months  F/u in 3 months      Patient is aware that if she is sick requiring her to be on an antibiotic or antiviral drug she will need to skip administering the biologic and taking the methotrexate until she has completed the antibiotic/antiviral course and is over the infection.

## 2024-07-24 NOTE — PROGRESS NOTES
[facility]  Ye Grissom D.O.  [address] , [address]   [place]  Office : [phone], Fax: [phone]     RHEUMATOLOGY OFFICE VISIT NOTE  Date of Visit:  2024 12:27 PM    Patient Information:  Name:  Katie Israel  :  1940  Age:  83 y.o.   Gender:  female      Ms. 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 Advised to start fosamax 35, cont leflunomide 20 and tramadol 50 and taper prednisone off, and she would f/u with Dr. Glynn for low back pain.     History of Present Illness:    Since the last visit, patient is feeling \"better\".     Pain level 7:/10  Left side of the low back  Left shoulder needs to be operated on but she's not going to get this done.   She cannot move her right arm since the shoulder revision.  Left wrist is swollen and she has a burning pain in her wrist and fingers. More during the day. They've gotten more crooked. At rest it hurts. Squeezing it helps her pain. It is burning pain traveling down to the left median nerve distribution.  Morning stiffness lasting >1 hour. She is having some difficulty dressing herself, walking outdoors on flat ground and getting in and out of a car. She cannot walk 2 miles.   In the morning she takes tramadol 50mg daily-bid. No problems with the first infliximab infusion.  She using bands and weights for exercise.     She still is feeling weak  She is still very jittery unsure if realted to lyme disease.  Her sugar level sometimes drops as low as 80.     30.3-33.4 mL/min    Dr. Glynn dx her with arthritis. Advised her to f/u with Dr. Olivo for a nerve stimulator   IMPRESSION:   1. Multilevel lumbar spondylosis, with level-by-level evaluation as detailed above.   2.  Status post posterior

## 2024-07-26 ENCOUNTER — TELEPHONE (OUTPATIENT)
Age: 84
End: 2024-07-26

## 2024-07-26 NOTE — TELEPHONE ENCOUNTER
Pt is wondering if can get injections in hips?   Pt started with infusions 7/25/24 with rheumatology

## 2024-08-05 ENCOUNTER — TELEPHONE (OUTPATIENT)
Dept: RHEUMATOLOGY | Age: 84
End: 2024-08-05

## 2024-08-05 NOTE — TELEPHONE ENCOUNTER
Pt left  that when she called [facility] [address], they advised her that Dr Grissom had sent in B1 instead of B12?    I called [facility] and spoke with Armin, Dr Grissom did send a rx for B12 but insurance will not pay for this since it is OTC. Pt will get it cheaper to just buy it OTC. The pharmacy just put the rx on hold.     I tried to call the pt, I did not get an answer or machine to leave .

## 2024-08-06 NOTE — TELEPHONE ENCOUNTER
Pt returned my call and I spoke with her, advised her to buy the Vitamin B12 OTC due to insurance will not pay for it and it will be cheaper for her to just get it OTC. She said that she will get some.

## 2024-08-11 PROBLEM — Z79.899 LONG-TERM USE OF HIGH-RISK MEDICATION: Status: ACTIVE | Noted: 2024-08-11

## 2024-08-13 ENCOUNTER — LAB (OUTPATIENT)
Dept: INTERNAL MEDICINE CLINIC | Facility: CLINIC | Age: 84
End: 2024-08-13

## 2024-08-13 DIAGNOSIS — M05.9 SEROPOSITIVE RHEUMATOID ARTHRITIS (HCC): ICD-10-CM

## 2024-08-13 DIAGNOSIS — M15.4 EROSIVE OSTEOARTHRITIS OF HANDS, BILATERAL: ICD-10-CM

## 2024-08-13 DIAGNOSIS — Z79.899 LONG-TERM USE OF HIGH-RISK MEDICATION: ICD-10-CM

## 2024-08-13 LAB
ALBUMIN SERPL-MCNC: 3.9 G/DL (ref 3.2–4.6)
ALBUMIN/GLOB SERPL: 1.3 (ref 1–1.9)
ALP SERPL-CCNC: 74 U/L (ref 35–104)
ALT SERPL-CCNC: 14 U/L (ref 12–65)
ANION GAP SERPL CALC-SCNC: 12 MMOL/L (ref 9–18)
AST SERPL-CCNC: 29 U/L (ref 15–37)
BASOPHILS # BLD: 0.1 K/UL (ref 0–0.2)
BASOPHILS NFR BLD: 2 % (ref 0–2)
BILIRUB SERPL-MCNC: 0.6 MG/DL (ref 0–1.2)
BUN SERPL-MCNC: 11 MG/DL (ref 8–23)
CALCIUM SERPL-MCNC: 9.4 MG/DL (ref 8.8–10.2)
CHLORIDE SERPL-SCNC: 102 MMOL/L (ref 98–107)
CO2 SERPL-SCNC: 24 MMOL/L (ref 20–28)
CREAT SERPL-MCNC: 1.03 MG/DL (ref 0.6–1.1)
DIFFERENTIAL METHOD BLD: ABNORMAL
EOSINOPHIL # BLD: 0.2 K/UL (ref 0–0.8)
EOSINOPHIL NFR BLD: 4 % (ref 0.5–7.8)
ERYTHROCYTE [DISTWIDTH] IN BLOOD BY AUTOMATED COUNT: 13.3 % (ref 11.9–14.6)
ERYTHROCYTE [SEDIMENTATION RATE] IN BLOOD: 15 MM/HR (ref 0–30)
GLOBULIN SER CALC-MCNC: 2.9 G/DL (ref 2.3–3.5)
GLUCOSE SERPL-MCNC: 107 MG/DL (ref 70–99)
HCT VFR BLD AUTO: 39.1 % (ref 35.8–46.3)
HGB BLD-MCNC: 12.8 G/DL (ref 11.7–15.4)
IMM GRANULOCYTES # BLD AUTO: 0 K/UL (ref 0–0.5)
IMM GRANULOCYTES NFR BLD AUTO: 0 % (ref 0–5)
LYMPHOCYTES # BLD: 0.8 K/UL (ref 0.5–4.6)
LYMPHOCYTES NFR BLD: 22 % (ref 13–44)
MCH RBC QN AUTO: 32.2 PG (ref 26.1–32.9)
MCHC RBC AUTO-ENTMCNC: 32.7 G/DL (ref 31.4–35)
MCV RBC AUTO: 98.5 FL (ref 82–102)
MONOCYTES # BLD: 0.4 K/UL (ref 0.1–1.3)
MONOCYTES NFR BLD: 11 % (ref 4–12)
NEUTS SEG # BLD: 2.2 K/UL (ref 1.7–8.2)
NEUTS SEG NFR BLD: 61 % (ref 43–78)
NRBC # BLD: 0 K/UL (ref 0–0.2)
PLATELET # BLD AUTO: 180 K/UL (ref 150–450)
PMV BLD AUTO: 11.6 FL (ref 9.4–12.3)
POTASSIUM SERPL-SCNC: 4.4 MMOL/L (ref 3.5–5.1)
PROT SERPL-MCNC: 6.7 G/DL (ref 6.3–8.2)
RBC # BLD AUTO: 3.97 M/UL (ref 4.05–5.2)
SODIUM SERPL-SCNC: 138 MMOL/L (ref 136–145)
WBC # BLD AUTO: 3.6 K/UL (ref 4.3–11.1)

## 2024-08-15 ENCOUNTER — TELEPHONE (OUTPATIENT)
Age: 84
End: 2024-08-15

## 2024-08-15 LAB — CRP SERPL-MCNC: 5 MG/L (ref 0–10)

## 2024-08-15 NOTE — TELEPHONE ENCOUNTER
Pt.is calling reporting that her chest has been hurting today.She lied down and HR was 90 which is concerning to her.Has been constant heaviness in chest for days now and not going away.Pain does go into her back.She has been SOB as well for 3 days which is new.She currently has tightness in her chest.I advised she head to her nearest ER for evaluation.Pt.v/u.

## 2024-08-16 ENCOUNTER — TELEPHONE (OUTPATIENT)
Age: 84
End: 2024-08-16

## 2024-08-16 NOTE — TELEPHONE ENCOUNTER
Patient called stating she has the following issues :    ER visit yesterday  Lots of diagnostics  D'dimer was positive per patient  Last night and today has no energy  Patient questions the D'dimer and what that means?

## 2024-08-16 NOTE — TELEPHONE ENCOUNTER
She had ER visit at [facility] yesterday for CP.DDIMER was elevated and CT scan negative for PE.Told to fu w/cardiolgy.She is still not feeling well.She was negative for all illness such as COVID,PNA or flu.Still feels tight in chest off/on.Feels shaky all over.Can  review ER notes and advise on fu or next steps?

## 2024-08-16 NOTE — TELEPHONE ENCOUNTER
Pt.notified of 's response and appt.made Monday 8:30 EA pt.advised to head to the ER PRN in interim.

## 2024-08-19 ENCOUNTER — OFFICE VISIT (OUTPATIENT)
Age: 84
End: 2024-08-19
Payer: MEDICARE

## 2024-08-19 ENCOUNTER — OFFICE VISIT (OUTPATIENT)
Dept: ORTHOPEDIC SURGERY | Age: 84
End: 2024-08-19
Payer: MEDICARE

## 2024-08-19 VITALS — WEIGHT: 146 LBS | BODY MASS INDEX: 24.92 KG/M2 | HEIGHT: 64 IN

## 2024-08-19 VITALS
HEART RATE: 86 BPM | HEIGHT: 64 IN | WEIGHT: 141 LBS | DIASTOLIC BLOOD PRESSURE: 78 MMHG | BODY MASS INDEX: 24.07 KG/M2 | SYSTOLIC BLOOD PRESSURE: 138 MMHG

## 2024-08-19 DIAGNOSIS — Z09 FOLLOW-UP EXAMINATION AFTER ORTHOPEDIC SURGERY: ICD-10-CM

## 2024-08-19 DIAGNOSIS — M19.021 PRIMARY OSTEOARTHRITIS OF RIGHT ELBOW: Primary | ICD-10-CM

## 2024-08-19 DIAGNOSIS — Z96.611 PRESENCE OF RIGHT ARTIFICIAL SHOULDER JOINT: ICD-10-CM

## 2024-08-19 DIAGNOSIS — I73.9 PVD (PERIPHERAL VASCULAR DISEASE) (HCC): ICD-10-CM

## 2024-08-19 DIAGNOSIS — I10 HYPERTENSION, BENIGN: ICD-10-CM

## 2024-08-19 DIAGNOSIS — I65.23 BILATERAL CAROTID ARTERY STENOSIS: ICD-10-CM

## 2024-08-19 DIAGNOSIS — I25.10 ATHEROSCLEROSIS OF NATIVE CORONARY ARTERY OF NATIVE HEART WITHOUT ANGINA PECTORIS: Primary | ICD-10-CM

## 2024-08-19 DIAGNOSIS — R00.2 PALPITATION: ICD-10-CM

## 2024-08-19 PROCEDURE — G8420 CALC BMI NORM PARAMETERS: HCPCS | Performed by: ORTHOPAEDIC SURGERY

## 2024-08-19 PROCEDURE — 1123F ACP DISCUSS/DSCN MKR DOCD: CPT | Performed by: ORTHOPAEDIC SURGERY

## 2024-08-19 PROCEDURE — 1036F TOBACCO NON-USER: CPT | Performed by: ORTHOPAEDIC SURGERY

## 2024-08-19 PROCEDURE — G8399 PT W/DXA RESULTS DOCUMENT: HCPCS | Performed by: ORTHOPAEDIC SURGERY

## 2024-08-19 PROCEDURE — G8428 CUR MEDS NOT DOCUMENT: HCPCS | Performed by: INTERNAL MEDICINE

## 2024-08-19 PROCEDURE — 3075F SYST BP GE 130 - 139MM HG: CPT | Performed by: INTERNAL MEDICINE

## 2024-08-19 PROCEDURE — 1036F TOBACCO NON-USER: CPT | Performed by: INTERNAL MEDICINE

## 2024-08-19 PROCEDURE — 1090F PRES/ABSN URINE INCON ASSESS: CPT | Performed by: ORTHOPAEDIC SURGERY

## 2024-08-19 PROCEDURE — 3078F DIAST BP <80 MM HG: CPT | Performed by: INTERNAL MEDICINE

## 2024-08-19 PROCEDURE — 93000 ELECTROCARDIOGRAM COMPLETE: CPT | Performed by: INTERNAL MEDICINE

## 2024-08-19 PROCEDURE — G8420 CALC BMI NORM PARAMETERS: HCPCS | Performed by: INTERNAL MEDICINE

## 2024-08-19 PROCEDURE — 1090F PRES/ABSN URINE INCON ASSESS: CPT | Performed by: INTERNAL MEDICINE

## 2024-08-19 PROCEDURE — G8399 PT W/DXA RESULTS DOCUMENT: HCPCS | Performed by: INTERNAL MEDICINE

## 2024-08-19 PROCEDURE — G8427 DOCREV CUR MEDS BY ELIG CLIN: HCPCS | Performed by: ORTHOPAEDIC SURGERY

## 2024-08-19 PROCEDURE — 99214 OFFICE O/P EST MOD 30 MIN: CPT | Performed by: ORTHOPAEDIC SURGERY

## 2024-08-19 PROCEDURE — 1123F ACP DISCUSS/DSCN MKR DOCD: CPT | Performed by: INTERNAL MEDICINE

## 2024-08-19 PROCEDURE — 99214 OFFICE O/P EST MOD 30 MIN: CPT | Performed by: INTERNAL MEDICINE

## 2024-08-19 NOTE — PROGRESS NOTES
[address], [place] [address]  PHONE: [phone]     24    NAME:  Katie Israel  : 1940  MRN: [id number]       SUBJECTIVE:   Katie Israel is a 83 y.o. female seen for a follow up visit regarding the following:     Chief Complaint   Patient presents with    Coronary Artery Disease       HPI:   Here for HTN, Carotid Dz, CAD.    Ca score 2017 was 83.   Carotid US 2019 and 3/2021 and 3/2023: mild to mod LICA dz  Echo 2021 and 2024: normal EF, mild pHTN  NST 2024:  low risk of cardiac events.   Monitor 3/2024: 3 week: avg HR 71bpm, sinus to sinus tach, runs of A tach, PACs, no obvious AF seen.     Went to ER last week for SOB and CP, CTA neg for PE.   trop low lying.   Feeling better now.    Feeling more stress at home now.   No new angina.  Better now.  Getting off prednisone for RA.   No CP, pressure.     NO new SAINI. Some lack of emergy.   Patient denies recent history of orthopnea, PND, excessive dizziness and/or syncope.            Past Medical History, Past Surgical History, Family history, Social History, and Medications were all reviewed with the patient today and updated as necessary.     Current Outpatient Medications   Medication Sig Dispense Refill    vitamin B-12 (CYANOCOBALAMIN) 100 MCG tablet Take 1 tablet by mouth daily 90 tablet 0    vitamin D3 (CHOLECALCIFEROL) 25 MCG (1000 UT) TABS tablet TAKE 1 TABLET BY MOUTH EVERY DAY 90 tablet 1    Thiamine HCl (VITAMIN B-1) 50 MG tablet TAKE 1 TABLET BY MOUTH EVERY DAY 90 tablet 1    omeprazole (PRILOSEC) 20 MG delayed release capsule TAKE 1 CAPSULE BY MOUTH EVERY DAY AS NEEDED FOR REFLUX 90 capsule 1    acetaminophen (TYLENOL 8 HOUR ARTHRITIS PAIN) 650 MG extended release tablet Take 1 tablet by mouth in the morning and at bedtime      citalopram (CELEXA) 20 MG tablet Take 1 tablet by mouth daily 90 tablet 1    cetirizine (ZYRTEC) 10 MG tablet Take 1 tablet by mouth daily      alendronate

## 2024-08-19 NOTE — PROGRESS NOTES
longstem right total shoulder arthroplasty in unchanged position    AP lateral and axial views right elbow demonstrate osteoarthritis in the right      Impression: As above   ELIEZER BAJWA JR, MD            Impression:      1. Primary osteoarthritis of right elbow    2. Presence of right artificial shoulder joint    3. Follow-up examination after orthopedic surgery             status post I&D removal of implant right shoulder [place] reverse right total shoulder arthroplasty with a proximal humeral osteotomy and revision reverse right total shoulder arthroplasty with a longstem delta xtend prosthesis biceps tenodesis latissimus dorsi and teres major tendon transfer 2 years  Painful reverse right total shoulder arthroplasty with glenoid bone loss and medialization   Rule out periprosthetic infection status post reverse right total shoulder arthroplasty   Rule out periprosthetic loosening   Status post reverse right total shoulder arthroplasty by Dr. Chandler for years   Rotator cuff tear arthropathy left shoulder   2 previous surgical procedures by Dr. Yates on the left shoulder   Sternoclavicular osteoarthritis both shoulders   Source bolus multiple levels   Status post primary right total hip arthroplasty   Status post revision left total hip arthroplasty   Status post primary left total hip arthroplasty   Status post right total knee arthroplasty   History of low back surgery   Chronic low back pain   Hypercholesterolemia   Coronary disease on aspirin   Systemic osteoarthritis   Status post previous hand surgery with osteoarthritis      Plan:   I discussed the problem with the patient.  Overall her right shoulder is the same with no change on physical exam or her radiographic studies.  She has multiple complaints primarily related to systemic issues including chronic pain and rheumatoid arthritis which I unfortunately cannot help her with.  I again tried to explain to her that I have reviewed her record and

## 2024-08-20 ENCOUNTER — OFFICE VISIT (OUTPATIENT)
Dept: INTERNAL MEDICINE CLINIC | Facility: CLINIC | Age: 84
End: 2024-08-20
Payer: MEDICARE

## 2024-08-20 VITALS
DIASTOLIC BLOOD PRESSURE: 60 MMHG | SYSTOLIC BLOOD PRESSURE: 102 MMHG | WEIGHT: 146 LBS | HEIGHT: 64 IN | HEART RATE: 75 BPM | BODY MASS INDEX: 24.92 KG/M2 | TEMPERATURE: 99.1 F | OXYGEN SATURATION: 97 %

## 2024-08-20 DIAGNOSIS — R79.89 LOW VITAMIN D LEVEL: ICD-10-CM

## 2024-08-20 DIAGNOSIS — R79.89 ABNORMAL CBC: ICD-10-CM

## 2024-08-20 DIAGNOSIS — R73.09 ELEVATED GLUCOSE: ICD-10-CM

## 2024-08-20 DIAGNOSIS — E78.2 MIXED HYPERLIPIDEMIA: ICD-10-CM

## 2024-08-20 DIAGNOSIS — R53.83 FATIGUE DUE TO DEPRESSION: Primary | ICD-10-CM

## 2024-08-20 DIAGNOSIS — F32.A FATIGUE DUE TO DEPRESSION: Primary | ICD-10-CM

## 2024-08-20 DIAGNOSIS — M05.80 POLYARTHRITIS WITH POSITIVE RHEUMATOID FACTOR (HCC): ICD-10-CM

## 2024-08-20 LAB
ALBUMIN SERPL-MCNC: 3.7 G/DL (ref 3.2–4.6)
ALBUMIN/GLOB SERPL: 1.2 (ref 1–1.9)
ALP SERPL-CCNC: 72 U/L (ref 35–104)
ALT SERPL-CCNC: 15 U/L (ref 12–65)
ANION GAP SERPL CALC-SCNC: 12 MMOL/L (ref 9–18)
AST SERPL-CCNC: 33 U/L (ref 15–37)
BASOPHILS # BLD: 0.1 K/UL (ref 0–0.2)
BASOPHILS NFR BLD: 1 % (ref 0–2)
BILIRUB SERPL-MCNC: 0.6 MG/DL (ref 0–1.2)
BUN SERPL-MCNC: 16 MG/DL (ref 8–23)
CALCIUM SERPL-MCNC: 9.1 MG/DL (ref 8.8–10.2)
CHLORIDE SERPL-SCNC: 101 MMOL/L (ref 98–107)
CHOLEST SERPL-MCNC: 161 MG/DL (ref 0–200)
CO2 SERPL-SCNC: 25 MMOL/L (ref 20–28)
CREAT SERPL-MCNC: 1 MG/DL (ref 0.6–1.1)
DIFFERENTIAL METHOD BLD: ABNORMAL
EOSINOPHIL # BLD: 0.1 K/UL (ref 0–0.8)
EOSINOPHIL NFR BLD: 2 % (ref 0.5–7.8)
ERYTHROCYTE [DISTWIDTH] IN BLOOD BY AUTOMATED COUNT: 13.2 % (ref 11.9–14.6)
EST. AVERAGE GLUCOSE BLD GHB EST-MCNC: 272 MG/DL
GLOBULIN SER CALC-MCNC: 3.2 G/DL (ref 2.3–3.5)
GLUCOSE SERPL-MCNC: 98 MG/DL (ref 70–99)
HBA1C MFR BLD: 11.1 % (ref 0–5.6)
HCT VFR BLD AUTO: 39.2 % (ref 35.8–46.3)
HDLC SERPL-MCNC: 44 MG/DL (ref 40–60)
HDLC SERPL: 3.7 (ref 0–5)
HGB BLD-MCNC: 12.4 G/DL (ref 11.7–15.4)
IMM GRANULOCYTES # BLD AUTO: 0 K/UL (ref 0–0.5)
IMM GRANULOCYTES NFR BLD AUTO: 1 % (ref 0–5)
LDLC SERPL CALC-MCNC: 102 MG/DL (ref 0–100)
LYMPHOCYTES # BLD: 0.7 K/UL (ref 0.5–4.6)
LYMPHOCYTES NFR BLD: 16 % (ref 13–44)
MCH RBC QN AUTO: 31.2 PG (ref 26.1–32.9)
MCHC RBC AUTO-ENTMCNC: 31.6 G/DL (ref 31.4–35)
MCV RBC AUTO: 98.7 FL (ref 82–102)
MONOCYTES # BLD: 0.4 K/UL (ref 0.1–1.3)
MONOCYTES NFR BLD: 8 % (ref 4–12)
NEUTS SEG # BLD: 3.2 K/UL (ref 1.7–8.2)
NEUTS SEG NFR BLD: 72 % (ref 43–78)
NRBC # BLD: 0 K/UL (ref 0–0.2)
PLATELET # BLD AUTO: 156 K/UL (ref 150–450)
PMV BLD AUTO: 12 FL (ref 9.4–12.3)
POTASSIUM SERPL-SCNC: 4.5 MMOL/L (ref 3.5–5.1)
PROT SERPL-MCNC: 6.9 G/DL (ref 6.3–8.2)
RBC # BLD AUTO: 3.97 M/UL (ref 4.05–5.2)
SODIUM SERPL-SCNC: 138 MMOL/L (ref 136–145)
TRIGL SERPL-MCNC: 76 MG/DL (ref 0–150)
TSH W FREE THYROID IF ABNORMAL: 1.8 UIU/ML (ref 0.27–4.2)
VLDLC SERPL CALC-MCNC: 15 MG/DL (ref 6–23)
WBC # BLD AUTO: 4.5 K/UL (ref 4.3–11.1)

## 2024-08-20 PROCEDURE — G8417 CALC BMI ABV UP PARAM F/U: HCPCS | Performed by: NURSE PRACTITIONER

## 2024-08-20 PROCEDURE — 3074F SYST BP LT 130 MM HG: CPT | Performed by: NURSE PRACTITIONER

## 2024-08-20 PROCEDURE — G8399 PT W/DXA RESULTS DOCUMENT: HCPCS | Performed by: NURSE PRACTITIONER

## 2024-08-20 PROCEDURE — 3078F DIAST BP <80 MM HG: CPT | Performed by: NURSE PRACTITIONER

## 2024-08-20 PROCEDURE — 1123F ACP DISCUSS/DSCN MKR DOCD: CPT | Performed by: NURSE PRACTITIONER

## 2024-08-20 PROCEDURE — G8427 DOCREV CUR MEDS BY ELIG CLIN: HCPCS | Performed by: NURSE PRACTITIONER

## 2024-08-20 PROCEDURE — 1036F TOBACCO NON-USER: CPT | Performed by: NURSE PRACTITIONER

## 2024-08-20 PROCEDURE — 99214 OFFICE O/P EST MOD 30 MIN: CPT | Performed by: NURSE PRACTITIONER

## 2024-08-20 PROCEDURE — 1090F PRES/ABSN URINE INCON ASSESS: CPT | Performed by: NURSE PRACTITIONER

## 2024-08-20 NOTE — PROGRESS NOTES
Katie Israel (:  1940) is a 83 y.o. female,Established patient, here for evaluation of the following chief complaint(s):  ED Follow-up and Fatigue (Shortness of breath, and dry mouth )         Assessment & Plan  Fatigue due to depression     Orders:    CBC with Auto Differential; Future    Comprehensive Metabolic Panel; Future    Hemoglobin A1C; Future    Methylmalonic Acid, Serum; Future    TSH with Reflex; Future    Lipid Panel; Future    Lipid Panel    TSH with Reflex    Methylmalonic Acid, Serum    Hemoglobin A1C    Comprehensive Metabolic Panel    CBC with Auto Differential    Elevated glucose     Orders:    CBC with Auto Differential; Future    Comprehensive Metabolic Panel; Future    Hemoglobin A1C; Future    Methylmalonic Acid, Serum; Future    TSH with Reflex; Future    Lipid Panel; Future    Lipid Panel    TSH with Reflex    Methylmalonic Acid, Serum    Hemoglobin A1C    Comprehensive Metabolic Panel    CBC with Auto Differential    Mixed hyperlipidemia       Orders:    CBC with Auto Differential; Future    Comprehensive Metabolic Panel; Future    Hemoglobin A1C; Future    Methylmalonic Acid, Serum; Future    TSH with Reflex; Future    Lipid Panel; Future    Lipid Panel    TSH with Reflex    Methylmalonic Acid, Serum    Hemoglobin A1C    Comprehensive Metabolic Panel    CBC with Auto Differential    Abnormal CBC     Orders:    CBC with Auto Differential; Future    Comprehensive Metabolic Panel; Future    Hemoglobin A1C; Future    Methylmalonic Acid, Serum; Future    TSH with Reflex; Future    Lipid Panel; Future    Lipid Panel    TSH with Reflex    Methylmalonic Acid, Serum    Hemoglobin A1C    Comprehensive Metabolic Panel    CBC with Auto Differential    Polyarthritis with positive rheumatoid factor (HCC)     Orders:    CBC with Auto Differential; Future    Comprehensive Metabolic Panel; Future    Hemoglobin A1C; Future    Methylmalonic Acid, Serum; Future    TSH with Reflex;

## 2024-08-20 NOTE — ASSESSMENT & PLAN NOTE
Orders:    CBC with Auto Differential; Future    Comprehensive Metabolic Panel; Future    Hemoglobin A1C; Future    Methylmalonic Acid, Serum; Future    TSH with Reflex; Future    Lipid Panel; Future    Lipid Panel    TSH with Reflex    Methylmalonic Acid, Serum    Hemoglobin A1C    Comprehensive Metabolic Panel    CBC with Auto Differential

## 2024-08-21 ENCOUNTER — NURSE ONLY (OUTPATIENT)
Dept: RHEUMATOLOGY | Age: 84
End: 2024-08-21
Payer: MEDICARE

## 2024-08-21 VITALS
SYSTOLIC BLOOD PRESSURE: 123 MMHG | WEIGHT: 146.4 LBS | TEMPERATURE: 98.2 F | BODY MASS INDEX: 25.13 KG/M2 | DIASTOLIC BLOOD PRESSURE: 68 MMHG | HEART RATE: 84 BPM

## 2024-08-21 DIAGNOSIS — M05.9 SEROPOSITIVE RHEUMATOID ARTHRITIS (HCC): Primary | ICD-10-CM

## 2024-08-21 PROCEDURE — 96413 CHEMO IV INFUSION 1 HR: CPT | Performed by: INTERNAL MEDICINE

## 2024-08-21 PROCEDURE — 96415 CHEMO IV INFUSION ADDL HR: CPT | Performed by: INTERNAL MEDICINE

## 2024-08-21 RX ORDER — ALBUTEROL SULFATE 90 UG/1
4 AEROSOL, METERED RESPIRATORY (INHALATION) PRN
Status: DISCONTINUED | OUTPATIENT
Start: 2024-08-21 | End: 2024-08-21 | Stop reason: HOSPADM

## 2024-08-21 RX ORDER — DIPHENHYDRAMINE HYDROCHLORIDE 50 MG/ML
50 INJECTION INTRAMUSCULAR; INTRAVENOUS
OUTPATIENT
Start: 2024-09-04

## 2024-08-21 RX ORDER — SODIUM CHLORIDE 9 MG/ML
INJECTION, SOLUTION INTRAVENOUS CONTINUOUS
Status: DISCONTINUED | OUTPATIENT
Start: 2024-08-21 | End: 2024-08-21 | Stop reason: HOSPADM

## 2024-08-21 RX ORDER — INFLIXIMAB 100 MG/10ML
3 INJECTION, POWDER, LYOPHILIZED, FOR SOLUTION INTRAVENOUS ONCE
Status: COMPLETED | OUTPATIENT
Start: 2024-08-21 | End: 2024-08-21

## 2024-08-21 RX ORDER — ONDANSETRON 2 MG/ML
8 INJECTION INTRAMUSCULAR; INTRAVENOUS
Status: DISCONTINUED | OUTPATIENT
Start: 2024-08-21 | End: 2024-08-21 | Stop reason: HOSPADM

## 2024-08-21 RX ORDER — INFLIXIMAB 100 MG/10ML
3 INJECTION, POWDER, LYOPHILIZED, FOR SOLUTION INTRAVENOUS ONCE
Status: CANCELLED
Start: 2024-09-04 | End: 2024-09-04

## 2024-08-21 RX ORDER — DIPHENHYDRAMINE HCL 25 MG
25 CAPSULE ORAL ONCE
Status: CANCELLED | OUTPATIENT
Start: 2024-09-04 | End: 2024-09-04

## 2024-08-21 RX ORDER — ACETAMINOPHEN 325 MG/1
650 TABLET ORAL ONCE
Status: CANCELLED | OUTPATIENT
Start: 2024-09-04 | End: 2024-09-04

## 2024-08-21 RX ORDER — INFLIXIMAB 100 MG/10ML
3 INJECTION, POWDER, LYOPHILIZED, FOR SOLUTION INTRAVENOUS ONCE
Start: 2024-09-04 | End: 2024-09-04

## 2024-08-21 RX ORDER — ACETAMINOPHEN 325 MG/1
650 TABLET ORAL ONCE
Status: DISCONTINUED | OUTPATIENT
Start: 2024-08-21 | End: 2024-08-21 | Stop reason: HOSPADM

## 2024-08-21 RX ORDER — DIPHENHYDRAMINE HCL 25 MG
25 CAPSULE ORAL ONCE
OUTPATIENT
Start: 2024-09-04 | End: 2024-09-04

## 2024-08-21 RX ORDER — ALBUTEROL SULFATE 90 UG/1
4 AEROSOL, METERED RESPIRATORY (INHALATION) PRN
OUTPATIENT
Start: 2024-09-04

## 2024-08-21 RX ORDER — ACETAMINOPHEN 325 MG/1
650 TABLET ORAL
Status: DISCONTINUED | OUTPATIENT
Start: 2024-08-21 | End: 2024-08-21 | Stop reason: HOSPADM

## 2024-08-21 RX ORDER — ONDANSETRON 2 MG/ML
8 INJECTION INTRAMUSCULAR; INTRAVENOUS
OUTPATIENT
Start: 2024-09-04

## 2024-08-21 RX ORDER — EPINEPHRINE 1 MG/ML
0.3 INJECTION, SOLUTION, CONCENTRATE INTRAVENOUS PRN
OUTPATIENT
Start: 2024-09-04

## 2024-08-21 RX ORDER — SODIUM CHLORIDE 9 MG/ML
INJECTION, SOLUTION INTRAVENOUS CONTINUOUS
OUTPATIENT
Start: 2024-09-04

## 2024-08-21 RX ORDER — ACETAMINOPHEN 325 MG/1
650 TABLET ORAL ONCE
OUTPATIENT
Start: 2024-09-04 | End: 2024-09-04

## 2024-08-21 RX ORDER — ACETAMINOPHEN 325 MG/1
650 TABLET ORAL
OUTPATIENT
Start: 2024-09-04

## 2024-08-21 RX ORDER — DIPHENHYDRAMINE HCL 25 MG
25 CAPSULE ORAL ONCE
Status: DISCONTINUED | OUTPATIENT
Start: 2024-08-21 | End: 2024-08-21 | Stop reason: HOSPADM

## 2024-08-21 RX ORDER — EPINEPHRINE 1 MG/ML
0.3 INJECTION, SOLUTION, CONCENTRATE INTRAVENOUS PRN
Status: DISCONTINUED | OUTPATIENT
Start: 2024-08-21 | End: 2024-08-21 | Stop reason: HOSPADM

## 2024-08-21 RX ORDER — DIPHENHYDRAMINE HYDROCHLORIDE 50 MG/ML
50 INJECTION INTRAMUSCULAR; INTRAVENOUS
Status: DISCONTINUED | OUTPATIENT
Start: 2024-08-21 | End: 2024-08-21 | Stop reason: HOSPADM

## 2024-08-21 RX ADMIN — INFLIXIMAB 200 MG: 100 INJECTION, POWDER, LYOPHILIZED, FOR SOLUTION INTRAVENOUS at 13:15

## 2024-08-21 NOTE — PROGRESS NOTES
[facility] RHEUMATOLOGY  [address] [place] [address], [address]  [place] [address]  Office : [phone], Fax: [phone]       Remicade infusion 8/21/2024 200 mg =3mg/kg infused in 0.9% NaCl. 0 mg of Remicade was wasted. Patient tolerated the infusion without complications. Infusion placed in right wrist vein.  IV inserted by Georgie Bazan RN  Next Infusion in 8 weeks (9 weeks due to lining up with 3 month f/u office visit) 10/23/2024    IV insertion time: 1300  Medication start time: 1315  Medication completion time: 1530    Patient discharged feeling fine and instructed to call the office with any post-infusion issues.    Pre-Infusion Questionnaire  Has your insurance changed or have you received a new card since your last visit?  no  Have you had any infections since your last infusion?   no  Since your last visit, have you been hospitalized, been to the ER, or Urgent Care Center for any reason?  yes, went to the ER for chest pain  Have you recently had GI problems, open wounds, urinary problems, or chest pain?   yes, chest pain  Are you currently taking antibiotics?   no  Have you recently had or are you scheduled for any surgical procedures?   no  Have you had a TB test in the last year?   yes, 1/12/2024 (negative)  Did you premedicate for today's infusion?  No - given Zyrtec after getting here  Have you received any vaccinations in the last 6 months, or planning to receive in the next 3 months: COVID, Flu, Pheumonia, Shingles?  no  When was your last appointment with Dr. Chavis, Dr. Rios, or Dr. Hsieh or Dr. Rosario.   7/24/2024  When was your last infusion?        7/24/2024  12. Are you in skilled nursing facility, Rehab, or living at a nursing home?      no    Reviewed and Confirmed by Katie Israel

## 2024-08-24 LAB — METHYLMALONATE SERPL-SCNC: 166 NMOL/L (ref 0–378)

## 2024-08-27 ENCOUNTER — OFFICE VISIT (OUTPATIENT)
Dept: INTERNAL MEDICINE CLINIC | Facility: CLINIC | Age: 84
End: 2024-08-27
Payer: MEDICARE

## 2024-08-27 VITALS
HEART RATE: 71 BPM | DIASTOLIC BLOOD PRESSURE: 80 MMHG | OXYGEN SATURATION: 95 % | SYSTOLIC BLOOD PRESSURE: 138 MMHG | WEIGHT: 145 LBS | TEMPERATURE: 98.6 F | BODY MASS INDEX: 24.75 KG/M2 | HEIGHT: 64 IN

## 2024-08-27 DIAGNOSIS — F32.A FATIGUE DUE TO DEPRESSION: ICD-10-CM

## 2024-08-27 DIAGNOSIS — F33.1 MAJOR DEPRESSIVE DISORDER, RECURRENT, MODERATE (HCC): ICD-10-CM

## 2024-08-27 DIAGNOSIS — R53.83 FATIGUE DUE TO DEPRESSION: ICD-10-CM

## 2024-08-27 DIAGNOSIS — F41.9 ANXIETY: ICD-10-CM

## 2024-08-27 DIAGNOSIS — R73.09 ELEVATED GLUCOSE: Primary | ICD-10-CM

## 2024-08-27 PROBLEM — E11.9 TYPE 2 DIABETES MELLITUS (HCC): Status: ACTIVE | Noted: 2024-08-27

## 2024-08-27 LAB
EST. AVERAGE GLUCOSE BLD GHB EST-MCNC: 123 MG/DL
GLUCOSE, POC: 112 MG/DL
HBA1C MFR BLD: 5 %
HBA1C MFR BLD: 5.9 % (ref 0–5.6)

## 2024-08-27 PROCEDURE — G8427 DOCREV CUR MEDS BY ELIG CLIN: HCPCS | Performed by: NURSE PRACTITIONER

## 2024-08-27 PROCEDURE — 3075F SYST BP GE 130 - 139MM HG: CPT | Performed by: NURSE PRACTITIONER

## 2024-08-27 PROCEDURE — 99214 OFFICE O/P EST MOD 30 MIN: CPT | Performed by: NURSE PRACTITIONER

## 2024-08-27 PROCEDURE — G8399 PT W/DXA RESULTS DOCUMENT: HCPCS | Performed by: NURSE PRACTITIONER

## 2024-08-27 PROCEDURE — 82962 GLUCOSE BLOOD TEST: CPT | Performed by: NURSE PRACTITIONER

## 2024-08-27 PROCEDURE — G8420 CALC BMI NORM PARAMETERS: HCPCS | Performed by: NURSE PRACTITIONER

## 2024-08-27 PROCEDURE — 1123F ACP DISCUSS/DSCN MKR DOCD: CPT | Performed by: NURSE PRACTITIONER

## 2024-08-27 PROCEDURE — 1036F TOBACCO NON-USER: CPT | Performed by: NURSE PRACTITIONER

## 2024-08-27 PROCEDURE — 83036 HEMOGLOBIN GLYCOSYLATED A1C: CPT | Performed by: NURSE PRACTITIONER

## 2024-08-27 PROCEDURE — 1090F PRES/ABSN URINE INCON ASSESS: CPT | Performed by: NURSE PRACTITIONER

## 2024-08-27 PROCEDURE — 3079F DIAST BP 80-89 MM HG: CPT | Performed by: NURSE PRACTITIONER

## 2024-08-27 RX ORDER — BUSPIRONE HYDROCHLORIDE 5 MG/1
5 TABLET ORAL
Qty: 30 TABLET | Refills: 0 | Status: SHIPPED | OUTPATIENT
Start: 2024-08-27 | End: 2024-09-26

## 2024-08-27 NOTE — PROGRESS NOTES
Katie Israel (:  1940) is a 83 y.o. female,Established patient, here for evaluation of the following chief complaint(s):  Discuss Labs         Assessment & Plan  Elevated glucose       Orders:    AMB POC GLUCOSE BLOOD, BY GLUCOSE MONITORING DEVICE    AMB POC HEMOGLOBIN A1C    Hemoglobin A1C    Fructosamine; Future    Fatigue due to depression     Orders:    AMB POC HEMOGLOBIN A1C    Hemoglobin A1C    Anxiety           Major depressive disorder, recurrent, moderate (HCC)   Continue celexa         No follow-ups on file.       Glucose 112 in office fasting  A1c in office fingerstick 5 - will recheck through the lab  She is peeing frequently at night - she is more thirsty in the day, she isn't very hungry.   Will check A1c send out repeat and fructosamine  She is feeling more anxious, she would like buspar, her daughter takes buspar PRN and recommended it  - discussed med risks/side effects, is on celexa, will add low dose PRN buspar      Wt Readings from Last 3 Encounters:   24 65.8 kg (145 lb)   24 66.4 kg (146 lb 6.4 oz)   24 66.2 kg (146 lb)       Subjective   Patient is here for follow up. She has checked her sugar at home, 118, 119, 120, is her highest glucose. She has been drinking diet green tea with andrzej/honey. She stopped drinking that   She was on a low dose of steroid for about a month. She hasn't had the steroid in a few months. She hasn't had steroid shots.   She usually checks her sugar daily - before was checking a few times a week. She has checked it with a shaking spell - sugar was around 80.         Review of Systems       Objective   Physical Exam  Vitals reviewed.   Constitutional:       Appearance: Normal appearance.   HENT:      Head: Normocephalic.   Eyes:      Extraocular Movements: Extraocular movements intact.      Pupils: Pupils are equal, round, and reactive to light.   Cardiovascular:      Rate and Rhythm: Normal rate and regular rhythm.

## 2024-09-04 ENCOUNTER — TELEPHONE (OUTPATIENT)
Age: 84
End: 2024-09-04

## 2024-09-05 ENCOUNTER — TELEPHONE (OUTPATIENT)
Dept: RHEUMATOLOGY | Age: 84
End: 2024-09-05

## 2024-09-05 DIAGNOSIS — M15.4 EROSIVE OSTEOARTHRITIS OF HANDS, BILATERAL: ICD-10-CM

## 2024-09-05 RX ORDER — TRAMADOL HYDROCHLORIDE 50 MG/1
50 TABLET ORAL 2 TIMES DAILY PRN
Qty: 60 TABLET | Refills: 1 | Status: SHIPPED | OUTPATIENT
Start: 2024-09-05 | End: 2024-11-04

## 2024-09-05 NOTE — TELEPHONE ENCOUNTER
Pt called stating that she needs a refill on tramadol. She states it haven't been called in to her pharmacy   
Pt left vm that she is out of her Tramadol, she would like for you to send the refill to [facility]. Her next ov is 10/23/24.     I called and spoke with her about who writes this for her and she said that she signed something that only Dr Grissom can give her pain medication.   
Simple: Patient demonstrates quick and easy understanding/Verbalized Understanding

## 2024-09-09 ENCOUNTER — TELEPHONE (OUTPATIENT)
Dept: RHEUMATOLOGY | Age: 84
End: 2024-09-09

## 2024-09-09 NOTE — TELEPHONE ENCOUNTER
Pt called stating that she has so much pain in her hands. She asked is it ok if she go to the emergency [facility]

## 2024-09-10 ENCOUNTER — TELEPHONE (OUTPATIENT)
Dept: RHEUMATOLOGY | Age: 84
End: 2024-09-10

## 2024-09-10 NOTE — TELEPHONE ENCOUNTER
I call this pt to let her know that it is okay if she go to the emergency room. Pt didn't answer lvm for pt to give us a call back with any questions. n

## 2024-09-16 ENCOUNTER — OFFICE VISIT (OUTPATIENT)
Age: 84
End: 2024-09-16
Payer: MEDICARE

## 2024-09-16 VITALS
BODY MASS INDEX: 24.59 KG/M2 | WEIGHT: 144 LBS | DIASTOLIC BLOOD PRESSURE: 78 MMHG | HEART RATE: 84 BPM | SYSTOLIC BLOOD PRESSURE: 124 MMHG | HEIGHT: 64 IN

## 2024-09-16 DIAGNOSIS — I65.23 BILATERAL CAROTID ARTERY STENOSIS: ICD-10-CM

## 2024-09-16 DIAGNOSIS — R06.02 SHORTNESS OF BREATH: ICD-10-CM

## 2024-09-16 DIAGNOSIS — I25.119 ATHEROSCLEROSIS OF NATIVE CORONARY ARTERY OF NATIVE HEART WITH ANGINA PECTORIS (HCC): Primary | ICD-10-CM

## 2024-09-16 DIAGNOSIS — I25.119 ATHEROSCLEROSIS OF NATIVE CORONARY ARTERY OF NATIVE HEART WITH ANGINA PECTORIS (HCC): ICD-10-CM

## 2024-09-16 DIAGNOSIS — I73.9 PVD (PERIPHERAL VASCULAR DISEASE) (HCC): ICD-10-CM

## 2024-09-16 DIAGNOSIS — I10 HYPERTENSION, BENIGN: ICD-10-CM

## 2024-09-16 DIAGNOSIS — R00.2 PALPITATIONS: ICD-10-CM

## 2024-09-16 DIAGNOSIS — R07.89 CHEST DISCOMFORT: ICD-10-CM

## 2024-09-16 LAB
ANION GAP SERPL CALC-SCNC: 9 MMOL/L (ref 9–18)
BASOPHILS # BLD: 0.1 K/UL (ref 0–0.2)
BASOPHILS NFR BLD: 1 % (ref 0–2)
BUN SERPL-MCNC: 16 MG/DL (ref 8–23)
CALCIUM SERPL-MCNC: 9.6 MG/DL (ref 8.8–10.2)
CHLORIDE SERPL-SCNC: 101 MMOL/L (ref 98–107)
CO2 SERPL-SCNC: 28 MMOL/L (ref 20–28)
CREAT SERPL-MCNC: 1 MG/DL (ref 0.6–1.1)
DIFFERENTIAL METHOD BLD: ABNORMAL
EOSINOPHIL # BLD: 0.1 K/UL (ref 0–0.8)
EOSINOPHIL NFR BLD: 2 % (ref 0.5–7.8)
ERYTHROCYTE [DISTWIDTH] IN BLOOD BY AUTOMATED COUNT: 14.6 % (ref 11.9–14.6)
GLUCOSE SERPL-MCNC: 89 MG/DL (ref 70–99)
HCT VFR BLD AUTO: 36.4 % (ref 35.8–46.3)
HGB BLD-MCNC: 11.6 G/DL (ref 11.7–15.4)
IMM GRANULOCYTES # BLD AUTO: 0 K/UL (ref 0–0.5)
IMM GRANULOCYTES NFR BLD AUTO: 0 % (ref 0–5)
LYMPHOCYTES # BLD: 1.1 K/UL (ref 0.5–4.6)
LYMPHOCYTES NFR BLD: 24 % (ref 13–44)
MCH RBC QN AUTO: 31.3 PG (ref 26.1–32.9)
MCHC RBC AUTO-ENTMCNC: 31.9 G/DL (ref 31.4–35)
MCV RBC AUTO: 98.1 FL (ref 82–102)
MONOCYTES # BLD: 0.5 K/UL (ref 0.1–1.3)
MONOCYTES NFR BLD: 10 % (ref 4–12)
NEUTS SEG # BLD: 2.7 K/UL (ref 1.7–8.2)
NEUTS SEG NFR BLD: 63 % (ref 43–78)
NRBC # BLD: 0 K/UL (ref 0–0.2)
PLATELET # BLD AUTO: 201 K/UL (ref 150–450)
PMV BLD AUTO: 12.1 FL (ref 9.4–12.3)
POTASSIUM SERPL-SCNC: 4.4 MMOL/L (ref 3.5–5.1)
RBC # BLD AUTO: 3.71 M/UL (ref 4.05–5.2)
SODIUM SERPL-SCNC: 138 MMOL/L (ref 136–145)
WBC # BLD AUTO: 4.4 K/UL (ref 4.3–11.1)

## 2024-09-16 PROCEDURE — G8399 PT W/DXA RESULTS DOCUMENT: HCPCS | Performed by: INTERNAL MEDICINE

## 2024-09-16 PROCEDURE — 1090F PRES/ABSN URINE INCON ASSESS: CPT | Performed by: INTERNAL MEDICINE

## 2024-09-16 PROCEDURE — 99215 OFFICE O/P EST HI 40 MIN: CPT | Performed by: INTERNAL MEDICINE

## 2024-09-16 PROCEDURE — G8420 CALC BMI NORM PARAMETERS: HCPCS | Performed by: INTERNAL MEDICINE

## 2024-09-16 PROCEDURE — 1036F TOBACCO NON-USER: CPT | Performed by: INTERNAL MEDICINE

## 2024-09-16 PROCEDURE — 1123F ACP DISCUSS/DSCN MKR DOCD: CPT | Performed by: INTERNAL MEDICINE

## 2024-09-16 PROCEDURE — 3078F DIAST BP <80 MM HG: CPT | Performed by: INTERNAL MEDICINE

## 2024-09-16 PROCEDURE — 3074F SYST BP LT 130 MM HG: CPT | Performed by: INTERNAL MEDICINE

## 2024-09-16 PROCEDURE — G8428 CUR MEDS NOT DOCUMENT: HCPCS | Performed by: INTERNAL MEDICINE

## 2024-09-16 RX ORDER — SODIUM CHLORIDE 9 MG/ML
INJECTION, SOLUTION INTRAVENOUS CONTINUOUS
OUTPATIENT
Start: 2024-09-16

## 2024-09-16 RX ORDER — SODIUM CHLORIDE 0.9 % (FLUSH) 0.9 %
5-40 SYRINGE (ML) INJECTION PRN
OUTPATIENT
Start: 2024-09-16

## 2024-09-16 RX ORDER — SODIUM CHLORIDE 9 MG/ML
INJECTION, SOLUTION INTRAVENOUS PRN
OUTPATIENT
Start: 2024-09-16

## 2024-09-16 RX ORDER — ASPIRIN 325 MG
325 TABLET ORAL ONCE
OUTPATIENT
Start: 2024-09-16 | End: 2024-09-16

## 2024-09-16 RX ORDER — SODIUM CHLORIDE 0.9 % (FLUSH) 0.9 %
5-40 SYRINGE (ML) INJECTION EVERY 12 HOURS SCHEDULED
OUTPATIENT
Start: 2024-09-16

## 2024-09-16 RX ORDER — LORAZEPAM 0.5 MG/1
0.5 TABLET ORAL
OUTPATIENT
Start: 2024-09-16 | End: 2024-09-17

## 2024-09-19 ENCOUNTER — OFFICE VISIT (OUTPATIENT)
Dept: INTERNAL MEDICINE CLINIC | Facility: CLINIC | Age: 84
End: 2024-09-19
Payer: MEDICARE

## 2024-09-19 VITALS
SYSTOLIC BLOOD PRESSURE: 110 MMHG | HEIGHT: 64 IN | WEIGHT: 144 LBS | HEART RATE: 93 BPM | BODY MASS INDEX: 24.59 KG/M2 | TEMPERATURE: 98.2 F | DIASTOLIC BLOOD PRESSURE: 70 MMHG | OXYGEN SATURATION: 94 %

## 2024-09-19 DIAGNOSIS — R20.2 NUMBNESS AND TINGLING IN LEFT HAND: ICD-10-CM

## 2024-09-19 DIAGNOSIS — G62.9 POLYNEUROPATHY: ICD-10-CM

## 2024-09-19 DIAGNOSIS — G56.02 CARPAL TUNNEL SYNDROME OF LEFT WRIST: Primary | ICD-10-CM

## 2024-09-19 DIAGNOSIS — D72.810 LYMPHOCYTOPENIA: ICD-10-CM

## 2024-09-19 DIAGNOSIS — R20.0 NUMBNESS AND TINGLING IN LEFT HAND: ICD-10-CM

## 2024-09-19 DIAGNOSIS — M25.522 LEFT ELBOW PAIN: ICD-10-CM

## 2024-09-19 LAB
BASOPHILS # BLD: 0 K/UL (ref 0–0.2)
BASOPHILS NFR BLD: 1 % (ref 0–2)
DIFFERENTIAL METHOD BLD: ABNORMAL
EOSINOPHIL # BLD: 0.1 K/UL (ref 0–0.8)
EOSINOPHIL NFR BLD: 3 % (ref 0.5–7.8)
ERYTHROCYTE [DISTWIDTH] IN BLOOD BY AUTOMATED COUNT: 14.7 % (ref 11.9–14.6)
HCT VFR BLD AUTO: 35.4 % (ref 35.8–46.3)
HGB BLD-MCNC: 11.1 G/DL (ref 11.7–15.4)
IMM GRANULOCYTES # BLD AUTO: 0 K/UL (ref 0–0.5)
IMM GRANULOCYTES NFR BLD AUTO: 0 % (ref 0–5)
LYMPHOCYTES # BLD: 0.9 K/UL (ref 0.5–4.6)
LYMPHOCYTES NFR BLD: 18 % (ref 13–44)
MCH RBC QN AUTO: 30.9 PG (ref 26.1–32.9)
MCHC RBC AUTO-ENTMCNC: 31.4 G/DL (ref 31.4–35)
MCV RBC AUTO: 98.6 FL (ref 82–102)
MONOCYTES # BLD: 0.4 K/UL (ref 0.1–1.3)
MONOCYTES NFR BLD: 8 % (ref 4–12)
NEUTS SEG # BLD: 3.4 K/UL (ref 1.7–8.2)
NEUTS SEG NFR BLD: 70 % (ref 43–78)
NRBC # BLD: 0 K/UL (ref 0–0.2)
PLATELET # BLD AUTO: 194 K/UL (ref 150–450)
PMV BLD AUTO: 11.8 FL (ref 9.4–12.3)
RBC # BLD AUTO: 3.59 M/UL (ref 4.05–5.2)
WBC # BLD AUTO: 4.9 K/UL (ref 4.3–11.1)

## 2024-09-19 PROCEDURE — 1123F ACP DISCUSS/DSCN MKR DOCD: CPT | Performed by: NURSE PRACTITIONER

## 2024-09-19 PROCEDURE — G8427 DOCREV CUR MEDS BY ELIG CLIN: HCPCS | Performed by: NURSE PRACTITIONER

## 2024-09-19 PROCEDURE — 1036F TOBACCO NON-USER: CPT | Performed by: NURSE PRACTITIONER

## 2024-09-19 PROCEDURE — G8399 PT W/DXA RESULTS DOCUMENT: HCPCS | Performed by: NURSE PRACTITIONER

## 2024-09-19 PROCEDURE — G8420 CALC BMI NORM PARAMETERS: HCPCS | Performed by: NURSE PRACTITIONER

## 2024-09-19 PROCEDURE — 1090F PRES/ABSN URINE INCON ASSESS: CPT | Performed by: NURSE PRACTITIONER

## 2024-09-19 PROCEDURE — 99213 OFFICE O/P EST LOW 20 MIN: CPT | Performed by: NURSE PRACTITIONER

## 2024-09-19 PROCEDURE — 3078F DIAST BP <80 MM HG: CPT | Performed by: NURSE PRACTITIONER

## 2024-09-19 PROCEDURE — 3074F SYST BP LT 130 MM HG: CPT | Performed by: NURSE PRACTITIONER

## 2024-09-19 RX ORDER — CITALOPRAM HYDROBROMIDE 20 MG/1
20 TABLET ORAL DAILY
Qty: 90 TABLET | Refills: 1 | Status: SHIPPED | OUTPATIENT
Start: 2024-09-19

## 2024-09-19 RX ORDER — VITAMIN B COMPLEX
1 CAPSULE ORAL DAILY
COMMUNITY

## 2024-09-23 DIAGNOSIS — R79.89 ABNORMAL CBC: Primary | ICD-10-CM

## 2024-09-23 DIAGNOSIS — R79.89 ABNORMAL CBC: ICD-10-CM

## 2024-09-23 LAB
ERYTHROCYTE [DISTWIDTH] IN BLOOD BY AUTOMATED COUNT: 14.6 % (ref 11.9–14.6)
FERRITIN SERPL-MCNC: 199 NG/ML (ref 8–388)
HCT VFR BLD AUTO: 37.5 % (ref 35.8–46.3)
HGB BLD-MCNC: 11.8 G/DL (ref 11.7–15.4)
IRON SATN MFR SERPL: 36 % (ref 20–50)
IRON SERPL-MCNC: 101 UG/DL (ref 35–100)
MCH RBC QN AUTO: 31.4 PG (ref 26.1–32.9)
MCHC RBC AUTO-ENTMCNC: 31.5 G/DL (ref 31.4–35)
MCV RBC AUTO: 99.7 FL (ref 82–102)
NRBC # BLD: 0 K/UL (ref 0–0.2)
PLATELET # BLD AUTO: 173 K/UL (ref 150–450)
PMV BLD AUTO: 12.3 FL (ref 9.4–12.3)
RBC # BLD AUTO: 3.76 M/UL (ref 4.05–5.2)
TIBC SERPL-MCNC: 278 UG/DL (ref 240–450)
UIBC SERPL-MCNC: 177 UG/DL (ref 112–347)
WBC # BLD AUTO: 4.5 K/UL (ref 4.3–11.1)

## 2024-09-25 ENCOUNTER — TELEPHONE (OUTPATIENT)
Dept: INTERNAL MEDICINE CLINIC | Facility: CLINIC | Age: 84
End: 2024-09-25

## 2024-10-21 ENCOUNTER — LAB (OUTPATIENT)
Dept: INTERNAL MEDICINE CLINIC | Facility: CLINIC | Age: 84
End: 2024-10-21

## 2024-10-21 DIAGNOSIS — E78.2 MIXED HYPERLIPIDEMIA: ICD-10-CM

## 2024-10-21 DIAGNOSIS — R73.09 ELEVATED GLUCOSE: ICD-10-CM

## 2024-10-21 LAB
ALBUMIN SERPL-MCNC: 3.6 G/DL (ref 3.2–4.6)
ALBUMIN/GLOB SERPL: 1.1 (ref 1–1.9)
ALP SERPL-CCNC: 69 U/L (ref 35–104)
ALT SERPL-CCNC: 18 U/L (ref 8–45)
ANION GAP SERPL CALC-SCNC: 9 MMOL/L (ref 9–18)
AST SERPL-CCNC: 35 U/L (ref 15–37)
BILIRUB SERPL-MCNC: 0.7 MG/DL (ref 0–1.2)
BUN SERPL-MCNC: 17 MG/DL (ref 8–23)
CALCIUM SERPL-MCNC: 9.7 MG/DL (ref 8.8–10.2)
CHLORIDE SERPL-SCNC: 107 MMOL/L (ref 98–107)
CHOLEST SERPL-MCNC: 183 MG/DL (ref 0–200)
CO2 SERPL-SCNC: 26 MMOL/L (ref 20–28)
CREAT SERPL-MCNC: 0.94 MG/DL (ref 0.6–1.1)
ERYTHROCYTE [DISTWIDTH] IN BLOOD BY AUTOMATED COUNT: 14.9 % (ref 11.9–14.6)
EST. AVERAGE GLUCOSE BLD GHB EST-MCNC: 116 MG/DL
GLOBULIN SER CALC-MCNC: 3.3 G/DL (ref 2.3–3.5)
GLUCOSE SERPL-MCNC: 98 MG/DL (ref 70–99)
HBA1C MFR BLD: 5.7 % (ref 0–5.6)
HCT VFR BLD AUTO: 38.1 % (ref 35.8–46.3)
HDLC SERPL-MCNC: 61 MG/DL (ref 40–60)
HDLC SERPL: 3 (ref 0–5)
HGB BLD-MCNC: 12.2 G/DL (ref 11.7–15.4)
LDLC SERPL CALC-MCNC: 108 MG/DL (ref 0–100)
MCH RBC QN AUTO: 31.5 PG (ref 26.1–32.9)
MCHC RBC AUTO-ENTMCNC: 32 G/DL (ref 31.4–35)
MCV RBC AUTO: 98.4 FL (ref 82–102)
NRBC # BLD: 0 K/UL (ref 0–0.2)
PLATELET # BLD AUTO: 223 K/UL (ref 150–450)
PMV BLD AUTO: 11.6 FL (ref 9.4–12.3)
POTASSIUM SERPL-SCNC: 4.5 MMOL/L (ref 3.5–5.1)
PROT SERPL-MCNC: 6.9 G/DL (ref 6.3–8.2)
RBC # BLD AUTO: 3.87 M/UL (ref 4.05–5.2)
SODIUM SERPL-SCNC: 142 MMOL/L (ref 136–145)
TRIGL SERPL-MCNC: 74 MG/DL (ref 0–150)
TSH, 3RD GENERATION: 1.33 UIU/ML (ref 0.27–4.2)
VLDLC SERPL CALC-MCNC: 15 MG/DL (ref 6–23)
WBC # BLD AUTO: 4.8 K/UL (ref 4.3–11.1)

## 2024-10-23 ENCOUNTER — TELEMEDICINE (OUTPATIENT)
Dept: RHEUMATOLOGY | Age: 84
End: 2024-10-23
Payer: MEDICARE

## 2024-10-23 ENCOUNTER — NURSE ONLY (OUTPATIENT)
Dept: RHEUMATOLOGY | Age: 84
End: 2024-10-23
Payer: MEDICARE

## 2024-10-23 VITALS
HEART RATE: 64 BPM | WEIGHT: 144 LBS | DIASTOLIC BLOOD PRESSURE: 69 MMHG | TEMPERATURE: 97.9 F | SYSTOLIC BLOOD PRESSURE: 130 MMHG | BODY MASS INDEX: 24.72 KG/M2

## 2024-10-23 DIAGNOSIS — M05.9 SEROPOSITIVE RHEUMATOID ARTHRITIS (HCC): Primary | ICD-10-CM

## 2024-10-23 DIAGNOSIS — T84.84XA PAIN DUE TO RIGHT SHOULDER JOINT PROSTHESIS (HCC): ICD-10-CM

## 2024-10-23 DIAGNOSIS — F11.20 OPIOID DEPENDENCE WITH CURRENT USE (HCC): ICD-10-CM

## 2024-10-23 DIAGNOSIS — M81.0 AGE-RELATED OSTEOPOROSIS WITHOUT CURRENT PATHOLOGICAL FRACTURE: ICD-10-CM

## 2024-10-23 DIAGNOSIS — G56.03 CARPAL TUNNEL SYNDROME ON BOTH SIDES: ICD-10-CM

## 2024-10-23 DIAGNOSIS — Z79.899 LONG-TERM USE OF HIGH-RISK MEDICATION: ICD-10-CM

## 2024-10-23 DIAGNOSIS — Z96.611 PAIN DUE TO RIGHT SHOULDER JOINT PROSTHESIS (HCC): ICD-10-CM

## 2024-10-23 DIAGNOSIS — M15.4 EROSIVE OSTEOARTHRITIS OF HANDS, BILATERAL: ICD-10-CM

## 2024-10-23 DIAGNOSIS — M05.9 SEROPOSITIVE RHEUMATOID ARTHRITIS (HCC): ICD-10-CM

## 2024-10-23 LAB — 25(OH)D3 SERPL-MCNC: 35.3 NG/ML (ref 30–100)

## 2024-10-23 PROCEDURE — 1123F ACP DISCUSS/DSCN MKR DOCD: CPT | Performed by: INTERNAL MEDICINE

## 2024-10-23 PROCEDURE — G8484 FLU IMMUNIZE NO ADMIN: HCPCS | Performed by: INTERNAL MEDICINE

## 2024-10-23 PROCEDURE — 1159F MED LIST DOCD IN RCRD: CPT | Performed by: INTERNAL MEDICINE

## 2024-10-23 PROCEDURE — 1036F TOBACCO NON-USER: CPT | Performed by: INTERNAL MEDICINE

## 2024-10-23 PROCEDURE — G8399 PT W/DXA RESULTS DOCUMENT: HCPCS | Performed by: INTERNAL MEDICINE

## 2024-10-23 PROCEDURE — G8427 DOCREV CUR MEDS BY ELIG CLIN: HCPCS | Performed by: INTERNAL MEDICINE

## 2024-10-23 PROCEDURE — 1090F PRES/ABSN URINE INCON ASSESS: CPT | Performed by: INTERNAL MEDICINE

## 2024-10-23 PROCEDURE — G8420 CALC BMI NORM PARAMETERS: HCPCS | Performed by: INTERNAL MEDICINE

## 2024-10-23 PROCEDURE — 99214 OFFICE O/P EST MOD 30 MIN: CPT | Performed by: INTERNAL MEDICINE

## 2024-10-23 PROCEDURE — 96413 CHEMO IV INFUSION 1 HR: CPT | Performed by: INTERNAL MEDICINE

## 2024-10-23 PROCEDURE — 96415 CHEMO IV INFUSION ADDL HR: CPT | Performed by: INTERNAL MEDICINE

## 2024-10-23 RX ORDER — DIPHENHYDRAMINE HYDROCHLORIDE 50 MG/ML
50 INJECTION INTRAMUSCULAR; INTRAVENOUS
Status: DISCONTINUED | OUTPATIENT
Start: 2024-10-23 | End: 2024-10-23 | Stop reason: HOSPADM

## 2024-10-23 RX ORDER — MELOXICAM 15 MG/1
15 TABLET ORAL DAILY
Qty: 30 TABLET | Refills: 1 | Status: SHIPPED | OUTPATIENT
Start: 2024-10-23 | End: 2024-12-22

## 2024-10-23 RX ORDER — ACETAMINOPHEN 325 MG/1
650 TABLET ORAL
Status: DISCONTINUED | OUTPATIENT
Start: 2024-10-23 | End: 2024-10-23 | Stop reason: HOSPADM

## 2024-10-23 RX ORDER — ACETAMINOPHEN 325 MG/1
650 TABLET ORAL ONCE
Status: DISCONTINUED | OUTPATIENT
Start: 2024-10-23 | End: 2024-10-23 | Stop reason: HOSPADM

## 2024-10-23 RX ORDER — ALENDRONATE SODIUM 35 MG/1
35 TABLET ORAL
Qty: 12 TABLET | Refills: 1 | Status: SHIPPED | OUTPATIENT
Start: 2024-10-23 | End: 2025-04-21

## 2024-10-23 RX ORDER — SODIUM CHLORIDE 9 MG/ML
INJECTION, SOLUTION INTRAVENOUS CONTINUOUS
Status: DISCONTINUED | OUTPATIENT
Start: 2024-10-23 | End: 2024-10-23 | Stop reason: HOSPADM

## 2024-10-23 RX ORDER — INFLIXIMAB 100 MG/10ML
3 INJECTION, POWDER, LYOPHILIZED, FOR SOLUTION INTRAVENOUS ONCE
Status: COMPLETED | OUTPATIENT
Start: 2024-10-23 | End: 2024-10-23

## 2024-10-23 RX ORDER — ONDANSETRON 2 MG/ML
8 INJECTION INTRAMUSCULAR; INTRAVENOUS
Status: DISCONTINUED | OUTPATIENT
Start: 2024-10-23 | End: 2024-10-23 | Stop reason: HOSPADM

## 2024-10-23 RX ORDER — INFLIXIMAB 100 MG/10ML
3 INJECTION, POWDER, LYOPHILIZED, FOR SOLUTION INTRAVENOUS ONCE
Start: 2024-10-30 | End: 2024-10-30

## 2024-10-23 RX ORDER — DIPHENHYDRAMINE HCL 25 MG
25 CAPSULE ORAL ONCE
OUTPATIENT
Start: 2024-10-30 | End: 2024-10-30

## 2024-10-23 RX ORDER — ONDANSETRON 2 MG/ML
8 INJECTION INTRAMUSCULAR; INTRAVENOUS
OUTPATIENT
Start: 2024-10-30

## 2024-10-23 RX ORDER — TRAMADOL HYDROCHLORIDE 50 MG/1
50 TABLET ORAL 2 TIMES DAILY PRN
Qty: 60 TABLET | Refills: 0 | Status: SHIPPED | OUTPATIENT
Start: 2024-10-23 | End: 2024-11-22

## 2024-10-23 RX ORDER — ACETAMINOPHEN 325 MG/1
650 TABLET ORAL ONCE
OUTPATIENT
Start: 2024-10-30 | End: 2024-10-30

## 2024-10-23 RX ORDER — ALBUTEROL SULFATE 90 UG/1
4 INHALANT RESPIRATORY (INHALATION) PRN
OUTPATIENT
Start: 2024-10-30

## 2024-10-23 RX ORDER — ALBUTEROL SULFATE 90 UG/1
4 INHALANT RESPIRATORY (INHALATION) PRN
Status: DISCONTINUED | OUTPATIENT
Start: 2024-10-23 | End: 2024-10-23 | Stop reason: HOSPADM

## 2024-10-23 RX ORDER — SODIUM CHLORIDE 9 MG/ML
INJECTION, SOLUTION INTRAVENOUS CONTINUOUS
OUTPATIENT
Start: 2024-10-30

## 2024-10-23 RX ORDER — EPINEPHRINE 1 MG/ML
0.3 INJECTION, SOLUTION, CONCENTRATE INTRAVENOUS PRN
OUTPATIENT
Start: 2024-10-30

## 2024-10-23 RX ORDER — ACETAMINOPHEN 325 MG/1
650 TABLET ORAL
OUTPATIENT
Start: 2024-10-30

## 2024-10-23 RX ORDER — DIPHENHYDRAMINE HCL 25 MG
25 CAPSULE ORAL ONCE
Status: DISCONTINUED | OUTPATIENT
Start: 2024-10-23 | End: 2024-10-23 | Stop reason: HOSPADM

## 2024-10-23 RX ORDER — EPINEPHRINE 1 MG/ML
0.3 INJECTION, SOLUTION, CONCENTRATE INTRAVENOUS PRN
Status: DISCONTINUED | OUTPATIENT
Start: 2024-10-23 | End: 2024-10-23 | Stop reason: HOSPADM

## 2024-10-23 RX ORDER — DIPHENHYDRAMINE HYDROCHLORIDE 50 MG/ML
50 INJECTION INTRAMUSCULAR; INTRAVENOUS
OUTPATIENT
Start: 2024-10-30

## 2024-10-23 RX ADMIN — INFLIXIMAB 200 MG: 100 INJECTION, POWDER, LYOPHILIZED, FOR SOLUTION INTRAVENOUS at 13:10

## 2024-10-23 NOTE — PATIENT INSTRUCTIONS
Continue current treatment, please notify our office for medication refill requests  Check labs every 3 months while on treatment with infusion  Notify our office for flares in rheumatoid arthritis, can prescribed short course of steroid taper  Will get vitamin d level checked w/infusion today  Okay to take meloxicam 15mg daily as needed  If you start lortab through Dr. Olivo we will stop prescribing the tramadol  Return for f/u in 6 months    Anti-inflammatory diet  [email or web address]/nutrition/[email or web address]-diet

## 2024-10-23 NOTE — PROGRESS NOTES
Katie Israel, was evaluated through a synchronous (real-time) audio-video encounter. The patient (or guardian if applicable) is aware that this is a billable service, which includes applicable co-pays. This Virtual Visit was conducted with patient's (and/or legal guardian's) consent. Patient identification was verified, and a caregiver was present when appropriate.   The patient was located at Facility ([facility] Department): [address]  [address]  [place] [address]  Provider was located at Home (Appt Dept State): [place]  Confirm you are appropriately licensed, registered, or certified to deliver care in the state where the patient is located as indicated above. If you are not or unsure, please re-schedule the visit: Yes, I confirm.     Katie Israel (:  1940) is a Established patient, presenting virtually for evaluation of the following:      Below is the assessment and plan developed based on review of pertinent history, physical exam, labs, studies, and medications.     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 Degenerative pattern likely is erosive osteoarthritis superimposed on rheumatoid arthritis  Negative CCP, SO, ESR, CRP         Current tx:  Infliximab 3mg/kg q4 wk started 7/10/2024  Failed tx:  SSZ - constipation  Leflunomide - asthenia  Contraindicated tx:  HCQ - h/o R eye macular pucker  MTX - CKD w/CrCl 32-37ml/min     Today: some improvement in joint pain with remicaide infusions which were started 4 months ago, will continue. Currently joint pain likely 2/2 to L CTS and OA. Has bad EoA which is why she continues to have some synovitis on exam.     Plan:  -continue infliximab 3mg/kg q4 wk   -repeat drug

## 2024-10-23 NOTE — PROGRESS NOTES
[facility] RHEUMATOLOGY  [address] [place] [address], [address]  [place] [address]  Office : [phone], Fax: [phone]       Remicade infusion 10/23/2024 200 mg =3 mg/kg infused in 0.9% NaCl. 0 mg of Remicade was wasted. Patient tolerated the infusion without complications. Infusion placed in LFA vein.  IV inserted by Cher Card RN  Next Infusion in 8 weeks on 12/18/2024    IV insertion time: 1255  Medication start time: 1310  Medication completion time: 1515    Patient discharged feeling fine and instructed to call the office with any post-infusion issues.    Pre-Infusion Questionnaire  Has your insurance changed or have you received a new card since your last visit?  no  Have you had any infections since your last infusion?   no  Since your last visit, have you been hospitalized, been to the ER, or Urgent Care Center for any reason?  Yes - patient has been to the ER due to having more pain overall. She will be seeing a pain management specialist   Have you recently had GI problems, open wounds, urinary problems, or chest pain?   no  Are you currently taking antibiotics?   no  Have you recently had or are you scheduled for any surgical procedures?   no  Have you had a TB test in the last year?   yes, 1/12/2024 (negative)  Did you premedicate for today's infusion?  yes, pt was given 10 mg Zyrtec PO while in the office prior to start of infusion  Have you received any vaccinations in the last 6 months, or planning to receive in the next 3 months: COVID, Flu, Pheumonia, Shingles?  no  When was your last appointment with Dr. Chavis, Dr. Rios, or Dr. Hsieh or Dr. Rosario.   10/23/2024 (virtual visit - pt was approved to have her Remicade infusion today and Vitamin D lab was drawn)  When was your last infusion?        8/21/2024  12. Are you in skilled nursing facility, Rehab, or living at a nursing home?      no    Reviewed and Confirmed by Katie Israel

## 2024-10-24 ENCOUNTER — TELEPHONE (OUTPATIENT)
Dept: RHEUMATOLOGY | Age: 84
End: 2024-10-24

## 2024-10-24 NOTE — ASSESSMENT & PLAN NOTE
Suspect this is active on the L side.    Plan:  -has appt w/Dr. Gibson for injection or release surgery  -use CTS wrists splints nightly

## 2024-10-24 NOTE — ASSESSMENT & PLAN NOTE
Uncontrolled OA joint pain.  Current tx:   Tramadol 50mg qd-bid prn   -Failed tx:  NSAIDs - h/o GI bleed and CKD    Would like to restart meloxicam 15mg qd. Also would like to switch tramadol to lortab. Advised patient to establish care with pain management for lortab b/c I do not prescribe this medication.    Plan:  -cont current medications, if she starts lortab will discontinue tramadol  -given prescription for meloxicam 15mg qd advised to take only with food and not daily  -repeat UDS 9/1/2024 and opioid contract 3/2025    Advised patient of side effects including hypertension, increased cardiovascular risk of MI, heart failure, stroke, renal & liver toxicity, GI upset and bleeding.    Orders:    traMADol (ULTRAM) 50 MG tablet; Take 1 tablet by mouth 2 times daily as needed for Pain for up to 30 days. Take lowest dose possible to manage pain Max Daily Amount: 100 mg

## 2024-10-24 NOTE — TELEPHONE ENCOUNTER
Pt sts after her infusion yesterday 10/23/24, she went out to eat and then went home. She noticed generalized swelling, and she sts her pain is worsened by this. Pt denies trouble breathing, dysuria, or other s/sx of allergic reaction. Pt sts this happened after an infusion in the past, but it resolved itself after 2 days. Pt is wondering what her next steps should be.

## 2024-10-24 NOTE — ASSESSMENT & PLAN NOTE
DXA    8/2012 - Right hip T score -2.0; Left radius T score -1.8  5/2024 - Left radius T score -3.3  She took fosamax years ago.  She denies dysphagia.   no dental implants or extractions planned.   Current treatment:  Fosamax 35mg po qwk    Lab Results   Component Value Date/Time    VITD25 56.3 08/23/2023 11:22 AM        Plan:  -continue fosamax 35mg po qweek  -continue calcium/vit d supplement  -repeat vit d annually 8/2024  -repeat DXA scan 5/2026

## 2024-10-24 NOTE — ASSESSMENT & PLAN NOTE
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 Degenerative pattern likely is erosive osteoarthritis superimposed on rheumatoid arthritis  Negative CCP, SO, ESR, CRP         Current tx:  Infliximab 3mg/kg q4 wk started 7/10/2024  Failed tx:  SSZ - constipation  Leflunomide - asthenia  Contraindicated tx:  HCQ - h/o R eye macular pucker  MTX - CKD w/CrCl 32-37ml/min     Today: some improvement in joint pain with remicaide infusions which were started 4 months ago, will continue. Currently joint pain likely 2/2 to L CTS and OA. Has bad EoA which is why she continues to have some synovitis on exam.     Plan:  -continue infliximab 3mg/kg q4 wk   -repeat drug toxicity and disease activity labs every 3 months while on treatment  -RTC In 6 months for f/u      Patient is aware that if she is sick requiring her to be on an antibiotic or antiviral drug she will need to skip administering the biologic and taking the methotrexate until she has completed the antibiotic/antiviral course and is over the infection.

## 2024-11-05 ENCOUNTER — OFFICE VISIT (OUTPATIENT)
Dept: INTERNAL MEDICINE CLINIC | Facility: CLINIC | Age: 84
End: 2024-11-05

## 2024-11-05 VITALS
WEIGHT: 144 LBS | HEIGHT: 64 IN | SYSTOLIC BLOOD PRESSURE: 110 MMHG | HEART RATE: 87 BPM | DIASTOLIC BLOOD PRESSURE: 70 MMHG | BODY MASS INDEX: 24.59 KG/M2 | OXYGEN SATURATION: 96 % | TEMPERATURE: 98.1 F

## 2024-11-05 DIAGNOSIS — M81.0 AGE-RELATED OSTEOPOROSIS WITHOUT CURRENT PATHOLOGICAL FRACTURE: ICD-10-CM

## 2024-11-05 DIAGNOSIS — I10 HYPERTENSION, BENIGN: Primary | ICD-10-CM

## 2024-11-05 DIAGNOSIS — R73.03 PREDIABETES: ICD-10-CM

## 2024-11-05 DIAGNOSIS — F33.1 MAJOR DEPRESSIVE DISORDER, RECURRENT, MODERATE (HCC): ICD-10-CM

## 2024-11-05 DIAGNOSIS — E03.9 ACQUIRED HYPOTHYROIDISM: ICD-10-CM

## 2024-11-05 PROBLEM — E11.9 TYPE 2 DIABETES MELLITUS (HCC): Status: RESOLVED | Noted: 2024-08-27 | Resolved: 2024-11-05

## 2024-11-05 RX ORDER — BUSPIRONE HYDROCHLORIDE 5 MG/1
5 TABLET ORAL DAILY
COMMUNITY
End: 2024-11-05 | Stop reason: ALTCHOICE

## 2024-11-05 RX ORDER — BUSPIRONE HYDROCHLORIDE 5 MG/1
5 TABLET ORAL 2 TIMES DAILY
Qty: 60 TABLET | Refills: 1 | Status: SHIPPED | OUTPATIENT
Start: 2024-11-05 | End: 2025-01-04

## 2024-11-05 NOTE — ASSESSMENT & PLAN NOTE
TSH euthyroid on lab    Orders:    Hemoglobin A1C; Future    TSH; Future    Comprehensive Metabolic Panel; Future    CBC; Future

## 2024-11-05 NOTE — ASSESSMENT & PLAN NOTE
Continue fosamax and f/u with rheumatology    Orders:    Hemoglobin A1C; Future    TSH; Future    Comprehensive Metabolic Panel; Future    CBC; Future

## 2024-11-05 NOTE — PROGRESS NOTES
Katie Israel (:  1940) is a 84 y.o. female,Established patient, here for evaluation of the following chief complaint(s):  Follow-up (6 months and go over labs )         Assessment & Plan  Hypertension, benign     Controled     Major depressive disorder, recurrent, moderate   Continue celexa, increase buspar to BID         Acquired hypothyroidism   TSH euthyroid on lab    Orders:    Hemoglobin A1C; Future    TSH; Future    Comprehensive Metabolic Panel; Future    CBC; Future    Age-related osteoporosis without current pathological fracture   Continue fosamax and f/u with rheumatology    Orders:    Hemoglobin A1C; Future    TSH; Future    Comprehensive Metabolic Panel; Future    CBC; Future    Prediabetes   A1c is prediabetes level    Orders:    Hemoglobin A1C; Future    Reviewed lab  Discussed small frequent meals to avoid getting shaky, may use boost/ensure as snacks between meals  Recommend flu vaccine  Fu 6 month with lab        No follow-ups on file.       Subjective   Patient is here for follow up.   Yesterday she was having a normal day and then she started jerking. Her hands were jerking. She had to quit getting dressed. She felt weak and like she could pass out. It lasted about 30 minutes. She sat down and rested and ate peanut butter.   She had eaten a normal breakfast and lunch.   This shakiness has happened before - it happened when she was out with her sister and she feared she was going to pass out. She had to go to [employer] and get some chicken and after a while that helped.   When she checked her sugar one of these times it was in the 80's.   She typically eats either oatmeal with yogurt or an egg and grits for breakfast. Yesterday she had eaten a late breakfast and then it was 3oclock and she was shaky.         Review of Systems       Objective   Physical Exam  Vitals reviewed.   Constitutional:       Appearance: Normal appearance.   HENT:      Head: Normocephalic.   Eyes:

## 2024-11-18 ENCOUNTER — OFFICE VISIT (OUTPATIENT)
Dept: ENT CLINIC | Age: 84
End: 2024-11-18

## 2024-11-18 VITALS
BODY MASS INDEX: 25.27 KG/M2 | OXYGEN SATURATION: 97 % | HEIGHT: 64 IN | WEIGHT: 148 LBS | RESPIRATION RATE: 16 BRPM | HEART RATE: 94 BPM

## 2024-11-18 DIAGNOSIS — M26.629 TMJ SYNDROME: ICD-10-CM

## 2024-11-18 DIAGNOSIS — H93.8X1 EAR FULLNESS, RIGHT: Primary | ICD-10-CM

## 2024-11-18 DIAGNOSIS — H69.91 DYSFUNCTION OF RIGHT EUSTACHIAN TUBE: ICD-10-CM

## 2024-11-18 DIAGNOSIS — H61.23 BILATERAL IMPACTED CERUMEN: ICD-10-CM

## 2024-11-18 RX ORDER — LEFLUNOMIDE 20 MG/1
1 TABLET ORAL DAILY
COMMUNITY

## 2024-11-18 ASSESSMENT — ENCOUNTER SYMPTOMS
EYE PAIN: 0
CONSTIPATION: 0
NAUSEA: 0
SORE THROAT: 1
FACIAL SWELLING: 0
STRIDOR: 0
CHOKING: 0
EYE ITCHING: 0
SINUS PAIN: 0
COUGH: 0
APNEA: 0
EYE DISCHARGE: 0
WHEEZING: 0
SINUS PRESSURE: 0
SHORTNESS OF BREATH: 0
DIARRHEA: 0

## 2024-11-18 NOTE — PROGRESS NOTES
Yes     Comment: occ, not weekly       Family History   Problem Relation Age of Onset    Stroke Other     Diabetes Other     Heart Disease Other     Asthma Other     Breast Cancer Neg Hx     Heart Disease Mother         CONGESTIVE HEART DISEASE    Stroke Father     Hypertension Other         ROS:    Review of Systems   Constitutional:  Negative for chills and fever.   HENT:  Positive for ear pain and sore throat. Negative for congestion, facial swelling, hearing loss, nosebleeds, sinus pressure, sinus pain and sneezing.    Eyes:  Negative for pain, discharge and itching.   Respiratory:  Negative for apnea, cough, choking, shortness of breath, wheezing and stridor.    Cardiovascular:  Negative for chest pain.   Gastrointestinal:  Negative for constipation, diarrhea and nausea.   Endocrine: Negative for polyuria.   Genitourinary:  Negative for difficulty urinating and flank pain.   Musculoskeletal:  Negative for arthralgias, myalgias and neck stiffness.   Skin:  Negative for rash and wound.   Allergic/Immunologic: Negative for environmental allergies.   Neurological:  Negative for dizziness, facial asymmetry and headaches.   Hematological:  Negative for adenopathy. Does not bruise/bleed easily.   Psychiatric/Behavioral:  Negative for agitation, behavioral problems and confusion.            PHYSICAL EXAM:    Pulse 94   Resp 16   Ht 1.626 m (5' 4\")   Wt 67.1 kg (148 lb)   SpO2 97%   BMI 25.40 kg/m²     Physical Exam  Vitals and nursing note reviewed.   Constitutional:       Appearance: Normal appearance.   HENT:      Head: Normocephalic and atraumatic.      Right Ear: Tympanic membrane, ear canal and external ear normal. No middle ear effusion. There is impacted cerumen.      Left Ear: Tympanic membrane, ear canal and external ear normal.  No middle ear effusion. There is impacted cerumen.      Ears:      Comments: Binocular microscopy revealed:    Bilateral EACs patent without edema erythema or lesions  Bilateral

## 2024-11-21 DIAGNOSIS — H69.91 DYSFUNCTION OF RIGHT EUSTACHIAN TUBE: Primary | ICD-10-CM

## 2024-11-21 PROCEDURE — 92567 TYMPANOMETRY: CPT | Performed by: STUDENT IN AN ORGANIZED HEALTH CARE EDUCATION/TRAINING PROGRAM

## 2024-11-22 ENCOUNTER — OFFICE VISIT (OUTPATIENT)
Age: 84
End: 2024-11-22

## 2024-11-22 DIAGNOSIS — M05.722 RHEUMATOID ARTHRITIS INVOLVING BOTH ELBOWS WITH POSITIVE RHEUMATOID FACTOR (HCC): ICD-10-CM

## 2024-11-22 DIAGNOSIS — M05.732 RHEUMATOID ARTHRITIS INVOLVING BOTH WRISTS WITH POSITIVE RHEUMATOID FACTOR (HCC): ICD-10-CM

## 2024-11-22 DIAGNOSIS — M15.2 DEGENERATIVE ARTHRITIS OF PROXIMAL INTERPHALANGEAL JOINT OF INDEX FINGER OF LEFT HAND: ICD-10-CM

## 2024-11-22 DIAGNOSIS — M05.721 RHEUMATOID ARTHRITIS INVOLVING BOTH ELBOWS WITH POSITIVE RHEUMATOID FACTOR (HCC): ICD-10-CM

## 2024-11-22 DIAGNOSIS — M25.522 LEFT ELBOW PAIN: Primary | ICD-10-CM

## 2024-11-22 DIAGNOSIS — M05.731 RHEUMATOID ARTHRITIS INVOLVING BOTH WRISTS WITH POSITIVE RHEUMATOID FACTOR (HCC): ICD-10-CM

## 2024-11-22 DIAGNOSIS — G56.02 LEFT CARPAL TUNNEL SYNDROME: ICD-10-CM

## 2024-11-23 RX ORDER — BETAMETHASONE SODIUM PHOSPHATE AND BETAMETHASONE ACETATE 3; 3 MG/ML; MG/ML
6 INJECTION, SUSPENSION INTRA-ARTICULAR; INTRALESIONAL; INTRAMUSCULAR; SOFT TISSUE ONCE
Status: COMPLETED | OUTPATIENT
Start: 2024-11-23 | End: 2024-11-23

## 2024-11-23 RX ADMIN — BETAMETHASONE SODIUM PHOSPHATE AND BETAMETHASONE ACETATE 6 MG: 3; 3 INJECTION, SUSPENSION INTRA-ARTICULAR; INTRALESIONAL; INTRAMUSCULAR; SOFT TISSUE at 10:04

## 2024-11-23 NOTE — PROGRESS NOTES
Orthopaedic Hand Clinic Note    Name: Katie Israel  YOB: 1940  Age: 84 y.o.  Gender: female  MRN: [id number]      CC: Patient referred for evaluation of upper extremity pain    HPI: Katie Israel is a 84 y.o. female Right hand dominant with a chief complaint of bilateral elbow pain, bilateral hand pain, left hand numbness and paresthesias, she has a diagnosis of rheumatoid arthritis, under treatment for the past 2 years by Dr. Grissom.  Patient reports she had left carpal tunnel release surgery over 10 years ago, she did well with that and all numbness disappeared, she reports over the past 3 to 4 months she has developed recurrent numbness in median distribution of the left hand that is very similar to what she had before her carpal tunnel surgery.  She also reports left worse than right elbow, wrist pain and left index finger pain.  Patient reports severe issues with function of the right hand, she reports that she had a right shoulder arthroplasty followed by revision shoulder arthroplasty after which she reports significant issues with dexterity of the right hand.      ROS/Meds/PSH/PMH/FH/SH: I personally reviewed the patients standard intake form.  Pertinents are discussed in the HPI    Physical Examination:  General: Awake and alert.  HEENT: Normocephalic, atraumatic  CV/Pulm: Breathing even and unlabored  Skin: No obvious rashes noted.  Lymphatic: No obvious evidence of lymphedema or lymphadenopathy    Musculoskeletal Exam:  Examination on the bilateral upper extremity demonstrates cap refill < 5 seconds in all fingers, severe tenderness palpation of the left radiocarpal joint as well as the left index finger PIP joint, arthritic deformity of all DIP joints of the left hand, no significant swelling of the MCP joints.  On the left elbow she has fairly good motion and tenderness to palpation is global but mild, she has very minimal pain with terminal flexion and extension.

## 2024-12-19 ENCOUNTER — NURSE ONLY (OUTPATIENT)
Dept: RHEUMATOLOGY | Age: 84
End: 2024-12-19

## 2024-12-19 VITALS
SYSTOLIC BLOOD PRESSURE: 109 MMHG | HEART RATE: 68 BPM | WEIGHT: 146.2 LBS | BODY MASS INDEX: 25.1 KG/M2 | DIASTOLIC BLOOD PRESSURE: 57 MMHG | TEMPERATURE: 97.5 F

## 2024-12-19 DIAGNOSIS — M05.9 SEROPOSITIVE RHEUMATOID ARTHRITIS (HCC): Primary | ICD-10-CM

## 2024-12-19 RX ORDER — DIPHENHYDRAMINE HYDROCHLORIDE 50 MG/ML
50 INJECTION INTRAMUSCULAR; INTRAVENOUS
OUTPATIENT
Start: 2025-02-13

## 2024-12-19 RX ORDER — ACETAMINOPHEN 325 MG/1
650 TABLET ORAL
OUTPATIENT
Start: 2025-02-13

## 2024-12-19 RX ORDER — INFLIXIMAB 100 MG/10ML
3 INJECTION, POWDER, LYOPHILIZED, FOR SOLUTION INTRAVENOUS ONCE
Status: COMPLETED | OUTPATIENT
Start: 2024-12-19 | End: 2024-12-19

## 2024-12-19 RX ORDER — EPINEPHRINE 1 MG/ML
0.3 INJECTION, SOLUTION, CONCENTRATE INTRAVENOUS PRN
Status: DISCONTINUED | OUTPATIENT
Start: 2024-12-19 | End: 2024-12-19 | Stop reason: HOSPADM

## 2024-12-19 RX ORDER — ALBUTEROL SULFATE 90 UG/1
4 INHALANT RESPIRATORY (INHALATION) PRN
Status: DISCONTINUED | OUTPATIENT
Start: 2024-12-19 | End: 2024-12-19 | Stop reason: HOSPADM

## 2024-12-19 RX ORDER — FAMOTIDINE 10 MG/ML
20 INJECTION, SOLUTION INTRAVENOUS
Status: DISCONTINUED | OUTPATIENT
Start: 2024-12-19 | End: 2024-12-19 | Stop reason: HOSPADM

## 2024-12-19 RX ORDER — ONDANSETRON 2 MG/ML
8 INJECTION INTRAMUSCULAR; INTRAVENOUS
OUTPATIENT
Start: 2025-02-13

## 2024-12-19 RX ORDER — SODIUM CHLORIDE 9 MG/ML
INJECTION, SOLUTION INTRAVENOUS CONTINUOUS
OUTPATIENT
Start: 2025-02-13

## 2024-12-19 RX ORDER — HYDROCORTISONE SODIUM SUCCINATE 100 MG/2ML
100 INJECTION INTRAMUSCULAR; INTRAVENOUS
OUTPATIENT
Start: 2025-02-13

## 2024-12-19 RX ORDER — DIPHENHYDRAMINE HCL 25 MG
25 TABLET ORAL ONCE
OUTPATIENT
Start: 2025-02-13 | End: 2025-02-13

## 2024-12-19 RX ORDER — EPINEPHRINE 1 MG/ML
0.3 INJECTION, SOLUTION, CONCENTRATE INTRAVENOUS PRN
OUTPATIENT
Start: 2025-02-13

## 2024-12-19 RX ORDER — ACETAMINOPHEN 325 MG/1
650 TABLET ORAL
Status: DISCONTINUED | OUTPATIENT
Start: 2024-12-19 | End: 2024-12-19 | Stop reason: HOSPADM

## 2024-12-19 RX ORDER — DIPHENHYDRAMINE HCL 25 MG
25 TABLET ORAL ONCE
Status: DISCONTINUED | OUTPATIENT
Start: 2024-12-19 | End: 2024-12-19 | Stop reason: HOSPADM

## 2024-12-19 RX ORDER — ALBUTEROL SULFATE 90 UG/1
4 INHALANT RESPIRATORY (INHALATION) PRN
OUTPATIENT
Start: 2025-02-13

## 2024-12-19 RX ORDER — SODIUM CHLORIDE 9 MG/ML
INJECTION, SOLUTION INTRAVENOUS CONTINUOUS
Status: DISCONTINUED | OUTPATIENT
Start: 2024-12-19 | End: 2024-12-19 | Stop reason: HOSPADM

## 2024-12-19 RX ORDER — HYDROCORTISONE SODIUM SUCCINATE 100 MG/2ML
100 INJECTION INTRAMUSCULAR; INTRAVENOUS
Status: DISCONTINUED | OUTPATIENT
Start: 2024-12-19 | End: 2024-12-19 | Stop reason: HOSPADM

## 2024-12-19 RX ORDER — FAMOTIDINE 10 MG/ML
20 INJECTION, SOLUTION INTRAVENOUS
OUTPATIENT
Start: 2025-02-13

## 2024-12-19 RX ORDER — ACETAMINOPHEN 325 MG/1
650 TABLET ORAL ONCE
OUTPATIENT
Start: 2025-02-13 | End: 2025-02-13

## 2024-12-19 RX ORDER — DIPHENHYDRAMINE HYDROCHLORIDE 50 MG/ML
50 INJECTION INTRAMUSCULAR; INTRAVENOUS
Status: DISCONTINUED | OUTPATIENT
Start: 2024-12-19 | End: 2024-12-19 | Stop reason: HOSPADM

## 2024-12-19 RX ORDER — ACETAMINOPHEN 325 MG/1
650 TABLET ORAL ONCE
Status: DISCONTINUED | OUTPATIENT
Start: 2024-12-19 | End: 2024-12-19 | Stop reason: HOSPADM

## 2024-12-19 RX ORDER — INFLIXIMAB 100 MG/10ML
3 INJECTION, POWDER, LYOPHILIZED, FOR SOLUTION INTRAVENOUS ONCE
Start: 2025-02-13 | End: 2025-02-13

## 2024-12-19 RX ORDER — ONDANSETRON 2 MG/ML
8 INJECTION INTRAMUSCULAR; INTRAVENOUS
Status: DISCONTINUED | OUTPATIENT
Start: 2024-12-19 | End: 2024-12-19 | Stop reason: HOSPADM

## 2024-12-19 RX ADMIN — INFLIXIMAB 200 MG: 100 INJECTION, POWDER, LYOPHILIZED, FOR SOLUTION INTRAVENOUS at 11:15

## 2024-12-19 NOTE — PROGRESS NOTES
[facility] RHEUMATOLOGY  [address] [place] [address], [address]  [place] [address]  Office : [phone], Fax: [phone]       Remicade infusion 12/19/2024 200 mg = 3 mg/kg infused in 0.9% NaCl. 0 mg of Remicade was wasted. Patient tolerated the infusion without complications. Infusion placed in LFA vein.  IV inserted by Georgie Bazan RN    Next Infusion in 8 weeks on 2/19/2025 (pt prefers to push back infusion to the following Wednesday due to transportation availability)    IV insertion time: 1045  Medication start time: 1110  Medication completion time: 1315    Patient discharged feeling fine and instructed to call the office with any post-infusion issues.    Pre-Infusion Questionnaire  Has your insurance changed or have you received a new card since your last visit?  no  Have you had any infections since your last infusion?   no  Since your last visit, have you been hospitalized, been to the ER, or Urgent Care Center for any reason?  no  Have you recently had GI problems, open wounds, urinary problems, or chest pain?   no  Are you currently taking antibiotics?   no  Have you recently had or are you scheduled for any surgical procedures?   no  Have you had a TB test in the last year?   yes, 1/12/2024 (negative)  Did you premedicate for today's infusion?  yes, pt given zyrtec in office  Have you received any vaccinations in the last 6 months, or planning to receive in the next 3 months: COVID, Flu, Pheumonia, Shingles?  no  When was your last appointment with Dr. Chavis, Dr. Rios, or Dr. Hsieh or Dr. Rosario.   10/23/2024  When was your last infusion?        10/23/2024  12. Are you in skilled nursing facility, Rehab, or living at a nursing home?      no    Reviewed and Confirmed by Katie Israel

## 2025-01-16 ENCOUNTER — PROCEDURE VISIT (OUTPATIENT)
Dept: NEUROLOGY | Age: 85
End: 2025-01-16

## 2025-01-16 VITALS — HEIGHT: 64 IN | OXYGEN SATURATION: 96 % | HEART RATE: 80 BPM | BODY MASS INDEX: 25.1 KG/M2

## 2025-01-16 DIAGNOSIS — R20.2 NUMBNESS AND TINGLING IN BOTH HANDS: Primary | ICD-10-CM

## 2025-01-16 DIAGNOSIS — R20.0 NUMBNESS AND TINGLING IN BOTH HANDS: Primary | ICD-10-CM

## 2025-01-16 NOTE — PROGRESS NOTES
EMG/Nerve Conduction Study Procedure Note  [address]    [address]  [place]  [address]   [phone]      Hx:    Exam:     84 y.o. M W   female     EMG::   Both upper extremities.  Hypothyroid.  Rheumatoid arthritis.  History of CTS.    Prev  CTR...   bilat...   left > Right arthritic hand wrist pains and swellings  +++ RA.  Soft left thenar.  Worsening symptoms since previous EMG of 6/20/2023.  Referring:    Dr. Arthur SERRA hand         Technologist ::   Johnathan Perez  Hgt:   5 foot 4 inch           Summary    EMG selected hand /upper muscles with   CV studies.               Controlled environmental factors / EMG lab.  Temperature.   NCV : sensory segments:       Abnormal = markedly abnormal on the left median = ABSENT/no response left median SNAP.  Slowed right median SCV with attenuated SNAP.  Normal bilateral ulnar bilateral radial SCV.      NCV transcarpal sensory segments:    Abnormal = bilateral median abnormalities = left median = ABSENT /no response SNAP.  Abnormal slowed right median with peak difference of latency at 0.54 ms (UL = 0.20 ms).      NCV Motor MCV segments:        Abnormal = the left median TL prolonged at 5.50 ms (UL = 4.15 ms) with significant attenuation CMAP.  (No anastomosis.) right median is basically within normal limits MCV.  Normal bilateral ulnar MCV.     F-wave studies:     Abnormal = the left median F waves are significantly prolonged and delayed.  Normal right median and bilateral ulnar F waves.       NEEDLE EMG:   Tested muscles::    Normal bilateral FDI APB muscle testing.                INTERPRETATION:         ELECTROPHYSIOLOGIC ABNORMALITIES REVEAL SEVERE ENTRAPMENT OF THE LEFT MEDIAN NERVE AT THE CARPAL SEGMENT AT THE WRIST.  MILD RIGHT MEDIAN ENTRAPMENT AT THE WRIST.  NO DEFINITE AXONAL DENERVATION.      CONCLUSION:   Moderately severe left median/carpal tunnel syndrome.  Minimal early right carpal tunnel syndrome.                 Procedure Details:

## 2025-01-21 ENCOUNTER — TELEPHONE (OUTPATIENT)
Age: 85
End: 2025-01-21

## 2025-01-21 NOTE — TELEPHONE ENCOUNTER
Patient can't make it to today's appt 01/21/25 and wants a sooner appt and Dr Mendez have [facility] in March , pt just wants sooner appt.

## 2025-01-27 ENCOUNTER — OFFICE VISIT (OUTPATIENT)
Age: 85
End: 2025-01-27
Payer: MEDICARE

## 2025-01-27 VITALS
DIASTOLIC BLOOD PRESSURE: 60 MMHG | SYSTOLIC BLOOD PRESSURE: 112 MMHG | BODY MASS INDEX: 24.59 KG/M2 | WEIGHT: 144 LBS | HEIGHT: 64 IN | HEART RATE: 70 BPM

## 2025-01-27 DIAGNOSIS — I73.9 PVD (PERIPHERAL VASCULAR DISEASE) (HCC): ICD-10-CM

## 2025-01-27 DIAGNOSIS — I25.119 ATHEROSCLEROSIS OF NATIVE CORONARY ARTERY OF NATIVE HEART WITH ANGINA PECTORIS (HCC): Primary | ICD-10-CM

## 2025-01-27 DIAGNOSIS — I65.23 BILATERAL CAROTID ARTERY STENOSIS: ICD-10-CM

## 2025-01-27 DIAGNOSIS — I10 HYPERTENSION, BENIGN: ICD-10-CM

## 2025-01-27 DIAGNOSIS — E78.2 MIXED HYPERLIPIDEMIA: ICD-10-CM

## 2025-01-27 DIAGNOSIS — I25.119 ATHEROSCLEROSIS OF NATIVE CORONARY ARTERY OF NATIVE HEART WITH ANGINA PECTORIS (HCC): ICD-10-CM

## 2025-01-27 LAB
ANION GAP SERPL CALC-SCNC: 14 MMOL/L (ref 7–16)
BASOPHILS # BLD: 0.05 K/UL (ref 0–0.2)
BASOPHILS NFR BLD: 0.9 % (ref 0–2)
BUN SERPL-MCNC: 19 MG/DL (ref 8–23)
CALCIUM SERPL-MCNC: 10 MG/DL (ref 8.8–10.2)
CHLORIDE SERPL-SCNC: 100 MMOL/L (ref 98–107)
CO2 SERPL-SCNC: 27 MMOL/L (ref 20–29)
CREAT SERPL-MCNC: 1 MG/DL (ref 0.6–1.1)
DIFFERENTIAL METHOD BLD: ABNORMAL
EOSINOPHIL # BLD: 0.12 K/UL (ref 0–0.8)
EOSINOPHIL NFR BLD: 2.1 % (ref 0.5–7.8)
ERYTHROCYTE [DISTWIDTH] IN BLOOD BY AUTOMATED COUNT: 14.6 % (ref 11.9–14.6)
GLUCOSE SERPL-MCNC: 80 MG/DL (ref 70–99)
HCT VFR BLD AUTO: 38.7 % (ref 35.8–46.3)
HGB BLD-MCNC: 12.5 G/DL (ref 11.7–15.4)
IMM GRANULOCYTES # BLD AUTO: 0.01 K/UL (ref 0–0.5)
IMM GRANULOCYTES NFR BLD AUTO: 0.2 % (ref 0–5)
LYMPHOCYTES # BLD: 1.89 K/UL (ref 0.5–4.6)
LYMPHOCYTES NFR BLD: 32.9 % (ref 13–44)
MCH RBC QN AUTO: 31.9 PG (ref 26.1–32.9)
MCHC RBC AUTO-ENTMCNC: 32.3 G/DL (ref 31.4–35)
MCV RBC AUTO: 98.7 FL (ref 82–102)
MONOCYTES # BLD: 0.56 K/UL (ref 0.1–1.3)
MONOCYTES NFR BLD: 9.7 % (ref 4–12)
NEUTS SEG # BLD: 3.12 K/UL (ref 1.7–8.2)
NEUTS SEG NFR BLD: 54.2 % (ref 43–78)
NRBC # BLD: 0 K/UL (ref 0–0.2)
PLATELET # BLD AUTO: 194 K/UL (ref 150–450)
PMV BLD AUTO: 12.3 FL (ref 9.4–12.3)
POTASSIUM SERPL-SCNC: 4.9 MMOL/L (ref 3.5–5.1)
RBC # BLD AUTO: 3.92 M/UL (ref 4.05–5.2)
SODIUM SERPL-SCNC: 140 MMOL/L (ref 136–145)
WBC # BLD AUTO: 5.8 K/UL (ref 4.3–11.1)

## 2025-01-27 PROCEDURE — 1090F PRES/ABSN URINE INCON ASSESS: CPT | Performed by: INTERNAL MEDICINE

## 2025-01-27 PROCEDURE — G8420 CALC BMI NORM PARAMETERS: HCPCS | Performed by: INTERNAL MEDICINE

## 2025-01-27 PROCEDURE — 1123F ACP DISCUSS/DSCN MKR DOCD: CPT | Performed by: INTERNAL MEDICINE

## 2025-01-27 PROCEDURE — 1126F AMNT PAIN NOTED NONE PRSNT: CPT | Performed by: INTERNAL MEDICINE

## 2025-01-27 PROCEDURE — 1036F TOBACCO NON-USER: CPT | Performed by: INTERNAL MEDICINE

## 2025-01-27 PROCEDURE — G8428 CUR MEDS NOT DOCUMENT: HCPCS | Performed by: INTERNAL MEDICINE

## 2025-01-27 PROCEDURE — 99215 OFFICE O/P EST HI 40 MIN: CPT | Performed by: INTERNAL MEDICINE

## 2025-01-27 RX ORDER — SODIUM CHLORIDE 0.9 % (FLUSH) 0.9 %
5-40 SYRINGE (ML) INJECTION PRN
OUTPATIENT
Start: 2025-01-27

## 2025-01-27 RX ORDER — ASPIRIN 325 MG
325 TABLET ORAL ONCE
OUTPATIENT
Start: 2025-01-27 | End: 2025-01-27

## 2025-01-27 RX ORDER — SODIUM CHLORIDE 9 MG/ML
INJECTION, SOLUTION INTRAVENOUS PRN
OUTPATIENT
Start: 2025-01-27

## 2025-01-27 RX ORDER — SODIUM CHLORIDE 0.9 % (FLUSH) 0.9 %
5-40 SYRINGE (ML) INJECTION EVERY 12 HOURS SCHEDULED
OUTPATIENT
Start: 2025-01-27

## 2025-01-27 RX ORDER — LORAZEPAM 0.5 MG/1
0.5 TABLET ORAL
OUTPATIENT
Start: 2025-01-27 | End: 2025-01-28

## 2025-01-27 RX ORDER — SODIUM CHLORIDE 9 MG/ML
INJECTION, SOLUTION INTRAVENOUS CONTINUOUS
OUTPATIENT
Start: 2025-01-27

## 2025-01-27 NOTE — PROGRESS NOTES
with the patient and before seeing the patient today.  Also independently reviewed outside records when available.       ASSESSMENT:    Katie was seen today for coronary artery disease, hypertension, hyperlipidemia and palpitations.    Diagnoses and all orders for this visit:    Atherosclerosis of native coronary artery of native heart with angina pectoris (HCC)  -     Basic Metabolic Panel; Future  -     CBC with Auto Differential; Future    Hypertension, benign    PVD (peripheral vascular disease) (HCC)    Bilateral carotid artery stenosis    Mixed hyperlipidemia    Other orders  -     Left Heart Cath / Coronary Angiography; Standing  -     Initiate PAT Protocol; Future  -     Verify informed consent; Standing  -     Verify pre-procedure history and physical completed; Standing  -     Procedure Consent; Standing  -     Diagnosis for Procedure; Standing  -     Vital signs per unit routine; Standing  -     Vital signs (specify frequency); Standing  -     Diet NPO Exceptions are: Sips of Water with Meds; Standing  -     Verify modification of diabetic agents; Standing  -     Skin cleansing in pre-procedure area; Standing  -     Clip procedure site; Standing  -     Initiate Oxygen Therapy Protocol; Standing  -     CBC; Standing  -     Basic Metabolic Panel; Standing  -     Electrocardiogram, 12-lead ; Standing  -     0.9 % sodium chloride infusion  -     sodium chloride flush 0.9 % injection 5-40 mL  -     sodium chloride flush 0.9 % injection 5-40 mL  -     0.9 % sodium chloride infusion  -     aspirin tablet 325 mg  -     LORazepam (ATIVAN) tablet 0.5 mg  -     Left Heart Cath / Coronary Angiography          PLAN:     1. CAD: EF normal, normal NST in the past.    More angina, sister with PCI recently.  She is worried.    More YENY, plan on definitive C.     Plan for Left Heart Catheterization and possible Percutaneous Coronary Intervention (PCI) at [facility] due to Worsening angina within the last 1-2

## 2025-01-31 PROBLEM — I25.119 ATHEROSCLEROSIS OF NATIVE CORONARY ARTERY OF NATIVE HEART WITH ANGINA PECTORIS (HCC): Status: ACTIVE | Noted: 2025-01-27

## 2025-02-07 ENCOUNTER — TELEPHONE (OUTPATIENT)
Age: 85
End: 2025-02-07

## 2025-02-07 DIAGNOSIS — I35.0 NONRHEUMATIC AORTIC VALVE STENOSIS: Primary | ICD-10-CM

## 2025-02-07 DIAGNOSIS — I10 PRIMARY HYPERTENSION: ICD-10-CM

## 2025-02-07 NOTE — TELEPHONE ENCOUNTER
Spoke with pt regarding physician's response about Heart Cath. Echo order placed  and will cancel heart cath.Pt voiced understanding.

## 2025-02-07 NOTE — TELEPHONE ENCOUNTER
Patient called and she wants an appt.  She is wondering if she can have at Transthorasic Echo instead of a cath?

## 2025-02-17 ENCOUNTER — OFFICE VISIT (OUTPATIENT)
Age: 85
End: 2025-02-17
Payer: MEDICARE

## 2025-02-17 DIAGNOSIS — M05.732 RHEUMATOID ARTHRITIS INVOLVING BOTH WRISTS WITH POSITIVE RHEUMATOID FACTOR (HCC): ICD-10-CM

## 2025-02-17 DIAGNOSIS — M15.2 DEGENERATIVE ARTHRITIS OF PROXIMAL INTERPHALANGEAL JOINT OF INDEX FINGER OF LEFT HAND: Primary | ICD-10-CM

## 2025-02-17 DIAGNOSIS — G56.02 LEFT CARPAL TUNNEL SYNDROME: ICD-10-CM

## 2025-02-17 DIAGNOSIS — M05.731 RHEUMATOID ARTHRITIS INVOLVING BOTH WRISTS WITH POSITIVE RHEUMATOID FACTOR (HCC): ICD-10-CM

## 2025-02-17 PROCEDURE — G8420 CALC BMI NORM PARAMETERS: HCPCS | Performed by: ORTHOPAEDIC SURGERY

## 2025-02-17 PROCEDURE — G8428 CUR MEDS NOT DOCUMENT: HCPCS | Performed by: ORTHOPAEDIC SURGERY

## 2025-02-17 PROCEDURE — 1036F TOBACCO NON-USER: CPT | Performed by: ORTHOPAEDIC SURGERY

## 2025-02-17 PROCEDURE — 20605 DRAIN/INJ JOINT/BURSA W/O US: CPT | Performed by: ORTHOPAEDIC SURGERY

## 2025-02-17 PROCEDURE — 1090F PRES/ABSN URINE INCON ASSESS: CPT | Performed by: ORTHOPAEDIC SURGERY

## 2025-02-17 PROCEDURE — 99214 OFFICE O/P EST MOD 30 MIN: CPT | Performed by: ORTHOPAEDIC SURGERY

## 2025-02-17 PROCEDURE — 1123F ACP DISCUSS/DSCN MKR DOCD: CPT | Performed by: ORTHOPAEDIC SURGERY

## 2025-02-17 PROCEDURE — 20600 DRAIN/INJ JOINT/BURSA W/O US: CPT | Performed by: ORTHOPAEDIC SURGERY

## 2025-02-17 RX ORDER — BETAMETHASONE SODIUM PHOSPHATE AND BETAMETHASONE ACETATE 3; 3 MG/ML; MG/ML
6 INJECTION, SUSPENSION INTRA-ARTICULAR; INTRALESIONAL; INTRAMUSCULAR; SOFT TISSUE ONCE
Status: COMPLETED | OUTPATIENT
Start: 2025-02-17 | End: 2025-02-17

## 2025-02-17 RX ADMIN — BETAMETHASONE SODIUM PHOSPHATE AND BETAMETHASONE ACETATE 6 MG: 3; 3 INJECTION, SUSPENSION INTRA-ARTICULAR; INTRALESIONAL; INTRAMUSCULAR; SOFT TISSUE at 11:32

## 2025-02-17 NOTE — PROGRESS NOTES
alcohol swab. Time out completed. The Bilateral radiocarpal joints  was injected with 1ml of 6mg/ml Betamethasone and 1ml xylocaine plain 1%. The injection site was then dressed with a bandaid. The patient tolerated the injection well. The patient was instructed to monitor their blood sugars if diabetic and call if any concerns. The patient was instructed to call the office if any adverse local effects occurred or any if any questions or concerns arise.     Procedure Note    The risk, benefits and alternatives of injection and no injection therapy were discussed. The patient consented for an injection. The patient has been identified by name and birthdate. The injection site was identified, marked and prepped with a alcohol swab. Time out completed. The Left index PIP joints  was injected with 1ml of 6mg/ml Betamethasone and 1ml xylocaine plain 1%. The injection site was then dressed with a bandaid. The patient tolerated the injection well. The patient was instructed to monitor their blood sugars if diabetic and call if any concerns. The patient was instructed to call the office if any adverse local effects occurred or any if any questions or concerns arise.    Patient understands risks and benefits of LEFT ULTRASOUND-GUIDED CARPAL TUNNEL RELEASE including but not limited to nerve injury, vessel injury, infection, failure to achieve desired results and possible need for additional surgery. Patient understands and wishes to proceed with surgery.     On Exam:   The patient is alert and oriented; ;   Lung auscultation is clear bilaterally   Heart has RRR without murmurs      Patient voiced accordance and understanding of the information provided and the formulated plan. All questions were answered to the patient's satisfaction during the encounter.    Heath Gibson MD  Orthopaedic Surgery  02/17/25  11:50 AM

## 2025-02-25 DIAGNOSIS — G56.02 LEFT CARPAL TUNNEL SYNDROME: Primary | ICD-10-CM

## 2025-02-25 DIAGNOSIS — G56.02 CARPAL TUNNEL SYNDROME ON LEFT: ICD-10-CM

## 2025-03-04 DIAGNOSIS — Z11.1 SCREENING EXAMINATION FOR PULMONARY TUBERCULOSIS: Primary | ICD-10-CM

## 2025-03-05 ENCOUNTER — NURSE ONLY (OUTPATIENT)
Dept: RHEUMATOLOGY | Age: 85
End: 2025-03-05

## 2025-03-05 VITALS
HEART RATE: 74 BPM | BODY MASS INDEX: 25.06 KG/M2 | DIASTOLIC BLOOD PRESSURE: 56 MMHG | WEIGHT: 146 LBS | TEMPERATURE: 98 F | SYSTOLIC BLOOD PRESSURE: 127 MMHG

## 2025-03-05 DIAGNOSIS — M05.9 SEROPOSITIVE RHEUMATOID ARTHRITIS (HCC): Primary | ICD-10-CM

## 2025-03-05 DIAGNOSIS — Z11.1 SCREENING EXAMINATION FOR PULMONARY TUBERCULOSIS: ICD-10-CM

## 2025-03-05 DIAGNOSIS — M15.4 EROSIVE OSTEOARTHRITIS OF HANDS, BILATERAL: ICD-10-CM

## 2025-03-05 DIAGNOSIS — Z79.899 LONG-TERM USE OF HIGH-RISK MEDICATION: ICD-10-CM

## 2025-03-05 DIAGNOSIS — M05.9 SEROPOSITIVE RHEUMATOID ARTHRITIS (HCC): ICD-10-CM

## 2025-03-05 LAB
ALBUMIN SERPL-MCNC: 3.7 G/DL (ref 3.2–4.6)
ALBUMIN/GLOB SERPL: 1.2 (ref 1–1.9)
ALP SERPL-CCNC: 79 U/L (ref 35–104)
ALT SERPL-CCNC: 22 U/L (ref 8–45)
ANION GAP SERPL CALC-SCNC: 10 MMOL/L (ref 7–16)
AST SERPL-CCNC: 30 U/L (ref 15–37)
BASOPHILS # BLD: 0.04 K/UL (ref 0–0.2)
BASOPHILS NFR BLD: 0.8 % (ref 0–2)
BILIRUB SERPL-MCNC: 0.5 MG/DL (ref 0–1.2)
BUN SERPL-MCNC: 17 MG/DL (ref 8–23)
CALCIUM SERPL-MCNC: 9.5 MG/DL (ref 8.8–10.2)
CHLORIDE SERPL-SCNC: 106 MMOL/L (ref 98–107)
CO2 SERPL-SCNC: 24 MMOL/L (ref 20–29)
CREAT SERPL-MCNC: 0.98 MG/DL (ref 0.6–1.1)
CRP SERPL-MCNC: <0.3 MG/DL (ref 0–0.4)
DIFFERENTIAL METHOD BLD: ABNORMAL
EOSINOPHIL # BLD: 0.04 K/UL (ref 0–0.8)
EOSINOPHIL NFR BLD: 0.8 % (ref 0.5–7.8)
ERYTHROCYTE [DISTWIDTH] IN BLOOD BY AUTOMATED COUNT: 15 % (ref 11.9–14.6)
ERYTHROCYTE [SEDIMENTATION RATE] IN BLOOD: 11 MM/HR (ref 0–30)
GLOBULIN SER CALC-MCNC: 3 G/DL (ref 2.3–3.5)
GLUCOSE SERPL-MCNC: 98 MG/DL (ref 70–99)
HCT VFR BLD AUTO: 35.7 % (ref 35.8–46.3)
HGB BLD-MCNC: 11.9 G/DL (ref 11.7–15.4)
IMM GRANULOCYTES # BLD AUTO: 0.02 K/UL (ref 0–0.5)
IMM GRANULOCYTES NFR BLD AUTO: 0.4 % (ref 0–5)
LYMPHOCYTES # BLD: 1.13 K/UL (ref 0.5–4.6)
LYMPHOCYTES NFR BLD: 23.3 % (ref 13–44)
MCH RBC QN AUTO: 32.2 PG (ref 26.1–32.9)
MCHC RBC AUTO-ENTMCNC: 33.3 G/DL (ref 31.4–35)
MCV RBC AUTO: 96.5 FL (ref 82–102)
MONOCYTES # BLD: 0.38 K/UL (ref 0.1–1.3)
MONOCYTES NFR BLD: 7.9 % (ref 4–12)
NEUTS SEG # BLD: 3.23 K/UL (ref 1.7–8.2)
NEUTS SEG NFR BLD: 66.8 % (ref 43–78)
NRBC # BLD: 0 K/UL (ref 0–0.2)
PLATELET # BLD AUTO: 198 K/UL (ref 150–450)
PMV BLD AUTO: 11.2 FL (ref 9.4–12.3)
POTASSIUM SERPL-SCNC: 4.6 MMOL/L (ref 3.5–5.1)
PROT SERPL-MCNC: 6.7 G/DL (ref 6.3–8.2)
RBC # BLD AUTO: 3.7 M/UL (ref 4.05–5.2)
SODIUM SERPL-SCNC: 141 MMOL/L (ref 136–145)
WBC # BLD AUTO: 4.8 K/UL (ref 4.3–11.1)

## 2025-03-05 RX ORDER — DIPHENHYDRAMINE HYDROCHLORIDE 50 MG/ML
50 INJECTION INTRAMUSCULAR; INTRAVENOUS
Status: DISCONTINUED | OUTPATIENT
Start: 2025-03-05 | End: 2025-03-05 | Stop reason: HOSPADM

## 2025-03-05 RX ORDER — DIPHENHYDRAMINE HYDROCHLORIDE 50 MG/ML
50 INJECTION INTRAMUSCULAR; INTRAVENOUS
OUTPATIENT
Start: 2025-04-09

## 2025-03-05 RX ORDER — INFLIXIMAB 100 MG/10ML
3 INJECTION, POWDER, LYOPHILIZED, FOR SOLUTION INTRAVENOUS ONCE
Status: COMPLETED | OUTPATIENT
Start: 2025-03-05 | End: 2025-03-05

## 2025-03-05 RX ORDER — HYDROCORTISONE SODIUM SUCCINATE 100 MG/2ML
100 INJECTION INTRAMUSCULAR; INTRAVENOUS
OUTPATIENT
Start: 2025-04-09

## 2025-03-05 RX ORDER — FAMOTIDINE 10 MG/ML
20 INJECTION, SOLUTION INTRAVENOUS
OUTPATIENT
Start: 2025-04-09

## 2025-03-05 RX ORDER — FAMOTIDINE 10 MG/ML
20 INJECTION, SOLUTION INTRAVENOUS
Status: DISCONTINUED | OUTPATIENT
Start: 2025-03-05 | End: 2025-03-05 | Stop reason: HOSPADM

## 2025-03-05 RX ORDER — ACETAMINOPHEN 325 MG/1
650 TABLET ORAL ONCE
Status: DISCONTINUED | OUTPATIENT
Start: 2025-03-05 | End: 2025-03-05 | Stop reason: HOSPADM

## 2025-03-05 RX ORDER — EPINEPHRINE 1 MG/ML
0.3 INJECTION, SOLUTION, CONCENTRATE INTRAVENOUS PRN
OUTPATIENT
Start: 2025-04-09

## 2025-03-05 RX ORDER — DIPHENHYDRAMINE HCL 25 MG
25 TABLET ORAL ONCE
Status: DISCONTINUED | OUTPATIENT
Start: 2025-03-05 | End: 2025-03-05 | Stop reason: HOSPADM

## 2025-03-05 RX ORDER — INFLIXIMAB 100 MG/10ML
3 INJECTION, POWDER, LYOPHILIZED, FOR SOLUTION INTRAVENOUS ONCE
Start: 2025-04-09 | End: 2025-04-09

## 2025-03-05 RX ORDER — ALBUTEROL SULFATE 90 UG/1
4 INHALANT RESPIRATORY (INHALATION) PRN
OUTPATIENT
Start: 2025-04-09

## 2025-03-05 RX ORDER — ACETAMINOPHEN 325 MG/1
650 TABLET ORAL
Status: DISCONTINUED | OUTPATIENT
Start: 2025-03-05 | End: 2025-03-05 | Stop reason: HOSPADM

## 2025-03-05 RX ORDER — ALBUTEROL SULFATE 90 UG/1
4 INHALANT RESPIRATORY (INHALATION) PRN
Status: DISCONTINUED | OUTPATIENT
Start: 2025-03-05 | End: 2025-03-05 | Stop reason: HOSPADM

## 2025-03-05 RX ORDER — EPINEPHRINE 1 MG/ML
0.3 INJECTION, SOLUTION, CONCENTRATE INTRAVENOUS PRN
Status: DISCONTINUED | OUTPATIENT
Start: 2025-03-05 | End: 2025-03-05 | Stop reason: HOSPADM

## 2025-03-05 RX ORDER — SODIUM CHLORIDE 9 MG/ML
INJECTION, SOLUTION INTRAVENOUS CONTINUOUS
Status: DISCONTINUED | OUTPATIENT
Start: 2025-03-05 | End: 2025-03-05 | Stop reason: HOSPADM

## 2025-03-05 RX ORDER — ONDANSETRON 2 MG/ML
8 INJECTION INTRAMUSCULAR; INTRAVENOUS
OUTPATIENT
Start: 2025-04-09

## 2025-03-05 RX ORDER — ACETAMINOPHEN 325 MG/1
650 TABLET ORAL ONCE
OUTPATIENT
Start: 2025-04-09 | End: 2025-04-09

## 2025-03-05 RX ORDER — ONDANSETRON 2 MG/ML
8 INJECTION INTRAMUSCULAR; INTRAVENOUS
Status: DISCONTINUED | OUTPATIENT
Start: 2025-03-05 | End: 2025-03-05 | Stop reason: HOSPADM

## 2025-03-05 RX ORDER — SODIUM CHLORIDE 9 MG/ML
INJECTION, SOLUTION INTRAVENOUS CONTINUOUS
OUTPATIENT
Start: 2025-04-09

## 2025-03-05 RX ORDER — ACETAMINOPHEN 325 MG/1
650 TABLET ORAL
OUTPATIENT
Start: 2025-04-09

## 2025-03-05 RX ORDER — DIPHENHYDRAMINE HCL 25 MG
25 TABLET ORAL ONCE
OUTPATIENT
Start: 2025-04-09 | End: 2025-04-09

## 2025-03-05 RX ORDER — HYDROCORTISONE SODIUM SUCCINATE 100 MG/2ML
100 INJECTION INTRAMUSCULAR; INTRAVENOUS
Status: DISCONTINUED | OUTPATIENT
Start: 2025-03-05 | End: 2025-03-05 | Stop reason: HOSPADM

## 2025-03-05 RX ADMIN — INFLIXIMAB 200 MG: 100 INJECTION, POWDER, LYOPHILIZED, FOR SOLUTION INTRAVENOUS at 13:30

## 2025-03-05 NOTE — PROGRESS NOTES
[facility]  [address] [place] [address], [address]  [place] [address]  Office : [phone], Fax: [phone]       Remicade infusion 3/5/2025 200mg =3mg/kg infused in 0.9% NaCl. 0 mg of Remicade was wasted. Patient tolerated the infusion without complications. Infusion placed in LFA vein.  IV inserted by Vero Ureña RN  Next Infusion in 8 weeks- 4/30/25    IV insertion time: 1323  Medication start time: 1330  Medication completion time: 1345    Patient Medication Supplied? no    Patient discharged feeling well and instructed to call the office with any post-infusion issues.    Pre-Infusion Questionnaire  Has your insurance changed or have you received a new card since your last visit?  no  Have you had any infections since your last infusion?   no  Since your last visit, have you been hospitalized, been to the ER, or Urgent Care Center for any reason?  no  Have you recently had GI problems, open wounds, urinary problems, or chest pain?   no  Are you currently taking antibiotics?   no  Have you recently had or are you scheduled for any surgical procedures?   no  Have you had a TB test in the last year?   yes, DRAWN TODAY  Did you premedicate for today's infusion?  yes, zyrtec  Have you received any vaccinations in the last 6 months, or planning to receive in the next 3 months: COVID, Flu, Pheumonia, Shingles?  no  When was your last appointment with Dr. Chavis, Dr. Rios, or Dr. Hsieh?  10/23/24  When was your last infusion?        12/19/24  12. Are you in skilled nursing facility, Rehab, or living at a nursing home?      no    Reviewed and Confirmed by Katie Israel

## 2025-03-10 LAB
M TB IFN-G BLD-IMP: NEGATIVE
M TB IFN-G CD4+ T-CELLS BLD-ACNC: 0.04 IU/ML
M TBIFN-G CD4+ CD8+T-CELLS BLD-ACNC: 0.04 IU/ML
QUANTIFERON CRITERIA: NORMAL
QUANTIFERON MITOGEN VALUE: 8.98 IU/ML
QUANTIFERON NIL VALUE: 0.15 IU/ML
QUANTIFERON, INCUBATION: NORMAL

## 2025-03-11 NOTE — TELEPHONE ENCOUNTER
Dr Grissom's pt. Next ov 5/7/25 w/ Dr Grissom and last ov was 10/23/24 w/ Dr Grissom.   Pt had Remicada IV on 3/5/25.    I called and spoke with the pt and she is  having a terrible flare at the moment. She states the left side of her face, neck, and above her left eye is hurting. She said that the pain is severe, constant, and sharp. It is worse when she turns her head to the left. She has been taking Tylenol and Motrin and it is not helping at all. She said that this has been going on for 3 days now.

## 2025-03-13 ENCOUNTER — TELEPHONE (OUTPATIENT)
Dept: RHEUMATOLOGY | Age: 85
End: 2025-03-13

## 2025-03-13 RX ORDER — PREDNISONE 5 MG/1
TABLET ORAL
Qty: 30 TABLET | Refills: 0 | OUTPATIENT
Start: 2025-03-13

## 2025-04-03 ENCOUNTER — TELEPHONE (OUTPATIENT)
Dept: ORTHOPEDIC SURGERY | Age: 85
End: 2025-04-03

## 2025-04-03 ENCOUNTER — TELEPHONE (OUTPATIENT)
Age: 85
End: 2025-04-03

## 2025-04-03 NOTE — TELEPHONE ENCOUNTER
Patient called stating she has the following request :    Would like test results from last procedure.  However, she would like the results to be mailed?    Please call and advise.

## 2025-04-03 NOTE — TELEPHONE ENCOUNTER
Called pt advised of Dr. Mendez's response. Pt gave a verbal understanding.   No f/u on file pt scheduled for f/u 7/18/25. Advised to call our office if needing to be seen sooner.     Pt wanting echo results mailed to her. Verified mailing address.

## 2025-04-03 NOTE — TELEPHONE ENCOUNTER
I have called the patient this afternoon, she wanted to know if the surgery that she is going to have on May 1st, carpal tunnel release will help the index finger as well. I did let her know that carpal tunnel can affect the first three fingers of the hand and that the surgery will help with the pain that she is having. She thanked me for calling.

## 2025-04-14 ENCOUNTER — TELEPHONE (OUTPATIENT)
Dept: ORTHOPEDIC SURGERY | Age: 85
End: 2025-04-14

## 2025-04-23 ENCOUNTER — TELEPHONE (OUTPATIENT)
Dept: ORTHOPEDIC SURGERY | Age: 85
End: 2025-04-23

## 2025-04-23 NOTE — TELEPHONE ENCOUNTER
Please call to confirm surgery details. Patient says he got a call from hospital today and they told her she would have to come in at 5:45 on 05/01. Patient says she can not come in that early.    [May participate in all age-appropriate activities] : [unfilled] May participate in all age-appropriate activities. [Influenza vaccine is recommended] : Influenza vaccine is recommended [FreeTextEntry1] : GEOVANNY is a 15 month old with complex medical history including but not limited to cardiac arrhythmia (listed as atrial flutter and supraventricular tachycardia on different documents) which required cardioversion. She was treated for the first year of her life with beta blocker medications. She has now been weaned off the medication. She has had no document recurrences of her arrhythmia off the medication.\par \par Her  workup to date did not reveal any evidence of significant structural cardiac abnormalities, functional cardiac abnormalities or baseline electrocardiographic abnormalities. \par \par Her echocardiogram was essentially normal today. it was difficult due to fighting and crying.\par \par I cannot find a reason for failure to thrive from a cadriac standpoint. Mom should consider trying to increase calories with PediaSure.\par \par She  does not require any restrictions from a cardiac standpoint.\par \par She does not require antibiotic prophylaxis from a cardiac standpoint. She  should continue with her   routine pediatric care.  [Needs SBE Prophylaxis] : [unfilled] does not need bacterial endocarditis prophylaxis

## 2025-04-23 NOTE — TELEPHONE ENCOUNTER
I have called the patient this morning to see who she spoke too, the patient did not answer. I did leave a VM for the patient to return my call.

## 2025-04-24 ENCOUNTER — TELEPHONE (OUTPATIENT)
Dept: ORTHOPEDIC SURGERY | Age: 85
End: 2025-04-24

## 2025-04-24 NOTE — TELEPHONE ENCOUNTER
I have called the patient this morning, she stated that she could not be at the hospital at 5:45 for her surgery at 7:45. She stated that she was going to need to have someone bring her and she has rheumatoid arthritis and could not get up that early. I told her that I would discuss with Arthur and let her know what we can do.    Metastatic breast cancer

## 2025-04-25 NOTE — TELEPHONE ENCOUNTER
I have called the patient this afternoon to let her know that  is working in getting her surgery moved to 10 and she will need to be there at 8 per gayle

## 2025-04-28 ENCOUNTER — TELEPHONE (OUTPATIENT)
Dept: ORTHOPEDIC SURGERY | Age: 85
End: 2025-04-28

## 2025-04-29 ENCOUNTER — TELEPHONE (OUTPATIENT)
Dept: RHEUMATOLOGY | Age: 85
End: 2025-04-29

## 2025-04-29 NOTE — TELEPHONE ENCOUNTER
Pt scheduled for next Remicade infusion on 4/30/25. Pt also scheduled for carpal tunnel surgery on 5/1/25. Is she ok to infuse, or should we reschedule? Thanks!

## 2025-04-30 ENCOUNTER — CLINICAL SUPPORT (OUTPATIENT)
Dept: RHEUMATOLOGY | Age: 85
End: 2025-04-30
Payer: MEDICARE

## 2025-04-30 VITALS
TEMPERATURE: 97.7 F | BODY MASS INDEX: 24.89 KG/M2 | DIASTOLIC BLOOD PRESSURE: 80 MMHG | SYSTOLIC BLOOD PRESSURE: 124 MMHG | WEIGHT: 145 LBS | HEART RATE: 56 BPM

## 2025-04-30 DIAGNOSIS — M15.4 EROSIVE OSTEOARTHRITIS OF HANDS, BILATERAL: Primary | ICD-10-CM

## 2025-04-30 DIAGNOSIS — M19.90 OSTEOARTHRITIS, UNSPECIFIED OSTEOARTHRITIS TYPE, UNSPECIFIED SITE: ICD-10-CM

## 2025-04-30 DIAGNOSIS — M05.9 SEROPOSITIVE RHEUMATOID ARTHRITIS (HCC): Primary | ICD-10-CM

## 2025-04-30 PROCEDURE — 96413 CHEMO IV INFUSION 1 HR: CPT | Performed by: INTERNAL MEDICINE

## 2025-04-30 PROCEDURE — 96415 CHEMO IV INFUSION ADDL HR: CPT | Performed by: INTERNAL MEDICINE

## 2025-04-30 RX ORDER — EPINEPHRINE 1 MG/ML
0.3 INJECTION, SOLUTION, CONCENTRATE INTRAVENOUS PRN
OUTPATIENT
Start: 2025-06-25

## 2025-04-30 RX ORDER — SODIUM CHLORIDE 9 MG/ML
INJECTION, SOLUTION INTRAVENOUS CONTINUOUS
Status: DISCONTINUED | OUTPATIENT
Start: 2025-04-30 | End: 2025-04-30 | Stop reason: HOSPADM

## 2025-04-30 RX ORDER — ACETAMINOPHEN 325 MG/1
650 TABLET ORAL ONCE
OUTPATIENT
Start: 2025-06-25 | End: 2025-06-25

## 2025-04-30 RX ORDER — DIPHENHYDRAMINE HCL 25 MG
25 TABLET ORAL ONCE
Status: DISCONTINUED | OUTPATIENT
Start: 2025-04-30 | End: 2025-04-30 | Stop reason: HOSPADM

## 2025-04-30 RX ORDER — HYDROCORTISONE SODIUM SUCCINATE 100 MG/2ML
100 INJECTION INTRAMUSCULAR; INTRAVENOUS
OUTPATIENT
Start: 2025-06-25

## 2025-04-30 RX ORDER — ALBUTEROL SULFATE 90 UG/1
4 INHALANT RESPIRATORY (INHALATION) PRN
OUTPATIENT
Start: 2025-06-25

## 2025-04-30 RX ORDER — ONDANSETRON 2 MG/ML
8 INJECTION INTRAMUSCULAR; INTRAVENOUS
Status: DISCONTINUED | OUTPATIENT
Start: 2025-04-30 | End: 2025-04-30 | Stop reason: HOSPADM

## 2025-04-30 RX ORDER — FAMOTIDINE 10 MG/ML
20 INJECTION, SOLUTION INTRAVENOUS
Status: DISCONTINUED | OUTPATIENT
Start: 2025-04-30 | End: 2025-04-30 | Stop reason: HOSPADM

## 2025-04-30 RX ORDER — EPINEPHRINE 1 MG/ML
0.3 INJECTION, SOLUTION, CONCENTRATE INTRAVENOUS PRN
Status: DISCONTINUED | OUTPATIENT
Start: 2025-04-30 | End: 2025-04-30 | Stop reason: HOSPADM

## 2025-04-30 RX ORDER — DIPHENHYDRAMINE HYDROCHLORIDE 50 MG/ML
50 INJECTION, SOLUTION INTRAMUSCULAR; INTRAVENOUS
Status: DISCONTINUED | OUTPATIENT
Start: 2025-04-30 | End: 2025-04-30 | Stop reason: HOSPADM

## 2025-04-30 RX ORDER — ACETAMINOPHEN 325 MG/1
650 TABLET ORAL
Status: DISCONTINUED | OUTPATIENT
Start: 2025-04-30 | End: 2025-04-30 | Stop reason: HOSPADM

## 2025-04-30 RX ORDER — INFLIXIMAB 100 MG/10ML
3 INJECTION, POWDER, LYOPHILIZED, FOR SOLUTION INTRAVENOUS ONCE
Status: COMPLETED | OUTPATIENT
Start: 2025-04-30 | End: 2025-04-30

## 2025-04-30 RX ORDER — HYDROCORTISONE SODIUM SUCCINATE 100 MG/2ML
100 INJECTION INTRAMUSCULAR; INTRAVENOUS
Status: DISCONTINUED | OUTPATIENT
Start: 2025-04-30 | End: 2025-04-30 | Stop reason: HOSPADM

## 2025-04-30 RX ORDER — ALBUTEROL SULFATE 90 UG/1
4 INHALANT RESPIRATORY (INHALATION) PRN
Status: DISCONTINUED | OUTPATIENT
Start: 2025-04-30 | End: 2025-04-30 | Stop reason: HOSPADM

## 2025-04-30 RX ORDER — ACETAMINOPHEN 325 MG/1
650 TABLET ORAL ONCE
Status: DISCONTINUED | OUTPATIENT
Start: 2025-04-30 | End: 2025-04-30 | Stop reason: HOSPADM

## 2025-04-30 RX ORDER — DIPHENHYDRAMINE HCL 25 MG
25 TABLET ORAL ONCE
OUTPATIENT
Start: 2025-06-25 | End: 2025-06-25

## 2025-04-30 RX ORDER — DIPHENHYDRAMINE HYDROCHLORIDE 50 MG/ML
50 INJECTION, SOLUTION INTRAMUSCULAR; INTRAVENOUS
OUTPATIENT
Start: 2025-06-25

## 2025-04-30 RX ORDER — ONDANSETRON 2 MG/ML
8 INJECTION INTRAMUSCULAR; INTRAVENOUS
OUTPATIENT
Start: 2025-06-25

## 2025-04-30 RX ORDER — FAMOTIDINE 10 MG/ML
20 INJECTION, SOLUTION INTRAVENOUS
OUTPATIENT
Start: 2025-06-25

## 2025-04-30 RX ORDER — INFLIXIMAB 100 MG/10ML
3 INJECTION, POWDER, LYOPHILIZED, FOR SOLUTION INTRAVENOUS ONCE
Start: 2025-06-25 | End: 2025-06-25

## 2025-04-30 RX ORDER — ACETAMINOPHEN 325 MG/1
650 TABLET ORAL
OUTPATIENT
Start: 2025-06-25

## 2025-04-30 RX ORDER — SODIUM CHLORIDE 9 MG/ML
INJECTION, SOLUTION INTRAVENOUS CONTINUOUS
OUTPATIENT
Start: 2025-06-25

## 2025-04-30 RX ADMIN — INFLIXIMAB 200 MG: 100 INJECTION, POWDER, LYOPHILIZED, FOR SOLUTION INTRAVENOUS at 13:30

## 2025-04-30 NOTE — PROGRESS NOTES
[facility] RHEUMATOLOGY  [address] [place] [address], [address]  [place] [address]  Office : [phone], Fax: [phone]       Remicade infusion 4/30/2025 200mg =3mg/kg infused in 0.9% NaCl. 0 mg of Remicade was wasted. Patient tolerated the infusion without complications. Infusion placed in LFA vein.  IV inserted by Vero Ureña RN  Next Infusion to be 2 weeks after carpal tunnel surgery (to be done in 9 weeks).    IV insertion time: 1320  Medication start time: 1330  Medication completion time: 1535    Patient Medication Supplied? no    Patient discharged feeling well and instructed to call the office with any post-infusion issues.    Pre-Infusion Questionnaire  Has your insurance changed or have you received a new card since your last visit?  no  Have you had any infections since your last infusion?   no  Since your last visit, have you been hospitalized, been to the ER, or Urgent Care Center for any reason?  no  Have you recently had GI problems, open wounds, urinary problems, or chest pain?   no  Are you currently taking antibiotics?   no  Have you recently had or are you scheduled for any surgical procedures?   yes, carpal tunnel surgery to be done in 9 weeks per Dr Grissom  Have you had a TB test in the last year?   yes, 3/5/25  Did you premedicate for today's infusion?  yes, zyrtec  Have you received any vaccinations in the last 6 months, or planning to receive in the next 3 months: COVID, Flu, Pheumonia, Shingles?  no  When was your last appointment with Dr. Chavis, Dr. Rios, or Dr. Hsieh?  10/23/24  When was your last infusion?        3/5/25  12. Are you in skilled nursing facility, Rehab, or living at a nursing home?      no    Reviewed and Confirmed by Katie Israel

## 2025-04-30 NOTE — TELEPHONE ENCOUNTER
Pt requesting refill on tramadol prescription and says she's been out for months. Pt also says she did not see pain management. Pt to see Dr. Grissom on 5/7.

## 2025-05-01 RX ORDER — TRAMADOL HYDROCHLORIDE 50 MG/1
50 TABLET ORAL 2 TIMES DAILY PRN
Qty: 60 TABLET | Refills: 0 | Status: SHIPPED | OUTPATIENT
Start: 2025-05-01 | End: 2025-05-31

## 2025-05-01 NOTE — TELEPHONE ENCOUNTER
Primary Rheumatologist: Dr. Grissom     Last OV:4/30/2025   Next OV:Visit date not found    Medication Requested: Tramadol  Rx last written:  10/23/2024    Plan:  traMADol (ULTRAM) 50 MG tablet; Take 1 tablet by mouth 2 times daily as needed for Pain for up to 30 days. Take lowest dose possible to manage pain Max Daily Amount: 100 mg     Recent Labs:    Lab Results   Component Value Date    WBC 4.8 03/05/2025    HGB 11.9 03/05/2025    HCT 35.7 (L) 03/05/2025    MCV 96.5 03/05/2025     03/05/2025    LYMPHOPCT 23.3 03/05/2025    RBC 3.70 (L) 03/05/2025    MCH 32.2 03/05/2025    MCHC 33.3 03/05/2025    RDW 15.0 (H) 03/05/2025     Lab Results   Component Value Date     03/05/2025    K 4.6 03/05/2025     03/05/2025    CO2 24 03/05/2025    BUN 17 03/05/2025    CREATININE 0.98 03/05/2025    GLUCOSE 98 03/05/2025    CALCIUM 9.5 03/05/2025    BILITOT 0.5 03/05/2025    ALKPHOS 79 03/05/2025    AST 30 03/05/2025    ALT 22 03/05/2025    LABGLOM 56 (L) 03/05/2025    GFRAA >60 08/23/2022    AGRATIO 1.0 (L) 11/30/2021    GLOB 3.0 03/05/2025     Lab Results   Component Value Date    CRP <0.3 03/05/2025      Lab Results   Component Value Date    SEDRATE 11 03/05/2025      Lab Results   Component Value Date/Time    VITD25 35.3 10/23/2024 04:35 PM

## 2025-05-02 DIAGNOSIS — G56.02 LEFT CARPAL TUNNEL SYNDROME: Primary | ICD-10-CM

## 2025-05-07 ENCOUNTER — OFFICE VISIT (OUTPATIENT)
Dept: RHEUMATOLOGY | Age: 85
End: 2025-05-07
Payer: MEDICARE

## 2025-05-07 VITALS
OXYGEN SATURATION: 96 % | BODY MASS INDEX: 24.82 KG/M2 | WEIGHT: 145.4 LBS | SYSTOLIC BLOOD PRESSURE: 130 MMHG | HEART RATE: 71 BPM | HEIGHT: 64 IN | DIASTOLIC BLOOD PRESSURE: 76 MMHG

## 2025-05-07 DIAGNOSIS — M05.9 SEROPOSITIVE RHEUMATOID ARTHRITIS (HCC): Primary | Chronic | ICD-10-CM

## 2025-05-07 DIAGNOSIS — Z79.899 LONG-TERM USE OF HIGH-RISK MEDICATION: Chronic | ICD-10-CM

## 2025-05-07 DIAGNOSIS — M15.4 EROSIVE OSTEOARTHRITIS OF HANDS, BILATERAL: Chronic | ICD-10-CM

## 2025-05-07 DIAGNOSIS — M81.0 AGE-RELATED OSTEOPOROSIS WITHOUT CURRENT PATHOLOGICAL FRACTURE: Chronic | ICD-10-CM

## 2025-05-07 PROCEDURE — 1160F RVW MEDS BY RX/DR IN RCRD: CPT | Performed by: INTERNAL MEDICINE

## 2025-05-07 PROCEDURE — 1159F MED LIST DOCD IN RCRD: CPT | Performed by: INTERNAL MEDICINE

## 2025-05-07 PROCEDURE — 1123F ACP DISCUSS/DSCN MKR DOCD: CPT | Performed by: INTERNAL MEDICINE

## 2025-05-07 PROCEDURE — 1036F TOBACCO NON-USER: CPT | Performed by: INTERNAL MEDICINE

## 2025-05-07 PROCEDURE — 1090F PRES/ABSN URINE INCON ASSESS: CPT | Performed by: INTERNAL MEDICINE

## 2025-05-07 PROCEDURE — 99214 OFFICE O/P EST MOD 30 MIN: CPT | Performed by: INTERNAL MEDICINE

## 2025-05-07 PROCEDURE — G8420 CALC BMI NORM PARAMETERS: HCPCS | Performed by: INTERNAL MEDICINE

## 2025-05-07 PROCEDURE — G8427 DOCREV CUR MEDS BY ELIG CLIN: HCPCS | Performed by: INTERNAL MEDICINE

## 2025-05-07 RX ORDER — ALENDRONATE SODIUM 70 MG/1
70 TABLET ORAL
Qty: 12 TABLET | Refills: 0 | Status: SHIPPED | OUTPATIENT
Start: 2025-05-07 | End: 2025-08-05

## 2025-05-07 RX ORDER — OMEPRAZOLE 20 MG/1
40 CAPSULE, DELAYED RELEASE ORAL DAILY
Qty: 90 CAPSULE | Refills: 1 | Status: SHIPPED
Start: 2025-05-07

## 2025-05-07 RX ORDER — CHOLECALCIFEROL (VITAMIN D3) 25 MCG
1 TABLET ORAL DAILY
Qty: 90 TABLET | Refills: 0 | Status: SHIPPED | OUTPATIENT
Start: 2025-05-07 | End: 2025-08-05

## 2025-05-07 NOTE — PATIENT INSTRUCTIONS
Reschedule carpal tunnel surgery June 25 th  I refilled the tramadol prescription  Get the shingrix vaccine and RSV don't get them at the same time

## 2025-05-07 NOTE — PROGRESS NOTES
[facility] Rhe[facility]  [address] [facility] , [address]   [place], [place]-[address]  Office : [phone], Fax: [phone]     RHEUMATOLOGY OFFICE VISIT NOTE  Date of Visit:  5/7/2025 10:17 AM      SUBJECTIVE:  Katie Israel is a 84 y.o. 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 Degenerative pattern likely is erosive osteoarthritis superimposed on rheumatoid arthritis  Negative CCP, SO, ESR, CRP         Current tx:  Infliximab 3mg/kg q4 wk started 7/10/2024  Failed tx:  SSZ - constipation  Leflunomide - asthenia  Contraindicated tx:  HCQ - h/o R eye macular pucker  MTX - CKD    Erosive osteoarthritis of hands, bilateral  Current tx:   Tramadol 50 mg bid prn     Age-related osteoporosis without current pathological fracture    DXA    8/2012 - Right hip T score -2.0; Left radius T score -1.8  5/2024 - Left radius T score -3.3  no dental implants or extractions planned.   Current tx:  Fosamax 70 mg po qwk (started 5/2024)  Prior tx:  She took fosamax years ago, unsure of duration    CKD stage 3 35.2-39.7 mL/min    Last OV 10/23/24    --------------    Today:  Since the last visit the patient is feeling worse.    Her  passed away march 3rd 2025.   Joint pain:  She's out of pain medicine.   Remicade has helped reduce her stiffness in the hands. Swelling has not changed. Around 11 am hands, arms, feet, elbow everything starts killing her. She sleeps well at night. She now has muscle cramps in her legs to the point she cries. She drinks a lot of water.   She's getting ready to have surgery for L CTS, surgery in 5/29/25. She will reschedule   She's been taking tylenol 500 mg twice a day w/o much improvement.   Bilateral hands, thinks left hand

## 2025-05-08 ENCOUNTER — TELEPHONE (OUTPATIENT)
Dept: ORTHOPEDIC SURGERY | Age: 85
End: 2025-05-08

## 2025-05-08 NOTE — TELEPHONE ENCOUNTER
Patient has been called, she has been scheduled for June 16,2025 for pre-op, sx has been rescheduled to July 3rd,2025

## 2025-05-08 NOTE — TELEPHONE ENCOUNTER
Patient will call  or can we go ahead and schedule her after July 2 for pre op appt, and then surgery? She had an infusion for rheumatology had it on 4/30 she said so wait 9 weeks the dr told her

## 2025-05-21 ENCOUNTER — OFFICE VISIT (OUTPATIENT)
Age: 85
End: 2025-05-21
Payer: MEDICARE

## 2025-05-21 VITALS
BODY MASS INDEX: 25.16 KG/M2 | DIASTOLIC BLOOD PRESSURE: 62 MMHG | SYSTOLIC BLOOD PRESSURE: 106 MMHG | WEIGHT: 147.4 LBS | HEIGHT: 64 IN | HEART RATE: 76 BPM

## 2025-05-21 DIAGNOSIS — D62 ACUTE BLOOD LOSS ANEMIA: ICD-10-CM

## 2025-05-21 DIAGNOSIS — I65.23 BILATERAL CAROTID ARTERY STENOSIS: ICD-10-CM

## 2025-05-21 DIAGNOSIS — R00.2 PALPITATIONS: ICD-10-CM

## 2025-05-21 DIAGNOSIS — E78.2 MIXED HYPERLIPIDEMIA: ICD-10-CM

## 2025-05-21 DIAGNOSIS — I25.10 ATHEROSCLEROSIS OF NATIVE CORONARY ARTERY OF NATIVE HEART WITHOUT ANGINA PECTORIS: ICD-10-CM

## 2025-05-21 DIAGNOSIS — I10 HYPERTENSION, BENIGN: ICD-10-CM

## 2025-05-21 DIAGNOSIS — I73.9 PVD (PERIPHERAL VASCULAR DISEASE): Primary | ICD-10-CM

## 2025-05-21 PROCEDURE — 99214 OFFICE O/P EST MOD 30 MIN: CPT | Performed by: INTERNAL MEDICINE

## 2025-05-21 PROCEDURE — 1123F ACP DISCUSS/DSCN MKR DOCD: CPT | Performed by: INTERNAL MEDICINE

## 2025-05-21 PROCEDURE — 1126F AMNT PAIN NOTED NONE PRSNT: CPT | Performed by: INTERNAL MEDICINE

## 2025-05-21 PROCEDURE — G8417 CALC BMI ABV UP PARAM F/U: HCPCS | Performed by: INTERNAL MEDICINE

## 2025-05-21 PROCEDURE — G8428 CUR MEDS NOT DOCUMENT: HCPCS | Performed by: INTERNAL MEDICINE

## 2025-05-21 PROCEDURE — 1090F PRES/ABSN URINE INCON ASSESS: CPT | Performed by: INTERNAL MEDICINE

## 2025-05-21 PROCEDURE — 1036F TOBACCO NON-USER: CPT | Performed by: INTERNAL MEDICINE

## 2025-05-21 RX ORDER — GABAPENTIN 100 MG/1
100 CAPSULE ORAL NIGHTLY
COMMUNITY
Start: 2025-05-19

## 2025-05-21 NOTE — PROGRESS NOTES
artery stenosis          PLAN:     1. CAD: EF normal, normal NST in the past.  She declined LHC earlier this yr.      PHTN worsening on echo.   Consider pulm consult in the future, RHC as well.      Needs to see GI as planned for GIB issues.    OFF ASA now.      The patient has been instructed to call with any angina or equivalent as reviewed today. All questions were answered with the patient voicing complete understanding.        2. PVD/Carotid Dz:  US ok, OFF ASA with bleeding issue     3. HPL:  Being followed, no med needed.        4. Palp:  no AF seen, follow for more sx.  Off Dilt now.       Left Atrium: Left atrium is severely dilated.   Follow for PAF.      5. HTN:  some white coat HTN.  Follow for now.  Off meds.          Patient has been instructed and agrees to call our office with any issues or other concerns related to their cardiac condition(s) and/or complaint(s).        Return in about 3 months (around 8/21/2025).       Oneil Mendez, DO  5/21/2025

## 2025-06-11 RX ORDER — TRAMADOL HYDROCHLORIDE 50 MG/1
50 TABLET ORAL EVERY 12 HOURS
COMMUNITY

## 2025-06-11 RX ORDER — FOLIC ACID 1 MG/1
1 TABLET ORAL DAILY
COMMUNITY

## 2025-06-11 NOTE — PRE-PROCEDURE INSTRUCTIONS
Patient verified name and .  Order for consent  was not found in EHR and  patient verifies procedure.   Type lA surgery, phone assessment complete.  Orders not received.  Labs per surgeon: none at present time  Labs per anesthesia protocol: none per protocol  hgb 12.8 on 2025    Patient answered medical/surgical history questions at their best of ability. All prior to admission medications documented in EPIC.    Patient instructed to continue taking all prescription medications up to the day of surgery but to take only the following medications the day of surgery according to anesthesia guidelines with a small sip of water: Tramadol/Tylenol, Zyrtec, Citalopram, and Omeprazole        Patient informed that all vitamins and supplements should be held 7 days prior to surgery and NSAIDS 5 days prior to surgery.   Prescription meds to hold:no additional    Patient instructed on the following:    > Arrive at [facility] OPC Entrance, time of arrival to be called the day before by 1700  > No food after midnight, patient may drink clear liquids up until 2 hours prior to arrival. No gum, candy, mints.   > Responsible adult must drive patient to the hospital, stay during surgery, and patient will need supervision 24 hours after anesthesia  > Use non moisturizing soap in shower the night before surgery and on the morning of surgery  > All piercings must be removed prior to arrival.    > Leave all valuables (money and jewelry) at home but bring insurance card and ID on DOS.   > You may be required to pay a deductible or co-pay on the day of your procedure. You can pre-pay by calling [phone] if your surgery is at the [facility] or [phone] if your surgery is at the [facility].  > Do not wear make-up, nail polish, lotions, cologne, perfumes, powders, or oil on skin. Artificial nails are not permitted.

## 2025-06-12 RX ORDER — TRAMADOL HYDROCHLORIDE 50 MG/1
50 TABLET ORAL 2 TIMES DAILY PRN
Qty: 60 TABLET | Refills: 0 | OUTPATIENT
Start: 2025-06-12 | End: 2025-07-12

## 2025-06-12 NOTE — TELEPHONE ENCOUNTER
Primary Rheumatologist: Dr. Grissom     Last OV: 4/30/2025   Next OV: 9/23/2025    Medication Requested: Tramadol   Rx last written:  5/01/2025        Plan:    EoA/generalized OA - on tramadol 50 mg bid, continues to have joint pain. I have told the speak w/the patient that I cannot prescribe a higher dose than this, if needed she will need to be referred to pain management.    Recent Labs:    Lab Results   Component Value Date    WBC 4.8 03/05/2025    HGB 11.9 03/05/2025    HCT 35.7 (L) 03/05/2025    MCV 96.5 03/05/2025     03/05/2025    LYMPHOPCT 23.3 03/05/2025    RBC 3.70 (L) 03/05/2025    MCH 32.2 03/05/2025    MCHC 33.3 03/05/2025    RDW 15.0 (H) 03/05/2025     Lab Results   Component Value Date     03/05/2025    K 4.6 03/05/2025     03/05/2025    CO2 24 03/05/2025    BUN 17 03/05/2025    CREATININE 0.98 03/05/2025    GLUCOSE 98 03/05/2025    CALCIUM 9.5 03/05/2025    BILITOT 0.5 03/05/2025    ALKPHOS 79 03/05/2025    AST 30 03/05/2025    ALT 22 03/05/2025    LABGLOM 56 (L) 03/05/2025    GFRAA >60 08/23/2022    AGRATIO 1.0 (L) 11/30/2021    GLOB 3.0 03/05/2025     Lab Results   Component Value Date    CRP <0.3 03/05/2025      Lab Results   Component Value Date    SEDRATE 11 03/05/2025      Lab Results   Component Value Date/Time    VITD25 35.3 10/23/2024 04:35 PM

## 2025-06-18 ENCOUNTER — TELEPHONE (OUTPATIENT)
Age: 85
End: 2025-06-18

## 2025-06-18 NOTE — TELEPHONE ENCOUNTER
I called the patient this morning due to her missing her appointment at 11 for her pre-op for surgery on 7/3. The patient stated that she called multiple times and left multiple messages for the office letting us know that she could not come in today due to her transportation being in the hospital and she does not drive. She also stated that when she received a phone called from the hospital that she was told that she did not need to come in today that the hospital had it taken care of. She stated that she is also on track to having the surgery.      is aware.     She thanked me for calling and for making sure that she was okay.

## 2025-07-02 ENCOUNTER — ANESTHESIA EVENT (OUTPATIENT)
Dept: SURGERY | Age: 85
End: 2025-07-02
Payer: MEDICARE

## 2025-07-02 DIAGNOSIS — G56.02 LEFT CARPAL TUNNEL SYNDROME: Primary | ICD-10-CM

## 2025-07-02 NOTE — H&P
History and Physical    Subjective:     Katie Israel is a 84 y.o. scheduled for left ultrasound-guided carpal tunnel release.    Past Medical History:   Diagnosis Date    Acid reflux     Arthritis     osteo    Vazquez's esophagus     Managed with as needed meds    Bilateral carotid artery disease     Followed by cardiology- Dr. Mendez    Chest discomfort     denies current or recent CP 2/2/23    Coronary atherosclerosis of native coronary vessel     Degeneration of lumbar or lumbosacral intervertebral disc     Depression     medication     Gastric ulcer, unspecified as acute or chronic, without mention of hemorrhage or perforation 1989    H/O echocardiogram 02/10/2025    Left ventricular systolic function visually estimated EF of 55 - 60%. Left ventricle size normal. Normal wall thickness.  Indeterminate diastolic function. Mild sclerosis of the aortic valve cusps. Mitral Valve: Mild annular calcification at posterior leaflet. Mild regurgitation. Tricuspid Valve: Moderate regurgitation. Moderately elevated RVSP, consistent with moderate pulmonary hypertension.    History of anemia     History of blood transfusion     last in 09/2022 after shoulder surgery    Hyperlipidemia     Hypothyroidism     no meds needed currently (low normal range per pt)    Infectious disease     lyme disease 2004    Palpitations     PVD (peripheral vascular disease)     Scoliosis (and kyphoscoliosis), idiopathic     Spinal stenosis, lumbar 12/7/2009    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    Thoracic or lumbosacral neuritis or radiculitis, unspecified     Tricuspid regurgitation     mild per echo dated 1-11-13    Tricuspid valve disorder     Type 2 diabetes mellitus 08/27/2024    no meds (06/202)      Past Surgical History:   Procedure Laterality Date    APPENDECTOMY  1987    BREAST BIOPSY Left 1980    ENLARGED MILK GLAND    BUNIONECTOMY Bilateral 2009    with hammertoe correction    CARPAL TUNNEL RELEASE Bilateral

## 2025-07-02 NOTE — PERIOP NOTE
Preop department called to notify patient of arrival time for scheduled procedure. Instructions given to   - Arrive at OPC Entrance [address].  - Nothing to eat after midnight unless otherwise indicated. No gum, mints, or ice chips. You may have clear liquids two hours prior to arrival to the hospital.   - Have a responsible adult to drive patient to the hospital, stay during surgery, and patient will need supervision 24 hours after anesthesia.   - Use antibacterial soap in shower the night before surgery and on the morning of surgery.       Was patient contacted: Yes  Voicemail left:   Numbers contacted: [phone]   Arrival time: 0830  Time to stop clear liquids: 0630

## 2025-07-03 ENCOUNTER — ANESTHESIA (OUTPATIENT)
Dept: SURGERY | Age: 85
End: 2025-07-03
Payer: MEDICARE

## 2025-07-03 ENCOUNTER — HOSPITAL ENCOUNTER (OUTPATIENT)
Age: 85
Setting detail: OUTPATIENT SURGERY
Discharge: HOME | End: 2025-07-03
Attending: ORTHOPAEDIC SURGERY | Admitting: ORTHOPAEDIC SURGERY
Payer: MEDICARE

## 2025-07-03 VITALS
WEIGHT: 147 LBS | TEMPERATURE: 97.6 F | DIASTOLIC BLOOD PRESSURE: 64 MMHG | HEIGHT: 64 IN | SYSTOLIC BLOOD PRESSURE: 157 MMHG | OXYGEN SATURATION: 94 % | BODY MASS INDEX: 25.1 KG/M2 | RESPIRATION RATE: 26 BRPM | HEART RATE: 63 BPM

## 2025-07-03 PROCEDURE — 3600000012 HC SURGERY LEVEL 2 ADDTL 15MIN: Performed by: ORTHOPAEDIC SURGERY

## 2025-07-03 PROCEDURE — 6360000002 HC RX W HCPCS: Performed by: ORTHOPAEDIC SURGERY

## 2025-07-03 PROCEDURE — 6360000002 HC RX W HCPCS

## 2025-07-03 PROCEDURE — 7100000010 HC PHASE II RECOVERY - FIRST 15 MIN: Performed by: ORTHOPAEDIC SURGERY

## 2025-07-03 PROCEDURE — 7100000000 HC PACU RECOVERY - FIRST 15 MIN: Performed by: ORTHOPAEDIC SURGERY

## 2025-07-03 PROCEDURE — 3700000000 HC ANESTHESIA ATTENDED CARE: Performed by: ORTHOPAEDIC SURGERY

## 2025-07-03 PROCEDURE — 2709999900 HC NON-CHARGEABLE SUPPLY: Performed by: ORTHOPAEDIC SURGERY

## 2025-07-03 PROCEDURE — 2720000010 HC SURG SUPPLY STERILE: Performed by: ORTHOPAEDIC SURGERY

## 2025-07-03 PROCEDURE — 6360000002 HC RX W HCPCS: Performed by: NURSE PRACTITIONER

## 2025-07-03 PROCEDURE — 3700000001 HC ADD 15 MINUTES (ANESTHESIA): Performed by: ORTHOPAEDIC SURGERY

## 2025-07-03 PROCEDURE — 2580000003 HC RX 258

## 2025-07-03 PROCEDURE — 7100000001 HC PACU RECOVERY - ADDTL 15 MIN: Performed by: ORTHOPAEDIC SURGERY

## 2025-07-03 PROCEDURE — 3600000002 HC SURGERY LEVEL 2 BASE: Performed by: ORTHOPAEDIC SURGERY

## 2025-07-03 PROCEDURE — 64721 CARPAL TUNNEL SURGERY: CPT | Performed by: ORTHOPAEDIC SURGERY

## 2025-07-03 PROCEDURE — 76998 US GUIDE INTRAOP: CPT | Performed by: ORTHOPAEDIC SURGERY

## 2025-07-03 RX ORDER — SODIUM CHLORIDE, SODIUM LACTATE, POTASSIUM CHLORIDE, CALCIUM CHLORIDE 600; 310; 30; 20 MG/100ML; MG/100ML; MG/100ML; MG/100ML
INJECTION, SOLUTION INTRAVENOUS CONTINUOUS
Status: DISCONTINUED | OUTPATIENT
Start: 2025-07-03 | End: 2025-07-03 | Stop reason: HOSPADM

## 2025-07-03 RX ORDER — SODIUM CHLORIDE 0.9 % (FLUSH) 0.9 %
5-40 SYRINGE (ML) INJECTION EVERY 12 HOURS SCHEDULED
Status: DISCONTINUED | OUTPATIENT
Start: 2025-07-03 | End: 2025-07-03 | Stop reason: HOSPADM

## 2025-07-03 RX ORDER — LIDOCAINE HYDROCHLORIDE 20 MG/ML
INJECTION, SOLUTION EPIDURAL; INFILTRATION; INTRACAUDAL; PERINEURAL
Status: DISCONTINUED | OUTPATIENT
Start: 2025-07-03 | End: 2025-07-03 | Stop reason: SDUPTHER

## 2025-07-03 RX ORDER — SODIUM CHLORIDE, SODIUM LACTATE, POTASSIUM CHLORIDE, CALCIUM CHLORIDE 600; 310; 30; 20 MG/100ML; MG/100ML; MG/100ML; MG/100ML
INJECTION, SOLUTION INTRAVENOUS
Status: DISCONTINUED | OUTPATIENT
Start: 2025-07-03 | End: 2025-07-03 | Stop reason: SDUPTHER

## 2025-07-03 RX ORDER — TRAMADOL HYDROCHLORIDE 50 MG/1
50 TABLET ORAL EVERY 6 HOURS PRN
Qty: 20 TABLET | Refills: 0 | Status: SHIPPED | OUTPATIENT
Start: 2025-07-03 | End: 2025-07-08

## 2025-07-03 RX ORDER — BUPIVACAINE HYDROCHLORIDE 2.5 MG/ML
INJECTION, SOLUTION EPIDURAL; INFILTRATION; INTRACAUDAL; PERINEURAL PRN
Status: DISCONTINUED | OUTPATIENT
Start: 2025-07-03 | End: 2025-07-03 | Stop reason: ALTCHOICE

## 2025-07-03 RX ORDER — OXYCODONE HYDROCHLORIDE 5 MG/1
5 TABLET ORAL
Status: DISCONTINUED | OUTPATIENT
Start: 2025-07-03 | End: 2025-07-03 | Stop reason: HOSPADM

## 2025-07-03 RX ORDER — SODIUM CHLORIDE 0.9 % (FLUSH) 0.9 %
5-40 SYRINGE (ML) INJECTION PRN
Status: DISCONTINUED | OUTPATIENT
Start: 2025-07-03 | End: 2025-07-03 | Stop reason: HOSPADM

## 2025-07-03 RX ORDER — ACETAMINOPHEN 500 MG
1000 TABLET ORAL ONCE
Status: DISCONTINUED | OUTPATIENT
Start: 2025-07-03 | End: 2025-07-03 | Stop reason: HOSPADM

## 2025-07-03 RX ORDER — FENTANYL CITRATE 50 UG/ML
100 INJECTION, SOLUTION INTRAMUSCULAR; INTRAVENOUS
Status: DISCONTINUED | OUTPATIENT
Start: 2025-07-03 | End: 2025-07-03 | Stop reason: HOSPADM

## 2025-07-03 RX ORDER — FENTANYL CITRATE 50 UG/ML
INJECTION, SOLUTION INTRAMUSCULAR; INTRAVENOUS
Status: DISCONTINUED | OUTPATIENT
Start: 2025-07-03 | End: 2025-07-03 | Stop reason: SDUPTHER

## 2025-07-03 RX ORDER — LIDOCAINE HYDROCHLORIDE 10 MG/ML
INJECTION, SOLUTION INFILTRATION; PERINEURAL PRN
Status: DISCONTINUED | OUTPATIENT
Start: 2025-07-03 | End: 2025-07-03 | Stop reason: ALTCHOICE

## 2025-07-03 RX ORDER — SODIUM CHLORIDE 9 MG/ML
INJECTION, SOLUTION INTRAVENOUS PRN
Status: DISCONTINUED | OUTPATIENT
Start: 2025-07-03 | End: 2025-07-03 | Stop reason: HOSPADM

## 2025-07-03 RX ORDER — PROPOFOL 10 MG/ML
INJECTION, EMULSION INTRAVENOUS
Status: DISCONTINUED | OUTPATIENT
Start: 2025-07-03 | End: 2025-07-03 | Stop reason: SDUPTHER

## 2025-07-03 RX ORDER — MIDAZOLAM HYDROCHLORIDE 2 MG/2ML
2 INJECTION, SOLUTION INTRAMUSCULAR; INTRAVENOUS
Status: DISCONTINUED | OUTPATIENT
Start: 2025-07-03 | End: 2025-07-03 | Stop reason: HOSPADM

## 2025-07-03 RX ORDER — IBUPROFEN 600 MG/1
1 TABLET ORAL PRN
Status: DISCONTINUED | OUTPATIENT
Start: 2025-07-03 | End: 2025-07-03 | Stop reason: HOSPADM

## 2025-07-03 RX ORDER — DIPHENHYDRAMINE HYDROCHLORIDE 50 MG/ML
12.5 INJECTION, SOLUTION INTRAMUSCULAR; INTRAVENOUS
Status: DISCONTINUED | OUTPATIENT
Start: 2025-07-03 | End: 2025-07-03 | Stop reason: HOSPADM

## 2025-07-03 RX ORDER — DEXTROSE MONOHYDRATE 100 MG/ML
INJECTION, SOLUTION INTRAVENOUS CONTINUOUS PRN
Status: DISCONTINUED | OUTPATIENT
Start: 2025-07-03 | End: 2025-07-03 | Stop reason: HOSPADM

## 2025-07-03 RX ORDER — LIDOCAINE HYDROCHLORIDE 10 MG/ML
1 INJECTION, SOLUTION INFILTRATION; PERINEURAL
Status: DISCONTINUED | OUTPATIENT
Start: 2025-07-03 | End: 2025-07-03 | Stop reason: HOSPADM

## 2025-07-03 RX ORDER — NALOXONE HYDROCHLORIDE 0.4 MG/ML
INJECTION, SOLUTION INTRAMUSCULAR; INTRAVENOUS; SUBCUTANEOUS PRN
Status: DISCONTINUED | OUTPATIENT
Start: 2025-07-03 | End: 2025-07-03 | Stop reason: HOSPADM

## 2025-07-03 RX ORDER — PROCHLORPERAZINE EDISYLATE 5 MG/ML
5 INJECTION INTRAMUSCULAR; INTRAVENOUS
Status: DISCONTINUED | OUTPATIENT
Start: 2025-07-03 | End: 2025-07-03 | Stop reason: HOSPADM

## 2025-07-03 RX ADMIN — LIDOCAINE HYDROCHLORIDE 50 MG: 20 INJECTION, SOLUTION EPIDURAL; INFILTRATION; INTRACAUDAL; PERINEURAL at 10:01

## 2025-07-03 RX ADMIN — PROPOFOL 150 MCG/KG/MIN: 10 INJECTION, EMULSION INTRAVENOUS at 10:01

## 2025-07-03 RX ADMIN — Medication 2000 MG: at 10:02

## 2025-07-03 RX ADMIN — FENTANYL CITRATE 50 MCG: 50 INJECTION, SOLUTION INTRAMUSCULAR; INTRAVENOUS at 10:05

## 2025-07-03 RX ADMIN — SODIUM CHLORIDE, SODIUM LACTATE, POTASSIUM CHLORIDE, AND CALCIUM CHLORIDE: 600; 310; 30; 20 INJECTION, SOLUTION INTRAVENOUS at 09:57

## 2025-07-03 ASSESSMENT — PAIN SCALES - GENERAL: PAINLEVEL_OUTOF10: 0

## 2025-07-03 NOTE — OP NOTE
Hand Surgery Operative Note      Katie Israel   84 y.o.   female      Pre-op diagnosis: Left Carpal Tunnel syndrome  Post op diagnosis: same      Procedure: Left Ultrasound-Guided Carpal Tunnel release cpt 38943, [id number]     Surgeon: Heath Gibson Jr, MD, PhD      Anesthesia: Local + MAC     Procedure indications: Patient with radial digit numbness recalcitrant to conservative measures with positive documentation of NCV findings consistent with carpal tunnel syndrome. After Thorough discussion, the patient decided to proceed with surgical management. We discussed in detail surgical risks including scar, pain, bleeding, infection, anesthetic risks, neurovascular injury, need for further surgery,  weakness, stiffness, risk of death and potential risk of other unforseen complication.      Procedure description:      Patient was placed in the supine position and after appropriate time-out and side, site and procedure confirmed.     Using a sterile ultrasound cover and sterile gel, relevant anatomical landmarks were identified, including but not limited to the median nerve, thenar motor branch/recurrent motor branch of the median nerve, palmar cutaneous branch of the median nerve, median and ulnar digital nerves and any visualized communications, ulnar vessels and superficial palmar arch, palmar fascia and distal transverse carpal ligament. A sterile ink marker was used to richy the borders of the transverse and longitudinal safe zones as well as the incision site in the proximal carpal tunnel region. The safe zones were re-scanned to ensure acceptable anatomy and sonographic visualization.    A regional anesthetic was administered. A 25 gauge needle was used to create a small skin wheel at the incision site using 2 ml of buffered 1% lidocaine plain. Following this, under the guidance of ultrasound local anesthesia was delivered deep and superficial to the transverse carpal ligament using a 21-gauge 1.5 inch and  complications.    All ultrasound images were stored     Disposition: To PACU with no complications and follow up per routine.  Patient is instructed to remove dressings in five days and other precautions include avoidance of heavy and repetitive lifting for 2 weeks, when an appointment for follow up and suture removal will take place.     Heath Gibson MD  07/03/25  10:12 AM

## 2025-07-03 NOTE — ANESTHESIA POSTPROCEDURE EVALUATION
Department of Anesthesiology  Postprocedure Note    Patient: Katie Israel  MRN: [id number]  YOB: 1940  Date of evaluation: 7/3/2025    Procedure Summary       Date: 07/03/25 Room / Location: [facility] OP OR 07 / [facility] O[facility]    Anesthesia Start: 0957 Anesthesia Stop: 1018    Procedure: CARPAL TUNNEL RELEASE ultra sound guided on the left (Left: Wrist) Diagnosis:       Carpal tunnel syndrome on left      (Carpal tunnel syndrome on left [G56.02])    Surgeons: Heath Gibson MD Responsible Provider: Dontea Healy MD    Anesthesia Type: TIVA ASA Status: 3            Anesthesia Type: No value filed.    Bryce Phase I: Bryce Score: 7    Bryce Phase II: Bryce Score: 10    Anesthesia Post Evaluation    Patient location during evaluation: PACU  Patient participation: complete - patient participated  Level of consciousness: awake and alert  Airway patency: patent  Nausea: well controlled.  Cardiovascular status: acceptable.  Respiratory status: acceptable  Hydration status: stable  Pain management: adequate    No notable events documented.

## 2025-07-03 NOTE — PROGRESS NOTES
Spiritual Health Critical Care Progress Note  [facility]      Room # OPOR/PL    Name: Katie Israel           Age: 84 y.o.    Gender: female          MRN: [id number]  P[employer]ec[employer]alism: Pres[employer]       Preferred Language: English      Date: 07/03/25  Visit Time: Begin Time: 0930 End Time : 0940  Complexity of Encounter: Low      Visit Summary:  met with patient and son in response to request for prayer before procedure. Patient expressed they have excellent support from son.  provided pastoral presence, prayer and empathetic listening.  is available for follow up as needed.       Referral/Consult From: Patient  Encounter Overview/Reason: Pre-Op  Service Provided For: Patient     Patient was available.    Roxanne, Belief, Meaning:   Patient has beliefs or practices that help with coping during difficult times  Family/Friends unable to assess at this time  Rituals (if applicable)      Importance and Influence:  Patient does not have spiritual/personal beliefs that influence decisions regarding their health  Family/Friends does not have spiritual/personal beliefs that influence decisions regarding the patient's health    Community:  Patient   indicated that they feel well-supported  Support System Includes   Children   Family/Friends   indicated that they feel well-supported    Assessment and Plan of Care:   Emotions Expressed by Patient:   Assessment: Calm, Coping    Interventions by :   Intervention: Active listening, Prayer (assurance of)/Butler, Sustaining Presence/Ministry of presence     Result/ Response by Patient:   Outcome: Comfort, Coping, Receptive, Expressed feelings of Rhonda, Peace and/or Love    Patient Plan of Care:         Emotions Expressed by Spouse/Family/Friends:   calm  hopeful     Interventions with Spouse/ Family/Friends include:   active listening  prayer  provided ministry of presence    Spouse/Family/Friends Plan of Care:   Spiritual care

## 2025-07-03 NOTE — ANESTHESIA PRE PROCEDURE
Department of Anesthesiology  Preprocedure Note       Name:  Katie Israel   Age:  84 y.o.  :  1940                                          MRN:  [id number]         Date:  7/3/2025      Surgeon: Surgeon(s):  Heath Gibson MD    Procedure: Procedure(s):  CARPAL TUNNEL RELEASE ultra sound guided on the left    Medications prior to admission:   Prior to Admission medications    Medication Sig Start Date End Date Taking? Authorizing Provider   Multiple Vitamin (MULTIVITAMINS PO) Take 1 tablet by mouth daily   Yes Therese Oliveros MD   folic acid (FOLVITE) 1 MG tablet Take 1 tablet by mouth daily   Yes Therese Oliveros MD   traMADol (ULTRAM) 50 MG tablet Take 1 tablet by mouth in the morning and 1 tablet in the evening. Max Daily Amount: 100 mg.   Yes Therese Oliveros MD   gabapentin (NEURONTIN) 100 MG capsule Take 1 capsule by mouth nightly. 25  Yes Therese Oliveros MD   vitamin D3 (CHOLECALCIFEROL) 25 MCG (1000 UT) TABS tablet Take 1 tablet by mouth daily 25 Yes Ye Grissom DO   omeprazole (PRILOSEC) 20 MG delayed release capsule Take 2 capsules by mouth Daily 25  Yes Ye Grissom DO   alendronate (FOSAMAX) 70 MG tablet Take 1 tablet by mouth every 7 days Take with a full glass of water upon arising for the day. Consume on an empty stomach at least 30 minutes before the first food, beverage, or medication. Stay upright (do not lie down) for at least 30 minutes. Do not crush or break.  Patient taking differently: Take 1 tablet by mouth every 7 days Take with a full glass of water upon arising for the day. Consume on an empty stomach at least 30 minutes before the first food, beverage, or medication. Stay upright (do not lie down) for at least 30 minutes. Do not crush or break. On 25 Yes Ye Grissom DO   b complex vitamins capsule Take 1 capsule by mouth daily   Yes Therese Oliveros MD   citalopram (CELEXA) 20 MG tablet

## 2025-07-07 ENCOUNTER — TELEPHONE (OUTPATIENT)
Dept: ORTHOPEDIC SURGERY | Age: 85
End: 2025-07-07

## 2025-07-09 ENCOUNTER — TELEPHONE (OUTPATIENT)
Dept: RHEUMATOLOGY | Age: 85
End: 2025-07-09

## 2025-07-09 NOTE — TELEPHONE ENCOUNTER
Patient called stating she is having some issues and she isn't sure what the issues are but she would like to talk to Dr. Grissom. The last time I spoke with this patient a few weeks ago she told me she is getting set up with a new Rheumatologist and she told me to cancel her appointment. Her follow up was scheduled for September. If one of you ladies could reach out to her and see what's going on since she really didn't explain much in the voicemail. Just let me know if she does want an appointment. [facility] is booking pretty far right now.

## 2025-07-10 NOTE — TELEPHONE ENCOUNTER
PHONE CALL to patient to see what is going on. Last OV was 5/7/25. She is asking about the Remicade infusions and next steps. Was not sure when she was clear to infuse or when she was going to see Dr. Grissom again.   She felt the Remicade was helping her, but she said the last 3 infusions she felt better before the infusion than after the infusion. She is having pain \"all over\". Carpel tunnel surgery 7/3/25- healing well. Her last Remicade infusion was 4/30/25. Note states she can resume remicade infusions 2 weeks after her CTS. She would be interested in trying a different infusion, as she doesn't feel that Remicade has provided much benefit (has been on since 7/2024). Would like to discuss with Dr. Grissom even if it can be with a phone call. I explained her schedule is full, but she may have a cancellation. She does not drive and would need notice to get a ride.   She just changed her PCP to [facility]. She cannot drive. Seems there was confusion that she didn't want to see Dr. Grissom, but she does want to stay with her and likes her a lot. Her appt for sept was cancelled in error.   Tramadol Last written 6/16/25 for 60 tabs. No one else writes for her pain meds. She is requesting a refill on the tramadol to hold her until her next appt.     Taking Gabapentin qhs- 100 mg- has helped her (written by pcp)

## 2025-07-11 ENCOUNTER — TELEPHONE (OUTPATIENT)
Dept: RHEUMATOLOGY | Age: 85
End: 2025-07-11

## 2025-07-13 DIAGNOSIS — M15.4 EROSIVE OSTEOARTHRITIS OF HANDS, BILATERAL: ICD-10-CM

## 2025-07-13 DIAGNOSIS — M19.90 OSTEOARTHRITIS, UNSPECIFIED OSTEOARTHRITIS TYPE, UNSPECIFIED SITE: ICD-10-CM

## 2025-07-13 DIAGNOSIS — M05.9 SEROPOSITIVE RHEUMATOID ARTHRITIS (HCC): ICD-10-CM

## 2025-07-13 RX ORDER — TRAMADOL HYDROCHLORIDE 50 MG/1
50 TABLET ORAL 2 TIMES DAILY PRN
Qty: 60 TABLET | Refills: 0 | Status: SHIPPED | OUTPATIENT
Start: 2025-07-13 | End: 2025-08-12

## 2025-08-18 ENCOUNTER — TELEPHONE (OUTPATIENT)
Age: 85
End: 2025-08-18

## 2025-08-26 ENCOUNTER — TELEPHONE (OUTPATIENT)
Dept: ORTHOPEDIC SURGERY | Age: 85
End: 2025-08-26

## 2025-08-26 ENCOUNTER — TELEPHONE (OUTPATIENT)
Dept: ADMINISTRATIVE | Age: 85
End: 2025-08-26

## 2025-09-03 ENCOUNTER — TELEPHONE (OUTPATIENT)
Dept: RHEUMATOLOGY | Age: 85
End: 2025-09-03

## (undated) DEVICE — GLOVE ORANGE PI 7 1/2   MSG9075

## (undated) DEVICE — SINGLE PORT MANIFOLD: Brand: NEPTUNE 2

## (undated) DEVICE — KENDALL RADIOLUCENT FOAM MONITORING ELECTRODE RECTANGULAR SHAPE: Brand: KENDALL

## (undated) DEVICE — SYRINGE MED 3ML CLR PLAS STD N CTRL LUERLOCK TIP DISP

## (undated) DEVICE — DISPOSABLE DRAPE, STERILE, FOR A CDS-3060 5 FOOT TABLE: Brand: PEDIGO PRODUCTS, INC.

## (undated) DEVICE — SYRINGE MED 10ML LUERLOCK TIP W/O SFTY DISP

## (undated) DEVICE — CONNECTOR TBNG OD5-7MM O2 END DISP

## (undated) DEVICE — COVER,TABLE,HEAVY DUTY,79"X110",STRL: Brand: MEDLINE

## (undated) DEVICE — LIQUIBAND RAPID ADHESIVE 36/CS 0.8ML: Brand: MEDLINE

## (undated) DEVICE — GAUZE,SPONGE,4"X4",12PLY,WOVEN,NS,LF: Brand: MEDLINE

## (undated) DEVICE — SPONGE GZ W4XL4IN COT 12 PLY TYP VII WVN C FLD DSGN

## (undated) DEVICE — SLING ARM SWTH UNIV FOAM 1 SZ FITS MOST

## (undated) DEVICE — PADDING,UNDERCAST,COTTON, 4"X4YD STERILE: Brand: MEDLINE

## (undated) DEVICE — AIRLIFE™ OXYGEN TUBING 7 FEET (2.1 M) CRUSH RESISTANT OXYGEN TUBING, VINYL TIPPED: Brand: AIRLIFE™

## (undated) DEVICE — SUTURE N ABSRB BRAIDED 2-0 MO-6 39 IN 26 MM 1/2 CIR BLU BLK 3910900023

## (undated) DEVICE — BANDAGE COMPR L5YDXW2IN FOAM CO FLX

## (undated) DEVICE — SUTURE VCRL SZ 0 L27IN ABSRB UD L36MM CP-1 1/2 CIR REV CUT J267H

## (undated) DEVICE — LUBE JELLY FOIL PACK 1.4 OZ: Brand: MEDLINE INDUSTRIES, INC.

## (undated) DEVICE — HAND PACK: Brand: MEDLINE INDUSTRIES, INC.

## (undated) DEVICE — SUTURE ETHLN SZ 2-0 L18IN NONABSORBABLE BLK L26MM PS 3/8 585H

## (undated) DEVICE — OSCILLATING TIP SAW CARTRIDGE: Brand: STRYKER PRECISION

## (undated) DEVICE — SLING ARM ADLT ULTIMATE

## (undated) DEVICE — VINYL URETHRAL CATHETER: Brand: DOVER

## (undated) DEVICE — BANDAGE,GAUZE,BULKEE II,4.5"X4.1YD,STRL: Brand: MEDLINE

## (undated) DEVICE — INTEGUSEAL MICROBIAL SEALANT: Brand: AVANOS

## (undated) DEVICE — ARGYLE SIGMOID SURGICAL SUCTION INSTRUMENT 23 FR/CH (7.7 MM): Brand: ARGYLE

## (undated) DEVICE — BNDG,ELSTC,MATRIX,STRL,4"X5YD,LF,HOOK&LP: Brand: MEDLINE

## (undated) DEVICE — DRAPE TWL SURG 16X26IN BLU ORB04] ALLCARE INC]

## (undated) DEVICE — STRIP,CLOSURE,WOUND,MEDI-STRIP,1/2X4: Brand: MEDLINE

## (undated) DEVICE — SUTURE N ABSRB BRAIDED 5-0 CTX 39 IN 48 MM WHT BLK XBRAID S 3910900052

## (undated) DEVICE — CONTAINER FORMALIN PFILLED 10% NBF 40ML

## (undated) DEVICE — NEEDLE SYR 18GA L1.5IN RED PLAS HUB S STL BLNT FILL W/O

## (undated) DEVICE — NEEDLE HYPO 21GA L1.5IN INTRAMUSCULAR S STL LATCH BVL UP

## (undated) DEVICE — CUP HUM DIA42MM +9MM OFFSET STD SHLDR POLYETH DELT XTEND
Type: IMPLANTABLE DEVICE | Site: SHOULDER | Status: NON-FUNCTIONAL
Removed: 2022-08-18

## (undated) DEVICE — PAD,ABDOMINAL,5"X9",ST,LF,25/BX: Brand: MEDLINE INDUSTRIES, INC.

## (undated) DEVICE — BASIC SINGLE BASIN 2-LF: Brand: MEDLINE INDUSTRIES, INC.

## (undated) DEVICE — FORCEP RAD JAW W/NEEDLE 160CM

## (undated) DEVICE — SOLUTION IRRIG 1000ML H2O PIC PLAS SHATTERPROOF CONTAINER

## (undated) DEVICE — SOLUTION IRRIG 3000ML 0.9% SOD CHL USP UROMATIC PLAS CONT

## (undated) DEVICE — CONTAINER,SPECIMEN,O.R.STRL,4.5OZ: Brand: MEDLINE

## (undated) DEVICE — DRESSING,GAUZE,XEROFORM,CURAD,5"X9",ST: Brand: CURAD

## (undated) DEVICE — SOLUTION IRRIG 1000ML 09% SOD CHL USP PIC PLAS CONTAINER

## (undated) DEVICE — GAUZE,SPONGE,4"X4",16PLY,STRL,LF,10/TRAY: Brand: MEDLINE

## (undated) DEVICE — DRAPE,ORTHOMAX,ARTHRO T ,W/ POUCH: Brand: MEDLINE

## (undated) DEVICE — GARMENT,MEDLINE,DVT,INT,CALF,MED, GEN2: Brand: MEDLINE

## (undated) DEVICE — KIT PROCEDURE SURG T AND A ORAL TOTE

## (undated) DEVICE — PAD,NON-ADHERENT,3X8,STERILE,LF,1/PK: Brand: MEDLINE

## (undated) DEVICE — GUARD TEETH AD NYL FOR LARYNSCP POS PROTCT

## (undated) DEVICE — CANNULA NSL ORAL AD FOR CAPNOFLEX CO2 O2 AIRLFE

## (undated) DEVICE — DRAPE,TOP,102X53,STERILE: Brand: MEDLINE

## (undated) DEVICE — TOTAL TRAY, DB, 100% SILI FOLEY, 16FR 10: Brand: MEDLINE

## (undated) DEVICE — TOTAL SHOULDER DR POSTA: Brand: MEDLINE INDUSTRIES, INC.

## (undated) DEVICE — EVAC 70 XTRA HP WAND: Brand: COBLATION

## (undated) DEVICE — SYRINGE, LUER SLIP, STERILE, 60ML: Brand: MEDLINE

## (undated) DEVICE — BNDG,ELSTC,MATRIX,STRL,6"X5YD,LF,HOOK&LP: Brand: MEDLINE

## (undated) DEVICE — Device

## (undated) DEVICE — 48" PROBE COVER W/GEL, ULTRASOUND, STERILE: Brand: SITE-RITE

## (undated) DEVICE — BNDG,ELSTC,MATRIX,STRL,2"X5YD,LF,HOOK&LP: Brand: MEDLINE

## (undated) DEVICE — BLOCK BITE AD 60FR W/ VELC STRP ADDRESSES MOST PT AND

## (undated) DEVICE — YANKAUER,BULB TIP,W/O VENT,RIGID,STERILE: Brand: MEDLINE